# Patient Record
Sex: FEMALE | Race: WHITE | Employment: OTHER | URBAN - METROPOLITAN AREA
[De-identification: names, ages, dates, MRNs, and addresses within clinical notes are randomized per-mention and may not be internally consistent; named-entity substitution may affect disease eponyms.]

---

## 2017-02-22 RX ORDER — METOPROLOL TARTRATE 50 MG/1
TABLET ORAL
Qty: 180 TAB | Refills: 0 | Status: SHIPPED | OUTPATIENT
Start: 2017-02-22 | End: 2017-06-05 | Stop reason: SDUPTHER

## 2017-02-22 RX ORDER — CANDESARTAN 16 MG/1
TABLET ORAL
Qty: 90 TAB | Refills: 0 | Status: SHIPPED | OUTPATIENT
Start: 2017-02-22 | End: 2017-03-03

## 2017-02-22 RX ORDER — AMLODIPINE BESYLATE 10 MG/1
10 TABLET ORAL AS NEEDED
Qty: 90 TAB | Refills: 0 | Status: SHIPPED | OUTPATIENT
Start: 2017-02-22 | End: 2017-06-13 | Stop reason: SDUPTHER

## 2017-02-27 ENCOUNTER — TELEPHONE (OUTPATIENT)
Dept: CARDIOLOGY CLINIC | Age: 82
End: 2017-02-27

## 2017-02-27 NOTE — TELEPHONE ENCOUNTER
Patient called regarding rx for Atacand, she would like for someone to call her at [phone]. Thank you

## 2017-02-27 NOTE — TELEPHONE ENCOUNTER
Patient identified times 2. Judie is now 139.00 for a 90 day supply. She will look into seeing if she has a deductible that has to be met. She wants to know if there is another alternative that may be cheaper. permission given to leave a detailed voicemail if no answer.

## 2017-03-01 NOTE — TELEPHONE ENCOUNTER
Patient identified times 2. Spoke with patients  and relayed Dr. Duy Verma message as well as recommendations. They will notify the nurse once they come to a decision. Understanding verbalized.

## 2017-03-01 NOTE — TELEPHONE ENCOUNTER
Tell her to check cost of Diovan or Benicar  Last choice for me would be Losartan  She should call drug plan to get costs to her

## 2017-03-02 NOTE — TELEPHONE ENCOUNTER
Patient called and stated she checked on drug cost and losartan is the only tier 1 drug in her price range.  She can be reached at [phone]

## 2017-03-03 RX ORDER — LOSARTAN POTASSIUM 50 MG/1
50 TABLET ORAL DAILY
Qty: 90 TAB | Refills: 0 | Status: SHIPPED | OUTPATIENT
Start: 2017-03-03 | End: 2017-06-05 | Stop reason: SDUPTHER

## 2017-03-03 NOTE — TELEPHONE ENCOUNTER
Patient identified times 2. Informed of medication recommendations as listed and confirmed pharmacy. Also reminded her that her and her  still need follow ups with Dr. Gio Suarez. Understanding verbalized.   Verbal order per Dr. Gio Suarez

## 2017-04-20 RX ORDER — HYDROCHLOROTHIAZIDE 25 MG/1
25 TABLET ORAL DAILY
Qty: 90 TAB | Refills: 0 | Status: SHIPPED | OUTPATIENT
Start: 2017-04-20 | End: 2017-06-05 | Stop reason: SDUPTHER

## 2017-04-20 RX ORDER — ATORVASTATIN CALCIUM 20 MG/1
20 TABLET, FILM COATED ORAL
Qty: 90 TAB | Refills: 0 | Status: SHIPPED | OUTPATIENT
Start: 2017-04-20 | End: 2017-06-05 | Stop reason: SDUPTHER

## 2017-06-05 RX ORDER — AMLODIPINE BESYLATE 10 MG/1
10 TABLET ORAL AS NEEDED
Qty: 90 TAB | Refills: 0 | Status: SHIPPED | OUTPATIENT
Start: 2017-06-05 | End: 2017-06-13 | Stop reason: SDUPTHER

## 2017-06-05 RX ORDER — LOSARTAN POTASSIUM 50 MG/1
50 TABLET ORAL DAILY
Qty: 90 TAB | Refills: 0 | Status: SHIPPED | OUTPATIENT
Start: 2017-06-05 | End: 2017-06-13 | Stop reason: SDUPTHER

## 2017-06-05 RX ORDER — ATORVASTATIN CALCIUM 20 MG/1
20 TABLET, FILM COATED ORAL
Qty: 90 TAB | Refills: 0 | Status: SHIPPED | OUTPATIENT
Start: 2017-06-05 | End: 2017-06-13 | Stop reason: SDUPTHER

## 2017-06-05 RX ORDER — HYDROCHLOROTHIAZIDE 25 MG/1
25 TABLET ORAL DAILY
Qty: 90 TAB | Refills: 0 | Status: SHIPPED | OUTPATIENT
Start: 2017-06-05 | End: 2017-06-13 | Stop reason: SDUPTHER

## 2017-06-05 RX ORDER — METOPROLOL TARTRATE 50 MG/1
50 TABLET ORAL 2 TIMES DAILY
Qty: 180 TAB | Refills: 0 | Status: SHIPPED | OUTPATIENT
Start: 2017-06-05 | End: 2017-06-13 | Stop reason: SDUPTHER

## 2017-06-05 NOTE — TELEPHONE ENCOUNTER
Requested Prescriptions     Signed Prescriptions Disp Refills    amLODIPine (NORVASC) 10 mg tablet 90 Tab 0     Sig: Take 1 Tab by mouth as needed. PRN for SBP > 150. Keep appointment on 6-13-17 for further refills. Authorizing Provider: Lauryn Reyes     Ordering User: Lesvia Eden    atorvastatin (LIPITOR) 20 mg tablet 90 Tab 0     Sig: Take 1 Tab by mouth nightly. Keep appointment on 6-13-17 for further refills. Authorizing Provider: Lauryn Reyes     Ordering User: Lesvia Eden    metoprolol tartrate (LOPRESSOR) 50 mg tablet 180 Tab 0     Sig: Take 1 Tab by mouth two (2) times a day. Keep appointment on 6-13-17 for further refills. Authorizing Provider: Lauryn Hyatt User: Lesvia Eden    hydroCHLOROthiazide (HYDRODIURIL) 25 mg tablet 90 Tab 0     Sig: Take 1 Tab by mouth daily. Keep appointment on 6-13-17 for further refills. Authorizing Provider: Lauryn Hyatt User: Lesvia Eden    losartan (COZAAR) 50 mg tablet 90 Tab 0     Sig: Take 1 Tab by mouth daily. Keep appointment on 6-13-17 for further refills. Authorizing Provider: Lauryn Hyatt User: Lesvia Eden     Verbal order per Dr. Mary Tucker.    Follow up scheduled for 6-13-17 (overdue).

## 2017-06-13 ENCOUNTER — OFFICE VISIT (OUTPATIENT)
Dept: CARDIOLOGY CLINIC | Age: 82
End: 2017-06-13

## 2017-06-13 VITALS
DIASTOLIC BLOOD PRESSURE: 64 MMHG | HEIGHT: 64 IN | HEART RATE: 60 BPM | RESPIRATION RATE: 16 BRPM | OXYGEN SATURATION: 98 % | SYSTOLIC BLOOD PRESSURE: 120 MMHG | BODY MASS INDEX: 23.76 KG/M2 | WEIGHT: 139.2 LBS

## 2017-06-13 DIAGNOSIS — Z95.1 S/P CABG X 3: ICD-10-CM

## 2017-06-13 DIAGNOSIS — E78.00 PURE HYPERCHOLESTEROLEMIA: ICD-10-CM

## 2017-06-13 DIAGNOSIS — I25.10 ATHEROSCLEROSIS OF NATIVE CORONARY ARTERY OF NATIVE HEART WITHOUT ANGINA PECTORIS: Primary | ICD-10-CM

## 2017-06-13 DIAGNOSIS — I10 ESSENTIAL HYPERTENSION, BENIGN: ICD-10-CM

## 2017-06-13 DIAGNOSIS — R60.9 EDEMA, UNSPECIFIED TYPE: ICD-10-CM

## 2017-06-13 DIAGNOSIS — T46.4X5A COUGH DUE TO ACE INHIBITOR: ICD-10-CM

## 2017-06-13 DIAGNOSIS — R05.8 COUGH DUE TO ACE INHIBITOR: ICD-10-CM

## 2017-06-13 RX ORDER — LOSARTAN POTASSIUM 50 MG/1
50 TABLET ORAL DAILY
Qty: 90 TAB | Refills: 1 | Status: SHIPPED | OUTPATIENT
Start: 2017-06-13 | End: 2018-02-23 | Stop reason: SDUPTHER

## 2017-06-13 RX ORDER — ATORVASTATIN CALCIUM 20 MG/1
20 TABLET, FILM COATED ORAL
Qty: 90 TAB | Refills: 1 | Status: SHIPPED | OUTPATIENT
Start: 2017-06-13 | End: 2018-05-14 | Stop reason: SDUPTHER

## 2017-06-13 RX ORDER — AMLODIPINE BESYLATE 10 MG/1
10 TABLET ORAL AS NEEDED
Qty: 90 TAB | Refills: 1 | Status: SHIPPED | OUTPATIENT
Start: 2017-06-13 | End: 2018-02-23 | Stop reason: SDUPTHER

## 2017-06-13 RX ORDER — METOPROLOL TARTRATE 50 MG/1
50 TABLET ORAL 2 TIMES DAILY
Qty: 180 TAB | Refills: 1 | Status: SHIPPED | OUTPATIENT
Start: 2017-06-13 | End: 2018-02-10 | Stop reason: SDUPTHER

## 2017-06-13 RX ORDER — HYDROCHLOROTHIAZIDE 25 MG/1
25 TABLET ORAL DAILY
Qty: 90 TAB | Refills: 1 | Status: SHIPPED | OUTPATIENT
Start: 2017-06-13 | End: 2018-08-06 | Stop reason: SDUPTHER

## 2017-06-13 NOTE — MR AVS SNAPSHOT
Visit Information Date & Time Provider Department Dept. Phone Encounter #  
 6/13/2017  9:00 AM Irina Rowland MD [facility] [phone] [id number] Upcoming Health Maintenance Date Due DTaP/Tdap/Td series (1 - Tdap) 5/7/1952 ZOSTER VACCINE AGE 60> 5/7/1991 GLAUCOMA SCREENING Q2Y 5/7/1996 OSTEOPOROSIS SCREENING (DEXA) 5/7/1996 MEDICARE YEARLY EXAM 5/7/1996 Pneumococcal 65+ Low/Medium Risk (2 of 2 - PPSV23) 1/1/2012 INFLUENZA AGE 9 TO ADULT 8/1/2017 Allergies as of 6/13/2017  Review Complete On: 6/13/2017 By: Irina Rowland MD  
  
 Severity Noted Reaction Type Reactions Ciprofloxacin High 12/02/2010    Itching, Other (comments) Cipro, \"turn beet red like sunburn\" Codeine  12/02/2010    Itching Lisinopril  12/29/2010    Cough Lovastatin  12/29/2010    Diarrhea Current Immunizations  Reviewed on 6/18/2011 Name Date Influenza Vaccine Split 9/17/2010 Pneumococcal Vaccine (Unspecified Type) 1/1/2007 Not reviewed this visit Vitals BP Pulse Resp Height(growth percentile) Weight(growth percentile) SpO2  
 120/64 (BP 1 Location: Left arm, BP Patient Position: Sitting) 60 16 5' 4\" (1.626 m) 139 lb 3.2 oz (63.1 kg) 98% BMI Smoking Status 23.89 kg/m2 Never Smoker Vitals History BMI and BSA Data Body Mass Index Body Surface Area  
 23.89 kg/m 2 1.69 m 2 Preferred Pharmacy Pharmacy Name Phone [facility], [address] [phone] Your Updated Medication List  
  
   
This list is accurate as of: 6/13/17  9:43 AM.  Always use your most recent med list. amLODIPine 10 mg tablet Commonly known as:  Eloise Medici Take 1 Tab by mouth as needed. PRN for SBP > 150. aspirin 81 mg chewable tablet Take 81 mg by mouth daily. atorvastatin 20 mg tablet Commonly known as:  LIPITOR Take 1 Tab by mouth nightly. doxazosin 1 mg tablet Commonly known as:  CARDURA Take  by mouth daily. hydroCHLOROthiazide 25 mg tablet Commonly known as:  HYDRODIURIL Take 1 Tab by mouth daily. losartan 50 mg tablet Commonly known as:  COZAAR Take 1 Tab by mouth daily. metoprolol tartrate 50 mg tablet Commonly known as:  LOPRESSOR Take 1 Tab by mouth two (2) times a day. naproxen 250 mg tablet Commonly known as:  NAPROSYN Take  by mouth two (2) times daily (with meals). Prescriptions Sent to Pharmacy Refills  
 metoprolol tartrate (LOPRESSOR) 50 mg tablet 1 Sig: Take 1 Tab by mouth two (2) times a day. Class: Normal  
 Pharmacy: [place] [address] Ph #: [phone] Route: Oral  
 hydroCHLOROthiazide (HYDRODIURIL) 25 mg tablet 1 Sig: Take 1 Tab by mouth daily. Class: Normal  
 Pharmacy: [place] [address] Ph #: [phone] Route: Oral  
 amLODIPine (NORVASC) 10 mg tablet 1 Sig: Take 1 Tab by mouth as needed. PRN for SBP > 150. Class: Normal  
 Pharmacy: [place] [address] Ph #: [phone] Route: Oral  
 losartan (COZAAR) 50 mg tablet 1 Sig: Take 1 Tab by mouth daily. Class: Normal  
 Pharmacy: [place] [address] Ph #: [phone] Route: Oral  
 atorvastatin (LIPITOR) 20 mg tablet 1 Sig: Take 1 Tab by mouth nightly. Class: Normal  
 Pharmacy: [place] [address] Ph #: [phone] Route: Oral  
  
Patient Instructions You will need to follow up in clinic with Dr. Neil Joy in 1 year. Introducing \[facility] [place] [facility]\"" & [facility] SERVICES! Dear Jose A Mendes: Thank you for requesting a [employer] account. Our records indicate that you already have an active [employer] account. You can access your account anytime at [email or web address] Did you know that you can access your hospital and ER discharge instructions at any time in [employer]? You can also review all of your test results from your hospital stay or ER visit. Additional Information If you have questions, please visit the Frequently Asked Questions section of the [employer] website at [email or web address] Remember, [employer] is NOT to be used for urgent needs. For medical emergencies, dial 911. Now available from your iPhone and Android! Please provide this summary of care documentation to your next provider. Your primary care clinician is listed as Payton Shahid. If you have any questions after today's visit, please call [phone].

## 2017-06-13 NOTE — PROGRESS NOTES
Kiet James     5/7/1931       Jason Barrios MD, [facility]  Date of Visit-6/13/2017   PCP is Emeka Chacon MD   [facility] [place]  [facility]  HPI:  Kiet James is a 80 y.o. female   ONE YEAR FU   Hx of CAD, HTN, CABG, dyslipidemia   Unable to tolerate satin or ACE inhibitor   Subjective:  Denies any specific complaints of chest pain, chest pressure, PND, orthopnea. She's taking Amlodipine daily and likes it. She gets some mild edema at night. Her blood pressure is 120. She's tolerating multiple meds for blood pressure currently well and getting labs with primary care    AssessCdment/Plan:     Impression/Plan:  1. CAD, no angina. Continue beta blocker and aspirin. 2. Hypertension, significant improvement with changes in meds. 3. Statin intolerance, but she will tolerate some low dose of Lipitor. 4. Edema is controlled. Follow up in one year. Impression: The primary encounter diagnosis was Atherosclerosis of native coronary artery of native heart without angina pectoris. Diagnoses of Essential hypertension, benign, Pure hypercholesterolemia, Edema, unspecified type, S/P CABG x 3, and Cough due to ACE inhibitor were also pertinent to this visit. Cardiac History:   Cath 6-:-- Global left ventricular function was normal. EF calculated by contrast  ventriculography was 65 %. -- CORONARY CIRCULATION:  -- Proximal LAD: There was a 50 % stenosis. -- Mid LAD: There was a long diffusely diseased segment tapering down to  a 95 % stenosis. With a 70% stenosis more distally. -- Proximal  circumflex: There was a very proximal 30-35% followed by a 90 % stenosis. -- 1st obtuse marginal: There was a 90 % stenosis. -- RCA: The vessel was small sized. Non-dominant vessel. -- Ostial RCA: There was a 90 % stenosis. -- Proximal RCA: There was a 50- 70 % long stenosis.   -- Mid RCA: There was a 80 % stenosis in the proximal portion of this  Segment. CABG- CABG - OFF PUMP - CABGx 3, lima, eric, epi aortic ultrasound - off pump, rightt endoscopic vein harvest; [id number] [place] [address]  to Juan TORRES Ao to OM1 and OM2     ROS-except as noted above. . A complete cardiac and respiratory are reviewed and negative except as above ; Resp-denies wheezing  or productive cough,. Const- No unusual weight loss or fever; Neuro-no recent seizure or CVA ; GI- No BRBPR, abdom pain, bloating ; - no  hematuria   see supplement sheet, initialed and to be scanned by staff  Past Medical History:   Diagnosis Date    Carotid arterial disease ([facility] [address]     mild 0-49%    Coronary atherosclerosis of native coronary artery     Cath 2011 with multivessel cad    Cough due to ACE inhibitor     Diverticulitis     Dyslipidemia     Hypertension     Macular degeneration     S/P CABG (coronary artery bypass graft)     6-19-11 CABG - OFF PUMP - CABGx 3, lima, eric, epi aortic ultrasound - off pump, rightt endoscopic vein harvest; [id number] [place] [address] Juan Pena to OM1 and OM2      Social Hx= reports that she has never smoked. She has never used smokeless tobacco. She reports that she drinks about 0.6 oz of alcohol per week  She reports that she does not use illicit drugs. Exam and Labs:    Visit Vitals    /64 (BP 1 Location: Left arm, BP Patient Position: Sitting)    Pulse 60    Resp 16    Ht 5' 4\" (1.626 m)    Wt 139 lb 3.2 oz (63.1 kg)    SpO2 98%    BMI 23.89 kg/m2      Constitutional:  NAD, comfortable  Head: NC,AT. Eyes: No scleral icterus. Neck:  Neck supple. No JVD present. Throat: moist mucous membranes. Chest: Effort normal & normal respiratory excursion . Neurological: alert, conversant and oriented . Skin: Skin is not cold. No obvious systemic rash noted. Not diaphoretic. No erythema. Psychiatric:  Grossly normal mood and affect. Behavior appears normal. Extremities:  no clubbing or cyanosis.  Abdomen: non distended    Lungs:breath sounds normal. No stridor. distress, wheezes or  Rales. Heart:    normal rate, regular rhythm, normal S1, S2, no murmurs, rubs, clicks or gallops , PMI non displaced. Edema: Edema is none. Wt Readings from Last 3 Encounters:   06/13/17 139 lb 3.2 oz (63.1 kg)   07/20/16 144 lb (65.3 kg)   12/07/15 148 lb 6.4 oz (67.3 kg)      BP Readings from Last 3 Encounters:   06/13/17 120/64   07/20/16 150/60   12/07/15 130/50      Current Outpatient Prescriptions   Medication Sig    amLODIPine (NORVASC) 10 mg tablet Take 1 Tab by mouth as needed. PRN for SBP > 150. Keep appointment on 6-13-17 for further refills.  atorvastatin (LIPITOR) 20 mg tablet Take 1 Tab by mouth nightly. Keep appointment on 6-13-17 for further refills.  metoprolol tartrate (LOPRESSOR) 50 mg tablet Take 1 Tab by mouth two (2) times a day. Keep appointment on 6-13-17 for further refills.  hydroCHLOROthiazide (HYDRODIURIL) 25 mg tablet Take 1 Tab by mouth daily. Keep appointment on 6-13-17 for further refills.  losartan (COZAAR) 50 mg tablet Take 1 Tab by mouth daily. Keep appointment on 6-13-17 for further refills.  aspirin 81 mg chewable tablet Take 81 mg by mouth daily.  doxazosin (CARDURA) 1 mg tablet Take  by mouth daily.  naproxen (NAPROSYN) 250 mg tablet Take  by mouth two (2) times daily (with meals). No current facility-administered medications for this visit. Impression see above.

## 2018-01-15 ENCOUNTER — TELEPHONE (OUTPATIENT)
Dept: CARDIOLOGY CLINIC | Age: 83
End: 2018-01-15

## 2018-01-15 DIAGNOSIS — I10 ESSENTIAL HYPERTENSION, BENIGN: Primary | ICD-10-CM

## 2018-01-15 DIAGNOSIS — E78.00 PURE HYPERCHOLESTEROLEMIA: ICD-10-CM

## 2018-01-15 NOTE — TELEPHONE ENCOUNTER
Patient would like to know if she will need blood work done for appointment on 2/07/18. Patient can be reached at [phone]. Thanks !

## 2018-01-16 NOTE — TELEPHONE ENCOUNTER
Verified patient with two types of identifiers. Informed patient of need for labs for next appointment. Verbalizes understanding. And will call with any questions or concerns.   Patient requested to have CBC drawn to check hgb level  Will ask MD  Patient request labs orders to be mailed    CBC, BMP, LFTs and fasting Lipids entered   Verbal order per Dr. Adarsh Toussaint

## 2018-01-26 ENCOUNTER — HOSPITAL ENCOUNTER (OUTPATIENT)
Dept: LAB | Age: 83
Discharge: HOME | End: 2018-01-26
Payer: MEDICARE

## 2018-01-26 PROCEDURE — 80061 LIPID PANEL: CPT

## 2018-01-26 PROCEDURE — 80076 HEPATIC FUNCTION PANEL: CPT

## 2018-01-26 PROCEDURE — 85025 COMPLETE CBC W/AUTO DIFF WBC: CPT

## 2018-01-26 PROCEDURE — 80048 BASIC METABOLIC PNL TOTAL CA: CPT

## 2018-01-26 PROCEDURE — 36415 COLL VENOUS BLD VENIPUNCTURE: CPT

## 2018-01-27 LAB
ALBUMIN SERPL-MCNC: 4 G/DL (ref 3.5–4.7)
ALP SERPL-CCNC: 92 IU/L (ref 39–117)
ALT SERPL-CCNC: 20 IU/L (ref 0–32)
AST SERPL-CCNC: 23 IU/L (ref 0–40)
BASOPHILS # BLD AUTO: 0.1 X10E3/UL (ref 0–0.2)
BASOPHILS NFR BLD AUTO: 2 %
BILIRUB DIRECT SERPL-MCNC: 0.17 MG/DL (ref 0–0.4)
BILIRUB SERPL-MCNC: 0.7 MG/DL (ref 0–1.2)
BUN SERPL-MCNC: 23 MG/DL (ref 8–27)
BUN/CREAT SERPL: 27 (ref 12–28)
CALCIUM SERPL-MCNC: 9.2 MG/DL (ref 8.7–10.3)
CHLORIDE SERPL-SCNC: 98 MMOL/L (ref 96–106)
CHOLEST SERPL-MCNC: 177 MG/DL (ref 100–199)
CO2 SERPL-SCNC: 26 MMOL/L (ref 18–29)
CREAT SERPL-MCNC: 0.85 MG/DL (ref 0.57–1)
EOSINOPHIL # BLD AUTO: 0.5 X10E3/UL (ref 0–0.4)
EOSINOPHIL NFR BLD AUTO: 7 %
ERYTHROCYTE [DISTWIDTH] IN BLOOD BY AUTOMATED COUNT: 14.6 % (ref 12.3–15.4)
GFR SERPLBLD CREATININE-BSD FMLA CKD-EPI: 62 ML/MIN/1.73
GFR SERPLBLD CREATININE-BSD FMLA CKD-EPI: 72 ML/MIN/1.73
GLUCOSE SERPL-MCNC: 104 MG/DL (ref 65–99)
HCT VFR BLD AUTO: 35 % (ref 34–46.6)
HDLC SERPL-MCNC: 79 MG/DL
HGB BLD-MCNC: 11.5 G/DL (ref 11.1–15.9)
IMM GRANULOCYTES # BLD: 0 X10E3/UL (ref 0–0.1)
IMM GRANULOCYTES NFR BLD: 0 %
INTERPRETATION, 910389: NORMAL
LDLC SERPL CALC-MCNC: 82 MG/DL (ref 0–99)
LYMPHOCYTES # BLD AUTO: 2.5 X10E3/UL (ref 0.7–3.1)
LYMPHOCYTES NFR BLD AUTO: 37 %
MCH RBC QN AUTO: 29.6 PG (ref 26.6–33)
MCHC RBC AUTO-ENTMCNC: 32.9 G/DL (ref 31.5–35.7)
MCV RBC AUTO: 90 FL (ref 79–97)
MONOCYTES # BLD AUTO: 0.5 X10E3/UL (ref 0.1–0.9)
MONOCYTES NFR BLD AUTO: 7 %
NEUTROPHILS # BLD AUTO: 3.1 X10E3/UL (ref 1.4–7)
NEUTROPHILS NFR BLD AUTO: 47 %
PLATELET # BLD AUTO: 249 X10E3/UL (ref 150–379)
POTASSIUM SERPL-SCNC: 4.9 MMOL/L (ref 3.5–5.2)
PROT SERPL-MCNC: 6.9 G/DL (ref 6–8.5)
RBC # BLD AUTO: 3.89 X10E6/UL (ref 3.77–5.28)
SODIUM SERPL-SCNC: 137 MMOL/L (ref 134–144)
TRIGL SERPL-MCNC: 81 MG/DL (ref 0–149)
VLDLC SERPL CALC-MCNC: 16 MG/DL (ref 5–40)
WBC # BLD AUTO: 6.6 X10E3/UL (ref 3.4–10.8)

## 2018-01-29 ENCOUNTER — TELEPHONE (OUTPATIENT)
Dept: CARDIOLOGY CLINIC | Age: 83
End: 2018-01-29

## 2018-01-29 NOTE — TELEPHONE ENCOUNTER
Verified patient with two types of identifiers. Notified patient of results. Patient verbalizes understanding and has no questions at this time. Patient requested results to be sent to PCP.

## 2018-01-29 NOTE — TELEPHONE ENCOUNTER
----- Message from Jimena Almaraz MD sent at 1/28/2018  8:51 PM EST -----  Labs fine  Nurse to call pt for test result

## 2018-01-29 NOTE — TELEPHONE ENCOUNTER
Faxed Recent lab results to Dr. Christina Parra office at fax number [phone]. Fax confirmation received.

## 2018-02-07 ENCOUNTER — OFFICE VISIT (OUTPATIENT)
Dept: CARDIOLOGY CLINIC | Age: 83
End: 2018-02-07

## 2018-02-07 VITALS
SYSTOLIC BLOOD PRESSURE: 130 MMHG | BODY MASS INDEX: 24.01 KG/M2 | HEART RATE: 55 BPM | WEIGHT: 140.6 LBS | DIASTOLIC BLOOD PRESSURE: 56 MMHG | OXYGEN SATURATION: 98 % | RESPIRATION RATE: 16 BRPM | HEIGHT: 64 IN

## 2018-02-07 DIAGNOSIS — Z95.1 S/P CABG X 3: ICD-10-CM

## 2018-02-07 DIAGNOSIS — H35.30 MACULAR DEGENERATION: ICD-10-CM

## 2018-02-07 DIAGNOSIS — E78.00 PURE HYPERCHOLESTEROLEMIA: ICD-10-CM

## 2018-02-07 DIAGNOSIS — I10 ESSENTIAL HYPERTENSION, BENIGN: ICD-10-CM

## 2018-02-07 DIAGNOSIS — I25.10 ATHEROSCLEROSIS OF NATIVE CORONARY ARTERY OF NATIVE HEART WITHOUT ANGINA PECTORIS: Primary | ICD-10-CM

## 2018-02-07 NOTE — PROGRESS NOTES
Christiano Rice     5/7/1931       Jason Cox MD, [facility]  Date of Visit-2/7/2018   PCP is Jessika Martinez MD   [facility] [place]  [facility]  HPI:  Christiano Rice is a 80 y.o. female   Follow up of CAD, CABG, HTN and dyslipidemia. Unable to tolerate statin or ACE. Lab work stable   Lab Results   Component Value Date/Time    Cholesterol, total 177 01/26/2018 09:15 AM    HDL Cholesterol 79 01/26/2018 09:15 AM    LDL, calculated 82 01/26/2018 09:15 AM    VLDL, calculated 16 01/26/2018 09:15 AM    Triglyceride 81 01/26/2018 09:15 AM      Lab Results   Component Value Date/Time    Sodium 137 01/26/2018 09:15 AM    Potassium 4.9 01/26/2018 09:15 AM    Chloride 98 01/26/2018 09:15 AM    CO2 26 01/26/2018 09:15 AM    Anion gap 16 (H) 06/19/2011 10:05 AM    Glucose 104 (H) 01/26/2018 09:15 AM    BUN 23 01/26/2018 09:15 AM    Creatinine 0.85 01/26/2018 09:15 AM    BUN/Creatinine ratio 27 01/26/2018 09:15 AM    GFR est AA 72 01/26/2018 09:15 AM    GFR est non-AA 62 01/26/2018 09:15 AM    Calcium 9.2 01/26/2018 09:15 AM      The pt states that her macular degeneration is getting worse. Her  states that her blood pressure is not controlled. She states that she is currently taking amlodipine whenever her blood pressure is above 150 but she is unclear when she needs to take it. The pt states that her blood pressure is usually under 150 but can be as high as 180 and never below 130. Her average number is about 150. The pt states that she is fatigued. Denies chest pain, edema, syncope or shortness of breath at rest, has no tachycardia, palpitations or sense of arrhythmia. EKG: sinus bradycardia WNL at 55 normal axis and intervals     Assessment/Plan:     Future Appointments  Date Time Provider Dalia Khan   8/22/2018 11:40 AM Fito Davis MD [facility]      Patient Instructions   Increase you Losartan from 50mg to 100 mg for 2 weeks. Check your BP at home once daily at random times    Call us at [phone] IN 2 weeks to let us know how it going. Return for follow up in 6 months     Key CAD CHF Meds             metoprolol tartrate (LOPRESSOR) 50 mg tablet  (Taking) Take 1 Tab by mouth two (2) times a day. hydroCHLOROthiazide (HYDRODIURIL) 25 mg tablet  (Taking) Take 1 Tab by mouth daily. amLODIPine (NORVASC) 10 mg tablet  (Taking) Take 1 Tab by mouth as needed. PRN for SBP > 150. losartan (COZAAR) 50 mg tablet  (Taking) Take 1 Tab by mouth daily. aspirin 81 mg chewable tablet  (Taking) Take 81 mg by mouth daily. doxazosin (CARDURA) 1 mg tablet  (Taking) Take  by mouth daily. 1. CAD, prior CABG no angina, stable     2. HTN multiple meds, I would prefer to try to amplify the doses of the meds she is already on I will increase the losartan to 100 mg daily, our goal is a blood pressure mostly below 150 and ideally between 120-130, she will call me in 2 weeks with numbers and then will either change the ARB or add Norvasc 10 mg a day, likely both. 3. Dyslipidemia with statin intolerance LDL is at goal, doing well. 4. Fatigue, previous anemia but now HGB is normal, mainly limited by her macular degeneration. Impression:   1. Atherosclerosis of native coronary artery of native heart without angina pectoris    2. Essential hypertension, benign    3. Pure hypercholesterolemia    4. S/P CABG x 3    5. Macular degeneration       Cardiac History:   Cath 6-:-- Global left ventricular function was normal. EF calculated by contrast  ventriculography was 65 %. -- CORONARY CIRCULATION:  -- Proximal LAD: There was a 50 % stenosis. -- Mid LAD: There was a long diffusely diseased segment tapering down to  a 95 % stenosis. With a 70% stenosis more distally. -- Proximal  circumflex: There was a very proximal 30-35% followed by a 90 % stenosis. -- 1st obtuse marginal: There was a 90 % stenosis.   -- RCA: The vessel was small sized. Non-dominant vessel. -- Ostial RCA: There was a 90 % stenosis. -- Proximal RCA: There was a 50- 70 % long stenosis. -- Mid RCA: There was a 80 % stenosis in the proximal portion of this  Segment. CABG- CABG - OFF PUMP - CABGx 3, lima, eric, epi aortic ultrasound - off pump, rightt endoscopic vein harvest; [address] [place] [address]  to LAD, Svg Ao to OM1 and OM2     ROS-except as noted above. . A complete cardiac and respiratory are reviewed and negative except as above ; Resp-denies wheezing  or productive cough,. Const- No unusual weight loss or fever; Neuro-no recent seizure or CVA ; GI- No BRBPR, abdom pain, bloating ; - no  hematuria   see supplement sheet, initialed and to be scanned by staff  Past Medical History:   Diagnosis Date    Carotid arterial disease ([facility] [address]     mild 0-49%    Coronary atherosclerosis of native coronary artery     Cath 2011 with multivessel cad    Cough due to ACE inhibitor     Diverticulitis     Dyslipidemia     Hypertension     Macular degeneration     S/P CABG (coronary artery bypass graft)     6-19-11 CABG - OFF PUMP - CABGx 3, lima, eric, epi aortic ultrasound - off pump, rightt endoscopic vein harvest; [address] [place] [address]  to LAD, Svg Ao to OM1 and OM2      Social Hx= reports that she has never smoked. She has never used smokeless tobacco. She reports that she drinks about 0.6 oz of alcohol per week  She reports that she does not use illicit drugs. Exam and Labs:  /56 (BP 1 Location: Left arm, BP Patient Position: Sitting)  Pulse (!) 55  Resp 16  Ht 5' 4\" (1.626 m)  Wt 140 lb 9.6 oz (63.8 kg)  SpO2 98%  BMI 24.13 kg/j2Nejdxqzjohhczl:  NAD, comfortable  Head: NC,AT. Eyes: No scleral icterus. Neck:  Neck supple. No JVD present. Throat: moist mucous membranes. Chest: Effort normal & normal respiratory excursion . Neurological: alert, conversant and oriented . Skin: Skin is not cold. No obvious systemic rash noted.  Not diaphoretic. No erythema. Psychiatric:  Grossly normal mood and affect. Behavior appears normal. Extremities:  no clubbing or cyanosis. Abdomen: non distended    Lungs:breath sounds normal. No stridor. distress, wheezes or  Rales. Heart: normal rate, regular rhythm, normal S1, S2, no murmurs, rubs, clicks or gallops , PMI non displaced. Edema: Edema is none. Lab Results   Component Value Date/Time    Cholesterol, total 177 01/26/2018 09:15 AM    HDL Cholesterol 79 01/26/2018 09:15 AM    LDL, calculated 82 01/26/2018 09:15 AM    Triglyceride 81 01/26/2018 09:15 AM     Lab Results   Component Value Date/Time    Sodium 137 01/26/2018 09:15 AM    Potassium 4.9 01/26/2018 09:15 AM    Chloride 98 01/26/2018 09:15 AM    CO2 26 01/26/2018 09:15 AM    Anion gap 16 (H) 06/19/2011 10:05 AM    Glucose 104 (H) 01/26/2018 09:15 AM    BUN 23 01/26/2018 09:15 AM    Creatinine 0.85 01/26/2018 09:15 AM    BUN/Creatinine ratio 27 01/26/2018 09:15 AM    GFR est AA 72 01/26/2018 09:15 AM    GFR est non-AA 62 01/26/2018 09:15 AM    Calcium 9.2 01/26/2018 09:15 AM      Wt Readings from Last 3 Encounters:   02/07/18 140 lb 9.6 oz (63.8 kg)   06/13/17 139 lb 3.2 oz (63.1 kg)   07/20/16 144 lb (65.3 kg)      BP Readings from Last 3 Encounters:   02/07/18 130/56   06/13/17 120/64   07/20/16 150/60      Current Outpatient Prescriptions   Medication Sig    metoprolol tartrate (LOPRESSOR) 50 mg tablet Take 1 Tab by mouth two (2) times a day.  hydroCHLOROthiazide (HYDRODIURIL) 25 mg tablet Take 1 Tab by mouth daily.  amLODIPine (NORVASC) 10 mg tablet Take 1 Tab by mouth as needed. PRN for SBP > 150.  losartan (COZAAR) 50 mg tablet Take 1 Tab by mouth daily.  atorvastatin (LIPITOR) 20 mg tablet Take 1 Tab by mouth nightly.  aspirin 81 mg chewable tablet Take 81 mg by mouth daily.  doxazosin (CARDURA) 1 mg tablet Take  by mouth daily.  naproxen (NAPROSYN) 250 mg tablet Take  by mouth two (2) times daily (with meals). No current facility-administered medications for this visit. Impression see above.       Written by Lois Lund, as dictated by Elvin Stark MD.

## 2018-02-07 NOTE — PATIENT INSTRUCTIONS
Increase you Losartan from 50mg to 100 mg for 2 weeks. Check your BP at home once daily at random times    Call us at [phone] IN 2 weeks to let us know how it going.     Return for follow up in 6 months

## 2018-02-07 NOTE — MR AVS SNAPSHOT
[address] [facility] [address] [facility] 
[phone] Patient: Christophe Sierra MRN: [id number] CGQ:2/5[birth year]70 Visit Information Date & Time Provider Department Dept. Phone Encounter #  
 2/7/2018 11:00 AM Luther Georges MD [facility] [phone] [id number] Your Appointments 8/22/2018 11:40 AM  
ESTABLISHED PATIENT with Luther Georges MD  
[facility] (PIERRE SCHEDULING) Appt Note: 1 yr f/u; 6 mo fu  
 [address] [facility] [address] 1007 [facility]  
54 [facility] Upcoming Health Maintenance Date Due DTaP/Tdap/Td series (1 - Tdap) 5/7/1952 ZOSTER VACCINE AGE 60> 3/7/1991 GLAUCOMA SCREENING Q2Y 5/7/1996 OSTEOPOROSIS SCREENING (DEXA) 5/7/1996 MEDICARE YEARLY EXAM 5/7/1996 Pneumococcal 65+ Low/Medium Risk (2 of 2 - PPSV23) 1/1/2012 Influenza Age 5 to Adult 8/1/2017 Allergies as of 2/7/2018  Review Complete On: 2/7/2018 By: Luther Georges MD  
  
 Severity Noted Reaction Type Reactions Ciprofloxacin High 12/02/2010    Itching, Other (comments) Cipro, \"turn beet red like sunburn\" Codeine  12/02/2010    Itching Lisinopril  12/29/2010    Cough Lovastatin  12/29/2010    Diarrhea Current Immunizations  Reviewed on 6/18/2011 Name Date Influenza Vaccine Split 9/17/2010 ZZZ-RETIRED (DO NOT USE) Pneumococcal Vaccine (Unspecified Type) 1/1/2007 Not reviewed this visit You Were Diagnosed With   
  
 Codes Comments Atherosclerosis of native coronary artery of native heart without angina pectoris    -  Primary ICD-10-CM: I25.10 ICD-9-CM: 414.01 Essential hypertension, benign     ICD-10-CM: I10 
ICD-9-CM: 401.1 Pure hypercholesterolemia     ICD-10-CM: E78.00 ICD-9-CM: 272.0 S/P CABG x 3     ICD-10-CM: Z95.1 ICD-9-CM: V45.81 Macular degeneration     ICD-10-CM: H35.30 ICD-9-CM: 362.50 Vitals BP Pulse Resp Height(growth percentile) Weight(growth percentile) SpO2  
 130/56 (BP 1 Location: Left arm, BP Patient Position: Sitting) (!) 55 16 5' 4\" (1.626 m) 140 lb 9.6 oz (63.8 kg) 98% BMI Smoking Status 24.13 kg/m2 Never Smoker BMI and BSA Data Body Mass Index Body Surface Area  
 24.13 kg/m 2 1.7 m 2 Preferred Pharmacy Pharmacy Name Phone [facility], [address] [phone] Your Updated Medication List  
  
   
This list is accurate as of: 2/7/18 11:59 AM.  Always use your most recent med list. amLODIPine 10 mg tablet Commonly known as:  Rulon Elena Take 1 Tab by mouth as needed. PRN for SBP > 150. aspirin 81 mg chewable tablet Take 81 mg by mouth daily. atorvastatin 20 mg tablet Commonly known as:  LIPITOR Take 1 Tab by mouth nightly. doxazosin 1 mg tablet Commonly known as:  CARDURA Take  by mouth daily. hydroCHLOROthiazide 25 mg tablet Commonly known as:  HYDRODIURIL Take 1 Tab by mouth daily. losartan 50 mg tablet Commonly known as:  COZAAR Take 1 Tab by mouth daily. metoprolol tartrate 50 mg tablet Commonly known as:  LOPRESSOR Take 1 Tab by mouth two (2) times a day. naproxen 250 mg tablet Commonly known as:  NAPROSYN Take  by mouth two (2) times daily (with meals). We Performed the Following AMB POC EKG ROUTINE W/ 12 LEADS, INTER & REP [75714 CPT(R)] Patient Instructions Increase you Losartan from 50mg to 100 mg for 2 weeks. Check your BP at home once daily at random times Call us at [phone] IN 2 weeks to let us know how it going. Return for follow up in 6 months Introducing [facility]!    
 Dear Misty Plunkett: 
 Thank you for requesting a [employer] account. Our records indicate that you already have an active [employer] account. You can access your account anytime at [email or web address] Did you know that you can access your hospital and ER discharge instructions at any time in [employer]? You can also review all of your test results from your hospital stay or ER visit. Additional Information If you have questions, please visit the Frequently Asked Questions section of the [employer] website at [email or web address] Remember, [employer] is NOT to be used for urgent needs. For medical emergencies, dial 911. Now available from your iPhone and Android! Please provide this summary of care documentation to your next provider. Your primary care clinician is listed as Tonda Kussmaul. If you have any questions after today's visit, please call [phone].

## 2018-02-14 RX ORDER — METOPROLOL TARTRATE 50 MG/1
TABLET ORAL
Qty: 180 TAB | Refills: 1 | Status: SHIPPED | OUTPATIENT
Start: 2018-02-14 | End: 2018-07-25 | Stop reason: SDUPTHER

## 2018-02-14 NOTE — TELEPHONE ENCOUNTER
Request for metoprolol tartrate 50 mg tab, BID. Last office visit 2/7/18,   Next office visit 8/22/18.      Refills per verbal order from Dr. Marleni Chi.

## 2018-02-16 ENCOUNTER — TELEPHONE (OUTPATIENT)
Dept: CARDIOLOGY CLINIC | Age: 83
End: 2018-02-16

## 2018-02-16 NOTE — TELEPHONE ENCOUNTER
Verified patient with two types of identifiers. Spoke with patient's , Ev Chapman. Concerned for wife's BP  Has been recording BP since last office visit as directed at random times. Patient only recording SBP #, readings are as follows:  154, 186, 135, 124, 158, 184, 163 and today 183. Patient would like to like to do something before going into the weekend with these high BP. Informed them that Dr. Ori Gay was out of the office until Tuesday. Will discuss with partner MD and call patient back with recommendation.

## 2018-02-16 NOTE — TELEPHONE ENCOUNTER
Patients  called stating pts BP is still running high all the time and would like to discuss another medication. Please call him at [phone].  Thank you

## 2018-02-16 NOTE — TELEPHONE ENCOUNTER
Verified patient with two types of identifiers. Per Dayo Cruz NP pt to take amlodipine 10 mg, take 1/2 tab in the evenings. Continue to monitor BP and call office next week with BP update. Verbalizes understanding. And will call with any questions or concerns.

## 2018-02-23 ENCOUNTER — TELEPHONE (OUTPATIENT)
Dept: CARDIOLOGY CLINIC | Age: 83
End: 2018-02-23

## 2018-02-23 RX ORDER — LOSARTAN POTASSIUM 100 MG/1
100 TABLET ORAL DAILY
Qty: 90 TAB | Refills: 1 | Status: SHIPPED | OUTPATIENT
Start: 2018-02-23 | End: 2018-11-09 | Stop reason: SDUPTHER

## 2018-02-23 RX ORDER — AMLODIPINE BESYLATE 10 MG/1
10 TABLET ORAL DAILY
Qty: 90 TAB | Refills: 1 | Status: SHIPPED | OUTPATIENT
Start: 2018-02-23 | End: 2019-08-03

## 2018-02-23 NOTE — TELEPHONE ENCOUNTER
Attempted to reach patient. No Answer, unable to leave message because it kept ringing. Upon return call to inform patient to increase amlodipine to 10 mg daily. Will send in updated prescription to pharmacy.

## 2018-02-23 NOTE — TELEPHONE ENCOUNTER
Verified patient with two types of identifiers. Spoke with Mr. Shorty Pop, pt's . Still concerned with BP. Last week patient amlodipine 5 mg, at night was added to medications. Reports that pt BP are as follows:    2/17 am 145 pm 174  2/18 am 151 pm 151  2/19 am 153 pm 145  2/20 am 162 pm 163  2/21 am 142 pm 188  2/22 am 157 pm 178  2/23 am 139    Will notify MD and return call with recommendation.

## 2018-02-23 NOTE — TELEPHONE ENCOUNTER
Verified patient with two types of identifiers. Informed patient of orders below. Verbalizes understanding. And will call with any questions or concerns.     Patient will call next week with updates

## 2018-02-23 NOTE — TELEPHONE ENCOUNTER
Patients  Homero Sutherland called concerned about patients BP. Please call him at [phone].  Thank you

## 2018-03-05 ENCOUNTER — TELEPHONE (OUTPATIENT)
Dept: CARDIOLOGY CLINIC | Age: 83
End: 2018-03-05

## 2018-03-05 NOTE — TELEPHONE ENCOUNTER
Verified patient with two types of identifiers. Patient's  reported that BP was doing good over weekend, concerned because it was a little low this morning at 98/44. Stated that patient only took losartan 50 mg instead of 100 mg. Patient had some small episodes of dizziness early this morning but denies any current symptoms. Instructed for patient to changed positions slowly today to allow for body to adjust to changes in positions. Patient is now taking Losartan 100 mg, daily in am and amlodipine 10 mg, daily in pm. Pt will recheck BP tonight before BP medication and will call office if BP is still low or if pt has any episodes of dizziness.      BP reported:  2/27/18 /90  2/28/18 /56  3/1/18 /56  3/2/18 /56  3/3/18 /60  3/4/18 /44  3/5/18 BP 98/44

## 2018-03-05 NOTE — TELEPHONE ENCOUNTER
Patient's  called in regarding his wife's BP. Patient's  would like a a call back today. Thanks!   Phone [phone]

## 2018-03-09 NOTE — TELEPHONE ENCOUNTER
Deandra Rider seems like bp are at goal  If persistent low bp that are symptomatic let us know but so far sounds good

## 2018-03-19 ENCOUNTER — TELEPHONE (OUTPATIENT)
Dept: CARDIOLOGY CLINIC | Age: 83
End: 2018-03-19

## 2018-03-19 NOTE — TELEPHONE ENCOUNTER
Patients  called, he said they have been monitoring her BP for about a month now and would like to speak with you   Phone: [phone]

## 2018-03-19 NOTE — TELEPHONE ENCOUNTER
Verified patient with two types of identifiers. Update on BP readings    3/12 /55 /60  3/13 /61 /54  3/15 /55 /55  3/16 /55 /57  3/17 /56 /55  3/18 /63 /53    Informed patient that it seemed that BP was at goal. Would update MD with readings. Instructed pt to continue to monitor BP 2-3 times per week and keep same plan. Verbalizes understanding. And will call with any questions or concerns. Will notify MD and return call to patient if any recommendations are needed.

## 2018-03-30 ENCOUNTER — TELEPHONE (OUTPATIENT)
Dept: CARDIOLOGY CLINIC | Age: 83
End: 2018-03-30

## 2018-03-30 NOTE — TELEPHONE ENCOUNTER
Verified patient with two types of identifiers. Patient's BP are at goal. Patient will monitor BP 1-2 times per week and PRN. Continue current plan and keep f/u in August.  Verbalizes understanding. And will call with any questions or concerns.

## 2018-03-30 NOTE — TELEPHONE ENCOUNTER
Pt's  called to give you the bp readings for the pt. He can be reached @ [phone]. Thanks!   Terrie Martin

## 2018-05-15 RX ORDER — ATORVASTATIN CALCIUM 20 MG/1
TABLET, FILM COATED ORAL
Qty: 90 TAB | Refills: 1 | Status: SHIPPED | OUTPATIENT
Start: 2018-05-15 | End: 2018-11-27 | Stop reason: SDUPTHER

## 2018-05-15 NOTE — TELEPHONE ENCOUNTER
Request for atorvastatin 20 mg tab, nightly. Last office visit 2/7/18,   Next office visit 8/22/18.      Refills per verbal order from Dr. Ival Nissen.

## 2018-07-27 RX ORDER — METOPROLOL TARTRATE 50 MG/1
TABLET ORAL
Qty: 180 TAB | Refills: 0 | Status: SHIPPED | OUTPATIENT
Start: 2018-07-27 | End: 2018-11-09 | Stop reason: SDUPTHER

## 2018-07-27 NOTE — TELEPHONE ENCOUNTER
Requested Prescriptions     Signed Prescriptions Disp Refills    metoprolol tartrate (LOPRESSOR) 50 mg tablet 180 Tab 0     Sig: TAKE ONE TABLET BY MOUTH TWICE A DAY     Authorizing Provider: Jonn Bernheim     Ordering User: Virgilio Castro     Verbal order per Dr. Kyrie Del Rosario.  Follow up scheduled on 8-22-18.

## 2018-10-31 ENCOUNTER — OFFICE VISIT (OUTPATIENT)
Dept: CARDIOLOGY CLINIC | Age: 83
End: 2018-10-31

## 2018-10-31 VITALS
HEIGHT: 64 IN | DIASTOLIC BLOOD PRESSURE: 60 MMHG | OXYGEN SATURATION: 97 % | WEIGHT: 143 LBS | SYSTOLIC BLOOD PRESSURE: 110 MMHG | RESPIRATION RATE: 16 BRPM | BODY MASS INDEX: 24.41 KG/M2 | HEART RATE: 51 BPM

## 2018-10-31 DIAGNOSIS — R05.8 COUGH DUE TO ACE INHIBITOR: ICD-10-CM

## 2018-10-31 DIAGNOSIS — I25.10 ATHEROSCLEROSIS OF NATIVE CORONARY ARTERY OF NATIVE HEART WITHOUT ANGINA PECTORIS: Primary | ICD-10-CM

## 2018-10-31 DIAGNOSIS — I10 ESSENTIAL HYPERTENSION, BENIGN: ICD-10-CM

## 2018-10-31 DIAGNOSIS — Z95.1 S/P CABG X 3: ICD-10-CM

## 2018-10-31 DIAGNOSIS — T46.4X5A COUGH DUE TO ACE INHIBITOR: ICD-10-CM

## 2018-10-31 DIAGNOSIS — E78.00 PURE HYPERCHOLESTEROLEMIA: ICD-10-CM

## 2018-10-31 RX ORDER — CHLORTHALIDONE 25 MG/1
25 TABLET ORAL DAILY
COMMUNITY
Start: 2018-10-23 | End: 2019-10-29

## 2018-10-31 NOTE — PROGRESS NOTES
Visit Vitals  /60 (BP 1 Location: Left arm, BP Patient Position: Sitting)   Pulse (!) 51   Resp 16   Ht 5' 4\" (1.626 m)   Wt 143 lb (64.9 kg)   SpO2 97%   BMI 24.55 kg/m²

## 2018-10-31 NOTE — PROGRESS NOTES
Soila Kaplan     5/7/1931       Jason Elizondo MD, [facility]  Date of Visit-10/31/2018   PCP is Minda Damico MD   [address] [place] [address] [facility] [place]  [facility]  HPI:  Soila Kaplan is a 80 y.o. female   6 months visit follow up of CAD, CABG, HTN and dyslipidemia. Unable to tolerate statin or ACE. Last visit we increased losartan. We increased Norvasc and Cozaar in February. And by March BP seems to be at goal.       Denies chest pain, edema, syncope or shortness of breath at rest, has no tachycardia, palpitations or sense of arrhythmia. Pt has low BP at home occasionally, and she says that she can sense it on her arm/head but denies feeling dizzy or bothersome when this is happening, and she would sit down when sensing low BP. Pt's highest BP at home was 138, and typically would be around 125. Pt is on Norvasc 5 mg. Pt was changed from hydrochlorothiazide to chlorthalidone due to her low sodium/hyponitremia. Pt is unsure whether her sodium has improved since this change however. EKG: Sinus at 51 with first degree AV block. Assessment/Plan:     1. CAD prior CABG no angina. 2. HTN doses have been adjusted she seems to have good control now. The Norvasc is at 5 mg and she's on chlorthalidone. She's at goal.    3. Dyslipidemia. Statin intolerance LDL is at goal.     4. Fatigue diarrhea issues working out with PCP and GI    Follow up in one year     Impression:   1. Atherosclerosis of native coronary artery of native heart without angina pectoris    2. Essential hypertension, benign    3. Pure hypercholesterolemia    4. S/P CABG x 3    5. Cough due to ACE inhibitor       Cardiac History:   Cath 6-:-- Global left ventricular function was normal. EF calculated by contrast  ventriculography was 65 %. -- CORONARY CIRCULATION:  -- Proximal LAD: There was a 50 % stenosis.   -- Mid LAD: There was a long diffusely diseased segment tapering down to  a 95 % stenosis. With a 70% stenosis more distally. -- Proximal  circumflex: There was a very proximal 30-35% followed by a 90 % stenosis. -- 1st obtuse marginal: There was a 90 % stenosis. -- RCA: The vessel was small sized. Non-dominant vessel. -- Ostial RCA: There was a 90 % stenosis. -- Proximal RCA: There was a 50- 70 % long stenosis. -- Mid RCA: There was a 80 % stenosis in the proximal portion of this  Segment. CABG- CABG - OFF PUMP - CABGx 3, lima, eric, epi aortic ultrasound - off pump, rightt endoscopic vein harvest; [address] [place] [address]  to LAD, Svg Ao to OM1 and OM2     ROS-except as noted above. . A complete cardiac and respiratory are reviewed and negative except as above ; Resp-denies wheezing  or productive cough,. Const- No unusual weight loss or fever; Neuro-no recent seizure or CVA ; GI- No BRBPR, abdom pain, bloating ; - no  hematuria   see supplement sheet, initialed and to be scanned by staff  Past Medical History:   Diagnosis Date    Carotid arterial disease ([facility] [address]     mild 0-49%    Coronary atherosclerosis of native coronary artery     Cath 2011 with multivessel cad    Cough due to ACE inhibitor     Diverticulitis     Dyslipidemia     Hypertension     Macular degeneration     S/P CABG (coronary artery bypass graft)     6-19-11 CABG - OFF PUMP - CABGx 3, lima, eric, epi aortic ultrasound - off pump, rightt endoscopic vein harvest; [address] [place] [address]  to LAD, Svg Ao to OM1 and OM2      Social Hx= reports that  has never smoked. she has never used smokeless tobacco. She reports that she drinks about 0.6 oz of alcohol per week. She reports that she does not use drugs. Exam and Labs:  /60 (BP 1 Location: Left arm, BP Patient Position: Sitting)   Pulse (!) 51   Resp 16   Ht 5' 4\" (1.626 m)   Wt 143 lb (64.9 kg)   SpO2 97%   BMI 24.55 kg/m² Constitutional:  NAD, comfortable  Head: NC,AT. Eyes: No scleral icterus. Neck:  Neck supple.  No JVD present. Throat: moist mucous membranes. Chest: Effort normal & normal respiratory excursion . Neurological: alert, conversant and oriented . Skin: Skin is not cold. No obvious systemic rash noted. Not diaphoretic. No erythema. Psychiatric:  Grossly normal mood and affect. Behavior appears normal. Extremities:  no clubbing or cyanosis. Abdomen: non distended    Lungs:breath sounds normal. No stridor. distress, wheezes or  Rales. Heart: normal rate, regular rhythm, normal S1, S2, no murmurs, rubs, clicks or gallops , PMI non displaced. Edema: Edema is none. Lab Results   Component Value Date/Time    Cholesterol, total 177 01/26/2018 09:15 AM    HDL Cholesterol 79 01/26/2018 09:15 AM    LDL, calculated 82 01/26/2018 09:15 AM    Triglyceride 81 01/26/2018 09:15 AM     Lab Results   Component Value Date/Time    Sodium 137 01/26/2018 09:15 AM    Potassium 4.9 01/26/2018 09:15 AM    Chloride 98 01/26/2018 09:15 AM    CO2 26 01/26/2018 09:15 AM    Anion gap 16 (H) 06/19/2011 10:05 AM    Glucose 104 (H) 01/26/2018 09:15 AM    BUN 23 01/26/2018 09:15 AM    Creatinine 0.85 01/26/2018 09:15 AM    BUN/Creatinine ratio 27 01/26/2018 09:15 AM    GFR est AA 72 01/26/2018 09:15 AM    GFR est non-AA 62 01/26/2018 09:15 AM    Calcium 9.2 01/26/2018 09:15 AM      Wt Readings from Last 3 Encounters:   10/31/18 143 lb (64.9 kg)   02/07/18 140 lb 9.6 oz (63.8 kg)   06/13/17 139 lb 3.2 oz (63.1 kg)      BP Readings from Last 3 Encounters:   10/31/18 110/60   02/07/18 130/56   06/13/17 120/64      Current Outpatient Medications   Medication Sig    metoprolol tartrate (LOPRESSOR) 50 mg tablet TAKE ONE TABLET BY MOUTH TWICE A DAY    atorvastatin (LIPITOR) 20 mg tablet TAKE ONE TABLET BY MOUTH ONCE NIGHTLY    losartan (COZAAR) 100 mg tablet Take 1 Tab by mouth daily.  amLODIPine (NORVASC) 10 mg tablet Take 1 Tab by mouth daily.  (Patient taking differently: Take 5 mg by mouth daily.)    aspirin 81 mg chewable tablet Take 81 mg by mouth daily.  doxazosin (CARDURA) 1 mg tablet Take  by mouth daily.  naproxen (NAPROSYN) 250 mg tablet Take  by mouth two (2) times daily (with meals).  chlorthalidone (HYGROTEN) 25 mg tablet     hydroCHLOROthiazide (HYDRODIURIL) 25 mg tablet TAKE ONE TABLET BY MOUTH DAILY     No current facility-administered medications for this visit. Impression see above.       Written by Omid Jensen, as dictated by Sabina Marino MD.

## 2018-11-09 RX ORDER — METOPROLOL TARTRATE 50 MG/1
TABLET ORAL
Qty: 180 TAB | Refills: 3 | Status: SHIPPED | OUTPATIENT
Start: 2018-11-09 | End: 2019-12-06 | Stop reason: SDUPTHER

## 2018-11-09 RX ORDER — LOSARTAN POTASSIUM 100 MG/1
TABLET ORAL
Qty: 90 TAB | Refills: 3 | Status: SHIPPED | OUTPATIENT
Start: 2018-11-09 | End: 2019-08-03

## 2018-11-09 NOTE — TELEPHONE ENCOUNTER
Request for losartan 100mg daily and metoprolol 50mg BID. Last office visit 10-31-18, next office visit 11-20-19.  Refills per verbal order from Dr. Johnson Olivo.

## 2018-11-28 RX ORDER — ATORVASTATIN CALCIUM 20 MG/1
TABLET, FILM COATED ORAL
Qty: 90 TAB | Refills: 0 | Status: SHIPPED | OUTPATIENT
Start: 2018-11-28 | End: 2019-03-17 | Stop reason: SDUPTHER

## 2018-11-28 NOTE — TELEPHONE ENCOUNTER
Request for lipitor 20mg every day. Last office visit 10/31/18, next office visit 11/20/19. Refills per verbal order from Dr. Rosanne Molina.  Last lipid panel 1/2018.

## 2019-03-18 RX ORDER — ATORVASTATIN CALCIUM 20 MG/1
TABLET, FILM COATED ORAL
Qty: 90 TAB | Refills: 1 | Status: SHIPPED | OUTPATIENT
Start: 2019-03-18 | End: 2019-10-11 | Stop reason: SDUPTHER

## 2019-03-18 NOTE — TELEPHONE ENCOUNTER
Requested Prescriptions     Signed Prescriptions Disp Refills    atorvastatin (LIPITOR) 20 mg tablet 90 Tab 1     Sig: TAKE ONE TABLET BY MOUTH ONCE NIGHTLY     Authorizing Provider: Janeice Castleman     Ordering User: Joe Pfeiffer     Verbal order per Dr. Cl Hazel.  Yearly follow up scheduled on 11-20-19.

## 2019-08-03 ENCOUNTER — APPOINTMENT (OUTPATIENT)
Dept: GENERAL RADIOLOGY | Age: 84
DRG: 872 | End: 2019-08-03
Attending: EMERGENCY MEDICINE
Payer: MEDICARE

## 2019-08-03 ENCOUNTER — APPOINTMENT (OUTPATIENT)
Dept: NON INVASIVE DIAGNOSTICS | Age: 84
DRG: 872 | End: 2019-08-03
Attending: INTERNAL MEDICINE
Payer: MEDICARE

## 2019-08-03 ENCOUNTER — APPOINTMENT (OUTPATIENT)
Dept: CT IMAGING | Age: 84
DRG: 872 | End: 2019-08-03
Attending: EMERGENCY MEDICINE
Payer: MEDICARE

## 2019-08-03 ENCOUNTER — HOSPITAL ENCOUNTER (INPATIENT)
Age: 84
LOS: 8 days | Discharge: HOME | DRG: 872 | End: 2019-08-11
Attending: EMERGENCY MEDICINE | Admitting: INTERNAL MEDICINE
Payer: MEDICARE

## 2019-08-03 DIAGNOSIS — N39.0 URINARY TRACT INFECTION WITHOUT HEMATURIA, SITE UNSPECIFIED: ICD-10-CM

## 2019-08-03 DIAGNOSIS — R50.9 FEVER, UNSPECIFIED FEVER CAUSE: ICD-10-CM

## 2019-08-03 DIAGNOSIS — E87.6 HYPOKALEMIA: ICD-10-CM

## 2019-08-03 DIAGNOSIS — R55 SYNCOPE AND COLLAPSE: Primary | ICD-10-CM

## 2019-08-03 PROBLEM — D72.89 LEFT SHIFT: Status: ACTIVE | Noted: 2019-08-03

## 2019-08-03 PROBLEM — A41.9 SEPSIS (HCC): Status: ACTIVE | Noted: 2019-08-03

## 2019-08-03 PROBLEM — D64.9 ANEMIA: Status: ACTIVE | Noted: 2019-08-03

## 2019-08-03 PROBLEM — E86.0 DEHYDRATION: Status: ACTIVE | Noted: 2019-08-03

## 2019-08-03 PROBLEM — E87.1 HYPONATREMIA: Status: ACTIVE | Noted: 2019-08-03

## 2019-08-03 LAB
ALBUMIN SERPL-MCNC: 2.6 G/DL (ref 3.5–5)
ALBUMIN/GLOB SERPL: 0.7 {RATIO} (ref 1.1–2.2)
ALP SERPL-CCNC: 98 U/L (ref 45–117)
ALT SERPL-CCNC: 13 U/L (ref 12–78)
AMPHET UR QL SCN: NEGATIVE
ANION GAP SERPL CALC-SCNC: 11 MMOL/L (ref 5–15)
APPEARANCE UR: ABNORMAL
AST SERPL-CCNC: 10 U/L (ref 15–37)
BACTERIA URNS QL MICRO: ABNORMAL /HPF
BARBITURATES UR QL SCN: NEGATIVE
BASOPHILS # BLD: 0.1 K/UL (ref 0–0.1)
BASOPHILS NFR BLD: 1 % (ref 0–1)
BENZODIAZ UR QL: NEGATIVE
BILIRUB SERPL-MCNC: 0.6 MG/DL (ref 0.2–1)
BILIRUB UR QL: NEGATIVE
BNP SERPL-MCNC: 2471 PG/ML
BUN SERPL-MCNC: 21 MG/DL (ref 6–20)
BUN/CREAT SERPL: 18 (ref 12–20)
CALCIUM SERPL-MCNC: 8.1 MG/DL (ref 8.5–10.1)
CANNABINOIDS UR QL SCN: NEGATIVE
CHLORIDE SERPL-SCNC: 101 MMOL/L (ref 97–108)
CO2 SERPL-SCNC: 21 MMOL/L (ref 21–32)
COCAINE UR QL SCN: NEGATIVE
COLOR UR: ABNORMAL
COMMENT, HOLDF: NORMAL
CORTIS SERPL-MCNC: 44.8 UG/DL
CREAT SERPL-MCNC: 1.2 MG/DL (ref 0.55–1.02)
DIFFERENTIAL METHOD BLD: ABNORMAL
DRUG SCRN COMMENT,DRGCM: NORMAL
EOSINOPHIL # BLD: 0 K/UL (ref 0–0.4)
EOSINOPHIL NFR BLD: 0 % (ref 0–7)
EPITH CASTS URNS QL MICRO: ABNORMAL /LPF
ERYTHROCYTE [DISTWIDTH] IN BLOOD BY AUTOMATED COUNT: 12.6 % (ref 11.5–14.5)
FERRITIN SERPL-MCNC: 192 NG/ML (ref 8–252)
FOLATE SERPL-MCNC: 38.7 NG/ML (ref 5–21)
GLOBULIN SER CALC-MCNC: 3.9 G/DL (ref 2–4)
GLUCOSE SERPL-MCNC: 134 MG/DL (ref 65–100)
GLUCOSE UR STRIP.AUTO-MCNC: NEGATIVE MG/DL
HAPTOGLOB SERPL-MCNC: 328 MG/DL (ref 30–200)
HCT VFR BLD AUTO: 29.2 % (ref 35–47)
HEMOCCULT STL QL: POSITIVE
HGB BLD-MCNC: 9.9 G/DL (ref 11.5–16)
HGB UR QL STRIP: ABNORMAL
HYALINE CASTS URNS QL MICRO: ABNORMAL /LPF (ref 0–5)
IMM GRANULOCYTES # BLD AUTO: 0.1 K/UL (ref 0–0.04)
IMM GRANULOCYTES NFR BLD AUTO: 1 % (ref 0–0.5)
IRON SATN MFR SERPL: 7 % (ref 20–50)
IRON SERPL-MCNC: 13 UG/DL (ref 35–150)
KETONES UR QL STRIP.AUTO: NEGATIVE MG/DL
LACTATE BLD-SCNC: 1.53 MMOL/L (ref 0.4–2)
LDH SERPL L TO P-CCNC: 234 U/L (ref 81–246)
LEUKOCYTE ESTERASE UR QL STRIP.AUTO: ABNORMAL
LYMPHOCYTES # BLD: 0.7 K/UL (ref 0.8–3.5)
LYMPHOCYTES NFR BLD: 7 % (ref 12–49)
MCH RBC QN AUTO: 29.8 PG (ref 26–34)
MCHC RBC AUTO-ENTMCNC: 33.9 G/DL (ref 30–36.5)
MCV RBC AUTO: 88 FL (ref 80–99)
METHADONE UR QL: NEGATIVE
MONOCYTES # BLD: 0.1 K/UL (ref 0–1)
MONOCYTES NFR BLD: 1 % (ref 5–13)
NEUTS SEG # BLD: 8.7 K/UL (ref 1.8–8)
NEUTS SEG NFR BLD: 90 % (ref 32–75)
NITRITE UR QL STRIP.AUTO: POSITIVE
NRBC # BLD: 0 K/UL (ref 0–0.01)
NRBC BLD-RTO: 0 PER 100 WBC
OPIATES UR QL: NEGATIVE
PCP UR QL: NEGATIVE
PH UR STRIP: 5.5 [PH] (ref 5–8)
PLATELET # BLD AUTO: 285 K/UL (ref 150–400)
PMV BLD AUTO: 9.2 FL (ref 8.9–12.9)
POTASSIUM SERPL-SCNC: 3 MMOL/L (ref 3.5–5.1)
PROT SERPL-MCNC: 6.5 G/DL (ref 6.4–8.2)
PROT UR STRIP-MCNC: 30 MG/DL
RBC # BLD AUTO: 3.32 M/UL (ref 3.8–5.2)
RBC #/AREA URNS HPF: ABNORMAL /HPF (ref 0–5)
RBC MORPH BLD: ABNORMAL
RETICS # AUTO: 0.03 M/UL (ref 0.02–0.08)
RETICS/RBC NFR AUTO: 1.1 % (ref 0.7–2.1)
SAMPLES BEING HELD,HOLD: NORMAL
SODIUM SERPL-SCNC: 133 MMOL/L (ref 136–145)
SP GR UR REFRACTOMETRY: 1.01 (ref 1–1.03)
TIBC SERPL-MCNC: 186 UG/DL (ref 250–450)
TROPONIN I SERPL-MCNC: <0.05 NG/ML
TSH SERPL DL<=0.05 MIU/L-ACNC: 0.33 UIU/ML (ref 0.36–3.74)
UROBILINOGEN UR QL STRIP.AUTO: 0.2 EU/DL (ref 0.2–1)
VIT B12 SERPL-MCNC: 1796 PG/ML (ref 193–986)
WBC # BLD AUTO: 9.7 K/UL (ref 3.6–11)
WBC MORPH BLD: ABNORMAL
WBC URNS QL MICRO: ABNORMAL /HPF (ref 0–4)

## 2019-08-03 PROCEDURE — 96361 HYDRATE IV INFUSION ADD-ON: CPT

## 2019-08-03 PROCEDURE — 74011250637 HC RX REV CODE- 250/637: Performed by: INTERNAL MEDICINE

## 2019-08-03 PROCEDURE — 36415 COLL VENOUS BLD VENIPUNCTURE: CPT

## 2019-08-03 PROCEDURE — 74011250636 HC RX REV CODE- 250/636: Performed by: INTERNAL MEDICINE

## 2019-08-03 PROCEDURE — 80307 DRUG TEST PRSMV CHEM ANLYZR: CPT

## 2019-08-03 PROCEDURE — 82272 OCCULT BLD FECES 1-3 TESTS: CPT

## 2019-08-03 PROCEDURE — 74011000258 HC RX REV CODE- 258: Performed by: EMERGENCY MEDICINE

## 2019-08-03 PROCEDURE — 87077 CULTURE AEROBIC IDENTIFY: CPT

## 2019-08-03 PROCEDURE — 87086 URINE CULTURE/COLONY COUNT: CPT

## 2019-08-03 PROCEDURE — 85025 COMPLETE CBC W/AUTO DIFF WBC: CPT

## 2019-08-03 PROCEDURE — 74011636320 HC RX REV CODE- 636/320: Performed by: RADIOLOGY

## 2019-08-03 PROCEDURE — 82728 ASSAY OF FERRITIN: CPT

## 2019-08-03 PROCEDURE — 83615 LACTATE (LD) (LDH) ENZYME: CPT

## 2019-08-03 PROCEDURE — 84443 ASSAY THYROID STIM HORMONE: CPT

## 2019-08-03 PROCEDURE — 83540 ASSAY OF IRON: CPT

## 2019-08-03 PROCEDURE — 87040 BLOOD CULTURE FOR BACTERIA: CPT

## 2019-08-03 PROCEDURE — 74011000258 HC RX REV CODE- 258: Performed by: INTERNAL MEDICINE

## 2019-08-03 PROCEDURE — 82607 VITAMIN B-12: CPT

## 2019-08-03 PROCEDURE — 83880 ASSAY OF NATRIURETIC PEPTIDE: CPT

## 2019-08-03 PROCEDURE — 83605 ASSAY OF LACTIC ACID: CPT

## 2019-08-03 PROCEDURE — 74177 CT ABD & PELVIS W/CONTRAST: CPT

## 2019-08-03 PROCEDURE — 82533 TOTAL CORTISOL: CPT

## 2019-08-03 PROCEDURE — 82746 ASSAY OF FOLIC ACID SERUM: CPT

## 2019-08-03 PROCEDURE — 93005 ELECTROCARDIOGRAM TRACING: CPT

## 2019-08-03 PROCEDURE — 65270000029 HC RM PRIVATE

## 2019-08-03 PROCEDURE — 89055 LEUKOCYTE ASSESSMENT FECAL: CPT

## 2019-08-03 PROCEDURE — 85045 AUTOMATED RETICULOCYTE COUNT: CPT

## 2019-08-03 PROCEDURE — 70450 CT HEAD/BRAIN W/O DYE: CPT

## 2019-08-03 PROCEDURE — 71045 X-RAY EXAM CHEST 1 VIEW: CPT

## 2019-08-03 PROCEDURE — 99285 EMERGENCY DEPT VISIT HI MDM: CPT

## 2019-08-03 PROCEDURE — 83010 ASSAY OF HAPTOGLOBIN QUANT: CPT

## 2019-08-03 PROCEDURE — 74011250636 HC RX REV CODE- 250/636: Performed by: EMERGENCY MEDICINE

## 2019-08-03 PROCEDURE — 81001 URINALYSIS AUTO W/SCOPE: CPT

## 2019-08-03 PROCEDURE — 74011250637 HC RX REV CODE- 250/637: Performed by: EMERGENCY MEDICINE

## 2019-08-03 PROCEDURE — 84484 ASSAY OF TROPONIN QUANT: CPT

## 2019-08-03 PROCEDURE — 87506 IADNA-DNA/RNA PROBE TQ 6-11: CPT

## 2019-08-03 PROCEDURE — 96365 THER/PROPH/DIAG IV INF INIT: CPT

## 2019-08-03 PROCEDURE — 80053 COMPREHEN METABOLIC PANEL: CPT

## 2019-08-03 PROCEDURE — 87186 SC STD MICRODIL/AGAR DIL: CPT

## 2019-08-03 RX ORDER — DIPHENOXYLATE HYDROCHLORIDE AND ATROPINE SULFATE 2.5; .025 MG/1; MG/1
1 TABLET ORAL
COMMUNITY
End: 2019-08-11

## 2019-08-03 RX ORDER — AMLODIPINE BESYLATE 5 MG/1
5 TABLET ORAL DAILY
Status: DISCONTINUED | OUTPATIENT
Start: 2019-08-04 | End: 2019-08-10

## 2019-08-03 RX ORDER — SODIUM CHLORIDE 0.9 % (FLUSH) 0.9 %
5-10 SYRINGE (ML) INJECTION AS NEEDED
Status: DISCONTINUED | OUTPATIENT
Start: 2019-08-03 | End: 2019-08-11 | Stop reason: HOSPADM

## 2019-08-03 RX ORDER — METOPROLOL TARTRATE 50 MG/1
50 TABLET ORAL DAILY
Status: DISCONTINUED | OUTPATIENT
Start: 2019-08-04 | End: 2019-08-03

## 2019-08-03 RX ORDER — LOSARTAN POTASSIUM 50 MG/1
50 TABLET ORAL DAILY
Status: DISCONTINUED | OUTPATIENT
Start: 2019-08-04 | End: 2019-08-09

## 2019-08-03 RX ORDER — METOPROLOL TARTRATE 50 MG/1
50 TABLET ORAL 2 TIMES DAILY
Status: DISCONTINUED | OUTPATIENT
Start: 2019-08-03 | End: 2019-08-11 | Stop reason: HOSPADM

## 2019-08-03 RX ORDER — LOSARTAN POTASSIUM 50 MG/1
100 TABLET ORAL DAILY
Status: DISCONTINUED | OUTPATIENT
Start: 2019-08-04 | End: 2019-08-03

## 2019-08-03 RX ORDER — LOPERAMIDE HYDROCHLORIDE 2 MG/1
2 CAPSULE ORAL ONCE
Status: COMPLETED | OUTPATIENT
Start: 2019-08-03 | End: 2019-08-03

## 2019-08-03 RX ORDER — ENOXAPARIN SODIUM 100 MG/ML
30 INJECTION SUBCUTANEOUS EVERY 24 HOURS
Status: DISCONTINUED | OUTPATIENT
Start: 2019-08-03 | End: 2019-08-05

## 2019-08-03 RX ORDER — POTASSIUM CHLORIDE 750 MG/1
40 TABLET, FILM COATED, EXTENDED RELEASE ORAL
Status: DISCONTINUED | OUTPATIENT
Start: 2019-08-03 | End: 2019-08-03

## 2019-08-03 RX ORDER — DIPHENHYDRAMINE HCL 25 MG
25 CAPSULE ORAL
Status: DISCONTINUED | OUTPATIENT
Start: 2019-08-03 | End: 2019-08-11 | Stop reason: HOSPADM

## 2019-08-03 RX ORDER — ATORVASTATIN CALCIUM 20 MG/1
20 TABLET, FILM COATED ORAL
Status: DISCONTINUED | OUTPATIENT
Start: 2019-08-03 | End: 2019-08-11 | Stop reason: HOSPADM

## 2019-08-03 RX ORDER — ONDANSETRON 2 MG/ML
4 INJECTION INTRAMUSCULAR; INTRAVENOUS
Status: DISCONTINUED | OUTPATIENT
Start: 2019-08-03 | End: 2019-08-11 | Stop reason: HOSPADM

## 2019-08-03 RX ORDER — POTASSIUM CHLORIDE 1.5 G/1.77G
40 POWDER, FOR SOLUTION ORAL
Status: COMPLETED | OUTPATIENT
Start: 2019-08-03 | End: 2019-08-03

## 2019-08-03 RX ORDER — AMLODIPINE BESYLATE 5 MG/1
5 TABLET ORAL DAILY
Status: ON HOLD | COMMUNITY
End: 2019-08-11 | Stop reason: SDUPTHER

## 2019-08-03 RX ORDER — DOXAZOSIN 1 MG/1
1 TABLET ORAL DAILY
Status: DISCONTINUED | OUTPATIENT
Start: 2019-08-04 | End: 2019-08-11 | Stop reason: HOSPADM

## 2019-08-03 RX ORDER — ACETAMINOPHEN 325 MG/1
650 TABLET ORAL
Status: DISCONTINUED | OUTPATIENT
Start: 2019-08-03 | End: 2019-08-11 | Stop reason: HOSPADM

## 2019-08-03 RX ORDER — ENOXAPARIN SODIUM 100 MG/ML
40 INJECTION SUBCUTANEOUS EVERY 24 HOURS
Status: DISCONTINUED | OUTPATIENT
Start: 2019-08-03 | End: 2019-08-03 | Stop reason: DRUGHIGH

## 2019-08-03 RX ORDER — POTASSIUM CHLORIDE AND SODIUM CHLORIDE 900; 300 MG/100ML; MG/100ML
INJECTION, SOLUTION INTRAVENOUS CONTINUOUS
Status: DISCONTINUED | OUTPATIENT
Start: 2019-08-03 | End: 2019-08-10

## 2019-08-03 RX ORDER — GUAIFENESIN 100 MG/5ML
81 LIQUID (ML) ORAL DAILY
Status: DISCONTINUED | OUTPATIENT
Start: 2019-08-04 | End: 2019-08-11 | Stop reason: HOSPADM

## 2019-08-03 RX ORDER — ACETAMINOPHEN 325 MG/1
650 TABLET ORAL
Status: COMPLETED | OUTPATIENT
Start: 2019-08-03 | End: 2019-08-03

## 2019-08-03 RX ORDER — LOSARTAN POTASSIUM 50 MG/1
50 TABLET ORAL DAILY
COMMUNITY
End: 2019-08-11

## 2019-08-03 RX ADMIN — SODIUM CHLORIDE 1000 ML: 900 INJECTION, SOLUTION INTRAVENOUS at 07:10

## 2019-08-03 RX ADMIN — ACETAMINOPHEN 650 MG: 325 TABLET ORAL at 07:57

## 2019-08-03 RX ADMIN — LOPERAMIDE HYDROCHLORIDE 2 MG: 2 CAPSULE ORAL at 20:40

## 2019-08-03 RX ADMIN — POTASSIUM CHLORIDE 40 MEQ: 1.5 POWDER, FOR SOLUTION ORAL at 10:07

## 2019-08-03 RX ADMIN — SODIUM CHLORIDE AND POTASSIUM CHLORIDE: 9; 2.98 INJECTION, SOLUTION INTRAVENOUS at 15:49

## 2019-08-03 RX ADMIN — ENOXAPARIN SODIUM 30 MG: 30 INJECTION SUBCUTANEOUS at 15:49

## 2019-08-03 RX ADMIN — PIPERACILLIN SODIUM,TAZOBACTAM SODIUM 3.38 G: 3; .375 INJECTION, POWDER, FOR SOLUTION INTRAVENOUS at 08:41

## 2019-08-03 RX ADMIN — ATORVASTATIN CALCIUM 20 MG: 20 TABLET, FILM COATED ORAL at 22:07

## 2019-08-03 RX ADMIN — METOPROLOL TARTRATE 50 MG: 50 TABLET ORAL at 20:40

## 2019-08-03 RX ADMIN — PIPERACILLIN SODIUM,TAZOBACTAM SODIUM 3.38 G: 3; .375 INJECTION, POWDER, FOR SOLUTION INTRAVENOUS at 18:10

## 2019-08-03 RX ADMIN — IOPAMIDOL 100 ML: 755 INJECTION, SOLUTION INTRAVENOUS at 08:30

## 2019-08-03 NOTE — ROUTINE PROCESS
TRANSFER - OUT REPORT: 
 
Verbal report given to Nissa Jorgensen RN(name) on Jenni Jacome  being transferred to [facility](unit) for routine progression of care Report consisted of patients Situation, Background, Assessment and  
Recommendations(SBAR). Information from the following report(s) SBAR, ED Summary and MAR was reviewed with the receiving nurse. Lines:  
Peripheral IV 08/03/19 Left Wrist (Active) Site Assessment Clean, dry, & intact 8/3/2019  7:02 AM  
Phlebitis Assessment 0 8/3/2019  7:02 AM  
Infiltration Assessment 0 8/3/2019  7:02 AM  
Dressing Status Clean, dry, & intact 8/3/2019  7:02 AM  
Dressing Type Tape;Transparent 8/3/2019  7:02 AM  
Hub Color/Line Status Flushed;Patent 8/3/2019  7:02 AM  
   
Peripheral IV 08/03/19 Right Wrist (Active) Site Assessment Clean, dry, & intact 8/3/2019  7:09 AM  
Phlebitis Assessment 0 8/3/2019  7:09 AM  
Infiltration Assessment 0 8/3/2019  7:09 AM  
Dressing Status Clean, dry, & intact 8/3/2019  7:09 AM  
Dressing Type Transparent 8/3/2019  7:09 AM  
Hub Color/Line Status Pink;Flushed;Patent 8/3/2019  7:09 AM  
  
 
Opportunity for questions and clarification was provided. Patient transported with: 
 [facility]

## 2019-08-03 NOTE — ED TRIAGE NOTES
Pt arrives via EMS from home c/o diarrhea x 13 days and fever. C/o shivering. Found by  on toilet, GCS 12. LNW 2100 last night Tympanic temp 104.5 in route. 200NS bolus in route,  in route. Denies CP, SOB, states \"I feel wonderful right now. \"

## 2019-08-03 NOTE — PROGRESS NOTES
Pharmacy Dosing Services: 08/03/19  Lovenox dose change per renal P&T protocol  Physician: Dr Daryle Robinson  Enoxaparin Indication:  DVT prophylaxis  Previous Dose  Lovenox 40 mg SC daily   Serum Creatinine Lab Results   Component Value Date/Time    Creatinine 1.20 (H) 08/03/2019 07:10 AM        Creatinine Clearance Estimated Creatinine Clearance: 28 mL/min (A) (based on SCr of 1.2 mg/dL (H)).    Platelets Lab Results   Component Value Date/Time    PLATELET 804 85/76/6660 07:10 AM         H/H Lab Results   Component Value Date/Time    HGB 9.9 (L) 08/03/2019 07:10 AM          Adjustments:  Changed Lovenox to 30 mg SC daily    Continue to monitor  [facility]

## 2019-08-03 NOTE — PROGRESS NOTES
[facility]: MED RECONCILIATION    Comments/Recommendations:     Patient able to confirm name, , allergies and preferred pharmacy    Medications added:     lomotil    Medications removed: HCTZ    Medications adjusted:    Losartan dose adjusted to 50 mg per pt  Amlodipine adjusted to 5 mg per pt     Information obtained from: Family Member    Allergies: Ciprofloxacin; Codeine; Lisinopril; and Lovastatin    Prior to Admission Medications:     Prior to Admission Medications   Prescriptions Last Dose Informant Patient Reported? Taking? amLODIPine (NORVASC) 5 mg tablet 2019 Self Yes Yes   Sig: Take 5 mg by mouth daily. aspirin 81 mg chewable tablet 2019 Self Yes Yes   Sig: Take 81 mg by mouth daily. atorvastatin (LIPITOR) 20 mg tablet 2019 Self No Yes   Sig: TAKE ONE TABLET BY MOUTH ONCE NIGHTLY   chlorthalidone (HYGROTEN) 25 mg tablet 2019 Self Yes Yes   Sig: Take 25 mg by mouth daily. diphenoxylate-atropine (LOMOTIL) 2.5-0.025 mg per tablet 2019 Self Yes Yes   Sig: Take 1 Tab by mouth four (4) times daily as needed for Diarrhea. doxazosin (CARDURA) 1 mg tablet 2019 Self Yes Yes   Sig: Take  by mouth daily. losartan (COZAAR) 50 mg tablet 2019 at Unknown time Self Yes Yes   Sig: Take 50 mg by mouth daily. metoprolol tartrate (LOPRESSOR) 50 mg tablet 2019 Self No Yes   Sig: TAKE ONE TABLET BY MOUTH TWICE A DAY   naproxen (NAPROSYN) 250 mg tablet 2019 Self Yes Yes   Sig: Take  by mouth two (2) times daily as needed. Facility-Administered Medications: None           [employer] MARIEL.    Clinical Staff Pharmacist  [address] [place] [address]  [phone]

## 2019-08-03 NOTE — ED PROVIDER NOTES
Leilani Paniagua. Preston Duane is an 80 y.o. female who presents via EMS to the ED with a c/o diarrhea onset (13 days ago ) and fever today. Pt denies any pain at this time but reports that she has had 13 days of severe diarrhea/ EMS report that pt was found seated, slumped over, trembling and minimally responsive on the toilet this morning. Pt's  reports that pt was complaining of chills but reports that her body felt extremely hot all over. EMS report that they got a tympanic temperature of 104.5° and administered 200 cc's of IVF. Pt's daughter states that pt was started on Lomotil for her diarrhea, and pt went from having 5-6 episodes of diarrhea daily to 3-4 episodes. Pt's daughter also reports that she has had a decreased appetite. Pt specifically denies chest pain, shortness of breath, n/v,  numbness, tingling, abdominal pain, back pain, cough, leg swelling, dizziness or any other acute sx. Of note pt's daughter reports that pt tested negative for E. Coli recently. Pt denies any recent travel, known sick contacts, or recent illness. PCP: Katelynn Perkins MD  PMHx significant for: Coronary atherosclerosis of native coronary artery, HTN, Dyslipidemia, Diverticulitis, Macular degeneration, Carotid Arterial Disease  PSHx significant for: Cardiac Catheterization, CABG  Social Hx: Tobacco: none EtOH: occaisional Illicit drug use: none    There are no further complaints or symptoms at this time. Signed by: Annie Zuniga scribkimi for Dao Bedoya MD on August 3rd, 2019 at 7:01am.      The history is provided by the patient, the spouse, a relative, the EMS personnel and medical records. No  was used.         Past Medical History:   Diagnosis Date    Carotid arterial disease ([address]     mild 0-49%    Coronary atherosclerosis of native coronary artery     Cath 2011 with multivessel cad    Cough due to ACE inhibitor     Diverticulitis     Dyslipidemia     Hypertension     Macular degeneration     S/P CABG (coronary artery bypass graft)     6-19-11 CABG - OFF PUMP - CABGx 3, lima, eric, epi aortic ultrasound - off pump, rightt endoscopic vein harvest; [id number] [facility] [address] to LAD, [place] Ao to OM1 and OM2       No past surgical history on file. No family history on file. Social History     Socioeconomic History    Marital status:      Spouse name: Not on file    Number of children: Not on file    Years of education: Not on file    Highest education level: Not on file   Occupational History    Not on file   Social Needs    Financial resource strain: Not on file    Food insecurity:     Worry: Not on file     Inability: Not on file    Transportation needs:     Medical: Not on file     Non-medical: Not on file   Tobacco Use    Smoking status: Never Smoker    Smokeless tobacco: Never Used   Substance and Sexual Activity    Alcohol use: Yes     Alcohol/week: 1.0 standard drinks     Types: 1 Standard drinks or equivalent per week     Comment: Occasional    Drug use: No    Sexual activity: Not on file   Lifestyle    Physical activity:     Days per week: Not on file     Minutes per session: Not on file    Stress: Not on file   Relationships    Social connections:     Talks on phone: Not on file     Gets together: Not on file     Attends Hinduism service: Not on file     Active member of club or organization: Not on file     Attends meetings of clubs or organizations: Not on file     Relationship status: Not on file    Intimate partner violence:     Fear of current or ex partner: Not on file     Emotionally abused: Not on file     Physically abused: Not on file     Forced sexual activity: Not on file   Other Topics Concern    Not on file   Social History Narrative    Not on file         ALLERGIES: Ciprofloxacin; Codeine; Lisinopril; and Lovastatin    Review of Systems   Constitutional: Positive for appetite change, chills and fever.    Respiratory: Negative for cough and shortness of breath. Cardiovascular: Negative for chest pain and leg swelling. Gastrointestinal: Positive for diarrhea. Negative for abdominal pain, nausea and vomiting. Genitourinary: Negative for dysuria and hematuria. Musculoskeletal: Negative for back pain and neck pain. Neurological: Positive for tremors. All other systems reviewed and are negative. Vitals:    08/03/19 0659   Pulse: 87   Resp: 20   SpO2: 95%   Weight: 63.5 kg (140 lb)   Height: 5' 4\" (1.626 m)            Physical Exam   Constitutional: She is oriented to person, place, and time. She appears well-developed and well-nourished. NAD, AxOx3, speaking in complete sentences  gcs = 15     HENT:   Head: Normocephalic and atraumatic. Mouth/Throat: Oropharynx is clear and moist.   Eyes: Pupils are equal, round, and reactive to light. Conjunctivae and EOM are normal. Right eye exhibits no discharge. Left eye exhibits no discharge. No scleral icterus. Neck: Normal range of motion. Neck supple. No JVD present. No tracheal deviation present. Cardiovascular: Normal rate, regular rhythm, normal heart sounds and intact distal pulses. Exam reveals no gallop and no friction rub. No murmur heard. Pulmonary/Chest: Effort normal and breath sounds normal. No respiratory distress. She has no wheezes. She has no rales. She exhibits no tenderness. Abdominal: Soft. Bowel sounds are normal. There is no tenderness. There is no rebound and no guarding. Genitourinary: No vaginal discharge found. Genitourinary Comments: Pt denies urinary/ vaginal complaints   Musculoskeletal: Normal range of motion. She exhibits no edema, tenderness or deformity. Neurological: She is alert and oriented to person, place, and time. She displays normal reflexes. No cranial nerve deficit or sensory deficit. She exhibits normal muscle tone.  Coordination normal.   pt has motor/ CV/ Sensation grossly intact to all extremities, R = L in strength;   Skin: Skin is warm and dry. Capillary refill takes less than 2 seconds. No rash noted. No erythema. No pallor. Psychiatric: She has a normal mood and affect. Her behavior is normal. Thought content normal.   Nursing note and vitals reviewed. MDM       Procedures    Chief Complaint   Patient presents with    Fever       7:10 AM  The patients presenting problems have been discussed, and they are in agreement with the care plan formulated and outlined with them. I have encouraged them to ask questions as they arise throughout their visit. MEDICATIONS GIVEN:  Medications   sodium chloride (NS) flush 5-10 mL (has no administration in time range)   acetaminophen (TYLENOL) tablet 650 mg (has no administration in time range)   sodium chloride 0.9 % bolus infusion 1,000 mL (has no administration in time range)     Followed by   sodium chloride 0.9 % bolus infusion 905 mL (has no administration in time range)   sodium chloride 0.9 % bolus infusion 1,000 mL (has no administration in time range)       LABS REVIEWED:  Labs Reviewed   CULTURE, BLOOD   CULTURE, BLOOD   LACTIC ACID   URINALYSIS W/ RFLX MICROSCOPIC   METABOLIC PANEL, COMPREHENSIVE   CBC WITH AUTOMATED DIFF   TROPONIN I   NT-PRO BNP   SAMPLES BEING HELD       RADIOLOGY RESULTS:  The following have been ordered and reviewed:  _____________________________________________________________________  _____________________________________________________________________    EKG interpretation:   Rhythm: normal sinus rhythm; and regular . Rate (approx.): 86; Axis: normal; P wave: normal; QRS interval: normal ; ST/T wave: normal; Negative acute significant segmental elevations/ compared to study dated 10/31/2018 noted widening of QRS    PROCEDURES:        CONSULTATIONS:       PROGRESS NOTES:      DIAGNOSIS:    1. Syncope and collapse    2. Urinary tract infection without hematuria, site unspecified    3. Fever, unspecified fever cause    4.  Hypokalemia        PLAN:  1- syncope - trend enzymes/ monitor  2 Fever/ UTI / Diarrhea - abx/ cultures;   3 hypokalemia - repleated;       ED COURSE: The patients hospital course has been uncomplicated. 8:15 AM  Pt / family updated on current results/ plans and they agree;      8:32 AM  Called by CT tech/ 'already did the CT with contrast/ didn't know about the PO contrast'; Hospitalist Alcides for Admission  9:58 AM    ED Room Number: EP36/13  Patient Name and age:  Krystyna Garcia 80 y.o.  female  Working Diagnosis:   1. Syncope and collapse    2. Urinary tract infection without hematuria, site unspecified    3. Fever, unspecified fever cause    4.  Hypokalemia      Readmission: no  Isolation Requirements:  no  Recommended Level of Care:  telemetry  Code Status:  Full Code  Department:[facility] ED - [phone]  Other:  Syncopal episode/ UTI/ hypokalemia/ started zosyn (diarrhea x 2 weeks)

## 2019-08-03 NOTE — PROGRESS NOTES
Bedside shift change report given to Juan Goel (oncoming nurse) by Dayna Landry (offgoing nurse). Report included the following information SBAR, Kardex, ED Summary and Recent Results.

## 2019-08-03 NOTE — ED NOTES
Patient relays \" I have so much trouble swallowing pills, Can I get this some other way\". This relayed to Dr. Brad Patel at this time, he states will change potassium to liquid instead of pills.

## 2019-08-04 LAB
ANION GAP SERPL CALC-SCNC: 7 MMOL/L (ref 5–15)
ATRIAL RATE: 86 BPM
BUN SERPL-MCNC: 19 MG/DL (ref 6–20)
BUN/CREAT SERPL: 15 (ref 12–20)
C DIFF GDH STL QL: NEGATIVE
C DIFF TOX A+B STL QL IA: NEGATIVE
CALCIUM SERPL-MCNC: 7.8 MG/DL (ref 8.5–10.1)
CALCULATED P AXIS, ECG09: 73 DEGREES
CALCULATED R AXIS, ECG10: 95 DEGREES
CALCULATED T AXIS, ECG11: -12 DEGREES
CAMPYLOBACTER SPECIES, DNA: NEGATIVE
CHLORIDE SERPL-SCNC: 107 MMOL/L (ref 97–108)
CO2 SERPL-SCNC: 22 MMOL/L (ref 21–32)
COMMENT, HOLDF: NORMAL
CREAT SERPL-MCNC: 1.3 MG/DL (ref 0.55–1.02)
DIAGNOSIS, 93000: NORMAL
ENTEROTOXIGEN E COLI, DNA: NEGATIVE
ERYTHROCYTE [DISTWIDTH] IN BLOOD BY AUTOMATED COUNT: 12.9 % (ref 11.5–14.5)
GLUCOSE SERPL-MCNC: 124 MG/DL (ref 65–100)
HCT VFR BLD AUTO: 26.2 % (ref 35–47)
HGB BLD-MCNC: 8.9 G/DL (ref 11.5–16)
INTERPRETATION: NORMAL
MAGNESIUM SERPL-MCNC: 1.5 MG/DL (ref 1.6–2.4)
MCH RBC QN AUTO: 29.9 PG (ref 26–34)
MCHC RBC AUTO-ENTMCNC: 34 G/DL (ref 30–36.5)
MCV RBC AUTO: 87.9 FL (ref 80–99)
NRBC # BLD: 0 K/UL (ref 0–0.01)
NRBC BLD-RTO: 0 PER 100 WBC
P SHIGELLOIDES DNA STL QL NAA+PROBE: NEGATIVE
P-R INTERVAL, ECG05: 186 MS
PLATELET # BLD AUTO: 261 K/UL (ref 150–400)
PMV BLD AUTO: 9.1 FL (ref 8.9–12.9)
POTASSIUM SERPL-SCNC: 3.6 MMOL/L (ref 3.5–5.1)
Q-T INTERVAL, ECG07: 372 MS
QRS DURATION, ECG06: 130 MS
QTC CALCULATION (BEZET), ECG08: 445 MS
RBC # BLD AUTO: 2.98 M/UL (ref 3.8–5.2)
SALMONELLA SPECIES, DNA: NEGATIVE
SAMPLES BEING HELD,HOLD: NORMAL
SHIGA TOXIN PRODUCING, DNA: NEGATIVE
SHIGELLA SP+EIEC IPAH STL QL NAA+PROBE: NEGATIVE
SODIUM SERPL-SCNC: 136 MMOL/L (ref 136–145)
TROPONIN I SERPL-MCNC: 0.07 NG/ML
TROPONIN I SERPL-MCNC: 0.07 NG/ML
VENTRICULAR RATE, ECG03: 86 BPM
VIBRIO SPECIES, DNA: NEGATIVE
WBC # BLD AUTO: 11.6 K/UL (ref 3.6–11)
WBC #/AREA STL HPF: NORMAL /HPF (ref 0–4)
Y. ENTEROCOLITICA, DNA: NEGATIVE

## 2019-08-04 PROCEDURE — 74011250636 HC RX REV CODE- 250/636: Performed by: INTERNAL MEDICINE

## 2019-08-04 PROCEDURE — 74011250637 HC RX REV CODE- 250/637: Performed by: FAMILY MEDICINE

## 2019-08-04 PROCEDURE — 83735 ASSAY OF MAGNESIUM: CPT

## 2019-08-04 PROCEDURE — 87040 BLOOD CULTURE FOR BACTERIA: CPT

## 2019-08-04 PROCEDURE — 74011000258 HC RX REV CODE- 258: Performed by: INTERNAL MEDICINE

## 2019-08-04 PROCEDURE — 87449 NOS EACH ORGANISM AG IA: CPT

## 2019-08-04 PROCEDURE — 65270000029 HC RM PRIVATE

## 2019-08-04 PROCEDURE — 74011250637 HC RX REV CODE- 250/637: Performed by: INTERNAL MEDICINE

## 2019-08-04 PROCEDURE — 85027 COMPLETE CBC AUTOMATED: CPT

## 2019-08-04 PROCEDURE — 84484 ASSAY OF TROPONIN QUANT: CPT

## 2019-08-04 PROCEDURE — 74011250636 HC RX REV CODE- 250/636: Performed by: FAMILY MEDICINE

## 2019-08-04 PROCEDURE — 80048 BASIC METABOLIC PNL TOTAL CA: CPT

## 2019-08-04 PROCEDURE — 36415 COLL VENOUS BLD VENIPUNCTURE: CPT

## 2019-08-04 RX ORDER — DIPHENOXYLATE HYDROCHLORIDE AND ATROPINE SULFATE 2.5; .025 MG/1; MG/1
1 TABLET ORAL
Status: DISCONTINUED | OUTPATIENT
Start: 2019-08-04 | End: 2019-08-11 | Stop reason: HOSPADM

## 2019-08-04 RX ORDER — MAGNESIUM SULFATE 1 G/100ML
1 INJECTION INTRAVENOUS ONCE
Status: COMPLETED | OUTPATIENT
Start: 2019-08-04 | End: 2019-08-04

## 2019-08-04 RX ADMIN — PIPERACILLIN SODIUM,TAZOBACTAM SODIUM 3.38 G: 3; .375 INJECTION, POWDER, FOR SOLUTION INTRAVENOUS at 08:01

## 2019-08-04 RX ADMIN — ACETAMINOPHEN 650 MG: 325 TABLET ORAL at 06:52

## 2019-08-04 RX ADMIN — DOXAZOSIN 1 MG: 1 TABLET ORAL at 09:11

## 2019-08-04 RX ADMIN — ACETAMINOPHEN 650 MG: 325 TABLET ORAL at 14:02

## 2019-08-04 RX ADMIN — LOSARTAN POTASSIUM 50 MG: 50 TABLET, FILM COATED ORAL at 09:11

## 2019-08-04 RX ADMIN — PSYLLIUM HUSK 1 PACKET: 3.4 POWDER ORAL at 12:49

## 2019-08-04 RX ADMIN — Medication 1 CAPSULE: at 03:53

## 2019-08-04 RX ADMIN — PIPERACILLIN SODIUM,TAZOBACTAM SODIUM 3.38 G: 3; .375 INJECTION, POWDER, FOR SOLUTION INTRAVENOUS at 01:04

## 2019-08-04 RX ADMIN — ATORVASTATIN CALCIUM 20 MG: 20 TABLET, FILM COATED ORAL at 21:10

## 2019-08-04 RX ADMIN — METOPROLOL TARTRATE 50 MG: 50 TABLET ORAL at 17:03

## 2019-08-04 RX ADMIN — Medication 10 ML: at 13:31

## 2019-08-04 RX ADMIN — MAGNESIUM SULFATE HEPTAHYDRATE 1 G: 1 INJECTION, SOLUTION INTRAVENOUS at 09:14

## 2019-08-04 RX ADMIN — ENOXAPARIN SODIUM 30 MG: 30 INJECTION SUBCUTANEOUS at 13:31

## 2019-08-04 RX ADMIN — ASPIRIN 81 MG 81 MG: 81 TABLET ORAL at 09:11

## 2019-08-04 RX ADMIN — METOPROLOL TARTRATE 50 MG: 50 TABLET ORAL at 09:11

## 2019-08-04 RX ADMIN — AMLODIPINE BESYLATE 5 MG: 5 TABLET ORAL at 09:11

## 2019-08-04 RX ADMIN — SODIUM CHLORIDE AND POTASSIUM CHLORIDE: 9; 2.98 INJECTION, SOLUTION INTRAVENOUS at 18:14

## 2019-08-04 RX ADMIN — DIPHENOXYLATE HYDROCHLORIDE AND ATROPINE SULFATE 1 TABLET: 2.5; .025 TABLET ORAL at 13:53

## 2019-08-04 RX ADMIN — PSYLLIUM HUSK 1 PACKET: 3.4 POWDER ORAL at 18:38

## 2019-08-04 RX ADMIN — PIPERACILLIN SODIUM,TAZOBACTAM SODIUM 3.38 G: 3; .375 INJECTION, POWDER, FOR SOLUTION INTRAVENOUS at 17:03

## 2019-08-04 RX ADMIN — DIPHENOXYLATE HYDROCHLORIDE AND ATROPINE SULFATE 1 TABLET: 2.5; .025 TABLET ORAL at 18:43

## 2019-08-04 NOTE — CONSULTS
[address]  [address] [facility], [address] [facility]  [phone]          GI CONSULTATION NOTE      NAME:  Porsha Hardin   :   1931   MRN:   [id number]       Referring Physician: Dr Virginia Eric Date: 2019     Chief Complaint:  Diarrhea and anemia    History of Present Illness:  Patient is a 80 y.o. who is seen in consultation at the request of Dr.Mc Christian Virgen. Pt admitted overnight with dx of UTI, dehydration and chronic diarrhea. She was seen with complains of pre syncope on day priro to admission while having BM . She hx chronic diarrhea, for 3 weeks lately with some intermittent bleeding attributed to hemorrhoid per pt.   Failed improve on imodium and cholestyramine [facility]  [facility] she has a fever, SIRS and UA suggests UTI. She has been started on abx. THis am she feels liitle better but still poor appetite and persistent diarrhea. She carries a dx of lymphocytic colitis followed by Dr Acosta Main and Meir He in the office. She has been off medications for yrs for this condition. SHe also reports IBS stx. Pt also kisha any new medications use of NSAIDs or sick contacts. She has had chronic anemia . Her stool is + FOBT.          PMH:  Past Medical History:   Diagnosis Date    Carotid arterial disease (HCC)     mild 0-49%    Coronary atherosclerosis of native coronary artery     Cath  with multivessel cad    Cough due to ACE inhibitor     Diverticulitis     Dyslipidemia     Hypertension     Macular degeneration     S/P CABG (coronary artery bypass graft)     11 CABG - OFF PUMP - CABGx 3, lima, eric, epi aortic ultrasound - off pump, rightt endoscopic vein harvest; [id number] [facility] [address] to LAD, [place] Ao to OM1 and OM2       PSH:  No past surgical history on file. Allergies:   Allergies   Allergen Reactions    Ciprofloxacin Itching and Other (comments)     Cipro, \"turn beet red like sunburn\"    Codeine Itching    Lisinopril Cough    Lovastatin Diarrhea       Home Medications:  Prior to Admission Medications   Prescriptions Last Dose Informant Patient Reported? Taking? amLODIPine (NORVASC) 5 mg tablet 8/2/2019 Self Yes Yes   Sig: Take 5 mg by mouth daily. aspirin 81 mg chewable tablet 8/2/2019 Self Yes Yes   Sig: Take 81 mg by mouth daily. atorvastatin (LIPITOR) 20 mg tablet 8/2/2019 Self No Yes   Sig: TAKE ONE TABLET BY MOUTH ONCE NIGHTLY   chlorthalidone (HYGROTEN) 25 mg tablet 8/2/2019 Self Yes Yes   Sig: Take 25 mg by mouth daily. diphenoxylate-atropine (LOMOTIL) 2.5-0.025 mg per tablet 8/2/2019 Self Yes Yes   Sig: Take 1 Tab by mouth four (4) times daily as needed for Diarrhea. doxazosin (CARDURA) 1 mg tablet 8/2/2019 Self Yes Yes   Sig: Take  by mouth daily. losartan (COZAAR) 50 mg tablet 8/2/2019 at Unknown time Self Yes Yes   Sig: Take 50 mg by mouth daily. metoprolol tartrate (LOPRESSOR) 50 mg tablet 8/2/2019 Self No Yes   Sig: TAKE ONE TABLET BY MOUTH TWICE A DAY   naproxen (NAPROSYN) 250 mg tablet 8/2/2019 Self Yes Yes   Sig: Take  by mouth two (2) times daily as needed.       Facility-Administered Medications: None       Hospital Medications:  Current Facility-Administered Medications   Medication Dose Route Frequency    lactobac ac& pc-s.therm-b.anim (GERALDINE Q/RISAQUAD)  1 Cap Oral DAILY    psyllium husk-aspartame (METAMUCIL FIBER) packet 1 Packet  1 Packet Oral BID    sodium chloride (NS) flush 5-10 mL  5-10 mL IntraVENous PRN    0.9% sodium chloride with KCl 40 mEq/L infusion   IntraVENous CONTINUOUS    amLODIPine (NORVASC) tablet 5 mg  5 mg Oral DAILY    aspirin chewable tablet 81 mg  81 mg Oral DAILY    atorvastatin (LIPITOR) tablet 20 mg  20 mg Oral QHS    doxazosin (CARDURA) tablet 1 mg  1 mg Oral DAILY    acetaminophen (TYLENOL) tablet 650 mg  650 mg Oral Q4H PRN    diphenhydrAMINE (BENADRYL) capsule 25 mg  25 mg Oral Q4H PRN    ondansetron (ZOFRAN) injection 4 mg  4 mg IntraVENous Q4H PRN    enoxaparin (LOVENOX) injection 30 mg  30 mg SubCUTAneous Q24H    piperacillin-tazobactam (ZOSYN) 3.375 g in 0.9% sodium chloride (MBP/ADV) 100 mL  3.375 g IntraVENous Q8H    metoprolol tartrate (LOPRESSOR) tablet 50 mg  50 mg Oral BID    losartan (COZAAR) tablet 50 mg  50 mg Oral DAILY       Social History:  Social History     Tobacco Use    Smoking status: Never Smoker    Smokeless tobacco: Never Used   Substance Use Topics    Alcohol use: Yes     Alcohol/week: 1.0 standard drinks     Types: 1 Standard drinks or equivalent per week     Comment: Occasional       Family History:  No family history on file. Review of Systems:  A detailed 10 system ROS is obtained, with pertinent positives as listed in the HPI and PMH. All others are negative. Objective:     Patient Vitals for the past 8 hrs:   BP Temp Pulse Resp SpO2   08/04/19 1056 130/72 98 °F (36.7 °C) 61 17 94 %   08/04/19 0916  98.1 °F (36.7 °C)      08/04/19 0910 130/59 97.5 °F (36.4 °C) 61 18 95 %     08/04 0701 - 08/04 1900  In: 626.7 [I.V.:626.7]  Out: -   08/02 1901 - 08/04 0700  In: 2816.7 [I.V.:2816.7]  Out: 500 [Urine:500]        PHYSICAL EXAM:  General: WD, WN. Alert, cooperative, no acute distress    HEENT: NC, Atraumatic. PERRLA, EOMI. Anicteric sclerae. Lungs:  CTA Bilaterally. No Wheezing/Rhonchi/Rales. Heart:  Regular  rhythm,  No murmur (), No Rubs, No Gallops  Abdomen: Soft, Non distended, Non tender.  +Bowel sounds, no HSM  Extremities: No c/c/e  Neurologic:  CN 2-12 gi, Alert and oriented X 3. No acute neurological distress   Psych:   Good insight. Not anxious nor agitated.     Data Review     Recent Labs     08/04/19  0503 08/03/19  0710   WBC 11.6* 9.7   HGB 8.9* 9.9*   HCT 26.2* 29.2*    285     Recent Labs     08/04/19  0503 08/03/19  0710    133*   K 3.6 3.0*    101   CO2 22 21   BUN 19 21*   CREA 1.30* 1.20*   * 134*   CA 7.8* 8.1*     Recent Labs     08/03/19  0710   SGOT 10*   AP 98   TP 6. 5   ALB 2.6*   GLOB 3.9     No results for input(s): INR, PTP, APTT in the last 72 hours. No lab exists for component: INREXT     Imaging studies reviewed          Assessment:   Chronic diarrhea- worsened for the past 3 weeks. 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 Pt has intermittent bleeding and anemia but no abdominal pain. Ddx incluide infectious, inflammatory, malasorptive and less likely neoplastic. Agree with stool studies and if negative consider colonoscopy with bx for further evaluation. Anemia- chronic , normocytic and persistent. Better now that in the past with Hb 8.8 g/dl. + FOBT    ARF- continue aggressive IV hydration.      Plan:   Supportive tx  Stool studies  IV hydration and electrolyte replacement  Dr Isabel Garcia to see in am       Tao Whittaker MD

## 2019-08-04 NOTE — PROGRESS NOTES
Oncall Dr Court Barrientos notified of pt request for immodium r/t multiple liquid stools and that c-diff test could not be completed per protocol because the WBC was not high enough. On call notified of need to consult with infectious disease before being able to order c-diff testing, will pass it on to the attending.

## 2019-08-04 NOTE — PROGRESS NOTES
Physical Therapy:  Attempted to see patient. Patient currently resting, and declined and agreed to participate tomorrow. Patient has been up all night with bowel movements and this is her first time resting. We will re-attempt tomorrow. Thank you.   Miguel Nolasco PT,DPT,NCS

## 2019-08-04 NOTE — PROGRESS NOTES
1300: Per patient, Dr. Jarvis Ram will order something for diarrhea if cdiff negative. Patient Cdiff came back negative. Notified Dr. Trevor Velasquez. Orders to follow. Enteric contact discontinued. Bedside shift change report given to 1818 DAPHNE Cancino (oncoming nurse) by Rachelle Nugent RN (offgoing nurse). Report included the following information SBAR, Kardex, Intake/Output, MAR and Recent Results.

## 2019-08-04 NOTE — PROGRESS NOTES
Problem: Falls - Risk of  Goal: *Absence of Falls  Description  Document Kian Shi Fall Risk and appropriate interventions in the flowsheet. Outcome: Progressing Towards Goal  Note:   Fall Risk Interventions:  Mobility Interventions: Communicate number of staff needed for ambulation/transfer, OT consult for ADLs, Patient to call before getting OOB, PT Consult for mobility concerns, PT Consult for assist device competence, Utilize walker, cane, or other assistive device         Medication Interventions: Assess postural VS orthostatic hypotension, Patient to call before getting OOB, Teach patient to arise slowly, Utilize gait belt for transfers/ambulation    Elimination Interventions: Call light in reach, Patient to call for help with toileting needs, Stay With Me (per policy), Toileting schedule/hourly rounds    History of Falls Interventions: Investigate reason for fall, Utilize gait belt for transfer/ambulation, Vital signs minimum Q4HRs X 24 hrs (comment for end date)         Problem: Patient Education: Go to Patient Education Activity  Goal: Patient/Family Education  Outcome: Progressing Towards Goal     Problem: Risk for Spread of Infection  Goal: Prevent transmission of infectious organism to others  Description  Prevent the transmission of infectious organisms to other patients, staff members, and visitors.   Outcome: Progressing Towards Goal     Problem: Patient Education:  Go to Education Activity  Goal: Patient/Family Education  Outcome: Progressing Towards Goal

## 2019-08-04 NOTE — PROGRESS NOTES
Daily Progress Note: 8/4/2019  Roosevelt Thompson MD    Assessment/Plan:   UTI (urinary tract infection) - POA, based on UA. --bacteremia -- prelim blood cultures positive. Repeat blood cultures today  --urine culture pending  --continue broad spectrum IV abx     Sepsis / Fever / Left shift  - POA, presumed due to UTI. NOT severe. Lactate normal.    --Abx as above. Supportive care. Follow cx.     Syncope / Dehydration / Hyponatremia / Hypokalemia - POA due to GI loss, poor PO intake and UTI. --Hydrate and follow. Replete lytes.       Anemia - iron def, stool heme +, hx hemorrhoids  --with ongoing diarrhea will have GI see as well    Diarrhea -- stool studies pending including Cdiff  --If Cdiff neg will start anti-diarrheals     Coronary atherosclerosis of native coronary artery S/P CABG x 3 / Essential hypertension, benign - POA, likely stable. --Troponin minimal bump this AM, will trend  --check ECHO     Pure hypercholesterolemia - continue atorvastatin     Macular degeneration - Supportive care.     DVT proph - lovenox       Problem List:  Problem List as of 8/4/2019 Date Reviewed: 11/4/2018          Codes Class Noted - Resolved    UTI (urinary tract infection) ICD-10-CM: N39.0  ICD-9-CM: 599.0  8/3/2019 - Present        Sepsis ([address] ICD-10-CM: A41.9  ICD-9-CM: 038.9, 995.91  8/3/2019 - Present        Fever ICD-10-CM: R50.9  ICD-9-CM: 780.60  8/3/2019 - Present        Left shift ICD-10-CM: D72.89  ICD-9-CM: 288.9  8/3/2019 - Present        Syncope ICD-10-CM: R55  ICD-9-CM: 780.2  8/3/2019 - Present        Dehydration ICD-10-CM: E86.0  ICD-9-CM: 276.51  8/3/2019 - Present        Hyponatremia ICD-10-CM: E87.1  ICD-9-CM: 276.1  8/3/2019 - Present        Hypokalemia ICD-10-CM: E87.6  ICD-9-CM: 276.8  8/3/2019 - Present        Anemia ICD-10-CM: D64.9  ICD-9-CM: 285.9  8/3/2019 - Present        Macular degeneration ICD-10-CM: H35.30  ICD-9-CM: 362.50  10/30/2013 - Present Carotid arterial disease (HCC) ICD-10-CM: I77.9  ICD-9-CM: 447.9  Unknown - Present    Overview Signed 10/30/2013 10:11 AM by Samantha Ackerman MD     mild 0-49%             Coronary atherosclerosis of native coronary artery ICD-10-CM: I25.10  ICD-9-CM: 414.01  Unknown - Present    Overview Signed 2/22/2012 10:44 AM by Samantha Ackerman MD     Cath 2011 with multivessel cad             S/P CABG (coronary artery bypass graft) ICD-10-CM: Z95.1  ICD-9-CM: V45.81  Unknown - Present    Overview Signed 2/22/2012 10:44 AM by Samantha Ackerman MD     7-88-36 CABG - OFF PUMP - CABGx 3, lima, eric, epi aortic ultrasound - off pump, rightt endoscopic vein harvest; [id number] [place] [address] Dr to LAD, Svg Ao to OM1 and OM2             S/P CABG x 3 ICD-10-CM: Z95.1  ICD-9-CM: V45.81  6/17/2011 - Present    Overview Signed 7/26/2011 10:51 AM by Clint TEAGUE to LAD and SVGs to OM-1 and OM-2. RCA small non-dominant diffusely diseased vessel ungrafted. LV EF at cath was 65%. Essential hypertension, benign ICD-10-CM: I10  ICD-9-CM: 401.1  12/2/2010 - Present        Pure hypercholesterolemia ICD-10-CM: E78.00  ICD-9-CM: 272.0  12/2/2010 - Present        RESOLVED: Cough due to ACE inhibitor ICD-10-CM: R05, T46.4X5A  ICD-9-CM: 786.2, E942.6  Unknown - 8/3/2019        RESOLVED: Edema ICD-10-CM: R60.9  ICD-9-CM: 782.3  6/3/2012 - 8/3/2019        RESOLVED: Medication adverse effect ICD-10-CM: T50.905A  ICD-9-CM: E947.9  6/3/2012 - 8/3/2019        RESOLVED: Diverticulitis ICD-10-CM: N37.52  ICD-9-CM: 562.11  Unknown - 8/3/2019        RESOLVED: Pre-operative cardiovascular examination, unstable angina ([address] ICD-10-CM: Z01.810, I20.0  ICD-9-CM: 411.1, V72.81  6/19/2011 - 6/19/2011        RESOLVED: Diverticulosis of colon with hemorrhage ICD-10-CM: K57.31  ICD-9-CM: 562.12  12/2/2010 - 8/3/2019              Subjective:   H&P: Ms. Leslie Montemayor is a 80 y.o.    female who presented to the Emergency Department complaining of pre syncope. Occurred this AM. Occurred after BM. Associated with period of weakness, poor alertness and confusion. Patient with acute and hx chronic diarrhea, for 3 weeks lately. Failed improve on imodium and cholestyramine. In our ER she has a fever, meets sepsis criteria and UA suggests UTI. We will admit her for management. :  Lots of diarrhea overnight. She has no abd. Pain or CP/SOB/dizziness. She feels weak. Some vaginal irritation from diarrhea so some dysuria at times. Discussed with daughter and  at bedside. Review of Systems:   A comprehensive review of systems was negative except for that written in the HPI. Objective:   Physical Exam:     Visit Vitals  /53 (BP 1 Location: Left arm, BP Patient Position: At rest;Head of bed elevated (Comment degrees))   Pulse 63   Temp 97.8 °F (36.6 °C)   Resp 18   Ht 5' 4\" (1.626 m)   Wt 63.5 kg (140 lb)   SpO2 95%   BMI 24.03 kg/m²      O2 Device: Room air    Temp (24hrs), Av.6 °F (37 °C), Min:97.8 °F (36.6 °C), Max:99.3 °F (37.4 °C)    No intake/output data recorded.  1901 -  0700  In: 2816.7 [I.V.:2816.7]  Out: 500 [Urine:500]    General:  Alert, cooperative, no distress, appears stated age. Head:  Normocephalic, without obvious abnormality, atraumatic. Eyes:  Conjunctivae/corneas clear. PERRL, EOMs intact. Nose: Nares normal. Septum midline. Mucosa normal. No drainage or sinus tenderness. Throat: Lips, mucosa, and tongue moist..   Neck: Supple, symmetrical, trachea midline, no adenopathy   Lungs:   Clear to auscultation bilaterally. Heart:  Regular rate and rhythm, 2/6 systolic murmur   Abdomen:   Soft, non-tender. Bowel sounds normal. No masses,  No organomegaly. Extremities: Extremities normal, atraumatic, no cyanosis or edema. No calf tenderness or cords. Pulses: 2+ and symmetric all extremities.    Skin: Skin color, texture, turgor normal. No rashes or lesions   Neurologic: CNII-XII intact. Alert and oriented X 3. Data Review:       Recent Days:  Recent Labs     08/04/19  0503 08/03/19  0710   WBC 11.6* 9.7   HGB 8.9* 9.9*   HCT 26.2* 29.2*    285     Recent Labs     08/04/19  0503 08/03/19  0710    133*   K 3.6 3.0*    101   CO2 22 21   * 134*   BUN 19 21*   CREA 1.30* 1.20*   CA 7.8* 8.1*   MG 1.5*  --    ALB  --  2.6*   SGOT  --  10*   ALT  --  13     No results for input(s): PH, PCO2, PO2, HCO3, FIO2 in the last 72 hours.     24 Hour Results:  Recent Results (from the past 24 hour(s))   DRUG SCREEN, URINE    Collection Time: 08/03/19 10:35 AM   Result Value Ref Range    AMPHETAMINES NEGATIVE  NEG      BARBITURATES NEGATIVE  NEG      BENZODIAZEPINES NEGATIVE  NEG      COCAINE NEGATIVE  NEG      METHADONE NEGATIVE  NEG      OPIATES NEGATIVE  NEG      PCP(PHENCYCLIDINE) NEGATIVE  NEG      THC (TH-CANNABINOL) NEGATIVE  NEG      Drug screen comment (NOTE)    OCCULT BLOOD, STOOL    Collection Time: 08/03/19  8:08 PM   Result Value Ref Range    Occult blood, stool POSITIVE (A) NEG     CBC W/O DIFF    Collection Time: 08/04/19  5:03 AM   Result Value Ref Range    WBC 11.6 (H) 3.6 - 11.0 K/uL    RBC 2.98 (L) 3.80 - 5.20 M/uL    HGB 8.9 (L) 11.5 - 16.0 g/dL    HCT 26.2 (L) 35.0 - 47.0 %    MCV 87.9 80.0 - 99.0 FL    MCH 29.9 26.0 - 34.0 PG    MCHC 34.0 30.0 - 36.5 g/dL    RDW 12.9 11.5 - 14.5 %    PLATELET 144 692 - 544 K/uL    MPV 9.1 8.9 - 12.9 FL    NRBC 0.0 0  WBC    ABSOLUTE NRBC 0.00 0.00 - 0.01 K/uL   MAGNESIUM    Collection Time: 08/04/19  5:03 AM   Result Value Ref Range    Magnesium 1.5 (L) 1.6 - 2.4 mg/dL   METABOLIC PANEL, BASIC    Collection Time: 08/04/19  5:03 AM   Result Value Ref Range    Sodium 136 136 - 145 mmol/L    Potassium 3.6 3.5 - 5.1 mmol/L    Chloride 107 97 - 108 mmol/L    CO2 22 21 - 32 mmol/L    Anion gap 7 5 - 15 mmol/L    Glucose 124 (H) 65 - 100 mg/dL    BUN 19 6 - 20 MG/DL    Creatinine 1.30 (H) 0.55 - 1.02 MG/DL BUN/Creatinine ratio 15 12 - 20      GFR est AA 47 (L) >60 ml/min/1.73m2    GFR est non-AA 39 (L) >60 ml/min/1.73m2    Calcium 7.8 (L) 8.5 - 10.1 MG/DL   TROPONIN I    Collection Time: 08/04/19  5:03 AM   Result Value Ref Range    Troponin-I, Qt. 0.07 (H) <0.05 ng/mL       Medications reviewed  Current Facility-Administered Medications   Medication Dose Route Frequency    lactobac ac& pc-s.therm-b.anim (GERALDINE Q/RISAQUAD)  1 Cap Oral DAILY    psyllium husk-aspartame (METAMUCIL FIBER) packet 1 Packet  1 Packet Oral BID    sodium chloride (NS) flush 5-10 mL  5-10 mL IntraVENous PRN    0.9% sodium chloride with KCl 40 mEq/L infusion   IntraVENous CONTINUOUS    amLODIPine (NORVASC) tablet 5 mg  5 mg Oral DAILY    aspirin chewable tablet 81 mg  81 mg Oral DAILY    atorvastatin (LIPITOR) tablet 20 mg  20 mg Oral QHS    doxazosin (CARDURA) tablet 1 mg  1 mg Oral DAILY    acetaminophen (TYLENOL) tablet 650 mg  650 mg Oral Q4H PRN    diphenhydrAMINE (BENADRYL) capsule 25 mg  25 mg Oral Q4H PRN    ondansetron (ZOFRAN) injection 4 mg  4 mg IntraVENous Q4H PRN    enoxaparin (LOVENOX) injection 30 mg  30 mg SubCUTAneous Q24H    piperacillin-tazobactam (ZOSYN) 3.375 g in 0.9% sodium chloride (MBP/ADV) 100 mL  3.375 g IntraVENous Q8H    metoprolol tartrate (LOPRESSOR) tablet 50 mg  50 mg Oral BID    losartan (COZAAR) tablet 50 mg  50 mg Oral DAILY       Care Plan discussed with: Patient/Family    Total time spent with patient: 30 minutes.     Analilia Baeza MD

## 2019-08-04 NOTE — PROGRESS NOTES
On call provider Dr Teresa Nuñez notified of positive blood cultures and that pt was already on broad spectrum iv abt. Provider ordered more medications to help control diarrhea but no other changes at this time.

## 2019-08-04 NOTE — PROGRESS NOTES
Dr Sahara Francis on call notified per family members concern about pt's continuing diarrhea. Provider concerned about complications should antidiarrheals be given if has c diff since it has not been r/o. Reordered c-dif test will attempt to send once sample is obtained.

## 2019-08-05 LAB
ALBUMIN SERPL-MCNC: 2.3 G/DL (ref 3.5–5)
ALBUMIN/GLOB SERPL: 0.6 {RATIO} (ref 1.1–2.2)
ALP SERPL-CCNC: 129 U/L (ref 45–117)
ALT SERPL-CCNC: 22 U/L (ref 12–78)
ANION GAP SERPL CALC-SCNC: 4 MMOL/L (ref 5–15)
AST SERPL-CCNC: 30 U/L (ref 15–37)
BACTERIA SPEC CULT: ABNORMAL
BASOPHILS # BLD: 0.1 K/UL (ref 0–0.1)
BASOPHILS NFR BLD: 1 % (ref 0–1)
BILIRUB SERPL-MCNC: 0.7 MG/DL (ref 0.2–1)
BUN SERPL-MCNC: 11 MG/DL (ref 6–20)
BUN/CREAT SERPL: 11 (ref 12–20)
CALCIUM SERPL-MCNC: 7.9 MG/DL (ref 8.5–10.1)
CC UR VC: ABNORMAL
CHLORIDE SERPL-SCNC: 114 MMOL/L (ref 97–108)
CO2 SERPL-SCNC: 21 MMOL/L (ref 21–32)
CREAT SERPL-MCNC: 1.01 MG/DL (ref 0.55–1.02)
DIFFERENTIAL METHOD BLD: ABNORMAL
EOSINOPHIL # BLD: 0.5 K/UL (ref 0–0.4)
EOSINOPHIL NFR BLD: 4 % (ref 0–7)
ERYTHROCYTE [DISTWIDTH] IN BLOOD BY AUTOMATED COUNT: 13.4 % (ref 11.5–14.5)
GLOBULIN SER CALC-MCNC: 3.7 G/DL (ref 2–4)
GLUCOSE SERPL-MCNC: 88 MG/DL (ref 65–100)
HCT VFR BLD AUTO: 27.1 % (ref 35–47)
HGB BLD-MCNC: 9.1 G/DL (ref 11.5–16)
IMM GRANULOCYTES # BLD AUTO: 0.1 K/UL (ref 0–0.04)
IMM GRANULOCYTES NFR BLD AUTO: 1 % (ref 0–0.5)
LYMPHOCYTES # BLD: 1.7 K/UL (ref 0.8–3.5)
LYMPHOCYTES NFR BLD: 14 % (ref 12–49)
MAGNESIUM SERPL-MCNC: 1.5 MG/DL (ref 1.6–2.4)
MCH RBC QN AUTO: 29.6 PG (ref 26–34)
MCHC RBC AUTO-ENTMCNC: 33.6 G/DL (ref 30–36.5)
MCV RBC AUTO: 88.3 FL (ref 80–99)
MONOCYTES # BLD: 1 K/UL (ref 0–1)
MONOCYTES NFR BLD: 9 % (ref 5–13)
NEUTS SEG # BLD: 9 K/UL (ref 1.8–8)
NEUTS SEG NFR BLD: 71 % (ref 32–75)
NRBC # BLD: 0 K/UL (ref 0–0.01)
NRBC BLD-RTO: 0 PER 100 WBC
PLATELET # BLD AUTO: 275 K/UL (ref 150–400)
PMV BLD AUTO: 9.5 FL (ref 8.9–12.9)
POTASSIUM SERPL-SCNC: 4.1 MMOL/L (ref 3.5–5.1)
PROT SERPL-MCNC: 6 G/DL (ref 6.4–8.2)
RBC # BLD AUTO: 3.07 M/UL (ref 3.8–5.2)
SERVICE CMNT-IMP: ABNORMAL
SERVICE CMNT-IMP: ABNORMAL
SODIUM SERPL-SCNC: 139 MMOL/L (ref 136–145)
WBC # BLD AUTO: 12.3 K/UL (ref 3.6–11)

## 2019-08-05 PROCEDURE — 97116 GAIT TRAINING THERAPY: CPT

## 2019-08-05 PROCEDURE — 97162 PT EVAL MOD COMPLEX 30 MIN: CPT

## 2019-08-05 PROCEDURE — 74011250637 HC RX REV CODE- 250/637: Performed by: INTERNAL MEDICINE

## 2019-08-05 PROCEDURE — 80053 COMPREHEN METABOLIC PANEL: CPT

## 2019-08-05 PROCEDURE — 74011250637 HC RX REV CODE- 250/637: Performed by: FAMILY MEDICINE

## 2019-08-05 PROCEDURE — 85025 COMPLETE CBC W/AUTO DIFF WBC: CPT

## 2019-08-05 PROCEDURE — 74011000258 HC RX REV CODE- 258: Performed by: INTERNAL MEDICINE

## 2019-08-05 PROCEDURE — 74011250636 HC RX REV CODE- 250/636: Performed by: INTERNAL MEDICINE

## 2019-08-05 PROCEDURE — 36415 COLL VENOUS BLD VENIPUNCTURE: CPT

## 2019-08-05 PROCEDURE — 74011250637 HC RX REV CODE- 250/637: Performed by: NURSE PRACTITIONER

## 2019-08-05 PROCEDURE — 83735 ASSAY OF MAGNESIUM: CPT

## 2019-08-05 PROCEDURE — 65270000029 HC RM PRIVATE

## 2019-08-05 RX ORDER — CHOLESTYRAMINE 4 G/4.8G
4 POWDER, FOR SUSPENSION ORAL
Status: DISCONTINUED | OUTPATIENT
Start: 2019-08-05 | End: 2019-08-05

## 2019-08-05 RX ORDER — LANOLIN ALCOHOL/MO/W.PET/CERES
400 CREAM (GRAM) TOPICAL DAILY
Status: DISCONTINUED | OUTPATIENT
Start: 2019-08-05 | End: 2019-08-07

## 2019-08-05 RX ORDER — ENOXAPARIN SODIUM 100 MG/ML
40 INJECTION SUBCUTANEOUS EVERY 24 HOURS
Status: DISCONTINUED | OUTPATIENT
Start: 2019-08-06 | End: 2019-08-11 | Stop reason: HOSPADM

## 2019-08-05 RX ORDER — CHOLESTYRAMINE 4 G/4.8G
4 POWDER, FOR SUSPENSION ORAL 2 TIMES DAILY WITH MEALS
Status: DISCONTINUED | OUTPATIENT
Start: 2019-08-05 | End: 2019-08-06

## 2019-08-05 RX ADMIN — PIPERACILLIN SODIUM,TAZOBACTAM SODIUM 3.38 G: 3; .375 INJECTION, POWDER, FOR SOLUTION INTRAVENOUS at 17:04

## 2019-08-05 RX ADMIN — PIPERACILLIN SODIUM,TAZOBACTAM SODIUM 3.38 G: 3; .375 INJECTION, POWDER, FOR SOLUTION INTRAVENOUS at 08:36

## 2019-08-05 RX ADMIN — ASPIRIN 81 MG 81 MG: 81 TABLET ORAL at 08:35

## 2019-08-05 RX ADMIN — METOPROLOL TARTRATE 50 MG: 50 TABLET ORAL at 08:35

## 2019-08-05 RX ADMIN — PSYLLIUM HUSK 1 PACKET: 3.4 POWDER ORAL at 17:04

## 2019-08-05 RX ADMIN — DIPHENOXYLATE HYDROCHLORIDE AND ATROPINE SULFATE 1 TABLET: 2.5; .025 TABLET ORAL at 10:15

## 2019-08-05 RX ADMIN — Medication 1 CAPSULE: at 08:35

## 2019-08-05 RX ADMIN — CHOLESTYRAMINE 4 G: 4 POWDER, FOR SUSPENSION ORAL at 16:01

## 2019-08-05 RX ADMIN — SODIUM CHLORIDE AND POTASSIUM CHLORIDE: 9; 2.98 INJECTION, SOLUTION INTRAVENOUS at 03:29

## 2019-08-05 RX ADMIN — LOSARTAN POTASSIUM 50 MG: 50 TABLET, FILM COATED ORAL at 08:34

## 2019-08-05 RX ADMIN — ATORVASTATIN CALCIUM 20 MG: 20 TABLET, FILM COATED ORAL at 22:22

## 2019-08-05 RX ADMIN — PIPERACILLIN SODIUM,TAZOBACTAM SODIUM 3.38 G: 3; .375 INJECTION, POWDER, FOR SOLUTION INTRAVENOUS at 00:53

## 2019-08-05 RX ADMIN — DIPHENOXYLATE HYDROCHLORIDE AND ATROPINE SULFATE 1 TABLET: 2.5; .025 TABLET ORAL at 20:25

## 2019-08-05 RX ADMIN — DIPHENOXYLATE HYDROCHLORIDE AND ATROPINE SULFATE 1 TABLET: 2.5; .025 TABLET ORAL at 04:35

## 2019-08-05 RX ADMIN — DOXAZOSIN 1 MG: 1 TABLET ORAL at 08:35

## 2019-08-05 RX ADMIN — METOPROLOL TARTRATE 50 MG: 50 TABLET ORAL at 17:04

## 2019-08-05 RX ADMIN — AMLODIPINE BESYLATE 5 MG: 5 TABLET ORAL at 08:35

## 2019-08-05 RX ADMIN — Medication 400 MG: at 08:36

## 2019-08-05 NOTE — PROGRESS NOTES
Problem: Falls - Risk of  Goal: *Absence of Falls  Description  Document Page Payment Fall Risk and appropriate interventions in the flowsheet. Outcome: Progressing Towards Goal  Note:   Fall Risk Interventions:  Mobility Interventions: Communicate number of staff needed for ambulation/transfer         Medication Interventions: Assess postural VS orthostatic hypotension    Elimination Interventions: Call light in reach    History of Falls Interventions: Door open when patient unattended         Problem: Patient Education: Go to Patient Education Activity  Goal: Patient/Family Education  Outcome: Progressing Towards Goal     Problem: Risk for Spread of Infection  Goal: Prevent transmission of infectious organism to others  Description  Prevent the transmission of infectious organisms to other patients, staff members, and visitors.   Outcome: Progressing Towards Goal     Problem: Patient Education:  Go to Education Activity  Goal: Patient/Family Education  Outcome: Progressing Towards Goal

## 2019-08-05 NOTE — PROGRESS NOTES
Physical Therapy Note:    Orders acknowledged, chart reviewed, discussed with RN. PT evaluation attempted and deferred. Pt declining participation, stating she recently returned to bed after ambulating to/from restroom and is very fatigued. Encouraged pt to mobilize as much as possible including sitting in chair and discussed role of PT. She agrees to follow-up at another time. Will continue to follow and proceed with PT evaluation when appropriate.     Sheeba Finch, PT, DPT, Desiree Contreras

## 2019-08-05 NOTE — PROGRESS NOTES
Bedside shift change report given to Frandy Monsalve, [facility] nurse) by MARY CARMEN Dean (offgoing nurse).  Report included the following information SBAR, Kardex, Intake/Output, MAR and Recent Results.

## 2019-08-05 NOTE — PROGRESS NOTES
[address] NP  [phone]           GI PROGRESS NOTE        NAME: Smita Garcia   :  1931   MRN:  [id number]       Subjective:   Has had 4 loose stools, not black or bloody. Denies abdominal pain      Objective:   NAD      VITALS:   Last 24hrs VS reviewed since prior progress note. Most recent are:  Visit Vitals  /63   Pulse 60   Temp 97.9 °F (36.6 °C)   Resp 14   Ht 5' 4\" (1.626 m)   Wt 63.5 kg (140 lb)   SpO2 94%   BMI 24.03 kg/m²       Intake/Output Summary (Last 24 hours) at 2019 1432  Last data filed at 2019 0844  Gross per 24 hour   Intake 406.67 ml   Output 250 ml   Net 156.67 ml       PHYSICAL EXAM:  General: Alert, in no acute distress    HEENT: Anicteric sclerae. Lungs:            CTA Bilaterally. Heart:  Regular  rhythm,    Abdomen: Soft, Non distended, Non tender.  (+)Bowel sounds, no HSM  Extremities: No c/c/e  Neurologic:  CN 2-12 gi, Alert and oriented X 3. No acute neurological distress   Psych:   Good insight. Not anxious nor agitated.     Lab Data Reviewed:   Recent Labs     19  0512 19  0503   WBC 12.3* 11.6*   HGB 9.1* 8.9*   HCT 27.1* 26.2*    261     Recent Labs     19  0512 19  0503    136   K 4.1 3.6   * 107   CO2 21 22   BUN 11 19   CREA 1.01 1.30*   GLU 88 124*   CA 7.9* 7.8*     Recent Labs     19  0512 19  0710   SGOT 30 10*   * 98   TP 6.0* 6.5   ALB 2.3* 2.6*   GLOB 3.7 3.9       ________________________________________________________________________  Patient Active Problem List   Diagnosis Code    Essential hypertension, benign I10    Pure hypercholesterolemia E78.00    S/P CABG x 3 Z95.1    Coronary atherosclerosis of native coronary artery I25.10    S/P CABG (coronary artery bypass graft) Z95.1    Macular degeneration H35.30    Carotid arterial disease (HCC) I77.9    UTI (urinary tract infection) N39.0    Sepsis (HCC) A41.9    Fever R50.9    Left shift D72.89    Syncope R55    Dehydration E86.0    Hyponatremia E87.1    Hypokalemia E87.6    Anemia D64.9         Assessment and Plan:  Diarrhea:  Stool tests are negative for infectious process. Pt has known history of microscopic colitis. - Diet as tolerated  - Monitor Labs  - Continue probiotic  - Continue Fiber supplement  - Started Questran BID - consider increasing based on pts response    Following.        Signed By: Ishan Heck NP     8/5/2019  2:32 PM

## 2019-08-05 NOTE — PROGRESS NOTES
Spiritual Care Partner Volunteer visited patient on the [facility] floor on 8/5/19   Documented by:  Mariely Mcclellan. Jeff Owens.      Paging Service: [phone])

## 2019-08-05 NOTE — PROGRESS NOTES
8/5/2019 11:23 AM Met with pt, pt's  and pt's daughter. Pt was resting during assessment, pt's family provided all information. Charted address and phone numbers confirmed. Reason for Admission:  UTI                  RRAT Score: 13                 Do you (patient/family) have any concerns for transition/discharge? No concerns noted from pt's  nor her daughter. Pt's daughter reported their main concern was getting pt's bowel movements under control. Pt's daughter reported pt is \"spunky\" and had no physical limitations prior to admission. Plan for utilizing home health: Pt has no history of home health. Pt's  has had home health in the past but was unable to recall the name of the agency. Current Advanced Directive/Advance Care Plan:    Gertrudis Tobin   [phone]             Transition of Care Plan: home with family    Pt lives with her  in a 1 story home in [place], there are no steps to enter. Pt was independent with adls and walking with out the use of an assistive device prior to admission. Pt's  and daughter transport pt to [facility] and will transport pt home. Pt has rx coverage and fills her scripts at the [facility]. No discharge needs identified at this time. CM will follow.  ROGELIO Gonzalez  Care Management Interventions  PCP Verified by CM: Yes(Garrison Babara Collet, no nurse navigator )  Xinhart Signup: No  Discharge Durable Medical Equipment: No  Physical Therapy Consult: Yes  Occupational Therapy Consult: No  Speech Therapy Consult: No  Current Support Network: Lives with Spouse

## 2019-08-05 NOTE — PROGRESS NOTES
Problem: Mobility Impaired (Adult and Pediatric)  Goal: *Acute Goals and Plan of Care (Insert Text)  Description  FUNCTIONAL STATUS PRIOR TO ADMISSION: Patient was independent and active without use of DME, provides care for spouse. HOME SUPPORT PRIOR TO ADMISSION: The patient lived alone with family to provide assistance. Physical Therapy Goals  Initiated 8/5/2019  1. Patient will move from supine to sit and sit to supine  in bed with independence within 7 day(s). 2.  Patient will transfer from bed to chair and chair to bed with independence using the least restrictive device within 7 day(s). 3.  Patient will perform sit to stand with independence within 7 day(s). 4.  Patient will ambulate with independence for 450 feet with the least restrictive device within 7 day(s). 5.  Patient will ascend/descend 1 stairs without handrail(s) with modified independence within 7 day(s). Outcome: Progressing Towards Goal   PHYSICAL THERAPY EVALUATION  Patient: Bhakti Lange (89 y.o. female)  Date: 8/5/2019  Primary Diagnosis: UTI (urinary tract infection) [N39.0]        Precautions: Universal         ASSESSMENT  Based on the objective data described below, the patient presents with decreased endurance and strength limiting functional independence on day 2 of admission s/p pre-syncope with work-up revealing UTI sepsis. She offers good participation with PT and appears eager to maintain independence. She mobilizes at overall SBA/supervision level and tolerates activity without complaints. Current Level of Function Impacting Discharge (mobility/balance): mild balance impairment    Functional Outcome Measure: The patient scored 25/28 on the Tinetti Gait and Balance Assment outcome measure which is indicative of low fall risk but positive balance impairments. Other factors to consider for discharge: provides care to spouse at baseline; may need additional family assistance for spouse.      Patient will benefit from skilled therapy intervention to address the above noted impairments. PLAN :  Recommendations and Planned Interventions: bed mobility training, transfer training, gait training, therapeutic exercises, patient and family training/education and therapeutic activities      Frequency/Duration: Patient will be followed by physical therapy:  5 times a week to address goals. Recommendation for discharge: (in order for the patient to meet his/her long term goals)  Outpatient physical therapy follow up recommended for strength, balance, and endurance training  vs. None pending progress. This discharge recommendation:  A follow-up discussion with the attending provider and/or case management is planned    Equipment recommendations for successful discharge (if) home: none          SUBJECTIVE:   Patient stated I'm glad we walked.     Pt received supine, agreeable to PT and cleared by RN. OBJECTIVE DATA SUMMARY:   HISTORY:    Past Medical History:   Diagnosis Date    Carotid arterial disease ([facility] [address]     mild 0-49%    Coronary atherosclerosis of native coronary artery     Cath 2011 with multivessel cad    Cough due to ACE inhibitor     Diverticulitis     Dyslipidemia     Hypertension     Macular degeneration     S/P CABG (coronary artery bypass graft)     6-19-11 CABG - OFF PUMP - CABGx 3, lima, eric, epi aortic ultrasound - off pump, rightt endoscopic vein harvest; [id number] [place] [address] Dr to LAD, Svg Ao to OM1 and OM2   No past surgical history on file.     Personal factors and/or comorbidities impacting plan of care: as above    Home Situation  Home Environment: Private residence  # Steps to Enter: 1  Wheelchair Ramp: Yes  One/Two Story Residence: One story  Living Alone: No  Support Systems: Family member(s)  Patient Expects to be Discharged to[de-identified] Private residence  Current DME Used/Available at Home: brijesh Verde, [address] [place], straight(does not use)    EXAMINATION/PRESENTATION/DECISION MAKING: Critical Behavior:  Neurologic State: Alert  Orientation Level: Oriented X4  Cognition: Follows commands     Hearing: Auditory  Auditory Impairment: None  Skin:  LE exposed skin intact  Edema: none noted LEs. Range Of Motion:  AROM: Within functional limits           PROM: Within functional limits           Strength:    Strength: Generally decreased, functional                    Tone & Sensation:   Tone: Normal                              Coordination:  Coordination: Within functional limits  Vision:      Functional Mobility:  Bed Mobility:     Supine to Sit: Modified independent  Sit to Supine: Modified independent  Scooting: Modified independent  Transfers:  Sit to Stand: Supervision  Stand to Sit: Supervision                       Balance:   Sitting: Intact  Standing: Without support  Standing - Static: Good  Standing - Dynamic : Good  Ambulation/Gait Training:  Distance (ft): 125 Feet (ft)  Assistive Device: Gait belt(intermittent hand-rail)  Ambulation - Level of Assistance: Stand-by assistance        Gait Abnormalities: Decreased step clearance                                    No loss of balance, appropriate obstacle negotiation; able to change gait speeds and directions without loss of balance. Functional Measure:  Tinetti test:    Sitting Balance: 1  Arises: 1  Attempts to Rise: 2  Immediate Standing Balance: 2  Standing Balance: 2  Nudged: 2  Eyes Closed: 1  Turn 360 Degrees - Continuous/Discontinuous: 1  Turn 360 Degrees - Steady/Unsteady: 1  Sitting Down: 1  Balance Score: 14  Indication of Gait: 1  R Step Length/Height: 1  L Step Length/Height: 1  R Foot Clearance: 1  L Foot Clearance: 1  Step Symmetry: 1  Step Continuity: 1  Path: 2  Trunk: 1  Walking Time: 1  Gait Score: 11  Total Score: 25         Tinetti Tool Score Risk of Falls  <19 = High Fall Risk  19-24 = Moderate Fall Risk  25-28 = Low Fall Risk  Tinetti ME.  Performance-Oriented Assessment of Mobility Problems in Elderly Patients. [facility] 66; [id number]. (Scoring Description: PT Bulletin Feb. 10, 1993)    Older adults: Shelly Crenshaw et al, 2009; n = 1000 [facility] elderly evaluated with ABC, BANDAR, ADL, and IADL)  · Mean BANDAR score for males aged 69-68 years = 26.21(3.40)  · Mean BANDAR score for females age 69-68 years = 25.16(4.30)  · Mean BANDAR score for males over 80 years = 23.29(6.02)  · Mean BANDAR score for females over 80 years = 17.20(8.32)            Physical Therapy Evaluation Charge Determination   History Examination Presentation Decision-Making   HIGH Complexity :3+ comorbidities / personal factors will impact the outcome/ POC  HIGH Complexity : 4+ Standardized tests and measures addressing body structure, function, activity limitation and / or participation in recreation  LOW Complexity : Stable, uncomplicated  Other outcome measures Tinetti  LOW       Based on the above components, the patient evaluation is determined to be of the following complexity level: LOW     Pain Rating:  Denies pain    Activity Tolerance:   Good  Please refer to the flowsheet for vital signs taken during this treatment. After treatment patient left in no apparent distress:   Supine in bed, Call bell within reach, Caregiver / family present and Side rails x 3    COMMUNICATION/EDUCATION:   The patients plan of care was discussed with: Registered Nurse. Fall prevention education was provided and the patient/caregiver indicated understanding., Patient/family have participated as able in goal setting and plan of care. and Patient/family agree to work toward stated goals and plan of care.     Thank you for this referral.     Wally Sanchez, PT, DPT   Time Calculation: 21 mins

## 2019-08-05 NOTE — PROGRESS NOTES
Daily Progress Note: 8/5/2019  Katie Newell MD    Assessment/Plan:   UTI (urinary tract infection) - POA, based on UA. --bacteremia -- prelim blood cultures positive. Repeat blood cultures neg so far. --urine culture with Gram neg rods  --continue Zosyn     Sepsis / Fever / Left shift  - POA, presumed due to UTI. NOT severe. Lactate normal.    --Abx as above. Supportive care. Follow cx.     Syncope / Dehydration / Hyponatremia / Hypokalemia - POA due to GI loss, poor PO intake and UTI. --Hydrate and follow. Replete lytes.       Anemia - iron def, stool heme +, hx hemorrhoids  --with ongoing diarrhea will have GI see as well    Diarrhea   --Cdiff neg   -- anti-diarrheals have helped  -- stool studies negative for infect organisms so far     Coronary atherosclerosis of native coronary artery S/P CABG x 3 / Essential hypertension, benign - POA, likely stable. --Troponin minimal bump  --check ECHO    Hypomagnesemia  --replace as needed     Pure hypercholesterolemia - continue atorvastatin     Macular degeneration - Supportive care.     DVT proph - lovenox       Problem List:  Problem List as of 8/5/2019 Date Reviewed: 11/4/2018          Codes Class Noted - Resolved    UTI (urinary tract infection) ICD-10-CM: N39.0  ICD-9-CM: 599.0  8/3/2019 - Present        Sepsis ([address] ICD-10-CM: A41.9  ICD-9-CM: 038.9, 995.91  8/3/2019 - Present        Fever ICD-10-CM: R50.9  ICD-9-CM: 780.60  8/3/2019 - Present        Left shift ICD-10-CM: D72.89  ICD-9-CM: 288.9  8/3/2019 - Present        Syncope ICD-10-CM: R55  ICD-9-CM: 780.2  8/3/2019 - Present        Dehydration ICD-10-CM: E86.0  ICD-9-CM: 276.51  8/3/2019 - Present        Hyponatremia ICD-10-CM: E87.1  ICD-9-CM: 276.1  8/3/2019 - Present        Hypokalemia ICD-10-CM: E87.6  ICD-9-CM: 276.8  8/3/2019 - Present        Anemia ICD-10-CM: D64.9  ICD-9-CM: 285.9  8/3/2019 - Present        Macular degeneration ICD-10-CM: H35.30  ICD-9-CM: 362.50  10/30/2013 - Present        Carotid arterial disease ([facility] [address] ICD-10-CM: I77.9  ICD-9-CM: 447.9  Unknown - Present    Overview Signed 10/30/2013 10:11 AM by Roxana Kelley MD     mild 0-49%             Coronary atherosclerosis of native coronary artery ICD-10-CM: I25.10  ICD-9-CM: 414.01  Unknown - Present    Overview Signed 2/22/2012 10:44 AM by Roxana Kelley MD     Cath 2011 with multivessel cad             S/P CABG (coronary artery bypass graft) ICD-10-CM: Z95.1  ICD-9-CM: V45.81  Unknown - Present    Overview Signed 2/22/2012 10:44 AM by Roxana Kelley MD     8-63-31 CABG - OFF PUMP - CABGx 3, lima, eric, epi aortic ultrasound - off pump, rightt endoscopic vein harvest; [address] [place] [address] [address] to LAD, Svg Ao to OM1 and OM2             S/P CABG x 3 ICD-10-CM: Z95.1  ICD-9-CM: V45.81  6/17/2011 - Present    Overview Signed 7/26/2011 10:51 AM by Benny TEAGUE to LAD and SVGs to OM-1 and OM-2. RCA small non-dominant diffusely diseased vessel ungrafted. LV EF at cath was 65%. Essential hypertension, benign ICD-10-CM: I10  ICD-9-CM: 401.1  12/2/2010 - Present        Pure hypercholesterolemia ICD-10-CM: E78.00  ICD-9-CM: 272.0  12/2/2010 - Present        RESOLVED: Cough due to ACE inhibitor ICD-10-CM: R05, T46.4X5A  ICD-9-CM: 786.2, E942.6  Unknown - 8/3/2019        RESOLVED: Edema ICD-10-CM: R60.9  ICD-9-CM: 782.3  6/3/2012 - 8/3/2019        RESOLVED: Medication adverse effect ICD-10-CM: T50.905A  ICD-9-CM: E947.9  6/3/2012 - 8/3/2019        RESOLVED: Diverticulitis ICD-10-CM: S19.60  ICD-9-CM: 562.11  Unknown - 8/3/2019        RESOLVED: Pre-operative cardiovascular examination, unstable angina ([facility] [address] ICD-10-CM: Z01.810, I20.0  ICD-9-CM: 411.1, V72.81  6/19/2011 - 6/19/2011        RESOLVED: Diverticulosis of colon with hemorrhage ICD-10-CM: K57.31  ICD-9-CM: 562.12  12/2/2010 - 8/3/2019              Subjective:   H&P: Ms. Дмитрий Clancy is a 80 y.o.    female who presented to the Emergency Department complaining of pre syncope. Occurred this AM. Occurred after BM. Associated with period of weakness, poor alertness and confusion. Patient with acute and hx chronic diarrhea, for 3 weeks lately. Failed improve on imodium and cholestyramine. In our ER she has a fever, meets sepsis criteria and UA suggests UTI. We will admit her for management. :  Lots of diarrhea overnight. She has no abd. Pain or CP/SOB/dizziness. She feels weak. Some vaginal irritation from diarrhea so some dysuria at times. Discussed with daughter and  at bedside. :  Pt has no complaints. Less diarrhea with the imodium - 3 loose stools since yesterday. GI considering colonoscopy. Review of Systems:   A comprehensive review of systems was negative except for that written in the HPI. Objective:   Physical Exam:     Visit Vitals  /72   Pulse 61   Temp 98.4 °F (36.9 °C)   Resp 13   Ht 5' 4\" (1.626 m)   Wt 63.5 kg (140 lb)   SpO2 92%   BMI 24.03 kg/m²      O2 Device: Room air    Temp (24hrs), Av.9 °F (36.6 °C), Min:97.4 °F (36.3 °C), Max:98.4 °F (36.9 °C)    No intake/output data recorded.  1901 -  0700  In: 2750 [I.V.:2750]  Out: 300 [Urine:300]    General:  Alert, cooperative, no distress, appears stated age. Head:  Normocephalic, without obvious abnormality, atraumatic. Eyes:  Conjunctivae/corneas clear. PERRL, EOMs intact. Nose: Nares normal. Septum midline. Mucosa normal. No drainage or sinus tenderness. Throat: Lips, mucosa, and tongue moist..   Neck: Supple, symmetrical, trachea midline, no adenopathy   Lungs:   Clear to auscultation bilaterally. Heart:  Regular rate and rhythm, 2/6 systolic murmur   Abdomen:   Soft, non-tender. Bowel sounds normal. No masses,  No organomegaly. Extremities: Extremities normal, atraumatic, no cyanosis or edema. No calf tenderness or cords. Pulses: 2+ and symmetric all extremities.    Skin: Skin color, texture, turgor normal. No rashes or lesions   Neurologic:   Alert and oriented X 3. Moves all extrem to command. Data Review:       Recent Days:  Recent Labs     08/05/19  0512 08/04/19  0503 08/03/19  0710   WBC 12.3* 11.6* 9.7   HGB 9.1* 8.9* 9.9*   HCT 27.1* 26.2* 29.2*    261 285     Recent Labs     08/05/19  0512 08/04/19  0503 08/03/19  0710    136 133*   K 4.1 3.6 3.0*   * 107 101   CO2 21 22 21   GLU 88 124* 134*   BUN 11 19 21*   CREA 1.01 1.30* 1.20*   CA 7.9* 7.8* 8.1*   MG 1.5* 1.5*  --    ALB 2.3*  --  2.6*   SGOT 30  --  10*   ALT 22  --  13     No results for input(s): PH, PCO2, PO2, HCO3, FIO2 in the last 72 hours. 24 Hour Results:  Recent Results (from the past 24 hour(s))   CULTURE, BLOOD, PAIRED    Collection Time: 08/04/19  8:19 AM   Result Value Ref Range    Special Requests: NO SPECIAL REQUESTS      Culture result: NO GROWTH AFTER 21 HOURS     TROPONIN I    Collection Time: 08/04/19  2:33 PM   Result Value Ref Range    Troponin-I, Qt. 0.07 (H) <0.05 ng/mL   SAMPLES BEING HELD    Collection Time: 08/04/19  2:33 PM   Result Value Ref Range    SAMPLES BEING HELD 1sst     COMMENT        Add-on orders for these samples will be processed based on acceptable specimen integrity and analyte stability, which may vary by analyte. CBC WITH AUTOMATED DIFF    Collection Time: 08/05/19  5:12 AM   Result Value Ref Range    WBC 12.3 (H) 3.6 - 11.0 K/uL    RBC 3.07 (L) 3.80 - 5.20 M/uL    HGB 9.1 (L) 11.5 - 16.0 g/dL    HCT 27.1 (L) 35.0 - 47.0 %    MCV 88.3 80.0 - 99.0 FL    MCH 29.6 26.0 - 34.0 PG    MCHC 33.6 30.0 - 36.5 g/dL    RDW 13.4 11.5 - 14.5 %    PLATELET 658 213 - 227 K/uL    MPV 9.5 8.9 - 12.9 FL    NRBC 0.0 0  WBC    ABSOLUTE NRBC 0.00 0.00 - 0.01 K/uL    NEUTROPHILS 71 32 - 75 %    LYMPHOCYTES 14 12 - 49 %    MONOCYTES 9 5 - 13 %    EOSINOPHILS 4 0 - 7 %    BASOPHILS 1 0 - 1 %    IMMATURE GRANULOCYTES 1 (H) 0.0 - 0.5 %    ABS.  NEUTROPHILS 9.0 (H) 1.8 - 8.0 K/UL    ABS. LYMPHOCYTES 1.7 0.8 - 3.5 K/UL    ABS. MONOCYTES 1.0 0.0 - 1.0 K/UL    ABS. EOSINOPHILS 0.5 (H) 0.0 - 0.4 K/UL    ABS. BASOPHILS 0.1 0.0 - 0.1 K/UL    ABS. IMM. GRANS. 0.1 (H) 0.00 - 0.04 K/UL    DF AUTOMATED     METABOLIC PANEL, COMPREHENSIVE    Collection Time: 08/05/19  5:12 AM   Result Value Ref Range    Sodium 139 136 - 145 mmol/L    Potassium 4.1 3.5 - 5.1 mmol/L    Chloride 114 (H) 97 - 108 mmol/L    CO2 21 21 - 32 mmol/L    Anion gap 4 (L) 5 - 15 mmol/L    Glucose 88 65 - 100 mg/dL    BUN 11 6 - 20 MG/DL    Creatinine 1.01 0.55 - 1.02 MG/DL    BUN/Creatinine ratio 11 (L) 12 - 20      GFR est AA >60 >60 ml/min/1.73m2    GFR est non-AA 52 (L) >60 ml/min/1.73m2    Calcium 7.9 (L) 8.5 - 10.1 MG/DL    Bilirubin, total 0.7 0.2 - 1.0 MG/DL    ALT (SGPT) 22 12 - 78 U/L    AST (SGOT) 30 15 - 37 U/L    Alk.  phosphatase 129 (H) 45 - 117 U/L    Protein, total 6.0 (L) 6.4 - 8.2 g/dL    Albumin 2.3 (L) 3.5 - 5.0 g/dL    Globulin 3.7 2.0 - 4.0 g/dL    A-G Ratio 0.6 (L) 1.1 - 2.2     MAGNESIUM    Collection Time: 08/05/19  5:12 AM   Result Value Ref Range    Magnesium 1.5 (L) 1.6 - 2.4 mg/dL       Medications reviewed  Current Facility-Administered Medications   Medication Dose Route Frequency    lactobac ac& pc-s.therm-b.anim (GERALDINE Q/RISAQUAD)  1 Cap Oral DAILY    psyllium husk-aspartame (METAMUCIL FIBER) packet 1 Packet  1 Packet Oral BID    diphenoxylate-atropine (LOMOTIL) tablet 1 Tab  1 Tab Oral QID PRN    sodium chloride (NS) flush 5-10 mL  5-10 mL IntraVENous PRN    0.9% sodium chloride with KCl 40 mEq/L infusion   IntraVENous CONTINUOUS    amLODIPine (NORVASC) tablet 5 mg  5 mg Oral DAILY    aspirin chewable tablet 81 mg  81 mg Oral DAILY    atorvastatin (LIPITOR) tablet 20 mg  20 mg Oral QHS    doxazosin (CARDURA) tablet 1 mg  1 mg Oral DAILY    acetaminophen (TYLENOL) tablet 650 mg  650 mg Oral Q4H PRN    diphenhydrAMINE (BENADRYL) capsule 25 mg  25 mg Oral Q4H PRN    ondansetron (ZOFRAN) injection 4 mg  4 mg IntraVENous Q4H PRN    enoxaparin (LOVENOX) injection 30 mg  30 mg SubCUTAneous Q24H    piperacillin-tazobactam (ZOSYN) 3.375 g in 0.9% sodium chloride (MBP/ADV) 100 mL  3.375 g IntraVENous Q8H    metoprolol tartrate (LOPRESSOR) tablet 50 mg  50 mg Oral BID    losartan (COZAAR) tablet 50 mg  50 mg Oral DAILY       Care Plan discussed with: Patient/Family    Total time spent with patient: 30 minutes.     Cl España MD

## 2019-08-05 NOTE — PROGRESS NOTES
Pharmacy Dosing Services: 08/05/19  Lovenox dose change per renal P&T protocol  Physician: Dr. Allan Myers  Enoxaparin Indication:  DVT prophylaxis    Previous Dose Lovenox 30 mg SC daily   Serum Creatinine Lab Results   Component Value Date/Time    Creatinine 1.01 08/05/2019 05:12 AM        Creatinine Clearance Estimated Creatinine Clearance: 33.2 mL/min (based on SCr of 1.01 mg/dL).    Platelets Lab Results   Component Value Date/Time    PLATELET 710 46/82/5582 05:12 AM         H/H Lab Results   Component Value Date/Time    HGB 9.1 (L) 08/05/2019 05:12 AM          Adjustments:  Changed Lovenox back to original dose of 40 mg SC daily    Continue to monitor  [address] [facility]

## 2019-08-06 LAB
ALBUMIN SERPL-MCNC: 2.4 G/DL (ref 3.5–5)
ALBUMIN/GLOB SERPL: 0.7 {RATIO} (ref 1.1–2.2)
ALP SERPL-CCNC: 172 U/L (ref 45–117)
ALT SERPL-CCNC: 22 U/L (ref 12–78)
ANION GAP SERPL CALC-SCNC: 5 MMOL/L (ref 5–15)
AST SERPL-CCNC: 24 U/L (ref 15–37)
BASOPHILS # BLD: 0.1 K/UL (ref 0–0.1)
BASOPHILS NFR BLD: 0 % (ref 0–1)
BILIRUB SERPL-MCNC: 0.6 MG/DL (ref 0.2–1)
BUN SERPL-MCNC: 6 MG/DL (ref 6–20)
BUN/CREAT SERPL: 7 (ref 12–20)
CALCIUM SERPL-MCNC: 7.8 MG/DL (ref 8.5–10.1)
CHLORIDE SERPL-SCNC: 112 MMOL/L (ref 97–108)
CO2 SERPL-SCNC: 21 MMOL/L (ref 21–32)
CREAT SERPL-MCNC: 0.86 MG/DL (ref 0.55–1.02)
DIFFERENTIAL METHOD BLD: ABNORMAL
EOSINOPHIL # BLD: 0.2 K/UL (ref 0–0.4)
EOSINOPHIL NFR BLD: 2 % (ref 0–7)
ERYTHROCYTE [DISTWIDTH] IN BLOOD BY AUTOMATED COUNT: 13.6 % (ref 11.5–14.5)
GLOBULIN SER CALC-MCNC: 3.4 G/DL (ref 2–4)
GLUCOSE SERPL-MCNC: 104 MG/DL (ref 65–100)
HCT VFR BLD AUTO: 27 % (ref 35–47)
HGB BLD-MCNC: 9.1 G/DL (ref 11.5–16)
IMM GRANULOCYTES # BLD AUTO: 0.1 K/UL (ref 0–0.04)
IMM GRANULOCYTES NFR BLD AUTO: 1 % (ref 0–0.5)
LYMPHOCYTES # BLD: 2.2 K/UL (ref 0.8–3.5)
LYMPHOCYTES NFR BLD: 19 % (ref 12–49)
MAGNESIUM SERPL-MCNC: 1.2 MG/DL (ref 1.6–2.4)
MCH RBC QN AUTO: 29.5 PG (ref 26–34)
MCHC RBC AUTO-ENTMCNC: 33.7 G/DL (ref 30–36.5)
MCV RBC AUTO: 87.7 FL (ref 80–99)
MONOCYTES # BLD: 1.2 K/UL (ref 0–1)
MONOCYTES NFR BLD: 11 % (ref 5–13)
NEUTS SEG # BLD: 7.8 K/UL (ref 1.8–8)
NEUTS SEG NFR BLD: 67 % (ref 32–75)
NRBC # BLD: 0 K/UL (ref 0–0.01)
NRBC BLD-RTO: 0 PER 100 WBC
PLATELET # BLD AUTO: 279 K/UL (ref 150–400)
PMV BLD AUTO: 9.2 FL (ref 8.9–12.9)
POTASSIUM SERPL-SCNC: 4.4 MMOL/L (ref 3.5–5.1)
PROT SERPL-MCNC: 5.8 G/DL (ref 6.4–8.2)
RBC # BLD AUTO: 3.08 M/UL (ref 3.8–5.2)
SODIUM SERPL-SCNC: 138 MMOL/L (ref 136–145)
WBC # BLD AUTO: 11.6 K/UL (ref 3.6–11)

## 2019-08-06 PROCEDURE — 74011250637 HC RX REV CODE- 250/637: Performed by: INTERNAL MEDICINE

## 2019-08-06 PROCEDURE — 36415 COLL VENOUS BLD VENIPUNCTURE: CPT

## 2019-08-06 PROCEDURE — 74011250636 HC RX REV CODE- 250/636: Performed by: FAMILY MEDICINE

## 2019-08-06 PROCEDURE — 74011250637 HC RX REV CODE- 250/637: Performed by: FAMILY MEDICINE

## 2019-08-06 PROCEDURE — 74011000258 HC RX REV CODE- 258: Performed by: INTERNAL MEDICINE

## 2019-08-06 PROCEDURE — 83735 ASSAY OF MAGNESIUM: CPT

## 2019-08-06 PROCEDURE — 74011250636 HC RX REV CODE- 250/636: Performed by: INTERNAL MEDICINE

## 2019-08-06 PROCEDURE — 74011250637 HC RX REV CODE- 250/637: Performed by: NURSE PRACTITIONER

## 2019-08-06 PROCEDURE — 65270000029 HC RM PRIVATE

## 2019-08-06 PROCEDURE — 85025 COMPLETE CBC W/AUTO DIFF WBC: CPT

## 2019-08-06 PROCEDURE — 74011000258 HC RX REV CODE- 258: Performed by: FAMILY MEDICINE

## 2019-08-06 PROCEDURE — 80053 COMPREHEN METABOLIC PANEL: CPT

## 2019-08-06 RX ORDER — CHOLESTYRAMINE 4 G/4.8G
4 POWDER, FOR SUSPENSION ORAL 4 TIMES DAILY
Status: DISCONTINUED | OUTPATIENT
Start: 2019-08-06 | End: 2019-08-10

## 2019-08-06 RX ADMIN — Medication 10 ML: at 21:52

## 2019-08-06 RX ADMIN — CHOLESTYRAMINE 4 G: 4 POWDER, FOR SUSPENSION ORAL at 22:03

## 2019-08-06 RX ADMIN — CHOLESTYRAMINE 4 G: 4 POWDER, FOR SUSPENSION ORAL at 18:18

## 2019-08-06 RX ADMIN — Medication 1 CAPSULE: at 08:01

## 2019-08-06 RX ADMIN — CHOLESTYRAMINE 4 G: 4 POWDER, FOR SUSPENSION ORAL at 07:59

## 2019-08-06 RX ADMIN — CHOLESTYRAMINE 4 G: 4 POWDER, FOR SUSPENSION ORAL at 13:55

## 2019-08-06 RX ADMIN — ATORVASTATIN CALCIUM 20 MG: 20 TABLET, FILM COATED ORAL at 21:52

## 2019-08-06 RX ADMIN — CEFTRIAXONE 2 G: 2 INJECTION, POWDER, FOR SOLUTION INTRAMUSCULAR; INTRAVENOUS at 08:51

## 2019-08-06 RX ADMIN — METOPROLOL TARTRATE 50 MG: 50 TABLET ORAL at 18:18

## 2019-08-06 RX ADMIN — DIPHENOXYLATE HYDROCHLORIDE AND ATROPINE SULFATE 1 TABLET: 2.5; .025 TABLET ORAL at 10:22

## 2019-08-06 RX ADMIN — PSYLLIUM HUSK 1 PACKET: 3.4 POWDER ORAL at 18:18

## 2019-08-06 RX ADMIN — SODIUM CHLORIDE AND POTASSIUM CHLORIDE: 9; 2.98 INJECTION, SOLUTION INTRAVENOUS at 11:51

## 2019-08-06 RX ADMIN — DIPHENOXYLATE HYDROCHLORIDE AND ATROPINE SULFATE 1 TABLET: 2.5; .025 TABLET ORAL at 18:18

## 2019-08-06 RX ADMIN — ASPIRIN 81 MG 81 MG: 81 TABLET ORAL at 08:00

## 2019-08-06 RX ADMIN — SODIUM CHLORIDE AND POTASSIUM CHLORIDE: 9; 2.98 INJECTION, SOLUTION INTRAVENOUS at 00:42

## 2019-08-06 RX ADMIN — PSYLLIUM HUSK 1 PACKET: 3.4 POWDER ORAL at 08:01

## 2019-08-06 RX ADMIN — DIPHENOXYLATE HYDROCHLORIDE AND ATROPINE SULFATE 1 TABLET: 2.5; .025 TABLET ORAL at 14:11

## 2019-08-06 RX ADMIN — AMLODIPINE BESYLATE 5 MG: 5 TABLET ORAL at 08:00

## 2019-08-06 RX ADMIN — PIPERACILLIN SODIUM,TAZOBACTAM SODIUM 3.38 G: 3; .375 INJECTION, POWDER, FOR SOLUTION INTRAVENOUS at 00:42

## 2019-08-06 RX ADMIN — METOPROLOL TARTRATE 50 MG: 50 TABLET ORAL at 08:00

## 2019-08-06 RX ADMIN — DIPHENOXYLATE HYDROCHLORIDE AND ATROPINE SULFATE 1 TABLET: 2.5; .025 TABLET ORAL at 22:01

## 2019-08-06 RX ADMIN — Medication 400 MG: at 08:00

## 2019-08-06 RX ADMIN — LOSARTAN POTASSIUM 50 MG: 50 TABLET, FILM COATED ORAL at 08:00

## 2019-08-06 RX ADMIN — ENOXAPARIN SODIUM 40 MG: 40 INJECTION SUBCUTANEOUS at 13:55

## 2019-08-06 RX ADMIN — SODIUM CHLORIDE AND POTASSIUM CHLORIDE: 9; 2.98 INJECTION, SOLUTION INTRAVENOUS at 21:53

## 2019-08-06 RX ADMIN — DIPHENOXYLATE HYDROCHLORIDE AND ATROPINE SULFATE 1 TABLET: 2.5; .025 TABLET ORAL at 22:03

## 2019-08-06 RX ADMIN — DOXAZOSIN 1 MG: 1 TABLET ORAL at 08:50

## 2019-08-06 NOTE — PROGRESS NOTES
Daily Progress Note: 8/6/2019  Joel Shahid MD    Assessment/Plan:   UTI (urinary tract infection) - POA, based on UA. --bacteremia -- prelim blood cultures positive. Repeat blood cultures neg so far. --urine culture with E Coli  --Zosyn initially, switched to Rocephin 8/6     Sepsis / Fever / Left shift  - POA, presumed due to UTI. NOT severe. Lactate normal.    --Abx as above. Supportive care. Follow cx.     Syncope / Dehydration / Hyponatremia / Hypokalemia - POA due to GI loss, poor PO intake and UTI. --Hydrate and follow. Replete lytes.       Anemia - iron def, stool heme +, hx hemorrhoids  --with ongoing diarrhea will have GI see as well    Diarrhea   --Cdiff neg   -- anti-diarrheals have helped  -- stool studies negative for infect organisms so far     Coronary atherosclerosis of native coronary artery S/P CABG x 3 / Essential hypertension, benign - POA, likely stable. --Troponin minimal bump  --check ECHO    Hypomagnesemia  --replace as needed     Pure hypercholesterolemia - continue atorvastatin     Macular degeneration - Supportive care.     DVT proph - lovenox       Problem List:  Problem List as of 8/6/2019 Date Reviewed: 11/4/2018          Codes Class Noted - Resolved    UTI (urinary tract infection) ICD-10-CM: N39.0  ICD-9-CM: 599.0  8/3/2019 - Present        Sepsis ([address] ICD-10-CM: A41.9  ICD-9-CM: 038.9, 995.91  8/3/2019 - Present        Fever ICD-10-CM: R50.9  ICD-9-CM: 780.60  8/3/2019 - Present        Left shift ICD-10-CM: D72.89  ICD-9-CM: 288.9  8/3/2019 - Present        Syncope ICD-10-CM: R55  ICD-9-CM: 780.2  8/3/2019 - Present        Dehydration ICD-10-CM: E86.0  ICD-9-CM: 276.51  8/3/2019 - Present        Hyponatremia ICD-10-CM: E87.1  ICD-9-CM: 276.1  8/3/2019 - Present        Hypokalemia ICD-10-CM: E87.6  ICD-9-CM: 276.8  8/3/2019 - Present        Anemia ICD-10-CM: D64.9  ICD-9-CM: 285.9  8/3/2019 - Present        Macular degeneration ICD-10-CM: H35.30  ICD-9-CM: 362.50  10/30/2013 - Present        Carotid arterial disease ([facility] [address] ICD-10-CM: I77.9  ICD-9-CM: 447.9  Unknown - Present    Overview Signed 10/30/2013 10:11 AM by Jey Butler MD     mild 0-49%             Coronary atherosclerosis of native coronary artery ICD-10-CM: I25.10  ICD-9-CM: 414.01  Unknown - Present    Overview Signed 2/22/2012 10:44 AM by Jey Butler MD     Cath 2011 with multivessel cad             S/P CABG (coronary artery bypass graft) ICD-10-CM: Z95.1  ICD-9-CM: V45.81  Unknown - Present    Overview Signed 2/22/2012 10:44 AM by Jey Butler MD     8-69-49 CABG - OFF PUMP - CABGx 3, lima, eric, epi aortic ultrasound - off pump, rightt endoscopic vein harvest; [address] [place] [address] Dr to LAD, Svg Ao to OM1 and OM2             S/P CABG x 3 ICD-10-CM: Z95.1  ICD-9-CM: V45.81  6/17/2011 - Present    Overview Signed 7/26/2011 10:51 AM by Trudi TEAGUE to LAD and SVGs to OM-1 and OM-2. RCA small non-dominant diffusely diseased vessel ungrafted. LV EF at cath was 65%. Essential hypertension, benign ICD-10-CM: I10  ICD-9-CM: 401.1  12/2/2010 - Present        Pure hypercholesterolemia ICD-10-CM: E78.00  ICD-9-CM: 272.0  12/2/2010 - Present        RESOLVED: Cough due to ACE inhibitor ICD-10-CM: R05, T46.4X5A  ICD-9-CM: 786.2, E942.6  Unknown - 8/3/2019        RESOLVED: Edema ICD-10-CM: R60.9  ICD-9-CM: 782.3  6/3/2012 - 8/3/2019        RESOLVED: Medication adverse effect ICD-10-CM: T50.905A  ICD-9-CM: E947.9  6/3/2012 - 8/3/2019        RESOLVED: Diverticulitis ICD-10-CM: E66.41  ICD-9-CM: 562.11  Unknown - 8/3/2019        RESOLVED: Pre-operative cardiovascular examination, unstable angina ([facility] [address] ICD-10-CM: Z01.810, I20.0  ICD-9-CM: 411.1, V72.81  6/19/2011 - 6/19/2011        RESOLVED: Diverticulosis of colon with hemorrhage ICD-10-CM: K57.31  ICD-9-CM: 562.12  12/2/2010 - 8/3/2019              Subjective:   H&P: Ms. Shauna Corral is a 80 y.o.  female who presented to the Emergency Department complaining of pre syncope. Occurred this AM. Occurred after BM. Associated with period of weakness, poor alertness and confusion. Patient with acute and hx chronic diarrhea, for 3 weeks lately. Failed improve on imodium and cholestyramine. In our ER she has a fever, meets sepsis criteria and UA suggests UTI. We will admit her for management. :  Lots of diarrhea overnight. She has no abd. Pain or CP/SOB/dizziness. She feels weak. Some vaginal irritation from diarrhea so some dysuria at times. Discussed with daughter and  at bedside. :  Pt has no complaints. Less diarrhea with the imodium - 3 loose stools since yesterday. GI considering colonoscopy. :  No complaints. Starting to have fewer loose stools on Questran and Imodium. Currently hold on colonoscopy since fewer stools. AM labs pending. E Coli on urine culture - switching to Rocephin. Review of Systems:   A comprehensive review of systems was negative except for that written in the HPI. Objective:   Physical Exam:     Visit Vitals  /71 (BP 1 Location: Left arm, BP Patient Position: Lying left side)   Pulse 70   Temp 97.9 °F (36.6 °C)   Resp 18   Ht 5' 4\" (1.626 m)   Wt 63.5 kg (140 lb)   SpO2 95%   BMI 24.03 kg/m²      O2 Device: Room air    Temp (24hrs), Av.8 °F (36.6 °C), Min:97.4 °F (36.3 °C), Max:98.1 °F (36.7 °C)    No intake/output data recorded.  1901 -  0700  In: 1678.3 [I.V.:1678.3]  Out: 250 [Urine:250]    General:  Alert, cooperative, no distress, appears stated age. Head:  Normocephalic, without obvious abnormality, atraumatic. Eyes:  Conjunctivae/corneas clear. PERRL, EOMs intact. Nose: Nares normal. Septum midline. Mucosa normal. No drainage or sinus tenderness. Throat: Lips, mucosa, and tongue moist..   Neck: Supple, symmetrical, trachea midline, no adenopathy   Lungs:   Clear to auscultation bilaterally. Heart:  Regular rate and rhythm, 2/6 systolic murmur   Abdomen:   Soft, non-tender. Bowel sounds normal. No masses,  No organomegaly. Extremities: Extremities normal, atraumatic, no cyanosis or edema. No calf tenderness or cords. Pulses: 2+ and symmetric all extremities. Skin: Skin color, texture, turgor normal. No rashes or lesions   Neurologic:   Alert and oriented X 3. Moves all extrem to command. Data Review:       Recent Days:  Recent Labs     08/05/19  0512 08/04/19  0503   WBC 12.3* 11.6*   HGB 9.1* 8.9*   HCT 27.1* 26.2*    261     Recent Labs     08/05/19  0512 08/04/19  0503    136   K 4.1 3.6   * 107   CO2 21 22   GLU 88 124*   BUN 11 19   CREA 1.01 1.30*   CA 7.9* 7.8*   MG 1.5* 1.5*   ALB 2.3*  --    SGOT 30  --    ALT 22  --      No results for input(s): PH, PCO2, PO2, HCO3, FIO2 in the last 72 hours. 24 Hour Results:  No results found for this or any previous visit (from the past 24 hour(s)).     Medications reviewed  Current Facility-Administered Medications   Medication Dose Route Frequency    magnesium oxide (MAG-OX) tablet 400 mg  400 mg Oral DAILY    enoxaparin (LOVENOX) injection 40 mg  40 mg SubCUTAneous Q24H    cholestyramine-aspartame (QUESTRAN LIGHT) packet 4 g  4 g Oral BID WITH MEALS    phenyleph-min oil-petrolatum (PREPARATION H) 0.25-14-74.9 % ointment   PeriANAL QID PRN    lactobac ac& pc-s.therm-b.anim (GERALDINE Q/RISAQUAD)  1 Cap Oral DAILY    psyllium husk-aspartame (METAMUCIL FIBER) packet 1 Packet  1 Packet Oral BID    diphenoxylate-atropine (LOMOTIL) tablet 1 Tab  1 Tab Oral QID PRN    sodium chloride (NS) flush 5-10 mL  5-10 mL IntraVENous PRN    0.9% sodium chloride with KCl 40 mEq/L infusion   IntraVENous CONTINUOUS    amLODIPine (NORVASC) tablet 5 mg  5 mg Oral DAILY    aspirin chewable tablet 81 mg  81 mg Oral DAILY    atorvastatin (LIPITOR) tablet 20 mg  20 mg Oral QHS    doxazosin (CARDURA) tablet 1 mg  1 mg Oral DAILY    acetaminophen (TYLENOL) tablet 650 mg  650 mg Oral Q4H PRN    diphenhydrAMINE (BENADRYL) capsule 25 mg  25 mg Oral Q4H PRN    ondansetron (ZOFRAN) injection 4 mg  4 mg IntraVENous Q4H PRN    piperacillin-tazobactam (ZOSYN) 3.375 g in 0.9% sodium chloride (MBP/ADV) 100 mL  3.375 g IntraVENous Q8H    metoprolol tartrate (LOPRESSOR) tablet 50 mg  50 mg Oral BID    losartan (COZAAR) tablet 50 mg  50 mg Oral DAILY       Care Plan discussed with: Patient/Family    Total time spent with patient: 30 minutes.     Aman Herrera MD

## 2019-08-06 NOTE — PROGRESS NOTES
[address] NP  [phone]           GI PROGRESS NOTE        NAME: Salud Villarreal   :  1931   MRN:  [id number]       Subjective:   Frequency of stools improving      Objective: In NAD      VITALS:   Last 24hrs VS reviewed since prior progress note. Most recent are:  Visit Vitals  /77 (BP 1 Location: Left arm, BP Patient Position: At rest)   Pulse 67   Temp 97.5 °F (36.4 °C)   Resp 15   Ht 5' 4\" (1.626 m)   Wt 63.5 kg (140 lb)   SpO2 95%   BMI 24.03 kg/m²       Intake/Output Summary (Last 24 hours) at 2019 1516  Last data filed at 2019 0647  Gross per 24 hour   Intake 1678.33 ml   Output    Net 1678.33 ml       PHYSICAL EXAM:  General: Alert, in no acute distress    HEENT: Anicteric sclerae. Lungs:            CTA Bilaterally. Heart:  Regular  rhythm,    Abdomen: Soft, Non distended, Non tender.  (+)Bowel sounds, no HSM  Extremities: No c/c/e  Neurologic:  CN 2-12 gi, Alert and oriented X 3. No acute neurological distress   Psych:   Good insight. Not anxious nor agitated.     Lab Data Reviewed:   Recent Labs     19  0912 19   WBC 11.6* 12.3*   HGB 9.1* 9.1*   HCT 27.0* 27.1*    275     Recent Labs     19  0912 19    139   K 4.4 4.1   * 114*   CO2 21 21   BUN 6 11   CREA 0.86 1.01   * 88   CA 7.8* 7.9*     Recent Labs     19  0912 19   SGOT 24 30   * 129*   TP 5.8* 6.0*   ALB 2.4* 2.3*   GLOB 3.4 3.7       ________________________________________________________________________  Patient Active Problem List   Diagnosis Code    Essential hypertension, benign I10    Pure hypercholesterolemia E78.00    S/P CABG x 3 Z95.1    Coronary atherosclerosis of native coronary artery I25.10    S/P CABG (coronary artery bypass graft) Z95.1    Macular degeneration H35.30    Carotid arterial disease ([facility]) I77.9    UTI (urinary tract infection) N39.0    Sepsis ([facility]) A41.9  Fever R50.9    Left shift D72.89    Syncope R55    Dehydration E86.0    Hyponatremia E87.1    Hypokalemia E87.6    Anemia D64.9         Assessment and Plan:  Diarrhea:  Stool tests are negative for infectious process. Pt has known history of microscopic colitis.   - Diet as tolerated  - Monitor Labs  - Continue probiotic  - Continue Fiber supplement  - Continue Questran QID  - Continue Lomotil    Following       Signed By: Attila Husain NP     8/6/2019  3:16 PM

## 2019-08-06 NOTE — PROGRESS NOTES
Bedside and Verbal shift change report given to Will Savage (oncoming nurse) by Yara Wang (offgoing nurse). Report included the following information SBAR, Kardex, Intake/Output, MAR and Recent Results.

## 2019-08-06 NOTE — PROGRESS NOTES
Problem: Falls - Risk of  Goal: *Absence of Falls  Description  Document Marc Patel Fall Risk and appropriate interventions in the flowsheet. Outcome: Progressing Towards Goal  Note:   Fall Risk Interventions:  Mobility Interventions: Patient to call before getting OOB         Medication Interventions: Patient to call before getting OOB    Elimination Interventions: Call light in reach    History of Falls Interventions: Door open when patient unattended         Problem: Patient Education: Go to Patient Education Activity  Goal: Patient/Family Education  Outcome: Progressing Towards Goal     Problem: Risk for Spread of Infection  Goal: Prevent transmission of infectious organism to others  Description  Prevent the transmission of infectious organisms to other patients, staff members, and visitors.   Outcome: Progressing Towards Goal     Problem: Patient Education:  Go to Education Activity  Goal: Patient/Family Education  Outcome: Progressing Towards Goal     Problem: Patient Education: Go to Patient Education Activity  Goal: Patient/Family Education  Outcome: Progressing Towards Goal     Problem: Diarrhea (Adult and Pediatrics)  Goal: *Absence of diarrhea  Outcome: Progressing Towards Goal     Problem: Patient Education: Go to Patient Education Activity  Goal: Patient/Family Education  Outcome: Progressing Towards Goal

## 2019-08-06 NOTE — PROGRESS NOTES
Problem: Falls - Risk of  Goal: *Absence of Falls  Description  Document Candida Lightning Fall Risk and appropriate interventions in the flowsheet.   Outcome: Progressing Towards Goal  Note:   Fall Risk Interventions:  Mobility Interventions: Patient to call before getting OOB, Utilize walker, cane, or other assistive device         Medication Interventions: Patient to call before getting OOB, Teach patient to arise slowly    Elimination Interventions: Call light in reach, Patient to call for help with toileting needs    History of Falls Interventions: Door open when patient unattended, Investigate reason for fall, Utilize gait belt for transfer/ambulation         Problem: Patient Education: Go to Patient Education Activity  Goal: Patient/Family Education  Outcome: Progressing Towards Goal     Problem: Patient Education:  Go to Education Activity  Goal: Patient/Family Education  Outcome: Progressing Towards Goal     Problem: Patient Education: Go to Patient Education Activity  Goal: Patient/Family Education  Outcome: Progressing Towards Goal     Problem: Diarrhea (Adult and Pediatrics)  Goal: *Absence of diarrhea  Outcome: Progressing Towards Goal     Problem: Patient Education: Go to Patient Education Activity  Goal: Patient/Family Education  Outcome: Progressing Towards Goal

## 2019-08-06 NOTE — PROGRESS NOTES
Physical therapy    Two attempts made to work with patient. Pt declining secondary to diarrhea. Family at bedside PT will continue to follow    Severa Smolder.  Boom Wade

## 2019-08-06 NOTE — PROGRESS NOTES
Bedside shift change report given to Char No, [facility] nurse) by Juan C Murillo nurse).  Report included the following information SBAR, Kardex, Intake/Output, MAR and Recent Results.

## 2019-08-06 NOTE — PROGRESS NOTES
8/6/2019 2:19 PM EMR reviewed, no discharge needs identified at this time. CM will continue to follow. ROGELIO Olivier    Discharge plan:  Home with family assistance and outpatient follow up when medically stable.

## 2019-08-07 LAB
ALBUMIN SERPL-MCNC: 2 G/DL (ref 3.5–5)
ALBUMIN/GLOB SERPL: 0.5 {RATIO} (ref 1.1–2.2)
ALP SERPL-CCNC: 135 U/L (ref 45–117)
ALT SERPL-CCNC: 18 U/L (ref 12–78)
ANION GAP SERPL CALC-SCNC: 8 MMOL/L (ref 5–15)
AST SERPL-CCNC: 22 U/L (ref 15–37)
BASOPHILS # BLD: 0.1 K/UL (ref 0–0.1)
BASOPHILS NFR BLD: 1 % (ref 0–1)
BILIRUB SERPL-MCNC: 0.3 MG/DL (ref 0.2–1)
BUN SERPL-MCNC: 4 MG/DL (ref 6–20)
BUN/CREAT SERPL: 5 (ref 12–20)
CALCIUM SERPL-MCNC: 8.1 MG/DL (ref 8.5–10.1)
CHLORIDE SERPL-SCNC: 112 MMOL/L (ref 97–108)
CO2 SERPL-SCNC: 21 MMOL/L (ref 21–32)
CREAT SERPL-MCNC: 0.74 MG/DL (ref 0.55–1.02)
DIFFERENTIAL METHOD BLD: ABNORMAL
EOSINOPHIL # BLD: 0.3 K/UL (ref 0–0.4)
EOSINOPHIL NFR BLD: 3 % (ref 0–7)
ERYTHROCYTE [DISTWIDTH] IN BLOOD BY AUTOMATED COUNT: 13.6 % (ref 11.5–14.5)
GLOBULIN SER CALC-MCNC: 3.7 G/DL (ref 2–4)
GLUCOSE SERPL-MCNC: 89 MG/DL (ref 65–100)
HCT VFR BLD AUTO: 25.1 % (ref 35–47)
HGB BLD-MCNC: 8.3 G/DL (ref 11.5–16)
IMM GRANULOCYTES # BLD AUTO: 0.1 K/UL (ref 0–0.04)
IMM GRANULOCYTES NFR BLD AUTO: 1 % (ref 0–0.5)
LYMPHOCYTES # BLD: 2.5 K/UL (ref 0.8–3.5)
LYMPHOCYTES NFR BLD: 25 % (ref 12–49)
MAGNESIUM SERPL-MCNC: 1.4 MG/DL (ref 1.6–2.4)
MCH RBC QN AUTO: 29.5 PG (ref 26–34)
MCHC RBC AUTO-ENTMCNC: 33.1 G/DL (ref 30–36.5)
MCV RBC AUTO: 89.3 FL (ref 80–99)
MONOCYTES # BLD: 1 K/UL (ref 0–1)
MONOCYTES NFR BLD: 10 % (ref 5–13)
NEUTS SEG # BLD: 5.8 K/UL (ref 1.8–8)
NEUTS SEG NFR BLD: 60 % (ref 32–75)
NRBC # BLD: 0 K/UL (ref 0–0.01)
NRBC BLD-RTO: 0 PER 100 WBC
PLATELET # BLD AUTO: 280 K/UL (ref 150–400)
PMV BLD AUTO: 9.3 FL (ref 8.9–12.9)
POTASSIUM SERPL-SCNC: 4.6 MMOL/L (ref 3.5–5.1)
PROT SERPL-MCNC: 5.7 G/DL (ref 6.4–8.2)
RBC # BLD AUTO: 2.81 M/UL (ref 3.8–5.2)
SODIUM SERPL-SCNC: 141 MMOL/L (ref 136–145)
WBC # BLD AUTO: 9.8 K/UL (ref 3.6–11)

## 2019-08-07 PROCEDURE — 74011250637 HC RX REV CODE- 250/637: Performed by: INTERNAL MEDICINE

## 2019-08-07 PROCEDURE — 85025 COMPLETE CBC W/AUTO DIFF WBC: CPT

## 2019-08-07 PROCEDURE — 74011250636 HC RX REV CODE- 250/636: Performed by: FAMILY MEDICINE

## 2019-08-07 PROCEDURE — 74011250637 HC RX REV CODE- 250/637: Performed by: NURSE PRACTITIONER

## 2019-08-07 PROCEDURE — 97116 GAIT TRAINING THERAPY: CPT

## 2019-08-07 PROCEDURE — 83735 ASSAY OF MAGNESIUM: CPT

## 2019-08-07 PROCEDURE — 36415 COLL VENOUS BLD VENIPUNCTURE: CPT

## 2019-08-07 PROCEDURE — 80053 COMPREHEN METABOLIC PANEL: CPT

## 2019-08-07 PROCEDURE — 65270000029 HC RM PRIVATE

## 2019-08-07 PROCEDURE — 74011000258 HC RX REV CODE- 258: Performed by: FAMILY MEDICINE

## 2019-08-07 PROCEDURE — 74011250637 HC RX REV CODE- 250/637: Performed by: FAMILY MEDICINE

## 2019-08-07 RX ORDER — MAGNESIUM SULFATE HEPTAHYDRATE 40 MG/ML
2 INJECTION, SOLUTION INTRAVENOUS ONCE
Status: COMPLETED | OUTPATIENT
Start: 2019-08-07 | End: 2019-08-07

## 2019-08-07 RX ORDER — HYDRALAZINE HYDROCHLORIDE 20 MG/ML
10 INJECTION INTRAMUSCULAR; INTRAVENOUS
Status: DISCONTINUED | OUTPATIENT
Start: 2019-08-07 | End: 2019-08-11 | Stop reason: HOSPADM

## 2019-08-07 RX ORDER — LOPERAMIDE HYDROCHLORIDE 2 MG/1
2 CAPSULE ORAL EVERY 4 HOURS
Status: DISCONTINUED | OUTPATIENT
Start: 2019-08-07 | End: 2019-08-08

## 2019-08-07 RX ADMIN — LOSARTAN POTASSIUM 50 MG: 50 TABLET, FILM COATED ORAL at 08:10

## 2019-08-07 RX ADMIN — METOPROLOL TARTRATE 50 MG: 50 TABLET ORAL at 08:10

## 2019-08-07 RX ADMIN — PSYLLIUM HUSK 1 PACKET: 3.4 POWDER ORAL at 08:10

## 2019-08-07 RX ADMIN — DIPHENOXYLATE HYDROCHLORIDE AND ATROPINE SULFATE 1 TABLET: 2.5; .025 TABLET ORAL at 13:40

## 2019-08-07 RX ADMIN — Medication 1 CAPSULE: at 08:10

## 2019-08-07 RX ADMIN — CHOLESTYRAMINE 4 G: 4 POWDER, FOR SUSPENSION ORAL at 08:10

## 2019-08-07 RX ADMIN — ENOXAPARIN SODIUM 40 MG: 40 INJECTION SUBCUTANEOUS at 13:40

## 2019-08-07 RX ADMIN — DOXAZOSIN 1 MG: 1 TABLET ORAL at 08:32

## 2019-08-07 RX ADMIN — CEFTRIAXONE 2 G: 2 INJECTION, POWDER, FOR SOLUTION INTRAMUSCULAR; INTRAVENOUS at 09:57

## 2019-08-07 RX ADMIN — LOPERAMIDE HYDROCHLORIDE 2 MG: 2 CAPSULE ORAL at 20:14

## 2019-08-07 RX ADMIN — CHOLESTYRAMINE 4 G: 4 POWDER, FOR SUSPENSION ORAL at 22:38

## 2019-08-07 RX ADMIN — METOPROLOL TARTRATE 50 MG: 50 TABLET ORAL at 17:57

## 2019-08-07 RX ADMIN — PSYLLIUM HUSK 1 PACKET: 3.4 POWDER ORAL at 17:57

## 2019-08-07 RX ADMIN — CHOLESTYRAMINE 4 G: 4 POWDER, FOR SUSPENSION ORAL at 17:57

## 2019-08-07 RX ADMIN — SODIUM CHLORIDE AND POTASSIUM CHLORIDE: 9; 2.98 INJECTION, SOLUTION INTRAVENOUS at 22:35

## 2019-08-07 RX ADMIN — DIPHENOXYLATE HYDROCHLORIDE AND ATROPINE SULFATE 1 TABLET: 2.5; .025 TABLET ORAL at 20:14

## 2019-08-07 RX ADMIN — MAGNESIUM SULFATE HEPTAHYDRATE 2 G: 40 INJECTION, SOLUTION INTRAVENOUS at 07:51

## 2019-08-07 RX ADMIN — DIPHENOXYLATE HYDROCHLORIDE AND ATROPINE SULFATE 1 TABLET: 2.5; .025 TABLET ORAL at 07:54

## 2019-08-07 RX ADMIN — AMLODIPINE BESYLATE 5 MG: 5 TABLET ORAL at 08:10

## 2019-08-07 RX ADMIN — ATORVASTATIN CALCIUM 20 MG: 20 TABLET, FILM COATED ORAL at 20:16

## 2019-08-07 RX ADMIN — ASPIRIN 81 MG 81 MG: 81 TABLET ORAL at 08:10

## 2019-08-07 RX ADMIN — CHOLESTYRAMINE 4 G: 4 POWDER, FOR SUSPENSION ORAL at 13:40

## 2019-08-07 RX ADMIN — DIPHENOXYLATE HYDROCHLORIDE AND ATROPINE SULFATE 1 TABLET: 2.5; .025 TABLET ORAL at 17:57

## 2019-08-07 RX ADMIN — LOPERAMIDE HYDROCHLORIDE 2 MG: 2 CAPSULE ORAL at 15:58

## 2019-08-07 NOTE — PROGRESS NOTES
[address] NP  [phone]           GI PROGRESS NOTE        NAME: Brigette Westfall   :  1931   MRN:  [id number]       Subjective: Had 5 episodes of diarrhea since 0000 last night. Objective:   NAD      VITALS:   Last 24hrs VS reviewed since prior progress note. Most recent are:  Visit Vitals  /59   Pulse 60   Temp 97.9 °F (36.6 °C)   Resp 15   Ht 5' 4\" (1.626 m)   Wt 63.5 kg (140 lb)   SpO2 96%   BMI 24.03 kg/m²     No intake or output data in the 24 hours ending 19 1446    PHYSICAL EXAM:  General: Alert, in no acute distress    HEENT: Anicteric sclerae. Lungs:            CTA Bilaterally. Heart:  Regular  rhythm,    Abdomen: Soft, Non distended, Non tender.  (+)Bowel sounds, no HSM  Extremities: No c/c/e  Neurologic:  CN 2-12 gi, Alert and oriented X 3. No acute neurological distress   Psych:   Good insight. Not anxious nor agitated.     Lab Data Reviewed:   Recent Labs     19  0530 19  09   WBC 9.8 11.6*   HGB 8.3* 9.1*   HCT 25.1* 27.0*    279     Recent Labs     19  0530 19  0912    138   K 4.6 4.4   * 112*   CO2 21 21   BUN 4* 6   CREA 0.74 0.86   GLU 89 104*   CA 8.1* 7.8*     Recent Labs     19  0530 19  0912   SGOT 22 24   * 172*   TP 5.7* 5.8*   ALB 2.0* 2.4*   GLOB 3.7 3.4       ________________________________________________________________________  Patient Active Problem List   Diagnosis Code    Essential hypertension, benign I10    Pure hypercholesterolemia E78.00    S/P CABG x 3 Z95.1    Coronary atherosclerosis of native coronary artery I25.10    S/P CABG (coronary artery bypass graft) Z95.1    Macular degeneration H35.30    Carotid arterial disease (HCC) I77.9    UTI (urinary tract infection) N39.0    Sepsis (HCC) A41.9    Fever R50.9    Left shift D72.89    Syncope R55    Dehydration E86.0    Hyponatremia E87.1    Hypokalemia E87.6    Anemia D64.9 Assessment and Plan:  Diarrhea:  Stool tests are negative for infectious process.  Pt has known history of microscopic colitis - hesitant to start her on budesonide in setting of UTI.    - Diet as tolerated  - Monitor Labs  - Continue probiotic  - Continue Fiber supplement  - Continue Questran QID  - Continue Lomotil  - Started Imodium every 4 hours scheduled.     Following       Signed By: Maxx Ferris NP     8/7/2019  2:46 PM

## 2019-08-07 NOTE — PROGRESS NOTES
8/7/2019 4:50 PM   Discharge plan:  Home with family assistance and outpatient follow up when medically stable.

## 2019-08-07 NOTE — PROGRESS NOTES
Problem: Falls - Risk of  Goal: *Absence of Falls  Description  Document Usha Gagandeep Fall Risk and appropriate interventions in the flowsheet. Outcome: Progressing Towards Goal  Note:   Fall Risk Interventions:  Mobility Interventions: Patient to call before getting OOB         Medication Interventions: Patient to call before getting OOB    Elimination Interventions: Call light in reach    History of Falls Interventions: Door open when patient unattended         Problem: Patient Education: Go to Patient Education Activity  Goal: Patient/Family Education  Outcome: Progressing Towards Goal     Problem: Risk for Spread of Infection  Goal: Prevent transmission of infectious organism to others  Description  Prevent the transmission of infectious organisms to other patients, staff members, and visitors.   Outcome: Progressing Towards Goal     Problem: Patient Education:  Go to Education Activity  Goal: Patient/Family Education  Outcome: Progressing Towards Goal     Problem: Patient Education: Go to Patient Education Activity  Goal: Patient/Family Education  Outcome: Progressing Towards Goal     Problem: Diarrhea (Adult and Pediatrics)  Goal: *Absence of diarrhea  Outcome: Progressing Towards Goal     Problem: Patient Education: Go to Patient Education Activity  Goal: Patient/Family Education  Outcome: Progressing Towards Goal

## 2019-08-07 NOTE — PROGRESS NOTES
Daily Progress Note: 8/7/2019  Nadia Jon MD    Assessment/Plan:   UTI (urinary tract infection) - POA, based on UA. --bacteremia -- prelim blood cultures positive. Repeat blood cultures neg so far. --urine culture with E Coli- sens to Rocephin  --Zosyn initially, switched to Rocephin 8/6     Sepsis / Fever / Left shift  - POA,  due to UTI. NOT severe. Lactate normal.    --Abx as above. Supportive care. Follow cx.     Syncope / Dehydration / Hyponatremia / Hypokalemia - POA due to GI loss, poor PO intake and UTI. --Hydrate and follow. Replete lytes.       Anemia - iron def, stool heme +, hx hemorrhoids  --with ongoing diarrhea will have GI see as well    Diarrhea   --Cdiff neg   -- anti-diarrheals have helped  -- stool studies negative for infect organisms so far     Coronary atherosclerosis of native coronary artery S/P CABG x 3 / Essential hypertension, benign - POA, likely stable. --Troponin minimal bump  --check ECHO    Hypomagnesemia  --replace as needed     Pure hypercholesterolemia - continue atorvastatin     Macular degeneration - Supportive care.     DVT proph - lovenox       Problem List:  Problem List as of 8/7/2019 Date Reviewed: 11/4/2018          Codes Class Noted - Resolved    UTI (urinary tract infection) ICD-10-CM: N39.0  ICD-9-CM: 599.0  8/3/2019 - Present        Sepsis ([facility] [address] ICD-10-CM: A41.9  ICD-9-CM: 038.9, 995.91  8/3/2019 - Present        Fever ICD-10-CM: R50.9  ICD-9-CM: 780.60  8/3/2019 - Present        Left shift ICD-10-CM: D72.89  ICD-9-CM: 288.9  8/3/2019 - Present        Syncope ICD-10-CM: R55  ICD-9-CM: 780.2  8/3/2019 - Present        Dehydration ICD-10-CM: E86.0  ICD-9-CM: 276.51  8/3/2019 - Present        Hyponatremia ICD-10-CM: E87.1  ICD-9-CM: 276.1  8/3/2019 - Present        Hypokalemia ICD-10-CM: E87.6  ICD-9-CM: 276.8  8/3/2019 - Present        Anemia ICD-10-CM: D64.9  ICD-9-CM: 285.9  8/3/2019 - Present        Macular degeneration ICD-10-CM: H35.30  ICD-9-CM: 362.50  10/30/2013 - Present        Carotid arterial disease ([address] ICD-10-CM: I77.9  ICD-9-CM: 447.9  Unknown - Present    Overview Signed 10/30/2013 10:11 AM by Osorio Limon MD     mild 0-49%             Coronary atherosclerosis of native coronary artery ICD-10-CM: I25.10  ICD-9-CM: 414.01  Unknown - Present    Overview Signed 2/22/2012 10:44 AM by Osorio Limon MD     Cath 2011 with multivessel cad             S/P CABG (coronary artery bypass graft) ICD-10-CM: Z95.1  ICD-9-CM: V45.81  Unknown - Present    Overview Signed 2/22/2012 10:44 AM by Osorio Limon MD     3-36-71 CABG - OFF PUMP - CABGx 3, lima, eric, epi aortic ultrasound - off pump, rightt endoscopic vein harvest; [id number] [place] [address] Dr to LAD, Svg Ao to OM1 and OM2             S/P CABG x 3 ICD-10-CM: Z95.1  ICD-9-CM: V45.81  6/17/2011 - Present    Overview Signed 7/26/2011 10:51 AM by Kadi TEAGUE to LAD and SVGs to OM-1 and OM-2. RCA small non-dominant diffusely diseased vessel ungrafted. LV EF at cath was 65%.              Essential hypertension, benign ICD-10-CM: I10  ICD-9-CM: 401.1  12/2/2010 - Present        Pure hypercholesterolemia ICD-10-CM: E78.00  ICD-9-CM: 272.0  12/2/2010 - Present        RESOLVED: Cough due to ACE inhibitor ICD-10-CM: R05, T46.4X5A  ICD-9-CM: 786.2, E942.6  Unknown - 8/3/2019        RESOLVED: Edema ICD-10-CM: R60.9  ICD-9-CM: 782.3  6/3/2012 - 8/3/2019        RESOLVED: Medication adverse effect ICD-10-CM: T50.905A  ICD-9-CM: E947.9  6/3/2012 - 8/3/2019        RESOLVED: Diverticulitis ICD-10-CM: V95.57  ICD-9-CM: 562.11  Unknown - 8/3/2019        RESOLVED: Pre-operative cardiovascular examination, unstable angina ([facility] [address] ICD-10-CM: Z01.810, I20.0  ICD-9-CM: 411.1, V72.81  6/19/2011 - 6/19/2011        RESOLVED: Diverticulosis of colon with hemorrhage ICD-10-CM: K57.31  ICD-9-CM: 562.12  12/2/2010 - 8/3/2019              Subjective:   H&P: Ms. Virgil Mauricio is a 80 y.o.  female who presented to the Emergency Department complaining of pre syncope. Occurred this AM. Occurred after BM. Associated with period of weakness, poor alertness and confusion. Patient with acute and hx chronic diarrhea, for 3 weeks lately. Failed improve on imodium and cholestyramine. In our ER she has a fever, meets sepsis criteria and UA suggests UTI. We will admit her for management. :  Lots of diarrhea overnight. She has no abd. Pain or CP/SOB/dizziness. She feels weak. Some vaginal irritation from diarrhea so some dysuria at times. Discussed with daughter and  at bedside. :  Pt has no complaints. Less diarrhea with the imodium - 3 loose stools since yesterday. GI considering colonoscopy. :  No complaints. Starting to have fewer loose stools on Questran and Imodium. Currently hold on colonoscopy since fewer stools. AM labs pending. E Coli on urine culture - switching to Rocephin. : Less diarrhea- 3 stools during the night. Feeling a little better. Will change po Mag to IV in case this is worsening diarrhea. BC neg at 3 days. UC E-coli sensitive to Rocephin- this will be day 5 of antibiotic. Decrease IVF. Review of Systems:   A comprehensive review of systems was negative except for that written in the HPI. Objective:   Physical Exam:     Visit Vitals  /68 (BP 1 Location: Left arm, BP Patient Position: At rest)   Pulse 78   Temp 99 °F (37.2 °C)   Resp 16   Ht 5' 4\" (1.626 m)   Wt 140 lb (63.5 kg)   SpO2 92%   BMI 24.03 kg/m²      O2 Device: Room air    Temp (24hrs), Av °F (36.7 °C), Min:97.5 °F (36.4 °C), Max:99 °F (37.2 °C)    No intake/output data recorded.  1901 -  0700  In: 1678.3 [I.V.:1678.3]  Out: -     General:  Alert, cooperative, no distress, appears stated age. Head:  Normocephalic, without obvious abnormality, atraumatic. Eyes:  Conjunctivae/corneas clear. PERRL, EOMs intact.    Nose: Nares normal. Septum midline. Mucosa normal. No drainage or sinus tenderness. Throat: Lips, mucosa, and tongue moist..   Neck: Supple, symmetrical, trachea midline, no adenopathy   Lungs:   Clear to auscultation bilaterally. Heart:  Regular rate and rhythm, 2/6 systolic murmur   Abdomen:   Soft, non-tender. Bowel sounds normal. No masses,  No organomegaly. Extremities: Extremities normal, atraumatic, no cyanosis or edema. No calf tenderness or cords. Pulses: 2+ and symmetric all extremities. Skin: Skin color, texture, turgor normal. No rashes or lesions   Neurologic:   Alert and oriented X 3. Moves all extrem to command. Data Review:       Recent Days:  Recent Labs     08/07/19 0530 08/06/19 0912 08/05/19 0512   WBC 9.8 11.6* 12.3*   HGB 8.3* 9.1* 9.1*   HCT 25.1* 27.0* 27.1*    279 275     Recent Labs     08/07/19 0530 08/06/19 0912 08/05/19 0512    138 139   K 4.6 4.4 4.1   * 112* 114*   CO2 21 21 21   GLU 89 104* 88   BUN 4* 6 11   CREA 0.74 0.86 1.01   CA 8.1* 7.8* 7.9*   MG 1.4* 1.2* 1.5*   ALB 2.0* 2.4* 2.3*   SGOT 22 24 30   ALT 18 22 22     No results for input(s): PH, PCO2, PO2, HCO3, FIO2 in the last 72 hours.     24 Hour Results:  Recent Results (from the past 24 hour(s))   MAGNESIUM    Collection Time: 08/06/19  9:12 AM   Result Value Ref Range    Magnesium 1.2 (L) 1.6 - 2.4 mg/dL   METABOLIC PANEL, COMPREHENSIVE    Collection Time: 08/06/19  9:12 AM   Result Value Ref Range    Sodium 138 136 - 145 mmol/L    Potassium 4.4 3.5 - 5.1 mmol/L    Chloride 112 (H) 97 - 108 mmol/L    CO2 21 21 - 32 mmol/L    Anion gap 5 5 - 15 mmol/L    Glucose 104 (H) 65 - 100 mg/dL    BUN 6 6 - 20 MG/DL    Creatinine 0.86 0.55 - 1.02 MG/DL    BUN/Creatinine ratio 7 (L) 12 - 20      GFR est AA >60 >60 ml/min/1.73m2    GFR est non-AA >60 >60 ml/min/1.73m2    Calcium 7.8 (L) 8.5 - 10.1 MG/DL    Bilirubin, total 0.6 0.2 - 1.0 MG/DL    ALT (SGPT) 22 12 - 78 U/L    AST (SGOT) 24 15 - 37 U/L Alk. phosphatase 172 (H) 45 - 117 U/L    Protein, total 5.8 (L) 6.4 - 8.2 g/dL    Albumin 2.4 (L) 3.5 - 5.0 g/dL    Globulin 3.4 2.0 - 4.0 g/dL    A-G Ratio 0.7 (L) 1.1 - 2.2     CBC WITH AUTOMATED DIFF    Collection Time: 08/06/19  9:12 AM   Result Value Ref Range    WBC 11.6 (H) 3.6 - 11.0 K/uL    RBC 3.08 (L) 3.80 - 5.20 M/uL    HGB 9.1 (L) 11.5 - 16.0 g/dL    HCT 27.0 (L) 35.0 - 47.0 %    MCV 87.7 80.0 - 99.0 FL    MCH 29.5 26.0 - 34.0 PG    MCHC 33.7 30.0 - 36.5 g/dL    RDW 13.6 11.5 - 14.5 %    PLATELET 829 286 - 485 K/uL    MPV 9.2 8.9 - 12.9 FL    NRBC 0.0 0  WBC    ABSOLUTE NRBC 0.00 0.00 - 0.01 K/uL    NEUTROPHILS 67 32 - 75 %    LYMPHOCYTES 19 12 - 49 %    MONOCYTES 11 5 - 13 %    EOSINOPHILS 2 0 - 7 %    BASOPHILS 0 0 - 1 %    IMMATURE GRANULOCYTES 1 (H) 0.0 - 0.5 %    ABS. NEUTROPHILS 7.8 1.8 - 8.0 K/UL    ABS. LYMPHOCYTES 2.2 0.8 - 3.5 K/UL    ABS. MONOCYTES 1.2 (H) 0.0 - 1.0 K/UL    ABS. EOSINOPHILS 0.2 0.0 - 0.4 K/UL    ABS. BASOPHILS 0.1 0.0 - 0.1 K/UL    ABS. IMM. GRANS. 0.1 (H) 0.00 - 0.04 K/UL    DF AUTOMATED     MAGNESIUM    Collection Time: 08/07/19  5:30 AM   Result Value Ref Range    Magnesium 1.4 (L) 1.6 - 2.4 mg/dL   METABOLIC PANEL, COMPREHENSIVE    Collection Time: 08/07/19  5:30 AM   Result Value Ref Range    Sodium 141 136 - 145 mmol/L    Potassium 4.6 3.5 - 5.1 mmol/L    Chloride 112 (H) 97 - 108 mmol/L    CO2 21 21 - 32 mmol/L    Anion gap 8 5 - 15 mmol/L    Glucose 89 65 - 100 mg/dL    BUN 4 (L) 6 - 20 MG/DL    Creatinine 0.74 0.55 - 1.02 MG/DL    BUN/Creatinine ratio 5 (L) 12 - 20      GFR est AA >60 >60 ml/min/1.73m2    GFR est non-AA >60 >60 ml/min/1.73m2    Calcium 8.1 (L) 8.5 - 10.1 MG/DL    Bilirubin, total 0.3 0.2 - 1.0 MG/DL    ALT (SGPT) 18 12 - 78 U/L    AST (SGOT) 22 15 - 37 U/L    Alk.  phosphatase 135 (H) 45 - 117 U/L    Protein, total 5.7 (L) 6.4 - 8.2 g/dL    Albumin 2.0 (L) 3.5 - 5.0 g/dL    Globulin 3.7 2.0 - 4.0 g/dL    A-G Ratio 0.5 (L) 1.1 - 2.2     CBC WITH AUTOMATED DIFF    Collection Time: 08/07/19  5:30 AM   Result Value Ref Range    WBC 9.8 3.6 - 11.0 K/uL    RBC 2.81 (L) 3.80 - 5.20 M/uL    HGB 8.3 (L) 11.5 - 16.0 g/dL    HCT 25.1 (L) 35.0 - 47.0 %    MCV 89.3 80.0 - 99.0 FL    MCH 29.5 26.0 - 34.0 PG    MCHC 33.1 30.0 - 36.5 g/dL    RDW 13.6 11.5 - 14.5 %    PLATELET 104 825 - 684 K/uL    MPV 9.3 8.9 - 12.9 FL    NRBC 0.0 0  WBC    ABSOLUTE NRBC 0.00 0.00 - 0.01 K/uL    NEUTROPHILS 60 32 - 75 %    LYMPHOCYTES 25 12 - 49 %    MONOCYTES 10 5 - 13 %    EOSINOPHILS 3 0 - 7 %    BASOPHILS 1 0 - 1 %    IMMATURE GRANULOCYTES 1 (H) 0.0 - 0.5 %    ABS. NEUTROPHILS 5.8 1.8 - 8.0 K/UL    ABS. LYMPHOCYTES 2.5 0.8 - 3.5 K/UL    ABS. MONOCYTES 1.0 0.0 - 1.0 K/UL    ABS. EOSINOPHILS 0.3 0.0 - 0.4 K/UL    ABS. BASOPHILS 0.1 0.0 - 0.1 K/UL    ABS. IMM.  GRANS. 0.1 (H) 0.00 - 0.04 K/UL    DF AUTOMATED         Medications reviewed  Current Facility-Administered Medications   Medication Dose Route Frequency    magnesium sulfate 2 g/50 ml IVPB (premix or compounded)  2 g IntraVENous ONCE    hydrALAZINE (APRESOLINE) 20 mg/mL injection 10 mg  10 mg IntraVENous Q6H PRN    cefTRIAXone (ROCEPHIN) 2 g in 0.9% sodium chloride (MBP/ADV) 50 mL  2 g IntraVENous Q24H    cholestyramine-aspartame (QUESTRAN LIGHT) packet 4 g  4 g Oral QID    enoxaparin (LOVENOX) injection 40 mg  40 mg SubCUTAneous Q24H    phenyleph-min oil-petrolatum (PREPARATION H) 0.25-14-74.9 % ointment   PeriANAL QID PRN    lactobac ac& pc-s.therm-b.anim (GERALDINE Q/RISAQUAD)  1 Cap Oral DAILY    psyllium husk-aspartame (METAMUCIL FIBER) packet 1 Packet  1 Packet Oral BID    diphenoxylate-atropine (LOMOTIL) tablet 1 Tab  1 Tab Oral QID PRN    sodium chloride (NS) flush 5-10 mL  5-10 mL IntraVENous PRN    0.9% sodium chloride with KCl 40 mEq/L infusion   IntraVENous CONTINUOUS    amLODIPine (NORVASC) tablet 5 mg  5 mg Oral DAILY    aspirin chewable tablet 81 mg  81 mg Oral DAILY    atorvastatin (LIPITOR) tablet 20 mg  20 mg Oral QHS    doxazosin (CARDURA) tablet 1 mg  1 mg Oral DAILY    acetaminophen (TYLENOL) tablet 650 mg  650 mg Oral Q4H PRN    diphenhydrAMINE (BENADRYL) capsule 25 mg  25 mg Oral Q4H PRN    ondansetron (ZOFRAN) injection 4 mg  4 mg IntraVENous Q4H PRN    metoprolol tartrate (LOPRESSOR) tablet 50 mg  50 mg Oral BID    losartan (COZAAR) tablet 50 mg  50 mg Oral DAILY       Care Plan discussed with: Patient/Family    Total time spent with patient: 30 minutes.     Anita Donnelly MD

## 2019-08-07 NOTE — PROGRESS NOTES
Problem: Mobility Impaired (Adult and Pediatric)  Goal: *Acute Goals and Plan of Care (Insert Text)  Description  FUNCTIONAL STATUS PRIOR TO ADMISSION: Patient was independent and active without use of DME, provides care for spouse. HOME SUPPORT PRIOR TO ADMISSION: The patient lived alone with family to provide assistance. Physical Therapy Goals  Initiated 8/5/2019  1. Patient will move from supine to sit and sit to supine  in bed with independence within 7 day(s). 2.  Patient will transfer from bed to chair and chair to bed with independence using the least restrictive device within 7 day(s). 3.  Patient will perform sit to stand with independence within 7 day(s). 4.  Patient will ambulate with independence for 450 feet with the least restrictive device within 7 day(s). 5.  Patient will ascend/descend 1 stairs without handrail(s) with modified independence within 7 day(s). Outcome: Progressing Towards Goal  Note:   PHYSICAL THERAPY TREATMENT  Patient: Anshu Berry (13 y.o. female)  Date: 8/7/2019  Diagnosis: UTI (urinary tract infection) [N39.0] <principal problem not specified>       Precautions:    Chart, physical therapy assessment, plan of care and goals were reviewed. ASSESSMENT      Current Level of Function Impacting Discharge (mobility/balance): SBA/ supervision for functional transfers and gait training, slight increased trunk sway noted, no overt LOB. Other factors to consider for discharge: none         PLAN :  Patient continues to benefit from skilled intervention to address the above impairments. Continue treatment per established plan of care. to address goals.     Recommendation for discharge: (in order for the patient to meet his/her long term goals)  Outpatient physical therapy follow up recommended for balance training and increased activity tolerance     This discharge recommendation:  Has been made in collaboration with the attending provider and/or case management    Equipment recommendations for successful discharge (if) home: none        SUBJECTIVE:   Patient stated i feeling great.     OBJECTIVE DATA SUMMARY:   Critical Behavior:  Neurologic State: Alert  Orientation Level: Oriented X4  Cognition: Follows commands     Functional Mobility Training:  Bed Mobility:     Supine to Sit: Modified independent  Sit to Supine: Modified independent           Transfers:  Sit to Stand: Supervision  Stand to Sit: Supervision                             Balance:  Sitting: Intact  Standing: Without support  Standing - Static: Good  Standing - Dynamic : Good  Ambulation/Gait Training:  Distance (ft): 200 Feet (ft)  Assistive Device: Gait belt  Ambulation - Level of Assistance: Stand-by assistance        Gait Abnormalities: Decreased step clearance                                      Stairs: Therapeutic Exercises:     Pain Rating:  Denies pain    Activity Tolerance:   Good  Please refer to the flowsheet for vital signs taken during this treatment.     After treatment patient left in no apparent distress:   Sitting in chair, Call bell within reach and Caregiver / family present    COMMUNICATION/COLLABORATION:   The patients plan of care was discussed with: Registered Nurse    Parrish Montes   Time Calculation: 20 mins

## 2019-08-07 NOTE — PROGRESS NOTES
Bedside shift change report given to MARY CARMEN Ramsey (oncoming nurse) by MARY CARMEN Dean  (offgoing nurse).  Report included the following information SBAR, Kardex, Intake/Output, MAR and Recent Results.

## 2019-08-07 NOTE — PROGRESS NOTES
Nutrition Assessment:    RECOMMENDATIONS/INTERVENTION(S):   1. Continue with Regular diet order to promote PO intakes. 2. Will order [id number] supplement BID for additional 40gm protein/day. 3. Monitor Po intakes of meals/ONS, GI for improvement in diarrhea, labs, weight. ASSESSMENT:   8/7: 79 yo female admitted for UTI. RD assessment for LOS. PMhx: diverticulitis, dislipidemia, HTN, CAD, CABG. Weight WNL per BMI per age. Per wt hx in EMR, pt's weight has been stable PTA. Regular diet ordered. Pt states she has been eating about 50% of her meals which is about her normal amount/meal at home. She usually eats 2 meals + 1 snack/day at home. Denies difficulty chewing/swallowing. Interested in trying [place] for additional kcals/protein. C/o diarrhea for last month, not really improving as she has already had 5 episodes today at time of visit. Discussed pt's need for increased PO intake while having diarrhea. Labs reviewed. Meds: statin, Questran, probiotic, MgSu, Metamucil. NaCl with 40mEq Kcl ordered at 75ml/hr. Diet Order: Regular  % Eaten:  No data found. Pertinent Medications: [x] Reviewed    Labs: [x] Reviewed    Anthropometrics: Height: 5' 4\" (162.6 cm) Weight: 63.5 kg (140 lb)    IBW (%IBW):   ( ) UBW (%UBW):   (  %)      BMI: Body mass index is 24.03 kg/m². This BMI is indicative of:   [] Underweight    [x] Normal    [] Overweight    []  Obesity    []  Extreme Obesity (BMI>40)  Estimated Nutrition Needs (Based on): 4849 Kcals/day(REE 1050 x AF 1.3) , 64 g(64-76gm (1-1.2gm/kg/d)) Protein  Carbohydrate: At Least 130 g/day  Fluids: 1365 mL/day (1 ml/kcal)    Last BM: 8/7 - diarrhea  []Active     [x]Hyperactive  []Hypoactive       [] Absent   BS  Skin:    [x] Intact   [] Incision  [] Breakdown   [] DTI   [] Tears/Excoriation/Abrasion  []Edema [] Other:    Wt Readings from Last 30 Encounters:   08/03/19 63.5 kg (140 lb)   10/31/18 64.9 kg (143 lb)   02/07/18 63.8 kg (140 lb 9.6 oz)   06/13/17 63.1 kg (139 lb 3.2 oz)   07/20/16 65.3 kg (144 lb)   12/07/15 67.3 kg (148 lb 6.4 oz)   02/02/15 67.6 kg (149 lb)   10/30/13 65.3 kg (144 lb)   07/30/13 66 kg (145 lb 9.6 oz)   12/19/12 66.2 kg (146 lb)   09/26/12 65.3 kg (144 lb)   03/21/12 63 kg (139 lb)   02/22/12 67.1 kg (148 lb)   10/28/11 62.1 kg (137 lb)   07/26/11 63 kg (139 lb)   06/20/11 69.3 kg (152 lb 12.8 oz)   06/16/11 65.3 kg (144 lb)   06/13/11 66.3 kg (146 lb 3.2 oz)   12/29/10 67.4 kg (148 lb 9.6 oz)      NUTRITION DIAGNOSES:   Problem:  Altered GI function     Etiology: related to altered GI function     Signs/Symptoms: as evidenced by diarrhea x 4 weeks      NUTRITION INTERVENTIONS:  Meals/Snacks: General/healthful diet   Supplements: Commercial supplement              GOAL:   Consume > 50% all meals + ONS with decreased number of BM's/day within next 4-6 days    Cultural, Amish, or Ethnic Dietary Needs: None     EDUCATION & DISCHARGE NEEDS:    [x] None Identified   [] Identified and Education Provided/Documented   [] Identified and Pt declined/was not appropriate      [x] Interdisciplinary Care Plan Reviewed/Documented    [x] Discharge Needs:    Follow general healthy diet at home    [] No Nutrition Related Discharge Needs    NUTRITION RISK:   Pt Is At Nutrition Risk  [x]     No Nutrition Risk Identified  []       PT SEEN FOR:    []  MD Consult: []Calorie Count      []Diabetic Diet Education        []Diet Education     []Electrolyte Management     []General Nutrition Management and Supplements     []Management of Tube Feeding     []TPN Recommendations    []  RN Referral:  []MST score >=2     []Enteral/Parenteral Nutrition PTA     []Pregnant: Gestational DM or Multigestation                 [] Pressure Ulcer    []  Low BMI      [x]  Length of Stay       [] Dysphagia Diet         [] Ventilator  []  Follow-up     Previous Recommendations:   [] Implemented          [] Not Implemented          [x] Not Applicable    Previous Goal:   [] Met              [] Progressing Towards Goal              [] Not Progressing Towards Goal   [x] Not Applicable            Srinivasa Santoyo, [address] [place] [address]  Pager [phone]  Phone [phone]

## 2019-08-08 PROCEDURE — 74011250637 HC RX REV CODE- 250/637: Performed by: INTERNAL MEDICINE

## 2019-08-08 PROCEDURE — 74011250637 HC RX REV CODE- 250/637: Performed by: FAMILY MEDICINE

## 2019-08-08 PROCEDURE — 74011250636 HC RX REV CODE- 250/636: Performed by: FAMILY MEDICINE

## 2019-08-08 PROCEDURE — 74011250637 HC RX REV CODE- 250/637: Performed by: NURSE PRACTITIONER

## 2019-08-08 PROCEDURE — 65270000029 HC RM PRIVATE

## 2019-08-08 RX ORDER — MAGNESIUM SULFATE HEPTAHYDRATE 40 MG/ML
2 INJECTION, SOLUTION INTRAVENOUS ONCE
Status: COMPLETED | OUTPATIENT
Start: 2019-08-08 | End: 2019-08-08

## 2019-08-08 RX ORDER — BUDESONIDE 3 MG/1
9 CAPSULE, COATED PELLETS ORAL DAILY
Status: DISCONTINUED | OUTPATIENT
Start: 2019-08-09 | End: 2019-08-11 | Stop reason: HOSPADM

## 2019-08-08 RX ORDER — BUDESONIDE 3 MG/1
6 CAPSULE, COATED PELLETS ORAL DAILY
Status: DISCONTINUED | OUTPATIENT
Start: 2019-08-08 | End: 2019-08-08

## 2019-08-08 RX ADMIN — CHOLESTYRAMINE 4 G: 4 POWDER, FOR SUSPENSION ORAL at 12:29

## 2019-08-08 RX ADMIN — LOPERAMIDE HYDROCHLORIDE 2 MG: 2 CAPSULE ORAL at 08:35

## 2019-08-08 RX ADMIN — SODIUM CHLORIDE AND POTASSIUM CHLORIDE: 9; 2.98 INJECTION, SOLUTION INTRAVENOUS at 14:42

## 2019-08-08 RX ADMIN — CHOLESTYRAMINE 4 G: 4 POWDER, FOR SUSPENSION ORAL at 07:47

## 2019-08-08 RX ADMIN — DIPHENOXYLATE HYDROCHLORIDE AND ATROPINE SULFATE 1 TABLET: 2.5; .025 TABLET ORAL at 08:41

## 2019-08-08 RX ADMIN — DIPHENOXYLATE HYDROCHLORIDE AND ATROPINE SULFATE 1 TABLET: 2.5; .025 TABLET ORAL at 05:14

## 2019-08-08 RX ADMIN — DIPHENOXYLATE HYDROCHLORIDE AND ATROPINE SULFATE 1 TABLET: 2.5; .025 TABLET ORAL at 21:04

## 2019-08-08 RX ADMIN — ASPIRIN 81 MG 81 MG: 81 TABLET ORAL at 08:35

## 2019-08-08 RX ADMIN — LOPERAMIDE HYDROCHLORIDE 2 MG: 2 CAPSULE ORAL at 13:08

## 2019-08-08 RX ADMIN — PSYLLIUM HUSK 1 PACKET: 3.4 POWDER ORAL at 08:35

## 2019-08-08 RX ADMIN — CHOLESTYRAMINE 4 G: 4 POWDER, FOR SUSPENSION ORAL at 17:18

## 2019-08-08 RX ADMIN — LOSARTAN POTASSIUM 50 MG: 50 TABLET, FILM COATED ORAL at 08:36

## 2019-08-08 RX ADMIN — AMLODIPINE BESYLATE 5 MG: 5 TABLET ORAL at 08:35

## 2019-08-08 RX ADMIN — PSYLLIUM HUSK 1 PACKET: 3.4 POWDER ORAL at 17:19

## 2019-08-08 RX ADMIN — METOPROLOL TARTRATE 50 MG: 50 TABLET ORAL at 08:36

## 2019-08-08 RX ADMIN — Medication 1 CAPSULE: at 08:35

## 2019-08-08 RX ADMIN — DIPHENOXYLATE HYDROCHLORIDE AND ATROPINE SULFATE 1 TABLET: 2.5; .025 TABLET ORAL at 13:08

## 2019-08-08 RX ADMIN — DOXAZOSIN 1 MG: 1 TABLET ORAL at 08:36

## 2019-08-08 RX ADMIN — ENOXAPARIN SODIUM 40 MG: 40 INJECTION SUBCUTANEOUS at 12:29

## 2019-08-08 RX ADMIN — METOPROLOL TARTRATE 50 MG: 50 TABLET ORAL at 17:19

## 2019-08-08 RX ADMIN — LOPERAMIDE HYDROCHLORIDE 2 MG: 2 CAPSULE ORAL at 05:14

## 2019-08-08 RX ADMIN — HYDRALAZINE HYDROCHLORIDE 10 MG: 20 INJECTION INTRAMUSCULAR; INTRAVENOUS at 21:57

## 2019-08-08 RX ADMIN — CHOLESTYRAMINE 4 G: 4 POWDER, FOR SUSPENSION ORAL at 21:04

## 2019-08-08 RX ADMIN — LOPERAMIDE HYDROCHLORIDE 2 MG: 2 CAPSULE ORAL at 00:18

## 2019-08-08 RX ADMIN — MAGNESIUM SULFATE HEPTAHYDRATE 2 G: 40 INJECTION, SOLUTION INTRAVENOUS at 08:35

## 2019-08-08 RX ADMIN — BUDESONIDE 6 MG: 3 CAPSULE, GELATIN COATED ORAL at 13:08

## 2019-08-08 RX ADMIN — ATORVASTATIN CALCIUM 20 MG: 20 TABLET, FILM COATED ORAL at 21:04

## 2019-08-08 NOTE — PROGRESS NOTES
Daily Progress Note: 8/8/2019  Johnathan Quintero MD    Assessment/Plan:   UTI (urinary tract infection) - POA, based on UA. --bacteremia -- prelim blood cultures positive. Repeat blood cultures neg so far. --urine culture with E Coli- sens to Rocephin  --Zosyn initially, switched to Rocephin 8/6- will stop today as she has had 5 days of IV antibiotics     Sepsis / Fever / Left shift  - POA,  due to UTI. NOT severe. Lactate normal.    --Abx as above. Supportive care. Follow cx.     Syncope / Dehydration / Hyponatremia / Hypokalemia - POA due to GI loss, poor PO intake and UTI. --Hydrate and follow. Replete lytes.       Anemia - iron def, stool heme +, hx hemorrhoids  --with ongoing diarrhea will have GI see as well    Diarrhea   -- Cdiff neg   -- anti-diarrheals   -- stool studies negative for infect organisms so far     Coronary atherosclerosis of native coronary artery S/P CABG x 3 / Essential hypertension, benign - POA, likely stable. --Troponin minimal bump  --check ECHO    Hypomagnesemia  --replace as needed     Pure hypercholesterolemia - continue atorvastatin     Macular degeneration - Supportive care.     DVT proph - lovenox       Problem List:  Problem List as of 8/8/2019 Date Reviewed: 11/4/2018          Codes Class Noted - Resolved    UTI (urinary tract infection) ICD-10-CM: N39.0  ICD-9-CM: 599.0  8/3/2019 - Present        Sepsis ([address] ICD-10-CM: A41.9  ICD-9-CM: 038.9, 995.91  8/3/2019 - Present        Fever ICD-10-CM: R50.9  ICD-9-CM: 780.60  8/3/2019 - Present        Left shift ICD-10-CM: D72.89  ICD-9-CM: 288.9  8/3/2019 - Present        Syncope ICD-10-CM: R55  ICD-9-CM: 780.2  8/3/2019 - Present        Dehydration ICD-10-CM: E86.0  ICD-9-CM: 276.51  8/3/2019 - Present        Hyponatremia ICD-10-CM: E87.1  ICD-9-CM: 276.1  8/3/2019 - Present        Hypokalemia ICD-10-CM: E87.6  ICD-9-CM: 276.8  8/3/2019 - Present        Anemia ICD-10-CM: D64.9  ICD-9-CM: 285.9  8/3/2019 - Present        Macular degeneration ICD-10-CM: H35.30  ICD-9-CM: 362.50  10/30/2013 - Present        Carotid arterial disease (HCC) ICD-10-CM: I77.9  ICD-9-CM: 447.9  Unknown - Present    Overview Signed 10/30/2013 10:11 AM by Osorio Limon MD     mild 0-49%             Coronary atherosclerosis of native coronary artery ICD-10-CM: I25.10  ICD-9-CM: 414.01  Unknown - Present    Overview Signed 2/22/2012 10:44 AM by Osorio Limon MD     Cath 2011 with multivessel cad             S/P CABG (coronary artery bypass graft) ICD-10-CM: Z95.1  ICD-9-CM: V45.81  Unknown - Present    Overview Signed 2/22/2012 10:44 AM by Osorio Limon MD     7-16-94 CABG - OFF PUMP - CABGx 3, lima, eric, epi aortic ultrasound - off pump, rightt endoscopic vein harvest; [address] [place] [address] Dr to LAD, Svg Ao to OM1 and OM2             S/P CABG x 3 ICD-10-CM: Z95.1  ICD-9-CM: V45.81  6/17/2011 - Present    Overview Signed 7/26/2011 10:51 AM by Kadi TEAGUE to LAD and SVGs to OM-1 and OM-2. RCA small non-dominant diffusely diseased vessel ungrafted. LV EF at cath was 65%.              Essential hypertension, benign ICD-10-CM: I10  ICD-9-CM: 401.1  12/2/2010 - Present        Pure hypercholesterolemia ICD-10-CM: E78.00  ICD-9-CM: 272.0  12/2/2010 - Present        RESOLVED: Cough due to ACE inhibitor ICD-10-CM: R05, T46.4X5A  ICD-9-CM: 786.2, E942.6  Unknown - 8/3/2019        RESOLVED: Edema ICD-10-CM: R60.9  ICD-9-CM: 782.3  6/3/2012 - 8/3/2019        RESOLVED: Medication adverse effect ICD-10-CM: T50.905A  ICD-9-CM: E947.9  6/3/2012 - 8/3/2019        RESOLVED: Diverticulitis ICD-10-CM: E59.74  ICD-9-CM: 562.11  Unknown - 8/3/2019        RESOLVED: Pre-operative cardiovascular examination, unstable angina ([facility] ICD-10-CM: Z01.810, I20.0  ICD-9-CM: 411.1, V72.81  6/19/2011 - 6/19/2011        RESOLVED: Diverticulosis of colon with hemorrhage ICD-10-CM: K57.31  ICD-9-CM: 562.12  12/2/2010 - 8/3/2019 Subjective:   H&P: Ms. Mary Anna is a 80 y.o.  female who presented to the Emergency Department complaining of pre syncope. Occurred this AM. Occurred after BM. Associated with period of weakness, poor alertness and confusion. Patient with acute and hx chronic diarrhea, for 3 weeks lately. Failed improve on imodium and cholestyramine. In our ER she has a fever, meets sepsis criteria and UA suggests UTI. We will admit her for management. :  Lots of diarrhea overnight. She has no abd. Pain or CP/SOB/dizziness. She feels weak. Some vaginal irritation from diarrhea so some dysuria at times. Discussed with daughter and  at bedside. :  Pt has no complaints. Less diarrhea with the imodium - 3 loose stools since yesterday. GI considering colonoscopy. :  No complaints. Starting to have fewer loose stools on Questran and Imodium. Currently hold on colonoscopy since fewer stools. AM labs pending. E Coli on urine culture - switching to Rocephin. : Less diarrhea- 3 stools during the night. Feeling a little better. Will change po Mag to IV in case this is worsening diarrhea. BC neg at 3 days. UC E-coli sensitive to Rocephin- this will be day 5 of antibiotic. Decrease IVF.     : Still having continuous stools. 10 in the last day. Will stop IV antibiotics as she has had for 5 days now. Replete mag IV. Afebrile. Review of Systems:   A comprehensive review of systems was negative except for that written in the HPI. Objective:   Physical Exam:     Visit Vitals  /65 (BP 1 Location: Right arm, BP Patient Position: Lying left side)   Pulse 72   Temp 98.6 °F (37 °C)   Resp 16   Ht 5' 4\" (1.626 m)   Wt 63.5 kg (140 lb)   SpO2 94%   BMI 24.03 kg/m²      O2 Device: Room air    Temp (24hrs), Av.8 °F (36.6 °C), Min:97.4 °F (36.3 °C), Max:98.6 °F (37 °C)    No intake/output data recorded. No intake/output data recorded.     General:  Alert, cooperative, no distress, appears stated age. Head:  Normocephalic, without obvious abnormality, atraumatic. Eyes:  Conjunctivae/corneas clear. PERRL, EOMs intact. Nose: Nares normal. Septum midline. Mucosa normal. No drainage or sinus tenderness. Throat: Lips, mucosa, and tongue moist..   Neck: Supple, symmetrical, trachea midline, no adenopathy   Lungs:   Clear to auscultation bilaterally. Heart:  Regular rate and rhythm, 2/6 systolic murmur   Abdomen:   Soft, non-tender. Bowel sounds normal. No masses,  No organomegaly. Extremities: Extremities normal, atraumatic, no cyanosis or edema. No calf tenderness or cords. Pulses: 2+ and symmetric all extremities. Skin: Skin color, texture, turgor normal. No rashes or lesions   Neurologic:   Alert and oriented X 3. Moves all extrem to command. Data Review:       Recent Days:  Recent Labs     08/07/19  0530 08/06/19  0912   WBC 9.8 11.6*   HGB 8.3* 9.1*   HCT 25.1* 27.0*    279     Recent Labs     08/07/19  0530 08/06/19  0912    138   K 4.6 4.4   * 112*   CO2 21 21   GLU 89 104*   BUN 4* 6   CREA 0.74 0.86   CA 8.1* 7.8*   MG 1.4* 1.2*   ALB 2.0* 2.4*   SGOT 22 24   ALT 18 22     No results for input(s): PH, PCO2, PO2, HCO3, FIO2 in the last 72 hours. 24 Hour Results:  No results found for this or any previous visit (from the past 24 hour(s)).     Medications reviewed  Current Facility-Administered Medications   Medication Dose Route Frequency    hydrALAZINE (APRESOLINE) 20 mg/mL injection 10 mg  10 mg IntraVENous Q6H PRN    loperamide (IMODIUM) capsule 2 mg  2 mg Oral Q4H    cefTRIAXone (ROCEPHIN) 2 g in 0.9% sodium chloride (MBP/ADV) 50 mL  2 g IntraVENous Q24H    cholestyramine-aspartame (QUESTRAN LIGHT) packet 4 g  4 g Oral QID    enoxaparin (LOVENOX) injection 40 mg  40 mg SubCUTAneous Q24H    phenyleph-min oil-petrolatum (PREPARATION H) 0.25-14-74.9 % ointment   PeriANAL QID PRN    lactobac ac& pc-s.therm-b.anim (GERALDINE Q/RISAQUAD) 1 Cap Oral DAILY    psyllium husk-aspartame (METAMUCIL FIBER) packet 1 Packet  1 Packet Oral BID    diphenoxylate-atropine (LOMOTIL) tablet 1 Tab  1 Tab Oral QID PRN    sodium chloride (NS) flush 5-10 mL  5-10 mL IntraVENous PRN    0.9% sodium chloride with KCl 40 mEq/L infusion   IntraVENous CONTINUOUS    amLODIPine (NORVASC) tablet 5 mg  5 mg Oral DAILY    aspirin chewable tablet 81 mg  81 mg Oral DAILY    atorvastatin (LIPITOR) tablet 20 mg  20 mg Oral QHS    doxazosin (CARDURA) tablet 1 mg  1 mg Oral DAILY    acetaminophen (TYLENOL) tablet 650 mg  650 mg Oral Q4H PRN    diphenhydrAMINE (BENADRYL) capsule 25 mg  25 mg Oral Q4H PRN    ondansetron (ZOFRAN) injection 4 mg  4 mg IntraVENous Q4H PRN    metoprolol tartrate (LOPRESSOR) tablet 50 mg  50 mg Oral BID    losartan (COZAAR) tablet 50 mg  50 mg Oral DAILY       Care Plan discussed with: Patient/Family    Total time spent with patient: 30 minutes.     David Salinas MD

## 2019-08-08 NOTE — PROGRESS NOTES
[address] NP  [phone]           GI PROGRESS NOTE        NAME: Bandar Davidson   :  1931   MRN:  [id number]       Subjective:   Reports 11 bowel movements over night. Denies abdominal pain. Objective: In NAD      VITALS:   Last 24hrs VS reviewed since prior progress note. Most recent are:  Visit Vitals  /65 (BP 1 Location: Right arm, BP Patient Position: Lying left side)   Pulse 72   Temp 98.6 °F (37 °C)   Resp 16   Ht 5' 4\" (1.626 m)   Wt 63.5 kg (140 lb)   SpO2 94%   BMI 24.03 kg/m²     No intake or output data in the 24 hours ending 19 1202    PHYSICAL EXAM:  General: Alert, in no acute distress    HEENT: Anicteric sclerae. Lungs:            CTA Bilaterally. Heart:  Regular  rhythm,    Abdomen: Soft, Non distended, Non tender.  (+)Bowel sounds, no HSM  Extremities: No c/c/e  Neurologic:  CN 2-12 gi, Alert and oriented X 3. No acute neurological distress   Psych:   Good insight. Not anxious nor agitated.     Lab Data Reviewed:   Recent Labs     19  0530 19  0912   WBC 9.8 11.6*   HGB 8.3* 9.1*   HCT 25.1* 27.0*    279     Recent Labs     19  0530 19  0912    138   K 4.6 4.4   * 112*   CO2 21 21   BUN 4* 6   CREA 0.74 0.86   GLU 89 104*   CA 8.1* 7.8*     Recent Labs     19  0530 19  0912   SGOT 22 24   * 172*   TP 5.7* 5.8*   ALB 2.0* 2.4*   GLOB 3.7 3.4       ________________________________________________________________________  Patient Active Problem List   Diagnosis Code    Essential hypertension, benign I10    Pure hypercholesterolemia E78.00    S/P CABG x 3 Z95.1    Coronary atherosclerosis of native coronary artery I25.10    S/P CABG (coronary artery bypass graft) Z95.1    Macular degeneration H35.30    Carotid arterial disease ([facility]) I77.9    UTI (urinary tract infection) N39.0    Sepsis ([facility]) A41.9    Fever R50.9    Left shift D72.89    Syncope R55    Dehydration E86.0    Hyponatremia E87.1    Hypokalemia E87.6    Anemia D64.9         Assessment and Plan:  Diarrhea:  Persisting with out improvement. Stool tests are negative for infectious process.      - Started Budesonide  - Diet as tolerated  - Monitor Labs  - Continue probiotic  - Continue Fiber supplement  - Continue Questran QID  - Continue Lomotil  - Continue Imodium every 4 hours scheduled.        Signed By: Karen Germain NP     8/8/2019  12:02 PM

## 2019-08-08 NOTE — PROGRESS NOTES
Physical Therapy    1425 Two attempts to work with patient. 1000 pt requesting shower and \"get cleaned up. 46 Pt sleeping and family requesting to allow her to rest. PT will continue to follow    Joel Alex.  Choco Tim

## 2019-08-08 NOTE — PROGRESS NOTES
Shift Summary    Bedside and Verbal shift change report given to Carina Blake RN (oncoming nurse) by Yoseph Pappas RN (offgoing nurse). Report included the following information SBAR, Kardex, MAR and Recent Results. Spoke with Pravin Aguero. NP regarding medications for loose stools. Patient has not had a bowel movement all day. Plan to stop imodium and only take lomotil as needed for loose stools. Oncoming nurse notified. Bedside and Verbal shift change report given to Harris Domingo RN (oncoming nurse) by Dorothy Hsu. Desiree Carvajal RN (offgoing nurse). Report included the following information SBAR, Kardex, MAR and Recent Results.

## 2019-08-09 LAB
BACTERIA SPEC CULT: ABNORMAL
BACTERIA SPEC CULT: NORMAL
SERVICE CMNT-IMP: ABNORMAL
SERVICE CMNT-IMP: NORMAL

## 2019-08-09 PROCEDURE — 74011250636 HC RX REV CODE- 250/636: Performed by: FAMILY MEDICINE

## 2019-08-09 PROCEDURE — 74011250637 HC RX REV CODE- 250/637: Performed by: INTERNAL MEDICINE

## 2019-08-09 PROCEDURE — 74011250637 HC RX REV CODE- 250/637: Performed by: FAMILY MEDICINE

## 2019-08-09 PROCEDURE — 74011000258 HC RX REV CODE- 258: Performed by: FAMILY MEDICINE

## 2019-08-09 PROCEDURE — 65270000029 HC RM PRIVATE

## 2019-08-09 RX ORDER — LOSARTAN POTASSIUM 50 MG/1
100 TABLET ORAL DAILY
Status: DISCONTINUED | OUTPATIENT
Start: 2019-08-09 | End: 2019-08-11 | Stop reason: HOSPADM

## 2019-08-09 RX ADMIN — METOPROLOL TARTRATE 50 MG: 50 TABLET ORAL at 08:08

## 2019-08-09 RX ADMIN — ASPIRIN 81 MG 81 MG: 81 TABLET ORAL at 08:08

## 2019-08-09 RX ADMIN — CHOLESTYRAMINE 4 G: 4 POWDER, FOR SUSPENSION ORAL at 08:13

## 2019-08-09 RX ADMIN — HYDRALAZINE HYDROCHLORIDE 10 MG: 20 INJECTION INTRAMUSCULAR; INTRAVENOUS at 21:20

## 2019-08-09 RX ADMIN — SODIUM CHLORIDE AND POTASSIUM CHLORIDE: 9; 2.98 INJECTION, SOLUTION INTRAVENOUS at 05:36

## 2019-08-09 RX ADMIN — METOPROLOL TARTRATE 50 MG: 50 TABLET ORAL at 17:28

## 2019-08-09 RX ADMIN — AMLODIPINE BESYLATE 5 MG: 5 TABLET ORAL at 08:08

## 2019-08-09 RX ADMIN — DOXAZOSIN 1 MG: 1 TABLET ORAL at 08:08

## 2019-08-09 RX ADMIN — PSYLLIUM HUSK 1 PACKET: 3.4 POWDER ORAL at 17:28

## 2019-08-09 RX ADMIN — Medication 1 CAPSULE: at 08:07

## 2019-08-09 RX ADMIN — SODIUM CHLORIDE AND POTASSIUM CHLORIDE: 9; 2.98 INJECTION, SOLUTION INTRAVENOUS at 14:03

## 2019-08-09 RX ADMIN — PSYLLIUM HUSK 1 PACKET: 3.4 POWDER ORAL at 08:13

## 2019-08-09 RX ADMIN — CEFTRIAXONE 2 G: 2 INJECTION, POWDER, FOR SOLUTION INTRAMUSCULAR; INTRAVENOUS at 10:31

## 2019-08-09 RX ADMIN — CHOLESTYRAMINE 4 G: 4 POWDER, FOR SUSPENSION ORAL at 17:28

## 2019-08-09 RX ADMIN — CHOLESTYRAMINE 4 G: 4 POWDER, FOR SUSPENSION ORAL at 21:02

## 2019-08-09 RX ADMIN — DIPHENOXYLATE HYDROCHLORIDE AND ATROPINE SULFATE 1 TABLET: 2.5; .025 TABLET ORAL at 14:02

## 2019-08-09 RX ADMIN — DIPHENHYDRAMINE HYDROCHLORIDE 25 MG: 25 CAPSULE ORAL at 23:57

## 2019-08-09 RX ADMIN — BUDESONIDE 9 MG: 3 CAPSULE, GELATIN COATED ORAL at 08:58

## 2019-08-09 RX ADMIN — ATORVASTATIN CALCIUM 20 MG: 20 TABLET, FILM COATED ORAL at 21:02

## 2019-08-09 RX ADMIN — DIPHENOXYLATE HYDROCHLORIDE AND ATROPINE SULFATE 1 TABLET: 2.5; .025 TABLET ORAL at 05:35

## 2019-08-09 RX ADMIN — ENOXAPARIN SODIUM 40 MG: 40 INJECTION SUBCUTANEOUS at 13:03

## 2019-08-09 RX ADMIN — LOSARTAN POTASSIUM 100 MG: 50 TABLET, FILM COATED ORAL at 08:07

## 2019-08-09 RX ADMIN — CHOLESTYRAMINE 4 G: 4 POWDER, FOR SUSPENSION ORAL at 13:04

## 2019-08-09 NOTE — PROGRESS NOTES
8/9/2019 5:55 PM 2nd IMM letter signed by pt.     8/9/2019  3:20 PM   Discharge plan:  Home with family assistance and outpatient follow up when medically stable.

## 2019-08-09 NOTE — PROGRESS NOTES
Physical Therapy - Patient's tx completed ~12 noon and OT called to follow up as patient agreeable and sitting up in chair. Patient completed gait and stairs and is cleared for discharge to home after first PT session. MARY CARMEN Machado and patient made aware patient has been cleared. Full PT note to follow.

## 2019-08-09 NOTE — PROGRESS NOTES
Problem: Diarrhea (Adult and Pediatrics)  Goal: *Absence of diarrhea  Outcome: Progressing Towards Goal   Patient encourage to eat nutritious food containing fiber and increase PO fluid intake.

## 2019-08-09 NOTE — PROGRESS NOTES
Spiritual Care Assessment/Progress Note  [address]      NAME: Karri Cummings      MRN: [id number]  AGE: 80 y.o. SEX: female  Shinto Affiliation: Presbyterian   Language: English     8/9/2019     Total Time (in minutes): 10     Spiritual Assessment begun in [facility] ORT 2 through conversation with:         [x]Patient        [] Family    [] Friend(s)        Reason for Consult: Initial/Spiritual assessment, patient floor     Spiritual beliefs: (Please include comment if needed)     [] Identifies with a nisreen tradition:         [] Supported by a nisreen community:            [] Claims no spiritual orientation:           [] Seeking spiritual identity:                [] Adheres to an individual form of spirituality:           [x] Not able to assess:                           Identified resources for coping:      [] Prayer                               [] Music                  [] Guided Imagery     [] Family/friends                 [] Pet visits     [] Devotional reading                         [x] Unknown     [] Other:                                              Interventions offered during this visit: (See comments for more details)    Patient Interventions:  Other (comment)(Unable to assess)           Plan of Care:     [] Support spiritual and/or cultural needs    [] Support AMD and/or advance care planning process      [] Support grieving process   [] Coordinate Rites and/or Rituals    [] Coordination with community clergy   [] No spiritual needs identified at this time   [] Detailed Plan of Care below (See Comments)  [] Make referral to Music Therapy  [] Make referral to Pet Therapy     [] Make referral to Addiction services  [] Make referral to [facility]  [] Make referral to Spiritual Care Partner  [] No future visits requested        [x] Follow up visits as needed     Comments:      attempted an initial spiritual assessment for patient, Micha Nation" on the Post. Surgical Ortho floor. Sridevi was with other providers when the  came to visit. Chaplains will follow up as able and/or needed  [address]. Niko Jared.      Paging Service: [phone])

## 2019-08-09 NOTE — PROGRESS NOTES
[address] NP  [phone]           GI PROGRESS NOTE        NAME: Bandar Davidson   :  1931   MRN:  [id number]       Subjective:   Frequency of bowel movements is improving,  Stools becoming more formed. Objective:   Nad      VITALS:   Last 24hrs VS reviewed since prior progress note. Most recent are:  Visit Vitals  /85 (BP 1 Location: Left arm, BP Patient Position: At rest)   Pulse 65   Temp 97.8 °F (36.6 °C)   Resp 16   Ht 5' 4\" (1.626 m)   Wt 63.5 kg (140 lb)   SpO2 97%   BMI 24.03 kg/m²       Intake/Output Summary (Last 24 hours) at 2019 1621  Last data filed at 2019 0745  Gross per 24 hour   Intake 200 ml   Output    Net 200 ml       PHYSICAL EXAM:  General: Alert, in no acute distress    HEENT: Anicteric sclerae. Lungs:            CTA Bilaterally. Heart:  Regular  rhythm,    Abdomen: Soft, Non distended, Non tender.  (+)Bowel sounds, no HSM  Extremities: No c/c/e  Neurologic:  CN 2-12 gi, Alert and oriented X 3. No acute neurological distress   Psych:   Good insight. Not anxious nor agitated.     Lab Data Reviewed:   Recent Labs     19  0530   WBC 9.8   HGB 8.3*   HCT 25.1*        Recent Labs     19  0530      K 4.6   *   CO2 21   BUN 4*   CREA 0.74   GLU 89   CA 8.1*     Recent Labs     19  0530   SGOT 22   *   TP 5.7*   ALB 2.0*   GLOB 3.7       ________________________________________________________________________  Patient Active Problem List   Diagnosis Code    Essential hypertension, benign I10    Pure hypercholesterolemia E78.00    S/P CABG x 3 Z95.1    Coronary atherosclerosis of native coronary artery I25.10    S/P CABG (coronary artery bypass graft) Z95.1    Macular degeneration H35.30    Carotid arterial disease (HCC) I77.9    UTI (urinary tract infection) N39.0    Sepsis (HCC) A41.9    Fever R50.9    Left shift D72.89    Syncope R55    Dehydration E86.0    Hyponatremia E87.1    Hypokalemia E87.6    Anemia D64.9         Assessment and Plan:  Diarrhea:  Improving with Budesonide  - Continue Budesonide  - Diet as tolerated  - Monitor Labs  - Continue probiotic  - Continue Fiber supplement  - Continue Questran QID  - Continue Lomotil  - Discontinued imodium    Will have on call provider see her as needed over the weekend. Please call with any questions or concerns.     Following       Signed By: Sandie Lopez NP     8/9/2019  4:21 PM

## 2019-08-09 NOTE — PROGRESS NOTES
Daily Progress Note: 8/9/2019  Luba Gilmore MD    Assessment/Plan:   UTI (urinary tract infection) - POA, based on UA. --bacteremia -- prelim blood cultures positive. Repeat blood cultures neg so far. --urine culture with E Coli- sens to Rocephin  --Zosyn initially, switched to Rocephin 8/6     Sepsis / Fever / Left shift  - POA,  due to UTI. NOT severe. Lactate normal.    --Abx as above. Supportive care. Follow cx.     Syncope / Dehydration / Hyponatremia / Hypokalemia - POA due to GI loss, poor PO intake and UTI. --Hydrate and follow. Replete lytes. HTN  -- Home meds  -- Increased Losartan 8/9    Anemia - iron def, stool heme +, hx hemorrhoids  --with ongoing diarrhea will have GI see as well    Diarrhea   -- Cdiff neg   -- anti-diarrheals   -- stool studies negative for infect organisms so far  -- Budesonide added 8/8     Coronary atherosclerosis of native coronary artery S/P CABG x 3 / Essential hypertension, benign - POA, likely stable. --Troponin minimal bump  --check ECHO    Hypomagnesemia  --replace as needed     Pure hypercholesterolemia - continue atorvastatin     Macular degeneration - Supportive care.     DVT proph - lovenox       Problem List:  Problem List as of 8/9/2019 Date Reviewed: 11/4/2018          Codes Class Noted - Resolved    UTI (urinary tract infection) ICD-10-CM: N39.0  ICD-9-CM: 599.0  8/3/2019 - Present        Sepsis ([facility] [address] ICD-10-CM: A41.9  ICD-9-CM: 038.9, 995.91  8/3/2019 - Present        Fever ICD-10-CM: R50.9  ICD-9-CM: 780.60  8/3/2019 - Present        Left shift ICD-10-CM: D72.89  ICD-9-CM: 288.9  8/3/2019 - Present        Syncope ICD-10-CM: R55  ICD-9-CM: 780.2  8/3/2019 - Present        Dehydration ICD-10-CM: E86.0  ICD-9-CM: 276.51  8/3/2019 - Present        Hyponatremia ICD-10-CM: E87.1  ICD-9-CM: 276.1  8/3/2019 - Present        Hypokalemia ICD-10-CM: E87.6  ICD-9-CM: 276.8  8/3/2019 - Present        Anemia ICD-10-CM: D64.9  ICD-9-CM: 285.9  8/3/2019 - Present        Macular degeneration ICD-10-CM: H35.30  ICD-9-CM: 362.50  10/30/2013 - Present        Carotid arterial disease (HCC) ICD-10-CM: I77.9  ICD-9-CM: 447.9  Unknown - Present    Overview Signed 10/30/2013 10:11 AM by Georges Torre MD     mild 0-49%             Coronary atherosclerosis of native coronary artery ICD-10-CM: I25.10  ICD-9-CM: 414.01  Unknown - Present    Overview Signed 2/22/2012 10:44 AM by Georges Torre MD     Cath 2011 with multivessel cad             S/P CABG (coronary artery bypass graft) ICD-10-CM: Z95.1  ICD-9-CM: V45.81  Unknown - Present    Overview Signed 2/22/2012 10:44 AM by Georges Torre MD     8-19-71 CABG - OFF PUMP - CABGx 3, lima, eric, epi aortic ultrasound - off pump, rightt endoscopic vein harvest; [address] [place] [address] Dr to LAD, Svg Ao to OM1 and OM2             S/P CABG x 3 ICD-10-CM: Z95.1  ICD-9-CM: V45.81  6/17/2011 - Present    Overview Signed 7/26/2011 10:51 AM by Jace TEAGUE to LAD and SVGs to OM-1 and OM-2. RCA small non-dominant diffusely diseased vessel ungrafted. LV EF at cath was 65%.              Essential hypertension, benign ICD-10-CM: I10  ICD-9-CM: 401.1  12/2/2010 - Present        Pure hypercholesterolemia ICD-10-CM: E78.00  ICD-9-CM: 272.0  12/2/2010 - Present        RESOLVED: Cough due to ACE inhibitor ICD-10-CM: R05, T46.4X5A  ICD-9-CM: 786.2, E942.6  Unknown - 8/3/2019        RESOLVED: Edema ICD-10-CM: R60.9  ICD-9-CM: 782.3  6/3/2012 - 8/3/2019        RESOLVED: Medication adverse effect ICD-10-CM: T50.905A  ICD-9-CM: E947.9  6/3/2012 - 8/3/2019        RESOLVED: Diverticulitis ICD-10-CM: B59.86  ICD-9-CM: 562.11  Unknown - 8/3/2019        RESOLVED: Pre-operative cardiovascular examination, unstable angina ([facility] ICD-10-CM: Z01.810, I20.0  ICD-9-CM: 411.1, V72.81  6/19/2011 - 6/19/2011        RESOLVED: Diverticulosis of colon with hemorrhage ICD-10-CM: K57.31  ICD-9-CM: 562.12 2010 - 8/3/2019              Subjective:   H&P: Ms. Lauro snyder is a 80 y.o.  female who presented to the Emergency Department complaining of pre syncope. Occurred this AM. Occurred after BM. Associated with period of weakness, poor alertness and confusion. Patient with acute and hx chronic diarrhea, for 3 weeks lately. Failed improve on imodium and cholestyramine. In our ER she has a fever, meets sepsis criteria and UA suggests UTI. We will admit her for management. :  Lots of diarrhea overnight. She has no abd. Pain or CP/SOB/dizziness. She feels weak. Some vaginal irritation from diarrhea so some dysuria at times. Discussed with daughter and  at bedside. :  Pt has no complaints. Less diarrhea with the imodium - 3 loose stools since yesterday. GI considering colonoscopy. :  No complaints. Starting to have fewer loose stools on Questran and Imodium. Currently hold on colonoscopy since fewer stools. AM labs pending. E Coli on urine culture - switching to Rocephin. : Less diarrhea- 3 stools during the night. Feeling a little better. Will change po Mag to IV in case this is worsening diarrhea. BC neg at 3 days. UC E-coli sensitive to Rocephin- this will be day 5 of antibiotic. Decrease IVF.     : Still having continuous stools. 10 in the last day. Replete mag IV. Afebrile. : AM labs pending. Had a better night. Last stool at 12:30am. BP up so will increase Losartan this am.  Added Budesonide yesterday. Will continue Rocephin for a few more days as BC +. Review of Systems:   A comprehensive review of systems was negative except for that written in the HPI.     Objective:   Physical Exam:     Visit Vitals  /65 (BP 1 Location: Right arm, BP Patient Position: At rest)   Pulse 68   Temp 97.7 °F (36.5 °C)   Resp 16   Ht 5' 4\" (1.626 m)   Wt 140 lb (63.5 kg)   SpO2 96%   BMI 24.03 kg/m²      O2 Device: Room air    Temp (24hrs), Av.6 °F (36.4 °C), Min:97.2 °F (36.2 °C), Max:97.9 °F (36.6 °C)    No intake/output data recorded. No intake/output data recorded. General:  Alert, cooperative, no distress, appears stated age. Head:  Normocephalic, without obvious abnormality, atraumatic. Eyes:  Conjunctivae/corneas clear. PERRL, EOMs intact. Nose: Nares normal. Septum midline. Mucosa normal. No drainage or sinus tenderness. Throat: Lips, mucosa, and tongue moist..   Neck: Supple, symmetrical, trachea midline, no adenopathy   Lungs:   Clear to auscultation bilaterally. Heart:  Regular rate and rhythm, 2/6 systolic murmur   Abdomen:   Soft, non-tender. Bowel sounds normal. No masses,  No organomegaly. Extremities: Extremities normal, atraumatic, no cyanosis or edema. No calf tenderness or cords. Pulses: 2+ and symmetric all extremities. Skin: Skin color, texture, turgor normal. No rashes or lesions   Neurologic:   Alert and oriented X 3. Moves all extrem to command. Data Review:       Recent Days:  Recent Labs     08/07/19  0530 08/06/19  0912   WBC 9.8 11.6*   HGB 8.3* 9.1*   HCT 25.1* 27.0*    279     Recent Labs     08/07/19 0530 08/06/19  0912    138   K 4.6 4.4   * 112*   CO2 21 21   GLU 89 104*   BUN 4* 6   CREA 0.74 0.86   CA 8.1* 7.8*   MG 1.4* 1.2*   ALB 2.0* 2.4*   SGOT 22 24   ALT 18 22     No results for input(s): PH, PCO2, PO2, HCO3, FIO2 in the last 72 hours. 24 Hour Results:  No results found for this or any previous visit (from the past 24 hour(s)).     Medications reviewed  Current Facility-Administered Medications   Medication Dose Route Frequency    losartan (COZAAR) tablet 100 mg  100 mg Oral DAILY    budesonide (ENTOCORT EC) capsule 9 mg  9 mg Oral DAILY    hydrALAZINE (APRESOLINE) 20 mg/mL injection 10 mg  10 mg IntraVENous Q6H PRN    cholestyramine-aspartame (QUESTRAN LIGHT) packet 4 g  4 g Oral QID    enoxaparin (LOVENOX) injection 40 mg  40 mg SubCUTAneous Q24H    phenyleph-min oil-petrolatum (PREPARATION H) 0.25-14-74.9 % ointment   PeriANAL QID PRN    lactobac ac& pc-s.therm-b.anim (GERALDINE Q/RISAQUAD)  1 Cap Oral DAILY    psyllium husk-aspartame (METAMUCIL FIBER) packet 1 Packet  1 Packet Oral BID    diphenoxylate-atropine (LOMOTIL) tablet 1 Tab  1 Tab Oral QID PRN    sodium chloride (NS) flush 5-10 mL  5-10 mL IntraVENous PRN    0.9% sodium chloride with KCl 40 mEq/L infusion   IntraVENous CONTINUOUS    amLODIPine (NORVASC) tablet 5 mg  5 mg Oral DAILY    aspirin chewable tablet 81 mg  81 mg Oral DAILY    atorvastatin (LIPITOR) tablet 20 mg  20 mg Oral QHS    doxazosin (CARDURA) tablet 1 mg  1 mg Oral DAILY    acetaminophen (TYLENOL) tablet 650 mg  650 mg Oral Q4H PRN    diphenhydrAMINE (BENADRYL) capsule 25 mg  25 mg Oral Q4H PRN    ondansetron (ZOFRAN) injection 4 mg  4 mg IntraVENous Q4H PRN    metoprolol tartrate (LOPRESSOR) tablet 50 mg  50 mg Oral BID       Care Plan discussed with: Patient/Family    Total time spent with patient: 30 minutes.     Karla Richter MD

## 2019-08-10 LAB
ALBUMIN SERPL-MCNC: 2.6 G/DL (ref 3.5–5)
ALBUMIN/GLOB SERPL: 0.7 {RATIO} (ref 1.1–2.2)
ALP SERPL-CCNC: 129 U/L (ref 45–117)
ALT SERPL-CCNC: 18 U/L (ref 12–78)
ANION GAP SERPL CALC-SCNC: 7 MMOL/L (ref 5–15)
AST SERPL-CCNC: 18 U/L (ref 15–37)
BASOPHILS # BLD: 0.1 K/UL (ref 0–0.1)
BASOPHILS NFR BLD: 1 % (ref 0–1)
BILIRUB SERPL-MCNC: 0.4 MG/DL (ref 0.2–1)
BUN SERPL-MCNC: 4 MG/DL (ref 6–20)
BUN/CREAT SERPL: 6 (ref 12–20)
CALCIUM SERPL-MCNC: 8.3 MG/DL (ref 8.5–10.1)
CHLORIDE SERPL-SCNC: 113 MMOL/L (ref 97–108)
CO2 SERPL-SCNC: 20 MMOL/L (ref 21–32)
CREAT SERPL-MCNC: 0.67 MG/DL (ref 0.55–1.02)
DIFFERENTIAL METHOD BLD: ABNORMAL
EOSINOPHIL # BLD: 0.3 K/UL (ref 0–0.4)
EOSINOPHIL NFR BLD: 3 % (ref 0–7)
ERYTHROCYTE [DISTWIDTH] IN BLOOD BY AUTOMATED COUNT: 13.7 % (ref 11.5–14.5)
GLOBULIN SER CALC-MCNC: 3.9 G/DL (ref 2–4)
GLUCOSE SERPL-MCNC: 99 MG/DL (ref 65–100)
HCT VFR BLD AUTO: 25.6 % (ref 35–47)
HGB BLD-MCNC: 8.4 G/DL (ref 11.5–16)
IMM GRANULOCYTES # BLD AUTO: 0.1 K/UL (ref 0–0.04)
IMM GRANULOCYTES NFR BLD AUTO: 1 % (ref 0–0.5)
LYMPHOCYTES # BLD: 2.2 K/UL (ref 0.8–3.5)
LYMPHOCYTES NFR BLD: 21 % (ref 12–49)
MAGNESIUM SERPL-MCNC: 1.8 MG/DL (ref 1.6–2.4)
MCH RBC QN AUTO: 29.6 PG (ref 26–34)
MCHC RBC AUTO-ENTMCNC: 32.8 G/DL (ref 30–36.5)
MCV RBC AUTO: 90.1 FL (ref 80–99)
MONOCYTES # BLD: 0.6 K/UL (ref 0–1)
MONOCYTES NFR BLD: 6 % (ref 5–13)
NEUTS SEG # BLD: 6.9 K/UL (ref 1.8–8)
NEUTS SEG NFR BLD: 68 % (ref 32–75)
NRBC # BLD: 0 K/UL (ref 0–0.01)
NRBC BLD-RTO: 0 PER 100 WBC
PLATELET # BLD AUTO: 333 K/UL (ref 150–400)
PMV BLD AUTO: 9.3 FL (ref 8.9–12.9)
POTASSIUM SERPL-SCNC: 4.6 MMOL/L (ref 3.5–5.1)
PROT SERPL-MCNC: 6.5 G/DL (ref 6.4–8.2)
RBC # BLD AUTO: 2.84 M/UL (ref 3.8–5.2)
SODIUM SERPL-SCNC: 140 MMOL/L (ref 136–145)
WBC # BLD AUTO: 10.2 K/UL (ref 3.6–11)

## 2019-08-10 PROCEDURE — 83735 ASSAY OF MAGNESIUM: CPT

## 2019-08-10 PROCEDURE — 74011250636 HC RX REV CODE- 250/636: Performed by: FAMILY MEDICINE

## 2019-08-10 PROCEDURE — 74011250637 HC RX REV CODE- 250/637: Performed by: FAMILY MEDICINE

## 2019-08-10 PROCEDURE — 74011000258 HC RX REV CODE- 258: Performed by: FAMILY MEDICINE

## 2019-08-10 PROCEDURE — 36415 COLL VENOUS BLD VENIPUNCTURE: CPT

## 2019-08-10 PROCEDURE — 74011250637 HC RX REV CODE- 250/637: Performed by: INTERNAL MEDICINE

## 2019-08-10 PROCEDURE — 85025 COMPLETE CBC W/AUTO DIFF WBC: CPT

## 2019-08-10 PROCEDURE — 80053 COMPREHEN METABOLIC PANEL: CPT

## 2019-08-10 PROCEDURE — 65270000029 HC RM PRIVATE

## 2019-08-10 RX ORDER — CHOLESTYRAMINE 4 G/4.8G
4 POWDER, FOR SUSPENSION ORAL
Status: DISCONTINUED | OUTPATIENT
Start: 2019-08-10 | End: 2019-08-11 | Stop reason: HOSPADM

## 2019-08-10 RX ORDER — AMLODIPINE BESYLATE 5 MG/1
5 TABLET ORAL 2 TIMES DAILY
Status: DISCONTINUED | OUTPATIENT
Start: 2019-08-10 | End: 2019-08-11 | Stop reason: HOSPADM

## 2019-08-10 RX ORDER — CHOLESTYRAMINE 4 G/4.8G
4 POWDER, FOR SUSPENSION ORAL
Status: DISCONTINUED | OUTPATIENT
Start: 2019-08-10 | End: 2019-08-10

## 2019-08-10 RX ADMIN — METOPROLOL TARTRATE 50 MG: 50 TABLET ORAL at 10:30

## 2019-08-10 RX ADMIN — HYDRALAZINE HYDROCHLORIDE 10 MG: 20 INJECTION INTRAMUSCULAR; INTRAVENOUS at 18:33

## 2019-08-10 RX ADMIN — LOSARTAN POTASSIUM 100 MG: 50 TABLET, FILM COATED ORAL at 10:30

## 2019-08-10 RX ADMIN — METOPROLOL TARTRATE 50 MG: 50 TABLET ORAL at 17:25

## 2019-08-10 RX ADMIN — PSYLLIUM HUSK 1 PACKET: 3.4 POWDER ORAL at 17:25

## 2019-08-10 RX ADMIN — ENOXAPARIN SODIUM 40 MG: 40 INJECTION SUBCUTANEOUS at 12:02

## 2019-08-10 RX ADMIN — Medication 1 CAPSULE: at 09:38

## 2019-08-10 RX ADMIN — DOXAZOSIN 1 MG: 1 TABLET ORAL at 10:30

## 2019-08-10 RX ADMIN — PSYLLIUM HUSK 1 PACKET: 3.4 POWDER ORAL at 09:37

## 2019-08-10 RX ADMIN — MENTHOL, CAMPHOR: 1.11; 1.11 LOTION TOPICAL at 12:02

## 2019-08-10 RX ADMIN — CEFTRIAXONE 2 G: 2 INJECTION, POWDER, FOR SOLUTION INTRAMUSCULAR; INTRAVENOUS at 09:41

## 2019-08-10 RX ADMIN — ATORVASTATIN CALCIUM 20 MG: 20 TABLET, FILM COATED ORAL at 21:40

## 2019-08-10 RX ADMIN — AMLODIPINE BESYLATE 5 MG: 5 TABLET ORAL at 10:30

## 2019-08-10 RX ADMIN — HYDRALAZINE HYDROCHLORIDE 10 MG: 20 INJECTION INTRAMUSCULAR; INTRAVENOUS at 08:34

## 2019-08-10 RX ADMIN — AMLODIPINE BESYLATE 5 MG: 5 TABLET ORAL at 17:25

## 2019-08-10 RX ADMIN — ASPIRIN 81 MG 81 MG: 81 TABLET ORAL at 09:39

## 2019-08-10 RX ADMIN — SODIUM CHLORIDE AND POTASSIUM CHLORIDE: 9; 2.98 INJECTION, SOLUTION INTRAVENOUS at 05:49

## 2019-08-10 RX ADMIN — MENTHOL, CAMPHOR: 1.11; 1.11 LOTION TOPICAL at 17:26

## 2019-08-10 RX ADMIN — CHOLESTYRAMINE 4 G: 4 POWDER, FOR SUSPENSION ORAL at 12:02

## 2019-08-10 RX ADMIN — BUDESONIDE 9 MG: 3 CAPSULE, GELATIN COATED ORAL at 09:38

## 2019-08-10 NOTE — PROGRESS NOTES
Daily Progress Note: 8/10/2019  Leydi To MD    Addendum: after rounding, I spoke with Dr Mick Garcia, he rec tapering Donnel Braver to daily at this point. Order placed. Assessment/Plan:   UTI (urinary tract infection) - POA, based on UA. --bacteremia -- prelim blood cultures positive. Repeat blood cultures neg so far. --urine culture with E Coli- sens to Rocephin  --Zosyn initially, switched to Rocephin 8/6     Sepsis / Fever / Left shift  - POA,  due to UTI. NOT severe. Lactate normal.  WBC stable. --Abx as above.       Syncope / Dehydration / Hyponatremia / Hypokalemia - POA due to GI loss, poor PO intake and UTI. --resolved  --stop IVF    HTN: remains elevated, will stop IVF  -- Home meds  -- Increased Losartan 8/9  -- monitor closely    Anemia - iron def, stool heme +, hx hemorrhoids, Hgb stable  - watch    Diarrhea   -- Cdiff neg   -- anti-diarrheals and Questran- wean to TID  -- stool studies negative for infect organisms so far  -- Budesonide added 8/8     Coronary atherosclerosis of native coronary artery S/P CABG x 3 / Essential hypertension, benign - POA, likely stable. Echo stable  --Troponin minimal bump    Hypomagnesemia: resolved  -- monitor and replace as needed     Pure hypercholesterolemia - continue atorvastatin     Macular degeneration - Supportive care.     Heat rash on back  --sarna lotion    DVT proph - lovenox       Problem List:  Problem List as of 8/10/2019 Date Reviewed: 11/4/2018          Codes Class Noted - Resolved    UTI (urinary tract infection) ICD-10-CM: N39.0  ICD-9-CM: 599.0  8/3/2019 - Present        Sepsis ([address] ICD-10-CM: A41.9  ICD-9-CM: 038.9, 995.91  8/3/2019 - Present        Fever ICD-10-CM: R50.9  ICD-9-CM: 780.60  8/3/2019 - Present        Left shift ICD-10-CM: D72.89  ICD-9-CM: 288.9  8/3/2019 - Present        Syncope ICD-10-CM: R55  ICD-9-CM: 780.2  8/3/2019 - Present        Dehydration ICD-10-CM: E86.0  ICD-9-CM: 276.51 8/3/2019 - Present        Hyponatremia ICD-10-CM: E87.1  ICD-9-CM: 276.1  8/3/2019 - Present        Hypokalemia ICD-10-CM: E87.6  ICD-9-CM: 276.8  8/3/2019 - Present        Anemia ICD-10-CM: D64.9  ICD-9-CM: 285.9  8/3/2019 - Present        Macular degeneration ICD-10-CM: H35.30  ICD-9-CM: 362.50  10/30/2013 - Present        Carotid arterial disease (HCC) ICD-10-CM: I77.9  ICD-9-CM: 447.9  Unknown - Present    Overview Signed 10/30/2013 10:11 AM by Be Langford MD     mild 0-49%             Coronary atherosclerosis of native coronary artery ICD-10-CM: I25.10  ICD-9-CM: 414.01  Unknown - Present    Overview Signed 2/22/2012 10:44 AM by Be Langford MD     Cath 2011 with multivessel cad             S/P CABG (coronary artery bypass graft) ICD-10-CM: Z95.1  ICD-9-CM: V45.81  Unknown - Present    Overview Signed 2/22/2012 10:44 AM by Be Langford MD     6-01-94 CABG - OFF PUMP - CABGx 3, lima, eric, epi aortic ultrasound - off pump, rightt endoscopic vein harvest; [id number] [place] [address] Dr to LAD, Svg Ao to OM1 and OM2             S/P CABG x 3 ICD-10-CM: Z95.1  ICD-9-CM: V45.81  6/17/2011 - Present    Overview Signed 7/26/2011 10:51 AM by Leo TEAGUE to LAD and SVGs to OM-1 and OM-2. RCA small non-dominant diffusely diseased vessel ungrafted. LV EF at cath was 65%.              Essential hypertension, benign ICD-10-CM: I10  ICD-9-CM: 401.1  12/2/2010 - Present        Pure hypercholesterolemia ICD-10-CM: E78.00  ICD-9-CM: 272.0  12/2/2010 - Present        RESOLVED: Cough due to ACE inhibitor ICD-10-CM: R05, T46.4X5A  ICD-9-CM: 786.2, E942.6  Unknown - 8/3/2019        RESOLVED: Edema ICD-10-CM: R60.9  ICD-9-CM: 782.3  6/3/2012 - 8/3/2019        RESOLVED: Medication adverse effect ICD-10-CM: T50.905A  ICD-9-CM: E947.9  6/3/2012 - 8/3/2019        RESOLVED: Diverticulitis ICD-10-CM: O13.28  ICD-9-CM: 562.11  Unknown - 8/3/2019        RESOLVED: Pre-operative cardiovascular examination, unstable angina ([facility] [address] ICD-10-CM: Z01.810, I20.0  ICD-9-CM: 411.1, V72.81  6/19/2011 - 6/19/2011        RESOLVED: Diverticulosis of colon with hemorrhage ICD-10-CM: K57.31  ICD-9-CM: 562.12  12/2/2010 - 8/3/2019              Subjective:   H&P: Ms. Jorge Isabel is a 80 y.o.  female who presented to the Emergency Department complaining of pre syncope. Occurred this AM. Occurred after BM. Associated with period of weakness, poor alertness and confusion. Patient with acute and hx chronic diarrhea, for 3 weeks lately. Failed improve on imodium and cholestyramine. In our ER she has a fever, meets sepsis criteria and UA suggests UTI. We will admit her for management. 8/4:  Lots of diarrhea overnight. She has no abd. Pain or CP/SOB/dizziness. She feels weak. Some vaginal irritation from diarrhea so some dysuria at times. Discussed with daughter and  at bedside. 8/5:  Pt has no complaints. Less diarrhea with the imodium - 3 loose stools since yesterday. GI considering colonoscopy. 8/6:  No complaints. Starting to have fewer loose stools on Questran and Imodium. Currently hold on colonoscopy since fewer stools. AM labs pending. E Coli on urine culture - switching to Rocephin. 8/7: Less diarrhea- 3 stools during the night. Feeling a little better. Will change po Mag to IV in case this is worsening diarrhea. BC neg at 3 days. UC E-coli sensitive to Rocephin- this will be day 5 of antibiotic. Decrease IVF.     8/8: Still having continuous stools. 10 in the last day. Replete mag IV. Afebrile. 8/9: AM labs pending. Had a better night. Last stool at 12:30am. BP up so will increase Losartan this am.  Added Budesonide yesterday. Will continue Rocephin for a few more days as BC +.     8/10: Labs stable, stooling has improved. Da and pt note that the Lorenda Bodo is very chalky and difficult to take. Want to wean it.  Also very insistent that the rocephin be completed today as it is day #7 and likely making her bowels worse. Pt notes itchy rash on upper back. Wants to walk more in the halls. Also notes dry nasal mucosa and whistle-like breathing. BPs have been elevated, losartan increased yesterday. Responded to IV hydralazine this AM, hasn't had her PO meds yet today. Review of Systems:   A comprehensive review of systems was negative except for that written in the HPI. Objective:   Physical Exam:     Visit Vitals  BP (P) 174/67 (BP 1 Location: Right arm, BP Patient Position: At rest)   Pulse (P) 80   Temp (P) 98.1 °F (36.7 °C)   Resp (P) 16   Ht 5' 4\" (1.626 m)   Wt 63.5 kg (140 lb)   SpO2 (P) 95%   BMI 24.03 kg/m²      O2 Device: Room air    Temp (24hrs), Av.2 °F (36.8 °C), Min:97.8 °F (36.6 °C), Max:98.4 °F (36.9 °C)    No intake/output data recorded.  1901 - 08/10 0700  In: 200 [P.O.:200]  Out: -     General:  Alert, cooperative, no distress, appears stated age. Head:  Normocephalic, without obvious abnormality, atraumatic. Eyes:  Conjunctivae/corneas clear. PERRL, EOMs intact. Nose: Nares normal. Septum midline. Throat: Lips, mucosa, and tongue moist.   Neck: Supple, symmetrical, trachea midline, no adenopathy   Lungs:   Crackles at bases, symmetric, nonlabored   Heart:  Regular rate and rhythm, 2/6 systolic murmur   Abdomen:   Soft, non-tender. Bowel sounds normal. No masses,  No organomegaly. Extremities: Extremities normal, atraumatic, no cyanosis or edema. No calf tenderness or cords. Pulses: 2+ and symmetric all extremities. Skin: Skin color, texture, turgor normal. Erythematous papules on upper back     Neurologic:   Alert and oriented X 3. Moves all extrem to command.      Data Review:       Recent Days:  Recent Labs     08/10/19  0530   WBC 10.2   HGB 8.4*   HCT 25.6*        Recent Labs     08/10/19  0459      K 4.6   *   CO2 20*   GLU 99   BUN 4*   CREA 0.67   CA 8.3*   MG 1.8   ALB 2.6*   SGOT 18   ALT 18     No results for input(s): PH, PCO2, PO2, HCO3, FIO2 in the last 72 hours. 24 Hour Results:  Recent Results (from the past 24 hour(s))   METABOLIC PANEL, COMPREHENSIVE    Collection Time: 08/10/19  4:59 AM   Result Value Ref Range    Sodium 140 136 - 145 mmol/L    Potassium 4.6 3.5 - 5.1 mmol/L    Chloride 113 (H) 97 - 108 mmol/L    CO2 20 (L) 21 - 32 mmol/L    Anion gap 7 5 - 15 mmol/L    Glucose 99 65 - 100 mg/dL    BUN 4 (L) 6 - 20 MG/DL    Creatinine 0.67 0.55 - 1.02 MG/DL    BUN/Creatinine ratio 6 (L) 12 - 20      GFR est AA >60 >60 ml/min/1.73m2    GFR est non-AA >60 >60 ml/min/1.73m2    Calcium 8.3 (L) 8.5 - 10.1 MG/DL    Bilirubin, total 0.4 0.2 - 1.0 MG/DL    ALT (SGPT) 18 12 - 78 U/L    AST (SGOT) 18 15 - 37 U/L    Alk. phosphatase 129 (H) 45 - 117 U/L    Protein, total 6.5 6.4 - 8.2 g/dL    Albumin 2.6 (L) 3.5 - 5.0 g/dL    Globulin 3.9 2.0 - 4.0 g/dL    A-G Ratio 0.7 (L) 1.1 - 2.2     MAGNESIUM    Collection Time: 08/10/19  4:59 AM   Result Value Ref Range    Magnesium 1.8 1.6 - 2.4 mg/dL   CBC WITH AUTOMATED DIFF    Collection Time: 08/10/19  5:30 AM   Result Value Ref Range    WBC 10.2 3.6 - 11.0 K/uL    RBC 2.84 (L) 3.80 - 5.20 M/uL    HGB 8.4 (L) 11.5 - 16.0 g/dL    HCT 25.6 (L) 35.0 - 47.0 %    MCV 90.1 80.0 - 99.0 FL    MCH 29.6 26.0 - 34.0 PG    MCHC 32.8 30.0 - 36.5 g/dL    RDW 13.7 11.5 - 14.5 %    PLATELET 082 569 - 945 K/uL    MPV 9.3 8.9 - 12.9 FL    NRBC 0.0 0  WBC    ABSOLUTE NRBC 0.00 0.00 - 0.01 K/uL    NEUTROPHILS 68 32 - 75 %    LYMPHOCYTES 21 12 - 49 %    MONOCYTES 6 5 - 13 %    EOSINOPHILS 3 0 - 7 %    BASOPHILS 1 0 - 1 %    IMMATURE GRANULOCYTES 1 (H) 0.0 - 0.5 %    ABS. NEUTROPHILS 6.9 1.8 - 8.0 K/UL    ABS. LYMPHOCYTES 2.2 0.8 - 3.5 K/UL    ABS. MONOCYTES 0.6 0.0 - 1.0 K/UL    ABS. EOSINOPHILS 0.3 0.0 - 0.4 K/UL    ABS. BASOPHILS 0.1 0.0 - 0.1 K/UL    ABS. IMM.  GRANS. 0.1 (H) 0.00 - 0.04 K/UL    DF AUTOMATED         Medications reviewed  Current Facility-Administered Medications   Medication Dose Route Frequency    losartan (COZAAR) tablet 100 mg  100 mg Oral DAILY    cefTRIAXone (ROCEPHIN) 2 g in 0.9% sodium chloride (MBP/ADV) 50 mL  2 g IntraVENous Q24H    budesonide (ENTOCORT EC) capsule 9 mg  9 mg Oral DAILY    hydrALAZINE (APRESOLINE) 20 mg/mL injection 10 mg  10 mg IntraVENous Q6H PRN    cholestyramine-aspartame (QUESTRAN LIGHT) packet 4 g  4 g Oral QID    enoxaparin (LOVENOX) injection 40 mg  40 mg SubCUTAneous Q24H    phenyleph-min oil-petrolatum (PREPARATION H) 0.25-14-74.9 % ointment   PeriANAL QID PRN    lactobac ac& pc-s.therm-b.anim (GERALDINE Q/RISAQUAD)  1 Cap Oral DAILY    psyllium husk-aspartame (METAMUCIL FIBER) packet 1 Packet  1 Packet Oral BID    diphenoxylate-atropine (LOMOTIL) tablet 1 Tab  1 Tab Oral QID PRN    sodium chloride (NS) flush 5-10 mL  5-10 mL IntraVENous PRN    0.9% sodium chloride with KCl 40 mEq/L infusion   IntraVENous CONTINUOUS    amLODIPine (NORVASC) tablet 5 mg  5 mg Oral DAILY    aspirin chewable tablet 81 mg  81 mg Oral DAILY    atorvastatin (LIPITOR) tablet 20 mg  20 mg Oral QHS    doxazosin (CARDURA) tablet 1 mg  1 mg Oral DAILY    acetaminophen (TYLENOL) tablet 650 mg  650 mg Oral Q4H PRN    diphenhydrAMINE (BENADRYL) capsule 25 mg  25 mg Oral Q4H PRN    ondansetron (ZOFRAN) injection 4 mg  4 mg IntraVENous Q4H PRN    metoprolol tartrate (LOPRESSOR) tablet 50 mg  50 mg Oral BID       Care Plan discussed with: Patient/Family/[place]/ Dr Ashok Mares    Total time spent with patient: 30 minutes.     Sadie Larkin MD

## 2019-08-10 NOTE — PROGRESS NOTES
Problem: Falls - Risk of  Goal: *Absence of Falls  Description  Document Margaret Salazar Fall Risk and appropriate interventions in the flowsheet. Outcome: Progressing Towards Goal  Note:   Fall Risk Interventions:  Mobility Interventions: Communicate number of staff needed for ambulation/transfer, Patient to call before getting OOB         Medication Interventions: Evaluate medications/consider consulting pharmacy, Patient to call before getting OOB, Teach patient to arise slowly    Elimination Interventions: Call light in reach, Elevated toilet seat, Patient to call for help with toileting needs, Stay With Me (per policy), Toilet paper/wipes in reach, Bed/chair exit alarm    History of Falls Interventions: Bed/chair exit alarm, Consult care management for discharge planning, Investigate reason for fall, Room close to nurse's station, Utilize gait belt for transfer/ambulation         Problem: Patient Education: Go to Patient Education Activity  Goal: Patient/Family Education  Outcome: Progressing Towards Goal     Problem: Risk for Spread of Infection  Goal: Prevent transmission of infectious organism to others  Description  Prevent the transmission of infectious organisms to other patients, staff members, and visitors.   Outcome: Progressing Towards Goal     Problem: Patient Education: Go to Patient Education Activity  Goal: Patient/Family Education  Outcome: Progressing Towards Goal  Note:   Patient encouraged to sit in chair for meals and ambulate today     Problem: Patient Education: Go to Patient Education Activity  Goal: Patient/Family Education  Outcome: Progressing Towards Goal  Note:   Patient encouraged to sit in chair for meals and ambulate today

## 2019-08-11 VITALS
DIASTOLIC BLOOD PRESSURE: 64 MMHG | RESPIRATION RATE: 16 BRPM | HEIGHT: 64 IN | BODY MASS INDEX: 23.9 KG/M2 | OXYGEN SATURATION: 97 % | HEART RATE: 65 BPM | SYSTOLIC BLOOD PRESSURE: 159 MMHG | WEIGHT: 140 LBS | TEMPERATURE: 98.4 F

## 2019-08-11 LAB
ALBUMIN SERPL-MCNC: 2.4 G/DL (ref 3.5–5)
ALBUMIN/GLOB SERPL: 0.7 {RATIO} (ref 1.1–2.2)
ALP SERPL-CCNC: 114 U/L (ref 45–117)
ALT SERPL-CCNC: 15 U/L (ref 12–78)
ANION GAP SERPL CALC-SCNC: 5 MMOL/L (ref 5–15)
AST SERPL-CCNC: 14 U/L (ref 15–37)
BASOPHILS # BLD: 0.1 K/UL (ref 0–0.1)
BASOPHILS NFR BLD: 1 % (ref 0–1)
BILIRUB SERPL-MCNC: 0.4 MG/DL (ref 0.2–1)
BUN SERPL-MCNC: 6 MG/DL (ref 6–20)
BUN/CREAT SERPL: 8 (ref 12–20)
CALCIUM SERPL-MCNC: 8.2 MG/DL (ref 8.5–10.1)
CHLORIDE SERPL-SCNC: 112 MMOL/L (ref 97–108)
CO2 SERPL-SCNC: 22 MMOL/L (ref 21–32)
CREAT SERPL-MCNC: 0.76 MG/DL (ref 0.55–1.02)
DIFFERENTIAL METHOD BLD: ABNORMAL
EOSINOPHIL # BLD: 0.2 K/UL (ref 0–0.4)
EOSINOPHIL NFR BLD: 2 % (ref 0–7)
ERYTHROCYTE [DISTWIDTH] IN BLOOD BY AUTOMATED COUNT: 13.8 % (ref 11.5–14.5)
GLOBULIN SER CALC-MCNC: 3.6 G/DL (ref 2–4)
GLUCOSE SERPL-MCNC: 104 MG/DL (ref 65–100)
HCT VFR BLD AUTO: 25 % (ref 35–47)
HGB BLD-MCNC: 8.2 G/DL (ref 11.5–16)
IMM GRANULOCYTES # BLD AUTO: 0.2 K/UL (ref 0–0.04)
IMM GRANULOCYTES NFR BLD AUTO: 2 % (ref 0–0.5)
LYMPHOCYTES # BLD: 2.5 K/UL (ref 0.8–3.5)
LYMPHOCYTES NFR BLD: 21 % (ref 12–49)
MAGNESIUM SERPL-MCNC: 1.7 MG/DL (ref 1.6–2.4)
MCH RBC QN AUTO: 29.4 PG (ref 26–34)
MCHC RBC AUTO-ENTMCNC: 32.8 G/DL (ref 30–36.5)
MCV RBC AUTO: 89.6 FL (ref 80–99)
MONOCYTES # BLD: 0.8 K/UL (ref 0–1)
MONOCYTES NFR BLD: 7 % (ref 5–13)
NEUTS SEG # BLD: 7.8 K/UL (ref 1.8–8)
NEUTS SEG NFR BLD: 67 % (ref 32–75)
NRBC # BLD: 0 K/UL (ref 0–0.01)
NRBC BLD-RTO: 0 PER 100 WBC
PLATELET # BLD AUTO: 345 K/UL (ref 150–400)
PMV BLD AUTO: 9.3 FL (ref 8.9–12.9)
POTASSIUM SERPL-SCNC: 4.2 MMOL/L (ref 3.5–5.1)
PROT SERPL-MCNC: 6 G/DL (ref 6.4–8.2)
RBC # BLD AUTO: 2.79 M/UL (ref 3.8–5.2)
SODIUM SERPL-SCNC: 139 MMOL/L (ref 136–145)
WBC # BLD AUTO: 11.6 K/UL (ref 3.6–11)

## 2019-08-11 PROCEDURE — 83735 ASSAY OF MAGNESIUM: CPT

## 2019-08-11 PROCEDURE — 85025 COMPLETE CBC W/AUTO DIFF WBC: CPT

## 2019-08-11 PROCEDURE — 36415 COLL VENOUS BLD VENIPUNCTURE: CPT

## 2019-08-11 PROCEDURE — 74011250637 HC RX REV CODE- 250/637: Performed by: INTERNAL MEDICINE

## 2019-08-11 PROCEDURE — 74011250637 HC RX REV CODE- 250/637: Performed by: FAMILY MEDICINE

## 2019-08-11 PROCEDURE — 80053 COMPREHEN METABOLIC PANEL: CPT

## 2019-08-11 PROCEDURE — 74011250636 HC RX REV CODE- 250/636: Performed by: FAMILY MEDICINE

## 2019-08-11 RX ORDER — CHLORTHALIDONE 25 MG/1
25 TABLET ORAL DAILY
Status: DISCONTINUED | OUTPATIENT
Start: 2019-08-11 | End: 2019-08-11 | Stop reason: HOSPADM

## 2019-08-11 RX ORDER — AMLODIPINE BESYLATE 5 MG/1
5 TABLET ORAL 2 TIMES DAILY
Qty: 60 TAB | Refills: 0 | Status: SHIPPED
Start: 2019-08-11 | End: 2019-10-19

## 2019-08-11 RX ORDER — CHOLESTYRAMINE 4 G/4.8G
4 POWDER, FOR SUSPENSION ORAL
Qty: 1 EACH | Refills: 0 | Status: SHIPPED
Start: 2019-08-11 | End: 2019-10-19

## 2019-08-11 RX ORDER — BUDESONIDE 3 MG/1
9 CAPSULE, COATED PELLETS ORAL DAILY
Qty: 30 CAP | Refills: 0 | Status: SHIPPED | OUTPATIENT
Start: 2019-08-11 | End: 2021-01-20

## 2019-08-11 RX ORDER — LOSARTAN POTASSIUM 100 MG/1
100 TABLET ORAL DAILY
Qty: 30 TAB | Refills: 0 | Status: SHIPPED
Start: 2019-08-11 | End: 2019-12-31

## 2019-08-11 RX ORDER — HYDRALAZINE HYDROCHLORIDE 10 MG/1
10 TABLET, FILM COATED ORAL
Qty: 12 TAB | Refills: 0 | Status: SHIPPED | OUTPATIENT
Start: 2019-08-11 | End: 2021-01-20

## 2019-08-11 RX ADMIN — METOPROLOL TARTRATE 50 MG: 50 TABLET ORAL at 09:00

## 2019-08-11 RX ADMIN — Medication 1 CAPSULE: at 09:00

## 2019-08-11 RX ADMIN — MENTHOL, CAMPHOR: 1.11; 1.11 LOTION TOPICAL at 09:02

## 2019-08-11 RX ADMIN — CHLORTHALIDONE 25 MG: 25 TABLET ORAL at 09:00

## 2019-08-11 RX ADMIN — DOXAZOSIN 1 MG: 1 TABLET ORAL at 09:00

## 2019-08-11 RX ADMIN — BUDESONIDE 9 MG: 3 CAPSULE, GELATIN COATED ORAL at 09:00

## 2019-08-11 RX ADMIN — ENOXAPARIN SODIUM 40 MG: 40 INJECTION SUBCUTANEOUS at 14:13

## 2019-08-11 RX ADMIN — AMLODIPINE BESYLATE 5 MG: 5 TABLET ORAL at 09:00

## 2019-08-11 RX ADMIN — LOSARTAN POTASSIUM 100 MG: 50 TABLET, FILM COATED ORAL at 09:00

## 2019-08-11 RX ADMIN — ASPIRIN 81 MG 81 MG: 81 TABLET ORAL at 09:00

## 2019-08-11 RX ADMIN — CHOLESTYRAMINE 4 G: 4 POWDER, FOR SUSPENSION ORAL at 14:13

## 2019-08-11 RX ADMIN — PSYLLIUM HUSK 1 PACKET: 3.4 POWDER ORAL at 08:59

## 2019-08-11 NOTE — PROGRESS NOTES
I have reviewed discharge instructions with the patient, spouse and caregiver. The patient, spouse and caregiver verbalized understanding. Prescriptions given to patient. Opportunities for questions/clarification provided.

## 2019-08-11 NOTE — DISCHARGE SUMMARY
Physician Discharge Summary     Patient ID:    Nate James  [id number]  80 y.o.  5/7/1931  Arley Barrett MD    Admit date: 8/3/2019    Discharge date and time: 8/11/2019    Admission Diagnoses: UTI (urinary tract infection) [N39.0]    Chronic Diagnoses:    Problem List as of 8/11/2019 Date Reviewed: 11/4/2018          Codes Class Noted - Resolved    UTI (urinary tract infection) ICD-10-CM: N39.0  ICD-9-CM: 599.0  8/3/2019 - Present        Sepsis ([facility] ICD-10-CM: A41.9  ICD-9-CM: 038.9, 995.91  8/3/2019 - Present        Fever ICD-10-CM: R50.9  ICD-9-CM: 780.60  8/3/2019 - Present        Left shift ICD-10-CM: D72.89  ICD-9-CM: 288.9  8/3/2019 - Present        Syncope ICD-10-CM: R55  ICD-9-CM: 780.2  8/3/2019 - Present        Dehydration ICD-10-CM: E86.0  ICD-9-CM: 276.51  8/3/2019 - Present        Hyponatremia ICD-10-CM: E87.1  ICD-9-CM: 276.1  8/3/2019 - Present        Hypokalemia ICD-10-CM: E87.6  ICD-9-CM: 276.8  8/3/2019 - Present        Anemia ICD-10-CM: D64.9  ICD-9-CM: 285.9  8/3/2019 - Present        Macular degeneration ICD-10-CM: H35.30  ICD-9-CM: 362.50  10/30/2013 - Present        Carotid arterial disease ([facility] ICD-10-CM: I77.9  ICD-9-CM: 447.9  Unknown - Present    Overview Signed 10/30/2013 10:11 AM by Roxana Kelley MD     mild 0-49%             Coronary atherosclerosis of native coronary artery ICD-10-CM: I25.10  ICD-9-CM: 414.01  Unknown - Present    Overview Signed 2/22/2012 10:44 AM by Roxana Kelley MD     Cath 2011 with multivessel cad             S/P CABG (coronary artery bypass graft) ICD-10-CM: Z95.1  ICD-9-CM: V45.81  Unknown - Present    Overview Signed 2/22/2012 10:44 AM by Roxana Kelley MD     3-86-68 CABG - OFF PUMP - CABGx 3, lima, eric, epi aortic ultrasound - off pump, rightt endoscopic vein harvest; [id number] [facility] [address] to LAD, Svg Ao to OM1 and OM2             S/P CABG x 3 ICD-10-CM: Z95.1  ICD-9-CM: V45.81  6/17/2011 - Present    Overview Signed 7/26/2011 10:51 AM by Rizwana TEAGUE to LAD and SVGs to OM-1 and OM-2. RCA small non-dominant diffusely diseased vessel ungrafted. LV EF at cath was 65%. Essential hypertension, benign ICD-10-CM: I10  ICD-9-CM: 401.1  12/2/2010 - Present        Pure hypercholesterolemia ICD-10-CM: E78.00  ICD-9-CM: 272.0  12/2/2010 - Present        RESOLVED: Cough due to ACE inhibitor ICD-10-CM: R05, T46.4X5A  ICD-9-CM: 786.2, E942.6  Unknown - 8/3/2019        RESOLVED: Edema ICD-10-CM: R60.9  ICD-9-CM: 782.3  6/3/2012 - 8/3/2019        RESOLVED: Medication adverse effect ICD-10-CM: T50.905A  ICD-9-CM: E947.9  6/3/2012 - 8/3/2019        RESOLVED: Diverticulitis ICD-10-CM: P88.56  ICD-9-CM: 562.11  Unknown - 8/3/2019        RESOLVED: Pre-operative cardiovascular examination, unstable angina ([facility] ICD-10-CM: Z01.810, I20.0  ICD-9-CM: 411.1, V72.81  6/19/2011 - 6/19/2011        RESOLVED: Diverticulosis of colon with hemorrhage ICD-10-CM: K57.31  ICD-9-CM: 562.12  12/2/2010 - 8/3/2019              Discharge Medications:   Current Discharge Medication List      START taking these medications    Details   budesonide (ENTOCORT EC) 3 mg capsule Take 3 Caps by mouth daily. Qty: 30 Cap, Refills: 0      camphor-menthol (SARNA) 0.5-0.5 % lotion Apply  to affected area two (2) times a day. Qty: 222 mL, Refills: 0      cholestyramine-aspartame (QUESTRAN LIGHT) 4 gram packet Take 1 Packet by mouth daily (with lunch). Qty: 1 Each, Refills: 0      psyllium husk-aspartame (METAMUCIL FIBER) 3.4 gram pwpk packet Take 1 Packet by mouth two (2) times a day. Qty: 1 Packet, Refills: 0      L. acidoph & paracasei- S therm- Bifido (GERALDINE-Q/RISAQUAD) 8 billion cell cap cap Take 1 Cap by mouth daily. Qty: 30 Cap, Refills: 0      hydrALAZINE (APRESOLINE) 10 mg tablet Take 1 Tab by mouth three (3) times daily as needed for Other (Systolic BP >187).   Qty: 12 Tab, Refills: 0         CONTINUE these medications which have CHANGED Details   amLODIPine (NORVASC) 5 mg tablet Take 1 Tab by mouth two (2) times a day. Qty: 60 Tab, Refills: 0      losartan (COZAAR) 100 mg tablet Take 1 Tab by mouth daily. Qty: 30 Tab, Refills: 0         CONTINUE these medications which have NOT CHANGED    Details   atorvastatin (LIPITOR) 20 mg tablet TAKE ONE TABLET BY MOUTH ONCE NIGHTLY  Qty: 90 Tab, Refills: 1      metoprolol tartrate (LOPRESSOR) 50 mg tablet TAKE ONE TABLET BY MOUTH TWICE A DAY  Qty: 180 Tab, Refills: 3      chlorthalidone (HYGROTEN) 25 mg tablet Take 25 mg by mouth daily. aspirin 81 mg chewable tablet Take 81 mg by mouth daily. Associated Diagnoses: Atherosclerosis of native coronary artery without angina pectoris; S/P CABG x 3      doxazosin (CARDURA) 1 mg tablet Take  by mouth daily. naproxen (NAPROSYN) 250 mg tablet Take  by mouth two (2) times daily as needed. STOP taking these medications       diphenoxylate-atropine (LOMOTIL) 2.5-0.025 mg per tablet Comments:   Reason for Stopping: Follow up Care:    1. Crista Verma MD in 3-5 days    Diet:  Low residue, low salt, limit caffeine    Disposition:  Home. Advanced Directive:    Discharge Exam:  [See today's progress note.]    CONSULTATIONS: GI    Significant Diagnostic Studies:   Recent Labs     08/11/19  0534 08/10/19  0530   WBC 11.6* 10.2   HGB 8.2* 8.4*   HCT 25.0* 25.6*    333     Recent Labs     08/11/19  0534 08/10/19  0459    140   K 4.2 4.6   * 113*   CO2 22 20*   BUN 6 4*   CREA 0.76 0.67   * 99   CA 8.2* 8.3*   MG 1.7 1.8     Recent Labs     08/11/19  0534 08/10/19  0459   SGOT 14* 18    129*   TP 6.0* 6.5   ALB 2.4* 2.6*   GLOB 3.6 3.9     No results for input(s): INR, PTP, APTT in the last 72 hours. No lab exists for component: INREXT   No results for input(s): FE, TIBC, PSAT, FERR in the last 72 hours. No results for input(s): PH, PCO2, PO2 in the last 72 hours.   No results for input(s): CPK, CKMB in the last 72 hours. No lab exists for component: TROPONINI  No components found for: [place]  Lab Results   Component Value Date/Time    TSH 0.33 (L) 08/03/2019 07:10 AM       HOSPITAL COURSE & TREATMENT RENDERED:   1. Pt was admitted with presyncope after extended period of chronic diarrhea that was unable to be managed outpt. Stool studies were neg for infectious process, but she was dx and treated for UTI with one positive blood cx. She completed rocephin course. GI saw pt, increased questran, started budesonide, probiotics, scheduled lomotil and metamucil. Eventually, pts stools firmed up and the Donnel Braver was weaned back to her home once daily regimen. She was able to limit the lomotil to PRN. Due to infection and diarrhea, her diuretics were held while on IVF. BPs were quite elevated during admission, and her home losartan and norvasc were doubled. When possible, her chlorthalidone was restarted and her BPs came down. Pt and her family were concerned about the possibility of continued spikes in BP at home, and felt more comfortable having some PRN hydralazine on hand to use. Pt was stable at the time of discharge. Please see today's progress note for further details.       Signed:  Sky Cobian MD  8/11/2019  8:46 AM

## 2019-08-11 NOTE — PROGRESS NOTES
Daily Progress and Discharge Note: 8/11/2019  Radha Manning MD      Assessment/Plan:   UTI (urinary tract infection) - POA, based on UA. --bacteremia -- prelim blood cultures positive. Repeat blood cultures neg so far. --urine culture with E Coli- sens to Rocephin  --Zosyn initially, switched to Rocephin 8/6- completed.     Sepsis / Fever / Left shift  - POA,  due to UTI. NOT severe. Lactate normal.  WBC stable. --Abx as above.       Syncope / Dehydration / Hyponatremia / Hypokalemia - POA due to GI loss, poor PO intake and UTI. --resolved    HTN: remains elevated  -- Increased Losartan 8/9  --increased norvasc to BID 8/10  --restart chlorthalidone  --da insistent that we send her home with some PRN hydralazine just in case    Anemia - iron def, stool heme +, hx hemorrhoids, Hgb stable  - watch    Diarrhea   -- Cdiff neg   -- anti-diarrheals and Questran weaned to daily  -- stool studies negative for infect organisms so far  -- Budesonide added 8/8     Coronary atherosclerosis of native coronary artery S/P CABG x 3 / Essential hypertension, benign - POA, likely stable. Echo stable  --Troponin minimal bump    Hypomagnesemia: resolved  -- monitor and replace as needed     Pure hypercholesterolemia - continue atorvastatin     Macular degeneration - Supportive care.     Heat rash on back  --sarna lotion    DVT proph - lovenox    --home today with family       Problem List:  Problem List as of 8/11/2019 Date Reviewed: 11/4/2018          Codes Class Noted - Resolved    UTI (urinary tract infection) ICD-10-CM: N39.0  ICD-9-CM: 599.0  8/3/2019 - Present        Sepsis ([address] ICD-10-CM: A41.9  ICD-9-CM: 038.9, 995.91  8/3/2019 - Present        Fever ICD-10-CM: R50.9  ICD-9-CM: 780.60  8/3/2019 - Present        Left shift ICD-10-CM: D72.89  ICD-9-CM: 288.9  8/3/2019 - Present        Syncope ICD-10-CM: R55  ICD-9-CM: 780.2  8/3/2019 - Present        Dehydration ICD-10-CM: E86.0  ICD-9-CM: 276.51  8/3/2019 - Present        Hyponatremia ICD-10-CM: E87.1  ICD-9-CM: 276.1  8/3/2019 - Present        Hypokalemia ICD-10-CM: E87.6  ICD-9-CM: 276.8  8/3/2019 - Present        Anemia ICD-10-CM: D64.9  ICD-9-CM: 285.9  8/3/2019 - Present        Macular degeneration ICD-10-CM: H35.30  ICD-9-CM: 362.50  10/30/2013 - Present        Carotid arterial disease ([facility]) ICD-10-CM: I77.9  ICD-9-CM: 447.9  Unknown - Present    Overview Signed 10/30/2013 10:11 AM by Linda Wooten MD     mild 0-49%             Coronary atherosclerosis of native coronary artery ICD-10-CM: I25.10  ICD-9-CM: 414.01  Unknown - Present    Overview Signed 2/22/2012 10:44 AM by Linda Wooten MD     Cath 2011 with multivessel cad             S/P CABG (coronary artery bypass graft) ICD-10-CM: Z95.1  ICD-9-CM: V45.81  Unknown - Present    Overview Signed 2/22/2012 10:44 AM by Linda Wooten MD     8-66-82 CABG - OFF PUMP - CABGx 3, lima, eric, epi aortic ultrasound - off pump, rightt endoscopic vein harvest; [id number] [place] [address] Dr to LAD, Svg Ao to OM1 and OM2             S/P CABG x 3 ICD-10-CM: Z95.1  ICD-9-CM: V45.81  6/17/2011 - Present    Overview Signed 7/26/2011 10:51 AM by Victoria TEAGUE to LAD and SVGs to OM-1 and OM-2. RCA small non-dominant diffusely diseased vessel ungrafted. LV EF at cath was 65%.              Essential hypertension, benign ICD-10-CM: I10  ICD-9-CM: 401.1  12/2/2010 - Present        Pure hypercholesterolemia ICD-10-CM: E78.00  ICD-9-CM: 272.0  12/2/2010 - Present        RESOLVED: Cough due to ACE inhibitor ICD-10-CM: R05, T46.4X5A  ICD-9-CM: 786.2, E942.6  Unknown - 8/3/2019        RESOLVED: Edema ICD-10-CM: R60.9  ICD-9-CM: 782.3  6/3/2012 - 8/3/2019        RESOLVED: Medication adverse effect ICD-10-CM: T50.905A  ICD-9-CM: E947.9  6/3/2012 - 8/3/2019        RESOLVED: Diverticulitis ICD-10-CM: I78.48  ICD-9-CM: 562.11  Unknown - 8/3/2019        RESOLVED: Pre-operative cardiovascular examination, unstable angina ([facility] ICD-10-CM: Z01.810, I20.0  ICD-9-CM: 411.1, V72.81  6/19/2011 - 6/19/2011        RESOLVED: Diverticulosis of colon with hemorrhage ICD-10-CM: K57.31  ICD-9-CM: 562.12  12/2/2010 - 8/3/2019              Subjective:   H&P: Ms. Ghazala Jones is a 80 y.o.  female who presented to the Emergency Department complaining of pre syncope. Occurred this AM. Occurred after BM. Associated with period of weakness, poor alertness and confusion. Patient with acute and hx chronic diarrhea, for 3 weeks lately. Failed improve on imodium and cholestyramine. In our ER she has a fever, meets sepsis criteria and UA suggests UTI. We will admit her for management. 8/4:  Lots of diarrhea overnight. She has no abd. Pain or CP/SOB/dizziness. She feels weak. Some vaginal irritation from diarrhea so some dysuria at times. Discussed with daughter and  at bedside. 8/5:  Pt has no complaints. Less diarrhea with the imodium - 3 loose stools since yesterday. GI considering colonoscopy. 8/6:  No complaints. Starting to have fewer loose stools on Questran and Imodium. Currently hold on colonoscopy since fewer stools. AM labs pending. E Coli on urine culture - switching to Rocephin. 8/7: Less diarrhea- 3 stools during the night. Feeling a little better. Will change po Mag to IV in case this is worsening diarrhea. BC neg at 3 days. UC E-coli sensitive to Rocephin- this will be day 5 of antibiotic. Decrease IVF.     8/8: Still having continuous stools. 10 in the last day. Replete mag IV. Afebrile. 8/9: AM labs pending. Had a better night. Last stool at 12:30am. BP up so will increase Losartan this am.  Added Budesonide yesterday. Will continue Rocephin for a few more days as BC +.     8/10: Labs stable, stooling has improved. Da and pt note that the Georgeanne Millers is very chalky and difficult to take. Want to wean it.  Also very insistent that the rocephin be completed today as it is day #7 and likely making her bowels worse. Pt notes itchy rash on upper back. Wants to walk more in the halls. Also notes dry nasal mucosa and whistle-like breathing. BPs have been elevated, losartan increased yesterday. Responded to IV hydralazine this AM, hasn't had her PO meds yet today. : BPs remain on the high end despite adjustments in home regimen. Kept up with PO intake of fluids yesterday despite IVF off. Da and pt are still quite concerned about pt's elevated BPs. We have been holding her chlorthalidone- and are ready to restart this. Tolerating PO.  3 soft BMs yesterday, but nothing since 3PM last night. Da is now concerned and wondering what to do if she gets constipated. Wondering what of the current bowel regimen meds she should keep. Also very vigilant about what to do with her BP readings and meds at home, but they are very eager to go home today. Itching is better with sarna lotion. Review of Systems:   A comprehensive review of systems was negative except for that written in the HPI. Objective:   Physical Exam:     Visit Vitals  /59 (BP 1 Location: Left arm, BP Patient Position: Sitting)   Pulse 76   Temp 98.3 °F (36.8 °C)   Resp 15   Ht 5' 4\" (1.626 m)   Wt 63.5 kg (140 lb)   SpO2 96%   BMI 24.03 kg/m²      O2 Device: Room air    Temp (24hrs), Av.2 °F (36.8 °C), Min:97.9 °F (36.6 °C), Max:98.7 °F (37.1 °C)    No intake/output data recorded. No intake/output data recorded. General:  Alert, cooperative, no distress, appears stated age. Sitting up on edge of bed in home night gown. Head:  Normocephalic, without obvious abnormality, atraumatic. Eyes:  Conjunctivae/corneas clear. PERRL, EOMs intact. Nose: Nares normal. Septum midline.    Throat: Lips, mucosa, and tongue moist.   Neck: Supple, symmetrical, trachea midline, no adenopathy   Lungs:   Clear, symmetric, nonlabored   Heart:  Regular rate and rhythm, 2/6 systolic murmur Abdomen:   Soft, non-tender. Bowel sounds normal. No masses,  No organomegaly. Extremities: Extremities normal, atraumatic, no cyanosis or edema. No calf tenderness or cords. Pulses: 2+ and symmetric all extremities. Skin: Skin color, texture, turgor normal. Erythematous papules on upper back     Neurologic:   Alert and oriented X 3. Moves all extrem to command. Data Review:       Recent Days:  Recent Labs     08/11/19  0534 08/10/19  0530   WBC 11.6* 10.2   HGB 8.2* 8.4*   HCT 25.0* 25.6*    333     Recent Labs     08/11/19  0534 08/10/19  0459    140   K 4.2 4.6   * 113*   CO2 22 20*   * 99   BUN 6 4*   CREA 0.76 0.67   CA 8.2* 8.3*   MG 1.7 1.8   ALB 2.4* 2.6*   SGOT 14* 18   ALT 15 18     No results for input(s): PH, PCO2, PO2, HCO3, FIO2 in the last 72 hours. 24 Hour Results:  Recent Results (from the past 24 hour(s))   CBC WITH AUTOMATED DIFF    Collection Time: 08/11/19  5:34 AM   Result Value Ref Range    WBC 11.6 (H) 3.6 - 11.0 K/uL    RBC 2.79 (L) 3.80 - 5.20 M/uL    HGB 8.2 (L) 11.5 - 16.0 g/dL    HCT 25.0 (L) 35.0 - 47.0 %    MCV 89.6 80.0 - 99.0 FL    MCH 29.4 26.0 - 34.0 PG    MCHC 32.8 30.0 - 36.5 g/dL    RDW 13.8 11.5 - 14.5 %    PLATELET 514 594 - 199 K/uL    MPV 9.3 8.9 - 12.9 FL    NRBC 0.0 0  WBC    ABSOLUTE NRBC 0.00 0.00 - 0.01 K/uL    NEUTROPHILS 67 32 - 75 %    LYMPHOCYTES 21 12 - 49 %    MONOCYTES 7 5 - 13 %    EOSINOPHILS 2 0 - 7 %    BASOPHILS 1 0 - 1 %    IMMATURE GRANULOCYTES 2 (H) 0.0 - 0.5 %    ABS. NEUTROPHILS 7.8 1.8 - 8.0 K/UL    ABS. LYMPHOCYTES 2.5 0.8 - 3.5 K/UL    ABS. MONOCYTES 0.8 0.0 - 1.0 K/UL    ABS. EOSINOPHILS 0.2 0.0 - 0.4 K/UL    ABS. BASOPHILS 0.1 0.0 - 0.1 K/UL    ABS. IMM.  GRANS. 0.2 (H) 0.00 - 0.04 K/UL    DF AUTOMATED     METABOLIC PANEL, COMPREHENSIVE    Collection Time: 08/11/19  5:34 AM   Result Value Ref Range    Sodium 139 136 - 145 mmol/L    Potassium 4.2 3.5 - 5.1 mmol/L    Chloride 112 (H) 97 - 108 mmol/L CO2 22 21 - 32 mmol/L    Anion gap 5 5 - 15 mmol/L    Glucose 104 (H) 65 - 100 mg/dL    BUN 6 6 - 20 MG/DL    Creatinine 0.76 0.55 - 1.02 MG/DL    BUN/Creatinine ratio 8 (L) 12 - 20      GFR est AA >60 >60 ml/min/1.73m2    GFR est non-AA >60 >60 ml/min/1.73m2    Calcium 8.2 (L) 8.5 - 10.1 MG/DL    Bilirubin, total 0.4 0.2 - 1.0 MG/DL    ALT (SGPT) 15 12 - 78 U/L    AST (SGOT) 14 (L) 15 - 37 U/L    Alk.  phosphatase 114 45 - 117 U/L    Protein, total 6.0 (L) 6.4 - 8.2 g/dL    Albumin 2.4 (L) 3.5 - 5.0 g/dL    Globulin 3.6 2.0 - 4.0 g/dL    A-G Ratio 0.7 (L) 1.1 - 2.2     MAGNESIUM    Collection Time: 08/11/19  5:34 AM   Result Value Ref Range    Magnesium 1.7 1.6 - 2.4 mg/dL       Medications reviewed  Current Facility-Administered Medications   Medication Dose Route Frequency    camphor-menthol (SARNA) 0.5-0.5 % lotion   Topical BID    cholestyramine-aspartame (QUESTRAN LIGHT) packet 4 g  4 g Oral DAILY WITH LUNCH    amLODIPine (NORVASC) tablet 5 mg  5 mg Oral BID    losartan (COZAAR) tablet 100 mg  100 mg Oral DAILY    budesonide (ENTOCORT EC) capsule 9 mg  9 mg Oral DAILY    hydrALAZINE (APRESOLINE) 20 mg/mL injection 10 mg  10 mg IntraVENous Q6H PRN    enoxaparin (LOVENOX) injection 40 mg  40 mg SubCUTAneous Q24H    phenyleph-min oil-petrolatum (PREPARATION H) 0.25-14-74.9 % ointment   PeriANAL QID PRN    lactobac ac& pc-s.therm-b.anim (GERALDINE Q/RISAQUAD)  1 Cap Oral DAILY    psyllium husk-aspartame (METAMUCIL FIBER) packet 1 Packet  1 Packet Oral BID    diphenoxylate-atropine (LOMOTIL) tablet 1 Tab  1 Tab Oral QID PRN    sodium chloride (NS) flush 5-10 mL  5-10 mL IntraVENous PRN    aspirin chewable tablet 81 mg  81 mg Oral DAILY    atorvastatin (LIPITOR) tablet 20 mg  20 mg Oral QHS    doxazosin (CARDURA) tablet 1 mg  1 mg Oral DAILY    acetaminophen (TYLENOL) tablet 650 mg  650 mg Oral Q4H PRN    diphenhydrAMINE (BENADRYL) capsule 25 mg  25 mg Oral Q4H PRN    ondansetron (ZOFRAN) injection 4 mg  4 mg IntraVENous Q4H PRN    metoprolol tartrate (LOPRESSOR) tablet 50 mg  50 mg Oral BID       Care Plan discussed with: Patient/Family/Nuremanuel/ Dr Mic Fall    Total time spent with patient: 30 minutes.     Latisha Mi MD

## 2019-08-11 NOTE — PROGRESS NOTES
Problem: Falls - Risk of  Goal: *Absence of Falls  Description  Document Yary Miller Fall Risk and appropriate interventions in the flowsheet.   Outcome: Progressing Towards Goal  Note:   Fall Risk Interventions:  Mobility Interventions: Patient to call before getting OOB, Bed/chair exit alarm, Communicate number of staff needed for ambulation/transfer         Medication Interventions: Bed/chair exit alarm, Evaluate medications/consider consulting pharmacy, Patient to call before getting OOB, Teach patient to arise slowly    Elimination Interventions: Call light in reach, Elevated toilet seat, Patient to call for help with toileting needs, Stay With Me (per policy), Bed/chair exit alarm    History of Falls Interventions: Bed/chair exit alarm, Consult care management for discharge planning         Problem: Patient Education: Go to Patient Education Activity  Goal: Patient/Family Education  Outcome: Progressing Towards Goal

## 2019-08-11 NOTE — DISCHARGE INSTRUCTIONS
Patient Discharge Instructions    Anshu Berry / [id number] : 1931    Admitted 8/3/2019 Discharged: 2019 8:45 AM     ACUTE DIAGNOSES:  UTI (urinary tract infection) [N39.0]    CHRONIC MEDICAL DIAGNOSES:  Problem List as of 2019 Date Reviewed: 2018          Codes Class Noted - Resolved    UTI (urinary tract infection) ICD-10-CM: N39.0  ICD-9-CM: 599.0  8/3/2019 - Present        Sepsis ([facility] ICD-10-CM: A41.9  ICD-9-CM: 038.9, 995.91  8/3/2019 - Present        Fever ICD-10-CM: R50.9  ICD-9-CM: 780.60  8/3/2019 - Present        Left shift ICD-10-CM: D72.89  ICD-9-CM: 288.9  8/3/2019 - Present        Syncope ICD-10-CM: R55  ICD-9-CM: 780.2  8/3/2019 - Present        Dehydration ICD-10-CM: E86.0  ICD-9-CM: 276.51  8/3/2019 - Present        Hyponatremia ICD-10-CM: E87.1  ICD-9-CM: 276.1  8/3/2019 - Present        Hypokalemia ICD-10-CM: E87.6  ICD-9-CM: 276.8  8/3/2019 - Present        Anemia ICD-10-CM: D64.9  ICD-9-CM: 285.9  8/3/2019 - Present        Macular degeneration ICD-10-CM: H35.30  ICD-9-CM: 362.50  10/30/2013 - Present        Carotid arterial disease ([facility] ICD-10-CM: I77.9  ICD-9-CM: 447.9  Unknown - Present    Overview Signed 10/30/2013 10:11 AM by Rosetta Diego MD     mild 0-49%             Coronary atherosclerosis of native coronary artery ICD-10-CM: I25.10  ICD-9-CM: 414.01  Unknown - Present    Overview Signed 2012 10:44 AM by Rosetta Diego MD     Cath  with multivessel cad             S/P CABG (coronary artery bypass graft) ICD-10-CM: Z95.1  ICD-9-CM: V45.81  Unknown - Present    Overview Signed 2012 10:44 AM by Rosetta Diego MD     1-96-87 CABG - OFF PUMP - CABGx 3, lima, eric, epi aortic ultrasound - off pump, rightt endoscopic vein harvest; [id number] [facility] [address] to LAD, Svg Ao to OM1 and OM2             S/P CABG x 3 ICD-10-CM: Z95.1  ICD-9-CM: V45.81  2011 - Present    Overview Signed 2011 10:51 AM by Michael TEAGUE to LAD and SVGs to OM-1 and OM-2. RCA small non-dominant diffusely diseased vessel ungrafted. LV EF at cath was 65%. Essential hypertension, benign ICD-10-CM: I10  ICD-9-CM: 401.1  12/2/2010 - Present        Pure hypercholesterolemia ICD-10-CM: E78.00  ICD-9-CM: 272.0  12/2/2010 - Present        RESOLVED: Cough due to ACE inhibitor ICD-10-CM: R05, T46.4X5A  ICD-9-CM: 786.2, E942.6  Unknown - 8/3/2019        RESOLVED: Edema ICD-10-CM: R60.9  ICD-9-CM: 782.3  6/3/2012 - 8/3/2019        RESOLVED: Medication adverse effect ICD-10-CM: T50.905A  ICD-9-CM: E947.9  6/3/2012 - 8/3/2019        RESOLVED: Diverticulitis ICD-10-CM: C41.34  ICD-9-CM: 562.11  Unknown - 8/3/2019        RESOLVED: Pre-operative cardiovascular examination, unstable angina ([facility] ICD-10-CM: Z01.810, I20.0  ICD-9-CM: 411.1, V72.81  6/19/2011 - 6/19/2011        RESOLVED: Diverticulosis of colon with hemorrhage ICD-10-CM: K57.31  ICD-9-CM: 562.12  12/2/2010 - 8/3/2019              DISCHARGE MEDICATIONS:   · It is important that you take the medication exactly as they are prescribed. · Keep your medication in the bottles provided by the pharmacist and keep a list of the medication names, dosages, and times to be taken in your wallet. · Do not take other medications without consulting your doctor. DIET:  Low fiber, low salt, limit caffeine    ACTIVITY: Activity as tolerated    ADDITIONAL INFORMATION: If you experience any of the following symptoms then please call your primary care physician or return to the emergency room if you cannot get hold of your doctor: Fever, chills, nausea, vomiting, diarrhea, change in mentation, falling, bleeding, shortness of breath. FOLLOW UP CARE:  Dr. Crista Verma MD  you are to call and set up an appointment to see them in 3-5 days    Information obtained by :  I understand that if any problems occur once I am at home I am to contact my physician.     I understand and acknowledge receipt of the instructions indicated above.                                                                                                                                            Physician's or R.N.'s Signature                                                                  Date/Time                                                                                                                                              Patient or Representative Signature                                                          Date/Time

## 2019-10-11 DIAGNOSIS — E78.00 PURE HYPERCHOLESTEROLEMIA: Primary | ICD-10-CM

## 2019-10-14 RX ORDER — ATORVASTATIN CALCIUM 20 MG/1
20 TABLET, FILM COATED ORAL
Qty: 90 TAB | Refills: 3 | Status: SHIPPED | OUTPATIENT
Start: 2019-10-14 | End: 2020-10-21

## 2019-10-14 NOTE — TELEPHONE ENCOUNTER
Request for atorvastatin 20mg QHS. Last office visit 10-31-18, next office visit 11-20-19.  Refills per verbal order from Dr. Martell Buerger.

## 2019-10-17 ENCOUNTER — APPOINTMENT (OUTPATIENT)
Dept: CT IMAGING | Age: 84
DRG: 640 | End: 2019-10-17
Attending: EMERGENCY MEDICINE
Payer: MEDICARE

## 2019-10-17 ENCOUNTER — HOSPITAL ENCOUNTER (EMERGENCY)
Age: 84
Discharge: HOME | DRG: 640 | End: 2019-10-17
Attending: EMERGENCY MEDICINE
Payer: MEDICARE

## 2019-10-17 VITALS
DIASTOLIC BLOOD PRESSURE: 64 MMHG | BODY MASS INDEX: 22.16 KG/M2 | OXYGEN SATURATION: 98 % | SYSTOLIC BLOOD PRESSURE: 148 MMHG | RESPIRATION RATE: 13 BRPM | HEART RATE: 70 BPM | TEMPERATURE: 97.7 F | WEIGHT: 133 LBS | HEIGHT: 65 IN

## 2019-10-17 DIAGNOSIS — K57.90 DIVERTICULOSIS: ICD-10-CM

## 2019-10-17 DIAGNOSIS — E86.0 DEHYDRATION: ICD-10-CM

## 2019-10-17 DIAGNOSIS — K80.20 GALLSTONES: ICD-10-CM

## 2019-10-17 DIAGNOSIS — R19.7 DIARRHEA, UNSPECIFIED TYPE: ICD-10-CM

## 2019-10-17 DIAGNOSIS — N39.0 URINARY TRACT INFECTION WITHOUT HEMATURIA, SITE UNSPECIFIED: Primary | ICD-10-CM

## 2019-10-17 LAB
ALBUMIN SERPL-MCNC: 3.1 G/DL (ref 3.5–5)
ALBUMIN/GLOB SERPL: 0.8 {RATIO} (ref 1.1–2.2)
ALP SERPL-CCNC: 104 U/L (ref 45–117)
ALT SERPL-CCNC: 21 U/L (ref 12–78)
AMYLASE SERPL-CCNC: 41 U/L (ref 25–115)
ANION GAP SERPL CALC-SCNC: 8 MMOL/L (ref 5–15)
APPEARANCE UR: CLEAR
AST SERPL-CCNC: 18 U/L (ref 15–37)
BACTERIA URNS QL MICRO: ABNORMAL /HPF
BASOPHILS # BLD: 0 K/UL (ref 0–0.1)
BASOPHILS NFR BLD: 0 % (ref 0–1)
BILIRUB SERPL-MCNC: 0.4 MG/DL (ref 0.2–1)
BILIRUB UR QL: NEGATIVE
BUN SERPL-MCNC: 9 MG/DL (ref 6–20)
BUN/CREAT SERPL: 13 (ref 12–20)
CALCIUM SERPL-MCNC: 9.1 MG/DL (ref 8.5–10.1)
CHLORIDE SERPL-SCNC: 93 MMOL/L (ref 97–108)
CO2 SERPL-SCNC: 23 MMOL/L (ref 21–32)
COLOR UR: ABNORMAL
COMMENT, HOLDF: NORMAL
CREAT SERPL-MCNC: 0.72 MG/DL (ref 0.55–1.02)
CRP SERPL-MCNC: 3.04 MG/DL (ref 0–0.6)
DIFFERENTIAL METHOD BLD: ABNORMAL
EOSINOPHIL # BLD: 0.1 K/UL (ref 0–0.4)
EOSINOPHIL NFR BLD: 1 % (ref 0–7)
EPITH CASTS URNS QL MICRO: ABNORMAL /LPF
ERYTHROCYTE [DISTWIDTH] IN BLOOD BY AUTOMATED COUNT: 13.6 % (ref 11.5–14.5)
GLOBULIN SER CALC-MCNC: 4.1 G/DL (ref 2–4)
GLUCOSE SERPL-MCNC: 130 MG/DL (ref 65–100)
GLUCOSE UR STRIP.AUTO-MCNC: NEGATIVE MG/DL
HCT VFR BLD AUTO: 29.5 % (ref 35–47)
HGB BLD-MCNC: 10.3 G/DL (ref 11.5–16)
HGB UR QL STRIP: ABNORMAL
IMM GRANULOCYTES # BLD AUTO: 0.1 K/UL (ref 0–0.04)
IMM GRANULOCYTES NFR BLD AUTO: 1 % (ref 0–0.5)
KETONES UR QL STRIP.AUTO: NEGATIVE MG/DL
LEUKOCYTE ESTERASE UR QL STRIP.AUTO: ABNORMAL
LIPASE SERPL-CCNC: 47 U/L (ref 73–393)
LYMPHOCYTES # BLD: 1.6 K/UL (ref 0.8–3.5)
LYMPHOCYTES NFR BLD: 21 % (ref 12–49)
MAGNESIUM SERPL-MCNC: 1.2 MG/DL (ref 1.6–2.4)
MCH RBC QN AUTO: 30.4 PG (ref 26–34)
MCHC RBC AUTO-ENTMCNC: 34.9 G/DL (ref 30–36.5)
MCV RBC AUTO: 87 FL (ref 80–99)
MONOCYTES # BLD: 0.3 K/UL (ref 0–1)
MONOCYTES NFR BLD: 4 % (ref 5–13)
NEUTS SEG # BLD: 5.5 K/UL (ref 1.8–8)
NEUTS SEG NFR BLD: 73 % (ref 32–75)
NITRITE UR QL STRIP.AUTO: NEGATIVE
NRBC # BLD: 0 K/UL (ref 0–0.01)
NRBC BLD-RTO: 0 PER 100 WBC
PH UR STRIP: 5.5 [PH] (ref 5–8)
PHOSPHATE SERPL-MCNC: 2.3 MG/DL (ref 2.6–4.7)
PLATELET # BLD AUTO: 358 K/UL (ref 150–400)
PMV BLD AUTO: 9.3 FL (ref 8.9–12.9)
POTASSIUM SERPL-SCNC: 3.5 MMOL/L (ref 3.5–5.1)
PROT SERPL-MCNC: 7.2 G/DL (ref 6.4–8.2)
PROT UR STRIP-MCNC: 30 MG/DL
RBC # BLD AUTO: 3.39 M/UL (ref 3.8–5.2)
RBC #/AREA URNS HPF: ABNORMAL /HPF (ref 0–5)
SAMPLES BEING HELD,HOLD: NORMAL
SODIUM SERPL-SCNC: 124 MMOL/L (ref 136–145)
SP GR UR REFRACTOMETRY: 1.01 (ref 1–1.03)
UA: UC IF INDICATED,UAUC: ABNORMAL
UROBILINOGEN UR QL STRIP.AUTO: 0.2 EU/DL (ref 0.2–1)
WBC # BLD AUTO: 7.7 K/UL (ref 3.6–11)
WBC URNS QL MICRO: ABNORMAL /HPF (ref 0–4)

## 2019-10-17 PROCEDURE — 74176 CT ABD & PELVIS W/O CONTRAST: CPT

## 2019-10-17 PROCEDURE — 86140 C-REACTIVE PROTEIN: CPT

## 2019-10-17 PROCEDURE — 87077 CULTURE AEROBIC IDENTIFY: CPT

## 2019-10-17 PROCEDURE — 99285 EMERGENCY DEPT VISIT HI MDM: CPT

## 2019-10-17 PROCEDURE — 83690 ASSAY OF LIPASE: CPT

## 2019-10-17 PROCEDURE — 85025 COMPLETE CBC W/AUTO DIFF WBC: CPT

## 2019-10-17 PROCEDURE — 74011250636 HC RX REV CODE- 250/636: Performed by: EMERGENCY MEDICINE

## 2019-10-17 PROCEDURE — 96360 HYDRATION IV INFUSION INIT: CPT

## 2019-10-17 PROCEDURE — 81001 URINALYSIS AUTO W/SCOPE: CPT

## 2019-10-17 PROCEDURE — 84100 ASSAY OF PHOSPHORUS: CPT

## 2019-10-17 PROCEDURE — 83735 ASSAY OF MAGNESIUM: CPT

## 2019-10-17 PROCEDURE — 87086 URINE CULTURE/COLONY COUNT: CPT

## 2019-10-17 PROCEDURE — 82150 ASSAY OF AMYLASE: CPT

## 2019-10-17 PROCEDURE — 80053 COMPREHEN METABOLIC PANEL: CPT

## 2019-10-17 PROCEDURE — 87186 SC STD MICRODIL/AGAR DIL: CPT

## 2019-10-17 RX ORDER — SULFAMETHOXAZOLE AND TRIMETHOPRIM 800; 160 MG/1; MG/1
1 TABLET ORAL 2 TIMES DAILY
Qty: 20 TAB | Refills: 0 | Status: SHIPPED | OUTPATIENT
Start: 2019-10-17 | End: 2019-10-19

## 2019-10-17 RX ADMIN — SODIUM CHLORIDE 1000 ML: 900 INJECTION, SOLUTION INTRAVENOUS at 06:34

## 2019-10-17 NOTE — ED PROVIDER NOTES
The patient is an 55-year-old female who presents to the ED by EMS for evaluation for generalized weakness, dizziness and lightheadedness, chronic watery nonbloody diarrhea for approximately 2 weeks, intermittent episode of blood that has been attributed to presents hemorrhoid. The patient admits to decreased appetite and activity. She denies any headache, chest pain, sore throat, cough, congestion, nausea, vomiting, abdominal pain, dysuria, sick contacts at home, unusual food sources, recent travel, prior history of the same. Past Medical History:   Diagnosis Date    Carotid arterial disease ([facility]     mild 0-49%    Coronary atherosclerosis of native coronary artery     Cath 2011 with multivessel cad    Cough due to ACE inhibitor     Diverticulitis     Dyslipidemia     Hypertension     Macular degeneration     S/P CABG (coronary artery bypass graft)     6-19-11 CABG - OFF PUMP - CABGx 3, lima, eric, epi aortic ultrasound - off pump, rightt endoscopic vein harvest; [id number] [place] [address] Dr to LAD, [place] Ao to OM1 and OM2       History reviewed. No pertinent surgical history. History reviewed. No pertinent family history. Social History     Socioeconomic History    Marital status:      Spouse name: Not on file    Number of children: Not on file    Years of education: Not on file    Highest education level: Not on file   Occupational History    Not on file   Social Needs    Financial resource strain: Not on file    Food insecurity:     Worry: Not on file     Inability: Not on file    Transportation needs:     Medical: Not on file     Non-medical: Not on file   Tobacco Use    Smoking status: Never Smoker    Smokeless tobacco: Never Used   Substance and Sexual Activity    Alcohol use:  Yes     Alcohol/week: 1.0 standard drinks     Types: 1 Standard drinks or equivalent per week     Comment: Occasional    Drug use: No    Sexual activity: Not Currently   Lifestyle    Physical activity:     Days per week: Not on file     Minutes per session: Not on file    Stress: Not on file   Relationships    Social connections:     Talks on phone: Not on file     Gets together: Not on file     Attends Christianity service: Not on file     Active member of club or organization: Not on file     Attends meetings of clubs or organizations: Not on file     Relationship status: Not on file    Intimate partner violence:     Fear of current or ex partner: Not on file     Emotionally abused: Not on file     Physically abused: Not on file     Forced sexual activity: Not on file   Other Topics Concern    Not on file   Social History Narrative    Not on file         ALLERGIES: Ciprofloxacin; Codeine; Lisinopril; and Lovastatin    Review of Systems   All other systems reviewed and are negative. Vitals:    10/17/19 0552 10/17/19 0553 10/17/19 0557 10/17/19 0600   BP: (!) 185/150 (!) 185/150 185/52 154/57   Pulse: 66      Resp: 17      Temp: 97.7 °F (36.5 °C)      SpO2: 97%  97% 98%   Weight: 60.3 kg (133 lb)      Height: 5' 5\" (1.651 m)               Physical Exam   Nursing note and vitals reviewed. CONSTITUTIONAL: Frail elderly female; in no apparent distress  HEAD: Normocephalic; atraumatic  EYES: PERRL; EOM intact; conjunctiva and sclera are clear bilaterally. ENT: No rhinorrhea; normal pharynx with no tonsillar hypertrophy; mucous membranes dry/moist, no erythema, no exudate. NECK: Supple; non-tender; no cervical lymphadenopathy  CARD: Normal S1, S2; no murmurs, rubs, or gallops. Regular rate and rhythm. RESP: Normal respiratory effort; breath sounds clear and equal bilaterally; no wheezes, rhonchi, or rales. ABD: Normal bowel sounds; non-distended; non-tender; no palpable organomegaly, no masses, no bruits. Back Exam: Normal inspection; no vertebral point tenderness, no CVA tenderness. Normal range of motion.   EXT: Normal ROM in all four extremities; non-tender to palpation; no swelling or deformity; distal pulses are normal, no edema. SKIN: Warm; dry; no rash; decreased skin turgor and pale  NEURO:Alert and oriented x 3, coherent, ROMMEL-XII grossly intact, sensory and motor are non-focal.    Better    MDM  Number of Diagnoses or Management Options  Diagnosis management comments: Assessment: Gastroenteritis/colitis presumed infectious rule out inflammatory bowel disease/dehydration/electrolyte abnormality    Plan: Lab/IV fluid/EKG/CT scan of the abdomen and pelvis/p.o. challenge and serial exam/ Monitor and Reevaluate. Amount and/or Complexity of Data Reviewed  Clinical lab tests: ordered and reviewed  Tests in the radiology section of CPT®: ordered and reviewed  Tests in the medicine section of CPT®: reviewed and ordered  Discussion of test results with the performing providers: yes  Decide to obtain previous medical records or to obtain history from someone other than the patient: yes  Obtain history from someone other than the patient: yes  Review and summarize past medical records: yes  Discuss the patient with other providers: yes  Independent visualization of images, tracings, or specimens: yes    Risk of Complications, Morbidity, and/or Mortality  Presenting problems: moderate  Diagnostic procedures: moderate  Management options: moderate           Procedures     CONSULT NOTE:  Emiliano Moon MD spoke with Dr. Ashley Nog, PCP> Discussed patient's presentation, history, physical assessment, and available diagnostic results. Will discontinue Keflex and initiate Bactrim> wants patient discharged home and will follow up in the office for outpatient reevaluation and work-up as deemed appropriate. Given    Progress Note:   Pt has been reexamined by Carlos Kinney MD. Pt is feeling much better. Symptoms have improved. All available results have been reviewed with pt and any available family. Pt understands sx, dx, and tx in ED. Care plan has been outlined and questions have been answered.  Pt is ready to go home. Will send home on dehydration/UTI/gastroenteritis instruction. Prescription of Bactrim. Oral rehydration instruction. Outpatient referral with PCP as needed. Written by David Hinton MD,8:17 AM    .   .

## 2019-10-17 NOTE — DISCHARGE INSTRUCTIONS
Patient Education        Diarrhea: Care Instructions  Your Care Instructions    Diarrhea is loose, watery stools (bowel movements). The exact cause is often hard to find. Sometimes diarrhea is your body's way of getting rid of what caused an upset stomach. Viruses, food poisoning, and many medicines can cause diarrhea. Some people get diarrhea in response to emotional stress, anxiety, or certain foods. Almost everyone has diarrhea now and then. It usually isn't serious, and your stools will return to normal soon. The important thing to do is replace the fluids you have lost, so you can prevent dehydration. The doctor has checked you carefully, but problems can develop later. If you notice any problems or new symptoms, get medical treatment right away. Follow-up care is a key part of your treatment and safety. Be sure to make and go to all appointments, and call your doctor if you are having problems. It's also a good idea to know your test results and keep a list of the medicines you take. How can you care for yourself at home? · Watch for signs of dehydration, which means your body has lost too much water. Dehydration is a serious condition and should be treated right away. Signs of dehydration are:  ? Increasing thirst and dry eyes and mouth. ? Feeling faint or lightheaded. ? A smaller amount of urine than normal.  · To prevent dehydration, drink plenty of fluids. Choose water and other caffeine-free clear liquids until you feel better. If you have kidney, heart, or liver disease and have to limit fluids, talk with your doctor before you increase the amount of fluids you drink. · Begin eating small amounts of mild foods the next day, if you feel like it. ? Try yogurt that has live cultures of Lactobacillus. (Check the label.)  ? Avoid spicy foods, fruits, alcohol, and caffeine until 48 hours after all symptoms are gone. ? Avoid chewing gum that contains sorbitol. ?  Avoid dairy products (except for yogurt with Lactobacillus) while you have diarrhea and for 3 days after symptoms are gone. · The doctor may recommend that you take over-the-counter medicine, such as loperamide (Imodium), if you still have diarrhea after 6 hours. Read and follow all instructions on the label. Do not use this medicine if you have bloody diarrhea, a high fever, or other signs of serious illness. Call your doctor if you think you are having a problem with your medicine. When should you call for help? Call 911 anytime you think you may need emergency care. For example, call if:    · You passed out (lost consciousness).     · Your stools are maroon or very bloody.    Call your doctor now or seek immediate medical care if:    · You are dizzy or lightheaded, or you feel like you may faint.     · Your stools are black and look like tar, or they have streaks of blood.     · You have new or worse belly pain.     · You have symptoms of dehydration, such as:  ? Dry eyes and a dry mouth. ? Passing only a little dark urine. ? Feeling thirstier than usual.     · You have a new or higher fever.    Watch closely for changes in your health, and be sure to contact your doctor if:    · Your diarrhea is getting worse.     · You see pus in the diarrhea.     · You are not getting better after 2 days (48 hours). Where can you learn more? Go to http://zainab-omid.info/. Enter D286 in the search box to learn more about \"Diarrhea: Care Instructions. \"  Current as of: June 26, 2019  Content Version: 12.2  © 9533-7637 [employer], Incorporated. Care instructions adapted under license by [employer] (which disclaims liability or warranty for this information). If you have questions about a medical condition or this instruction, always ask your healthcare professional. [place] [address] any warranty or liability for your use of this information.          Patient Education        Learning About Gallstones  What are gallstones? Gallstones are stones that form in the gallbladder. The gallbladder is a small sac located just under the liver. It stores bile released by the liver. Bile helps you digest fats. Gallstones can be smaller than a grain of sand or as large as a golf ball. Gallstones form when cholesterol and other things found in bile make stones. They can also form if the gallbladder doesn't empty as it should. Gallstones can also form in the common bile duct or cystic duct. These tubes carry bile from the gallbladder and the liver to the small intestine. What happens when you have gallstones? Gallstones can cause many different problems, such as:  · A blockage in the common bile duct. · Inflammation or infection of the gallbladder (acute cholecystitis). This can happen when a gallstone blocks the cystic duct. · Inflammation or infection of the common bile duct (cholangitis). This can happen when gallstones get stuck in the common bile duct. In rare cases, this can damage the liver or spread infection. · Pancreatitis, an inflammation of the pancreas. What are the symptoms? · Most people who have gallstones don't have symptoms. · When symptoms occur, they can include:  ? Pain in the pit of your stomach or in the upper right part of your belly. It may spread to your right upper back or shoulder blade area. ? Pain that may come and go or be steady. It may get worse when you eat. ? Fever and chills, if a gallstone is blocking a bile duct and causing an infection. ? Yellowing of your skin and the whites of your eyes. How can you prevent gallstones? There is no sure way to prevent gallstones. But you can reduce your risk of forming gallstones that can cause symptoms. · Stay at a healthy weight. If you need to lose weight, do so slowly and sensibly. · Eat regular, balanced meals. · Be active, and exercise regularly. How are gallstones treated?   · If you don't have symptoms, you probably don't need treatment. · For mild symptoms, your doctor may have you take pain medicine and wait to see if the pain goes away. · For severe pain or infection, or if you have more than one gallstone attack, your doctor may suggest surgery to have your gallbladder removed. The body works fine without a gallbladder. Follow-up care is a key part of your treatment and safety. Be sure to make and go to all appointments, and call your doctor if you are having problems. It's also a good idea to know your test results and keep a list of the medicines you take. Where can you learn more? Go to http://zainabFieldooomid.info/. Enter  in the search box to learn more about \"Learning About Gallstones. \"  Current as of: November 7, 2018  Content Version: 12.2  © 2236-6555 [employer]. Care instructions adapted under license by [employer] (which disclaims liability or warranty for this information). If you have questions about a medical condition or this instruction, always ask your healthcare professional. [place] [address] any warranty or liability for your use of this information. Patient Education        Oral Rehydration: Care Instructions  Your Care Instructions    Dehydration occurs when your body loses too much water. This can happen if you do not drink enough fluids or lose a lot of fluid due to diarrhea, vomiting, or sweating. Being dehydrated can cause health problems and can even be life-threatening. To replace lost fluids, you need to drink liquid that contains special chemicals called electrolytes. Electrolytes keep your body working well. Plain water does not have electrolytes. You also need to rest to prevent more fluid loss. Replacing water and electrolytes (oral rehydration) completely takes about 36 hours. But you should feel better within a few hours. Follow-up care is a key part of your treatment and safety.  Be sure to make and go to all appointments, and call your doctor if you are having problems. It's also a good idea to know your test results and keep a list of the medicines you take. How can you care for yourself at home? · Take frequent sips of a drink such as Gatorade, Powerade, or other rehydration drinks that your doctor suggests. These replace both fluid and important chemicals (electrolytes) you need for balance in your blood. · Drink 2 quarts of cool liquid over 2 to 4 hours. You should have at least 10 glasses of liquid a day to replace lost fluid. If you have kidney, heart, or liver disease and have to limit fluids, talk with your doctor before you increase the amount of fluids you drink. · Make your own drink. Measure everything carefully. The drink may not work well or may even be harmful if the amounts are off. ? 1 quart water  ? ½ teaspoon salt  ? 6 teaspoons sugar  · Do not drink liquid with caffeine, such as coffee and johnnie. · Do not drink any alcohol. It can make you dehydrated. · Drink plenty of fluids, enough so that your urine is light yellow or clear like water. If you have kidney, heart, or liver disease and have to limit fluids, talk with your doctor before you increase the amount of fluids you drink. When should you call for help? Call 911 anytime you think you may need emergency care. For example, call if:    · You have signs of severe dehydration, such as:  ? You are confused or unable to stay awake.  ? You passed out (lost consciousness).    Call your doctor now or seek immediate medical care if:    · You still have signs of dehydration. You have sunken eyes and a dry mouth, and you pass only a little dark urine.     · You are dizzy or lightheaded, or you feel like you may faint.     · You are not able to keep down fluids.    Watch closely for changes in your health, and be sure to contact your doctor if:    · You do not get better as expected. Where can you learn more?   Go to http://zainab-omid.info/. Enter I040 in the search box to learn more about \"Oral Rehydration: Care Instructions. \"  Current as of: June 26, 2019  Content Version: 12.2  © 0261-2689 "[employer] Incorporated. Care instructions adapted under license by [employer] (which disclaims liability or warranty for this information). If you have questions about a medical condition or this instruction, always ask your healthcare professional. [address] any warranty or liability for your use of this information.

## 2019-10-17 NOTE — ED NOTES
Bedside and Verbal shift change report given to Kenyetta Sanchez RN (oncoming nurse) by Woody Keyes RN (offgoing nurse). Report included the following information SBAR, Kardex, ED Summary, MAR, Accordion and Recent Results.

## 2019-10-17 NOTE — ED NOTES
Assumed care of patient. Introduced self as primary nurse using [address] [place] [address] Nw. Stretcher in low locked position with call bell within reach. Pt updated on plan of care and wait times. Pt instructed to use call bell if assistance is needed.

## 2019-10-17 NOTE — ED TRIAGE NOTES
Triage: Pt has had n/d for two weeks. Pt was seen by PCP earlier in the week. Pt was seen by Gi previous day and has suspected colitis.

## 2019-10-19 ENCOUNTER — HOSPITAL ENCOUNTER (INPATIENT)
Age: 84
LOS: 10 days | Discharge: HOME HEALTH | DRG: 640 | End: 2019-10-29
Attending: EMERGENCY MEDICINE | Admitting: INTERNAL MEDICINE
Payer: MEDICARE

## 2019-10-19 ENCOUNTER — APPOINTMENT (OUTPATIENT)
Dept: GENERAL RADIOLOGY | Age: 84
DRG: 640 | End: 2019-10-19
Attending: EMERGENCY MEDICINE
Payer: MEDICARE

## 2019-10-19 DIAGNOSIS — E87.1 HYPONATREMIA: ICD-10-CM

## 2019-10-19 DIAGNOSIS — A41.9 SEPSIS, DUE TO UNSPECIFIED ORGANISM, UNSPECIFIED WHETHER ACUTE ORGAN DYSFUNCTION PRESENT (HCC): Primary | ICD-10-CM

## 2019-10-19 DIAGNOSIS — K52.9 CHRONIC DIARRHEA: ICD-10-CM

## 2019-10-19 DIAGNOSIS — E87.6 HYPOKALEMIA: ICD-10-CM

## 2019-10-19 DIAGNOSIS — N30.01 ACUTE CYSTITIS WITH HEMATURIA: ICD-10-CM

## 2019-10-19 PROBLEM — R55 SYNCOPE: Status: RESOLVED | Noted: 2019-08-03 | Resolved: 2019-10-19

## 2019-10-19 PROBLEM — N39.0 UTI (URINARY TRACT INFECTION): Status: RESOLVED | Noted: 2019-08-03 | Resolved: 2019-10-19

## 2019-10-19 PROBLEM — R50.9 FEVER: Status: RESOLVED | Noted: 2019-08-03 | Resolved: 2019-10-19

## 2019-10-19 PROBLEM — D72.89 LEFT SHIFT: Status: RESOLVED | Noted: 2019-08-03 | Resolved: 2019-10-19

## 2019-10-19 PROBLEM — R19.7 NAUSEA VOMITING AND DIARRHEA: Status: ACTIVE | Noted: 2019-10-19

## 2019-10-19 PROBLEM — G93.41 ACUTE METABOLIC ENCEPHALOPATHY: Status: ACTIVE | Noted: 2019-10-19

## 2019-10-19 PROBLEM — R11.2 NAUSEA VOMITING AND DIARRHEA: Status: ACTIVE | Noted: 2019-10-19

## 2019-10-19 PROBLEM — D64.9 ANEMIA: Status: RESOLVED | Noted: 2019-08-03 | Resolved: 2019-10-19

## 2019-10-19 PROBLEM — D72.829 LEUKOCYTOSIS: Status: ACTIVE | Noted: 2019-10-19

## 2019-10-19 LAB
ALBUMIN SERPL-MCNC: 3.5 G/DL (ref 3.5–5)
ALBUMIN/GLOB SERPL: 0.8 {RATIO} (ref 1.1–2.2)
ALP SERPL-CCNC: 102 U/L (ref 45–117)
ALT SERPL-CCNC: 21 U/L (ref 12–78)
ANION GAP SERPL CALC-SCNC: 13 MMOL/L (ref 5–15)
ANION GAP SERPL CALC-SCNC: 8 MMOL/L (ref 5–15)
ANION GAP SERPL CALC-SCNC: 8 MMOL/L (ref 5–15)
ANION GAP SERPL CALC-SCNC: 9 MMOL/L (ref 5–15)
APPEARANCE UR: CLEAR
AST SERPL-CCNC: 19 U/L (ref 15–37)
BACTERIA SPEC CULT: ABNORMAL
BACTERIA URNS QL MICRO: NEGATIVE /HPF
BASOPHILS # BLD: 0.1 K/UL (ref 0–0.1)
BASOPHILS NFR BLD: 0 % (ref 0–1)
BILIRUB SERPL-MCNC: 0.6 MG/DL (ref 0.2–1)
BILIRUB UR QL: NEGATIVE
BUN SERPL-MCNC: 5 MG/DL (ref 6–20)
BUN SERPL-MCNC: 6 MG/DL (ref 6–20)
BUN/CREAT SERPL: 7 (ref 12–20)
BUN/CREAT SERPL: 8 (ref 12–20)
BUN/CREAT SERPL: 9 (ref 12–20)
BUN/CREAT SERPL: 9 (ref 12–20)
CALCIUM SERPL-MCNC: 6.9 MG/DL (ref 8.5–10.1)
CALCIUM SERPL-MCNC: 7.5 MG/DL (ref 8.5–10.1)
CALCIUM SERPL-MCNC: 7.6 MG/DL (ref 8.5–10.1)
CALCIUM SERPL-MCNC: 8.7 MG/DL (ref 8.5–10.1)
CC UR VC: ABNORMAL
CHLORIDE SERPL-SCNC: 73 MMOL/L (ref 97–108)
CHLORIDE SERPL-SCNC: 82 MMOL/L (ref 97–108)
CHLORIDE SERPL-SCNC: 82 MMOL/L (ref 97–108)
CHLORIDE SERPL-SCNC: 84 MMOL/L (ref 97–108)
CO2 SERPL-SCNC: 24 MMOL/L (ref 21–32)
CO2 SERPL-SCNC: 24 MMOL/L (ref 21–32)
CO2 SERPL-SCNC: 26 MMOL/L (ref 21–32)
CO2 SERPL-SCNC: 27 MMOL/L (ref 21–32)
COLOR UR: ABNORMAL
COMMENT, HOLDF: NORMAL
COMMENT, HOLDF: NORMAL
CREAT SERPL-MCNC: 0.63 MG/DL (ref 0.55–1.02)
CREAT SERPL-MCNC: 0.65 MG/DL (ref 0.55–1.02)
CREAT SERPL-MCNC: 0.67 MG/DL (ref 0.55–1.02)
CREAT SERPL-MCNC: 0.88 MG/DL (ref 0.55–1.02)
DIFFERENTIAL METHOD BLD: ABNORMAL
EOSINOPHIL # BLD: 0.1 K/UL (ref 0–0.4)
EOSINOPHIL NFR BLD: 0 % (ref 0–7)
EPITH CASTS URNS QL MICRO: ABNORMAL /LPF
ERYTHROCYTE [DISTWIDTH] IN BLOOD BY AUTOMATED COUNT: 13.1 % (ref 11.5–14.5)
GLOBULIN SER CALC-MCNC: 4.3 G/DL (ref 2–4)
GLUCOSE SERPL-MCNC: 129 MG/DL (ref 65–100)
GLUCOSE SERPL-MCNC: 134 MG/DL (ref 65–100)
GLUCOSE SERPL-MCNC: 172 MG/DL (ref 65–100)
GLUCOSE SERPL-MCNC: 184 MG/DL (ref 65–100)
GLUCOSE UR STRIP.AUTO-MCNC: 100 MG/DL
HCT VFR BLD AUTO: 31.5 % (ref 35–47)
HEMOCCULT STL QL: NEGATIVE
HGB BLD-MCNC: 11.7 G/DL (ref 11.5–16)
HGB UR QL STRIP: ABNORMAL
HYALINE CASTS URNS QL MICRO: ABNORMAL /LPF (ref 0–5)
IMM GRANULOCYTES # BLD AUTO: 0.1 K/UL (ref 0–0.04)
IMM GRANULOCYTES NFR BLD AUTO: 1 % (ref 0–0.5)
KETONES UR QL STRIP.AUTO: ABNORMAL MG/DL
LACTATE BLD-SCNC: 1.68 MMOL/L (ref 0.4–2)
LACTATE BLD-SCNC: 2.42 MMOL/L (ref 0.4–2)
LEUKOCYTE ESTERASE UR QL STRIP.AUTO: NEGATIVE
LYMPHOCYTES # BLD: 3.3 K/UL (ref 0.8–3.5)
LYMPHOCYTES NFR BLD: 20 % (ref 12–49)
MAGNESIUM SERPL-MCNC: 0.8 MG/DL (ref 1.6–2.4)
MAGNESIUM SERPL-MCNC: 0.9 MG/DL (ref 1.6–2.4)
MAGNESIUM SERPL-MCNC: NORMAL MG/DL (ref 1.6–2.4)
MCH RBC QN AUTO: 30.8 PG (ref 26–34)
MCHC RBC AUTO-ENTMCNC: 37.1 G/DL (ref 30–36.5)
MCV RBC AUTO: 82.9 FL (ref 80–99)
MONOCYTES # BLD: 1 K/UL (ref 0–1)
MONOCYTES NFR BLD: 6 % (ref 5–13)
NEUTS SEG # BLD: 11.6 K/UL (ref 1.8–8)
NEUTS SEG NFR BLD: 72 % (ref 32–75)
NITRITE UR QL STRIP.AUTO: NEGATIVE
NRBC # BLD: 0 K/UL (ref 0–0.01)
NRBC BLD-RTO: 0 PER 100 WBC
PH UR STRIP: 6.5 [PH] (ref 5–8)
PLATELET # BLD AUTO: 496 K/UL (ref 150–400)
PMV BLD AUTO: 8.8 FL (ref 8.9–12.9)
POTASSIUM SERPL-SCNC: 2.4 MMOL/L (ref 3.5–5.1)
POTASSIUM SERPL-SCNC: 2.8 MMOL/L (ref 3.5–5.1)
POTASSIUM SERPL-SCNC: 5.5 MMOL/L (ref 3.5–5.1)
POTASSIUM SERPL-SCNC: ABNORMAL MMOL/L (ref 3.5–5.1)
PROT SERPL-MCNC: 7.8 G/DL (ref 6.4–8.2)
PROT UR STRIP-MCNC: 100 MG/DL
RBC # BLD AUTO: 3.8 M/UL (ref 3.8–5.2)
RBC #/AREA URNS HPF: ABNORMAL /HPF (ref 0–5)
SAMPLES BEING HELD,HOLD: NORMAL
SAMPLES BEING HELD,HOLD: NORMAL
SERVICE CMNT-IMP: ABNORMAL
SODIUM SERPL-SCNC: 112 MMOL/L (ref 136–145)
SODIUM SERPL-SCNC: 115 MMOL/L (ref 136–145)
SODIUM SERPL-SCNC: 116 MMOL/L (ref 136–145)
SODIUM SERPL-SCNC: 117 MMOL/L (ref 136–145)
SP GR UR REFRACTOMETRY: 1.01 (ref 1–1.03)
TROPONIN I SERPL-MCNC: <0.05 NG/ML
UR CULT HOLD, URHOLD: NORMAL
UROBILINOGEN UR QL STRIP.AUTO: 0.2 EU/DL (ref 0.2–1)
WBC # BLD AUTO: 16.1 K/UL (ref 3.6–11)
WBC URNS QL MICRO: ABNORMAL /HPF (ref 0–4)

## 2019-10-19 PROCEDURE — 74011250637 HC RX REV CODE- 250/637: Performed by: INTERNAL MEDICINE

## 2019-10-19 PROCEDURE — 74011000258 HC RX REV CODE- 258: Performed by: EMERGENCY MEDICINE

## 2019-10-19 PROCEDURE — 87086 URINE CULTURE/COLONY COUNT: CPT

## 2019-10-19 PROCEDURE — 82272 OCCULT BLD FECES 1-3 TESTS: CPT

## 2019-10-19 PROCEDURE — 96368 THER/DIAG CONCURRENT INF: CPT

## 2019-10-19 PROCEDURE — 87177 OVA AND PARASITES SMEARS: CPT

## 2019-10-19 PROCEDURE — 71045 X-RAY EXAM CHEST 1 VIEW: CPT

## 2019-10-19 PROCEDURE — 85025 COMPLETE CBC W/AUTO DIFF WBC: CPT

## 2019-10-19 PROCEDURE — 84484 ASSAY OF TROPONIN QUANT: CPT

## 2019-10-19 PROCEDURE — 83735 ASSAY OF MAGNESIUM: CPT

## 2019-10-19 PROCEDURE — 0107U C DIFF TOX AG DETCJ IA STOOL: CPT

## 2019-10-19 PROCEDURE — 65610000006 HC RM INTENSIVE CARE

## 2019-10-19 PROCEDURE — 80053 COMPREHEN METABOLIC PANEL: CPT

## 2019-10-19 PROCEDURE — 74011250636 HC RX REV CODE- 250/636: Performed by: EMERGENCY MEDICINE

## 2019-10-19 PROCEDURE — 87506 IADNA-DNA/RNA PROBE TQ 6-11: CPT

## 2019-10-19 PROCEDURE — 93005 ELECTROCARDIOGRAM TRACING: CPT

## 2019-10-19 PROCEDURE — 74011000250 HC RX REV CODE- 250: Performed by: INTERNAL MEDICINE

## 2019-10-19 PROCEDURE — 80048 BASIC METABOLIC PNL TOTAL CA: CPT

## 2019-10-19 PROCEDURE — 96375 TX/PRO/DX INJ NEW DRUG ADDON: CPT

## 2019-10-19 PROCEDURE — 89055 LEUKOCYTE ASSESSMENT FECAL: CPT

## 2019-10-19 PROCEDURE — 99285 EMERGENCY DEPT VISIT HI MDM: CPT

## 2019-10-19 PROCEDURE — 36415 COLL VENOUS BLD VENIPUNCTURE: CPT

## 2019-10-19 PROCEDURE — 96365 THER/PROPH/DIAG IV INF INIT: CPT

## 2019-10-19 PROCEDURE — 81001 URINALYSIS AUTO W/SCOPE: CPT

## 2019-10-19 PROCEDURE — 83605 ASSAY OF LACTIC ACID: CPT

## 2019-10-19 PROCEDURE — 87040 BLOOD CULTURE FOR BACTERIA: CPT

## 2019-10-19 PROCEDURE — 74011250636 HC RX REV CODE- 250/636: Performed by: INTERNAL MEDICINE

## 2019-10-19 PROCEDURE — 96366 THER/PROPH/DIAG IV INF ADDON: CPT

## 2019-10-19 PROCEDURE — 74011000258 HC RX REV CODE- 258: Performed by: INTERNAL MEDICINE

## 2019-10-19 RX ORDER — METOPROLOL TARTRATE 50 MG/1
50 TABLET ORAL 2 TIMES DAILY
Status: DISCONTINUED | OUTPATIENT
Start: 2019-10-19 | End: 2019-10-29 | Stop reason: HOSPADM

## 2019-10-19 RX ORDER — ONDANSETRON 2 MG/ML
4 INJECTION INTRAMUSCULAR; INTRAVENOUS
Status: COMPLETED | OUTPATIENT
Start: 2019-10-19 | End: 2019-10-19

## 2019-10-19 RX ORDER — MAGNESIUM SULFATE 1 G/100ML
1 INJECTION INTRAVENOUS
Status: COMPLETED | OUTPATIENT
Start: 2019-10-19 | End: 2019-10-20

## 2019-10-19 RX ORDER — LOSARTAN POTASSIUM 50 MG/1
100 TABLET ORAL DAILY
Status: DISCONTINUED | OUTPATIENT
Start: 2019-10-19 | End: 2019-10-29 | Stop reason: HOSPADM

## 2019-10-19 RX ORDER — NALOXONE HYDROCHLORIDE 0.4 MG/ML
0.4 INJECTION, SOLUTION INTRAMUSCULAR; INTRAVENOUS; SUBCUTANEOUS AS NEEDED
Status: DISCONTINUED | OUTPATIENT
Start: 2019-10-19 | End: 2019-10-29 | Stop reason: HOSPADM

## 2019-10-19 RX ORDER — SODIUM CHLORIDE 0.9 % (FLUSH) 0.9 %
5-10 SYRINGE (ML) INJECTION AS NEEDED
Status: DISCONTINUED | OUTPATIENT
Start: 2019-10-19 | End: 2019-10-29 | Stop reason: HOSPADM

## 2019-10-19 RX ORDER — MONTELUKAST SODIUM 4 MG/1
3 TABLET, CHEWABLE ORAL
COMMUNITY
End: 2021-01-20

## 2019-10-19 RX ORDER — DIPHENHYDRAMINE HCL 25 MG
25 CAPSULE ORAL
Status: DISCONTINUED | OUTPATIENT
Start: 2019-10-19 | End: 2019-10-29 | Stop reason: HOSPADM

## 2019-10-19 RX ORDER — PROCHLORPERAZINE EDISYLATE 5 MG/ML
10 INJECTION INTRAMUSCULAR; INTRAVENOUS
Status: DISCONTINUED | OUTPATIENT
Start: 2019-10-19 | End: 2019-10-19 | Stop reason: CLARIF

## 2019-10-19 RX ORDER — MAGNESIUM SULFATE HEPTAHYDRATE 40 MG/ML
2 INJECTION, SOLUTION INTRAVENOUS ONCE
Status: COMPLETED | OUTPATIENT
Start: 2019-10-19 | End: 2019-10-19

## 2019-10-19 RX ORDER — ACETAMINOPHEN 325 MG/1
650 TABLET ORAL
Status: DISCONTINUED | OUTPATIENT
Start: 2019-10-19 | End: 2019-10-29 | Stop reason: HOSPADM

## 2019-10-19 RX ORDER — RANITIDINE 150 MG/1
150 TABLET, FILM COATED ORAL
COMMUNITY
End: 2021-01-20

## 2019-10-19 RX ORDER — POTASSIUM CHLORIDE 7.45 MG/ML
10 INJECTION INTRAVENOUS
Status: DISPENSED | OUTPATIENT
Start: 2019-10-19 | End: 2019-10-19

## 2019-10-19 RX ORDER — AMLODIPINE BESYLATE 5 MG/1
5 TABLET ORAL
COMMUNITY
End: 2019-10-29

## 2019-10-19 RX ORDER — ENOXAPARIN SODIUM 100 MG/ML
40 INJECTION SUBCUTANEOUS EVERY 24 HOURS
Status: DISCONTINUED | OUTPATIENT
Start: 2019-10-19 | End: 2019-10-29 | Stop reason: HOSPADM

## 2019-10-19 RX ORDER — MAGNESIUM SULFATE 1 G/100ML
1 INJECTION INTRAVENOUS
Status: DISPENSED | OUTPATIENT
Start: 2019-10-19 | End: 2019-10-19

## 2019-10-19 RX ORDER — NITROFURANTOIN 25; 75 MG/1; MG/1
100 CAPSULE ORAL 2 TIMES DAILY
COMMUNITY
Start: 2019-10-18 | End: 2019-10-29

## 2019-10-19 RX ORDER — DEXTROSE, SODIUM CHLORIDE, AND POTASSIUM CHLORIDE 5; .45; .3 G/100ML; G/100ML; G/100ML
INJECTION INTRAVENOUS CONTINUOUS
Status: DISCONTINUED | OUTPATIENT
Start: 2019-10-19 | End: 2019-10-20

## 2019-10-19 RX ORDER — ONDANSETRON 2 MG/ML
4 INJECTION INTRAMUSCULAR; INTRAVENOUS
Status: DISCONTINUED | OUTPATIENT
Start: 2019-10-19 | End: 2019-10-29 | Stop reason: HOSPADM

## 2019-10-19 RX ORDER — POTASSIUM CHLORIDE 7.45 MG/ML
10 INJECTION INTRAVENOUS
Status: COMPLETED | OUTPATIENT
Start: 2019-10-19 | End: 2019-10-20

## 2019-10-19 RX ORDER — GUAIFENESIN 100 MG/5ML
81 LIQUID (ML) ORAL
Status: DISCONTINUED | OUTPATIENT
Start: 2019-10-19 | End: 2019-10-29 | Stop reason: HOSPADM

## 2019-10-19 RX ORDER — ONDANSETRON 4 MG/1
4 TABLET, ORALLY DISINTEGRATING ORAL
COMMUNITY
End: 2021-01-20

## 2019-10-19 RX ORDER — METOPROLOL TARTRATE 50 MG/1
50 TABLET ORAL 2 TIMES DAILY
Status: DISCONTINUED | OUTPATIENT
Start: 2019-10-19 | End: 2019-10-19

## 2019-10-19 RX ORDER — MONTELUKAST SODIUM 4 MG/1
2 TABLET, CHEWABLE ORAL DAILY
COMMUNITY
End: 2021-01-20

## 2019-10-19 RX ADMIN — POTASSIUM CHLORIDE 10 MEQ: 7.46 INJECTION, SOLUTION INTRAVENOUS at 13:46

## 2019-10-19 RX ADMIN — POTASSIUM CHLORIDE 10 MEQ: 7.46 INJECTION, SOLUTION INTRAVENOUS at 15:01

## 2019-10-19 RX ADMIN — MAGNESIUM SULFATE HEPTAHYDRATE 1 G: 1 INJECTION, SOLUTION INTRAVENOUS at 19:45

## 2019-10-19 RX ADMIN — ONDANSETRON 4 MG: 2 INJECTION INTRAMUSCULAR; INTRAVENOUS at 10:36

## 2019-10-19 RX ADMIN — LOSARTAN POTASSIUM 100 MG: 50 TABLET, FILM COATED ORAL at 16:37

## 2019-10-19 RX ADMIN — POTASSIUM CHLORIDE 10 MEQ: 10 INJECTION, SOLUTION INTRAVENOUS at 23:54

## 2019-10-19 RX ADMIN — SODIUM CHLORIDE 809 ML: 900 INJECTION, SOLUTION INTRAVENOUS at 11:46

## 2019-10-19 RX ADMIN — MAGNESIUM SULFATE HEPTAHYDRATE 2 G: 40 INJECTION, SOLUTION INTRAVENOUS at 16:38

## 2019-10-19 RX ADMIN — PIPERACILLIN AND TAZOBACTAM 3.38 G: 3; .375 INJECTION, POWDER, LYOPHILIZED, FOR SOLUTION INTRAVENOUS at 11:23

## 2019-10-19 RX ADMIN — FOLIC ACID: 5 INJECTION, SOLUTION INTRAMUSCULAR; INTRAVENOUS; SUBCUTANEOUS at 18:05

## 2019-10-19 RX ADMIN — PROCHLORPERAZINE EDISYLATE 10 MG: 5 INJECTION INTRAMUSCULAR; INTRAVENOUS at 12:52

## 2019-10-19 RX ADMIN — POTASSIUM CHLORIDE 10 MEQ: 10 INJECTION, SOLUTION INTRAVENOUS at 21:45

## 2019-10-19 RX ADMIN — MAGNESIUM SULFATE HEPTAHYDRATE 1 G: 1 INJECTION, SOLUTION INTRAVENOUS at 21:45

## 2019-10-19 RX ADMIN — POTASSIUM CHLORIDE 10 MEQ: 7.46 INJECTION, SOLUTION INTRAVENOUS at 11:37

## 2019-10-19 RX ADMIN — ENOXAPARIN SODIUM 40 MG: 40 INJECTION SUBCUTANEOUS at 15:06

## 2019-10-19 RX ADMIN — POTASSIUM CHLORIDE 10 MEQ: 10 INJECTION, SOLUTION INTRAVENOUS at 20:47

## 2019-10-19 RX ADMIN — METOPROLOL TARTRATE 50 MG: 50 TABLET, FILM COATED ORAL at 16:38

## 2019-10-19 RX ADMIN — POTASSIUM CHLORIDE, DEXTROSE MONOHYDRATE AND SODIUM CHLORIDE: 300; 5; 450 INJECTION, SOLUTION INTRAVENOUS at 15:02

## 2019-10-19 RX ADMIN — SODIUM CHLORIDE 1000 ML: 900 INJECTION, SOLUTION INTRAVENOUS at 11:21

## 2019-10-19 NOTE — PROGRESS NOTES
[facility]: MED RECONCILIATION    Comments/Recommendations:   Interview with the patient's daughter at the bedside. Verifies allergies and preferred pharmacy listed. Pharmacist reviewed prescription refill history with Rx Query. For her current UTI the patient took three days of cephalexin and then was changed to Bactrim DS which she took one dose of and then her antibiotic was changed to nitrofurantoin. The patient took one dose of nitrofurantoin prior to admission. Medications added:     Colestipol  Nitrofurantoin  Ondansetron  Ranitidine    Medications removed:    Cholestyramine  Naproxen  Bactrim    Medications adjusted:    Amlodipine  Sarna    Allergies: Ciprofloxacin; Codeine; Lisinopril; and Lovastatin    Prior to Admission Medications:     Medication Documentation Review Audit       Reviewed by Nila Werner PHARMD (Pharmacist) on 10/19/19 at 1609      Medication Sig Documenting Provider Last Dose Status Taking? amLODIPine (NORVASC) 5 mg tablet Take 5 mg by mouth nightly. Provider, Historical 10/18/2019 Unknown time Active Yes   aspirin 81 mg chewable tablet Take 81 mg by mouth nightly. Provider, Historical 10/18/2019 Unknown time Active Yes   atorvastatin (LIPITOR) 20 mg tablet Take 1 Tab by mouth nightly. Tanvir Contreras MD 10/18/2019 Unknown time Active Yes   budesonide (ENTOCORT EC) 3 mg capsule Take 3 Caps by mouth daily. Danyell Street MD 10/18/2019 Unknown time Active Yes   camphor-menthol (SARNA ORIGINAL) 0.5-0.5 % lotion Apply  to affected area two (2) times daily as needed for Itching. Provider, Historical  Active Yes   chlorthalidone (HYGROTEN) 25 mg tablet Take 25 mg by mouth daily. Provider, Historical 10/18/2019 Unknown time Active Yes   colestipol (COLESTID) 1 gram tablet Take 2 g by mouth daily. Provider, Historical 10/18/2019 Unknown time Active Yes   colestipol (COLESTID) 1 gram tablet Take 3 g by mouth nightly.  Provider, Historical 10/18/2019 Unknown time Active Yes   doxazosin (CARDURA) 1 mg tablet Take 1 mg by mouth daily. Provider, Historical 10/18/2019 Unknown time Active Yes   hydrALAZINE (APRESOLINE) 10 mg tablet Take 1 Tab by mouth three (3) times daily as needed for Other (Systolic BP >860). Padilla Jacobo MD  Active Yes   L. acidoph & paracasei- S therm- Bifido (GERALDINE-Q/RISAQUAD) 8 billion cell cap cap Take 1 Cap by mouth daily. Padilla Jacobo MD 10/18/2019 Unknown time Active Yes   losartan (COZAAR) 100 mg tablet Take 1 Tab by mouth daily. Padilla Jacobo MD 10/18/2019 Unknown time Active Yes   metoprolol tartrate (LOPRESSOR) 50 mg tablet TAKE ONE TABLET BY MOUTH TWICE A DAY Jamarcus Lion MD 10/18/2019 Unknown time Active Yes   nitrofurantoin, macrocrystal-monohydrate, (MACROBID) 100 mg capsule Take 100 mg by mouth two (2) times a day. Indications: uti Provider, Historical 10/18/2019 Unknown time Active Yes   ondansetron (ZOFRAN ODT) 4 mg disintegrating tablet Take 4 mg by mouth every eight (8) hours as needed for Nausea. Provider, Historical  Active Yes   psyllium husk-aspartame (METAMUCIL FIBER) 3.4 gram pwpk packet Take 1 Packet by mouth two (2) times a day. Padilla Jacobo MD 10/18/2019 Unknown time Active Yes   raNITIdine (ZANTAC) 150 mg tablet Take 150 mg by mouth nightly.  Provider, Historical  Active Yes                  Thank you,      Josh Stone, PharmD, BCPS

## 2019-10-19 NOTE — PROGRESS NOTES
@[id number] TRANSFER - IN REPORT:    Verbal report received from  Noemi Villalba RN(name) on Georges Gambino  being received from ED(unit) for routine progression of care      Report consisted of patients Situation, Background, Assessment and   Recommendations(SBAR). Information from the following report(s) SBAR, Kardex, ED Summary, Intake/Output, MAR, Accordion, Recent Results, Med Rec Status, Cardiac Rhythm Sinus and Alarm Parameters  was reviewed with the receiving nurse. Opportunity for questions and clarification was provided. Assessment completed upon patients arrival to unit and care assumed.

## 2019-10-19 NOTE — CONSULTS
[facility]  [address] [facility], [facility] [address]  [id number] Renal Consult Note      NAME:  Nadja Cool   :   1931   MRN:  [id number]     Requesting Physician Jaylen Woodard MD   Reason for Consult:  hyponatremia     PCP:  Dacia Villegas MD     Date/Time:  10/19/2019 7:39 PM          Subjective:     CHIEF COMPLAINT: weakness, confusion and lethargy     HISTORY OF PRESENT ILLNESS:     Ms. Teresa Perdomo is a 80 y.o.  female who is admitted to the medical  Service with hyponatremia. We are asked to evaluate for hyponatremia. Ms. Teresa Perdomo is complaining of lethargy, weakness and disordered sensorium    Patinet notes poor appetite for several weeks, can't remember when she last ate a good meal ( concurs). She has had some nausea with vomiting but her primary GI issues are difficulty in swallowing and chronic loose stools. There is an unquantified weight loss. Presented to ER 2 days ago and treated with Bactrim (?presumed UTI). Serum Na then was 124. Returned today and Na down to 112 associated wit marked hypokalemia, hypochloremia and hypomagnesemia (c/w nutritional def). No seizures or tics. She has received IV Mag/ K and 0.45NS with some correction of the aforementioned defects. We are asked to assist in the treatment of these electrolyte anomalies. Past Medical History:   Diagnosis Date    Carotid arterial disease ([address]     mild 0-49%    Coronary atherosclerosis of native coronary artery     Cath  with multivessel cad    Cough due to ACE inhibitor     Diverticulitis     Dyslipidemia     Hypertension     Macular degeneration     S/P CABG (coronary artery bypass graft)     11 CABG - OFF PUMP - CABGx 3, lima, eric, epi aortic ultrasound - off pump, rightt endoscopic vein harvest; [id number] [facility] [address] to LAD, Svg Ao to OM1 and OM2        No past surgical history on file.     Social History Tobacco Use    Smoking status: Never Smoker    Smokeless tobacco: Never Used   Substance Use Topics    Alcohol use: Yes     Alcohol/week: 1.0 standard drinks     Types: 1 Standard drinks or equivalent per week     Comment: Occasional        No family history on file. Allergies   Allergen Reactions    Ciprofloxacin Itching and Other (comments)     Cipro, \"turn beet red like sunburn\"    Codeine Itching    Lisinopril Cough    Lovastatin Diarrhea        Prior to Admission medications    Medication Sig Start Date End Date Taking? Authorizing Provider   amLODIPine (NORVASC) 5 mg tablet Take 5 mg by mouth nightly. Yes Provider, Historical   camphor-menthol (SARNA ORIGINAL) 0.5-0.5 % lotion Apply  to affected area two (2) times daily as needed for Itching. Yes Provider, Historical   colestipol (COLESTID) 1 gram tablet Take 2 g by mouth daily. Yes Provider, Historical   colestipol (COLESTID) 1 gram tablet Take 3 g by mouth nightly. Yes Provider, Historical   nitrofurantoin, macrocrystal-monohydrate, (MACROBID) 100 mg capsule Take 100 mg by mouth two (2) times a day. Indications: uti 10/18/19 10/28/19 Yes Provider, Historical   ondansetron (ZOFRAN ODT) 4 mg disintegrating tablet Take 4 mg by mouth every eight (8) hours as needed for Nausea. Yes Provider, Historical   raNITIdine (ZANTAC) 150 mg tablet Take 150 mg by mouth nightly. Yes Provider, Historical   atorvastatin (LIPITOR) 20 mg tablet Take 1 Tab by mouth nightly. 10/14/19  Yes Griselda Mu, MD   losartan (COZAAR) 100 mg tablet Take 1 Tab by mouth daily. 8/11/19  Yes Boom Alarcon MD   budesonide (ENTOCORT EC) 3 mg capsule Take 3 Caps by mouth daily. 8/11/19  Yes Boom Alarcon MD   psyllium husk-aspartame (METAMUCIL FIBER) 3.4 gram pwpk packet Take 1 Packet by mouth two (2) times a day. 8/11/19  Yes Boom Alarcon MD   L. acidoph & paracasei- S therm- Bifido (GERALDINE-Q/RISAQUAD) 8 billion cell cap cap Take 1 Cap by mouth daily. 8/11/19  Yes Marielena Branham MD   hydrALAZINE (APRESOLINE) 10 mg tablet Take 1 Tab by mouth three (3) times daily as needed for Other (Systolic BP >231). 8/11/19  Yes Marielena Branham MD   metoprolol tartrate (LOPRESSOR) 50 mg tablet TAKE ONE TABLET BY MOUTH TWICE A DAY 11/9/18  Yes Teodora Cox MD   chlorthalidone (HYGROTEN) 25 mg tablet Take 25 mg by mouth daily. 10/23/18  Yes Provider, Historical   aspirin 81 mg chewable tablet Take 81 mg by mouth nightly. Yes Provider, Historical   doxazosin (CARDURA) 1 mg tablet Take 1 mg by mouth daily. Yes Provider, Historical         Current Facility-Administered Medications:     sodium chloride (NS) flush 5-10 mL, 5-10 mL, IntraVENous, PRN, Aura Lamb MD    [START ON 10/20/2019] lactobac ac& pc-s.therm-b.anim (GERALDINE Q/RISAQUAD), 1 Cap, Oral, DAILY, Aura Lamb MD    dextrose 5% - 0.45% NaCl with KCl 40 mEq/L infusion, , IntraVENous, CONTINUOUS, Aura Lamb MD, Stopped at 10/19/19 1830    dextrose 5 % - 0.45% NaCl 1,000 mL with mvi, adult no. 4 with vit K 10 mL, thiamine 828 mg, folic acid 1 mg, potassium chloride 40 mEq infusion, , IntraVENous, Q24H, Aura Lamb MD, Last Rate: 75 mL/hr at 10/19/19 1805    aspirin chewable tablet 81 mg, 81 mg, Oral, QHS, Aura Lamb MD    naloxone [facility]) injection 0.4 mg, 0.4 mg, IntraVENous, PRN, Aura Lamb MD    acetaminophen (TYLENOL) tablet 650 mg, 650 mg, Oral, Q4H PRN, Aura Lamb MD    diphenhydrAMINE (BENADRYL) capsule 25 mg, 25 mg, Oral, Q4H PRN, Aura Lamb MD    ondansetron [facility]) injection 4 mg, 4 mg, IntraVENous, Q4H PRN, Aura Lamb MD    enoxaparin (LOVENOX) injection 40 mg, 40 mg, SubCUTAneous, Q24H, Aura Lamb MD, 40 mg at 10/19/19 1506    metoprolol tartrate (LOPRESSOR) tablet 50 mg, 50 mg, Oral, BID, Aura Lamb MD, 50 mg at 10/19/19 1638    losartan (COZAAR) tablet 100 mg, 100 mg, Oral, DAILY, Aura Lamb, MD, 100 mg at 10/19/19 1637    magnesium sulfate 1 g/100 ml IVPB (premix or compounded), 1 g, IntraVENous, Q1H, Gaurav Oconnell MD    potassium chloride 10 mEq in 100 ml IVPB, 10 mEq, IntraVENous, Q1H, Gaurav Oconnell MD      Review of Systems:  Constitutional: positive for fatigue, malaise and anorexia, negative for fevers  Ears, nose, mouth, throat, and face: negative for epistaxis, voice change and but positive for difficulty with swallowing: \"lump in my throat\"  Respiratory: negative for hemoptysis, pneumonia, asthma or wheezing  Cardiovascular: positive for dizziness, negative for chest pain, syncope, lower extremity edema  Gastrointestinal: positive for odynophagia, negative for melena, constipation and but postive for diarrhea and nausea, poor appetite  Genitourinary:negative for dysuria and hematuria  Hematologic/lymphatic: negative for bleeding, blood in stool or urine and coughing up blood  Musculoskeletal:positive for muscle weakness, negative for neck pain, back pain and bone pain  Neurological: negative for seizures and but positive for altered thinking       Objective:      VITALS:    Vital signs reviewed; most recent are:    Visit Vitals  /71   Pulse 66   Temp 98.8 °F (37.1 °C)   Resp 17   Ht 5' 5\" (1.651 m)   Wt 60.3 kg (133 lb)   SpO2 97%   BMI 22.13 kg/m²     SpO2 Readings from Last 6 Encounters:   10/19/19 97%   10/17/19 98%   08/11/19 97%   10/31/18 97%   02/07/18 98%   06/13/17 98%        No intake or output data in the 24 hours ending 10/19/19 1939         Exam:   Physical Exam:General appearance: alert, cooperative, no distress, appears stated age  Head: Normocephalic, without obvious abnormality, atraumatic  Eyes: negative findings: anicteric sclera, conjunctivae and sclerae normal and corneas clear  Neck: supple, symmetrical, trachea midline, no adenopathy, thyroid: not enlarged, symmetric, no tenderness/mass/nodules and no JVD  Lungs: posterior basilar crackles left  > right  Heart: regular rate and rhythm, no S3 or S4, no edema  Abdomen: soft, non-tender. Bowel sounds normal. No masses,  no organomegaly  Extremities: extremities normal, atraumatic, no cyanosis or edema  Skin: Skin color, texture, turgor normal. No rashes or lesions  Neurologic: Grossly normal, ? Left pstosis     LABS:  Recent Labs     10/19/19  1638 10/19/19  1537 10/19/19  1426 10/19/19  1015 10/17/19  0606   * 116* 115* 112* 124*   K 2.8* 5.5* HEMOLYZED,RECOLLECT REQUESTED 2.4* 3.5   CL 82* 84* 82* 73* 93*   CO2 27 24 24 26 23   BUN 5* 6 6 6 9   CREA 0.63 0.67 0.65 0.88 0.72   CA 7.5* 6.9* 7.6* 8.7 9.1   ALB  --   --   --  3.5 3.1*   PHOS  --   --   --   --  2.3*   MG 0.9* 0.8* HEMOLYZED,RECOLLECT REQUESTED  --  1.2*     Recent Labs     10/19/19  1015 10/17/19  0606   WBC 16.1* 7.7   HGB 11.7 10.3*   HCT 31.5* 29.5*   * 358     Lab Results   Component Value Date/Time    Glucose (POC) 112 (H) 06/21/2011 06:35 AM    Glucose (POC) 111 (H) 06/20/2011 09:01 PM     No results for input(s): PH, PCO2, PO2, HCO3, FIO2 in the last 72 hours. No results for input(s): INR, INREXT in the last 72 hours. No results for input(s): AJIT, KU, CLU, CREAU in the last 72 hours. No lab exists for component: PROU   Occult blood stool negative  CXR 10-19-19 Indication: Nausea, vomiting     Comparison: 8/3/2019     Portable exam of the chest obtained at 1120 demonstrates mild cardiomegaly. The  patient is status post CABG procedure. There is a shallow inspiratory effort  with bibasilar atelectasis. Chronic rotator cuff tears are noted bilaterally.     IMPRESSION  Impression: Shallow inspiratory effort with bibasilar atelectasis. CT abd/pelvis 10-17-19    FINDINGS:   LUNG BASES: Clear. INCIDENTALLY IMAGED HEART AND MEDIASTINUM: Unremarkable. LIVER: No mass or biliary dilatation. GALLBLADDER: Layering gallstones. SPLEEN: No mass. PANCREAS: No mass or ductal dilatation. ADRENALS: Unremarkable.   KIDNEYS/URETERS: No mass, calculus, or hydronephrosis. Multiple right renal  cysts. STOMACH: Unremarkable. SMALL BOWEL: No dilatation or wall thickening. COLON: No dilatation or wall thickening. Sigmoid diverticulosis  APPENDIX: Unremarkable. PERITONEUM: No ascites or pneumoperitoneum. RETROPERITONEUM: No lymphadenopathy or aortic aneurysm. Severe atherosclerosis. Severe ostial calcification of the renal arteries and SMA. REPRODUCTIVE ORGANS: Small uterus  URINARY BLADDER: No mass or calculus. Mild bladder distention. BONES: No destructive bone lesion. Level convex curvature. Disc desiccation at  multiple levels. ADDITIONAL COMMENTS: N/A     IMPRESSION  IMPRESSION:  Sigmoid diverticulosis  Severe atherosclerosis  Incidental cholelithiasis    CT Head 8-3-19 FINDINGS:  The ventricles and sulci are normal in size, shape and configuration and  midline. There is no significant white matter disease. There is no intracranial  hemorrhage, extra-axial collection, mass, mass effect or midline shift. The  basilar cisterns are open. No acute infarct is identified. The bone windows  demonstrate no abnormalities. The visualized portions of the paranasal sinuses  and mastoid air cells are clear. Vascular calcification is noted.     IMPRESSION  IMPRESSION: No acute abnormality.     Assessment:   Renal Specific Problems  Hyponatremia related to poor solute intake with increased solute loss via stool replaced with hypotonic solutions.    Hyponatremia exacerbated by hypomag and hypokalemia  Volume depletion with hypochloremia  Chronic diarrhea  difficulty with swallowing followed by Dr Erika Sanchez:     Obtain/ Order: labs/cultures/radiology/procedures:  urine lytes and urine creatinine and protein/creat and serial metabolic and Mag levels    Therapeutic:    Replete Mag and K as you are doing  Currently getting 0.45NS (to prevent rapid correction) but will see if this results in a drop in serum Na; if so, then would change to 0.9NS but reduce rate of IV replacement to control rate of correction  Needs to eat (increase solute intake) so will need GI to define the process that is preventing swallowing.  In the meantime, use EnsurePlus or like substabce  Labs can be q.6-8hrs      Risk of deterioration: medium      Total time spent with patient: 30 Minutes                  Discussed:  patient and ICU RN,  at bedside during interview and exam           ___________________________________________________    Attending Physician: Marylu Pichardo MD

## 2019-10-19 NOTE — ED TRIAGE NOTES
EMS reports pt has had nausea and vomiting x 2 days and diarrhea x 3 weeks. Given 4 mg zofran and ~300 cc fluids en route. 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 Currently on abx.  en route.

## 2019-10-19 NOTE — ED NOTES
Bedside and Verbal shift change report given to Tracey Sutherland  (oncoming nurse) by Liborio Pelaez  (offgoing nurse). Report included the following information SBAR, ED Summary, MAR, Recent Results and Cardiac Rhythm A fib .

## 2019-10-19 NOTE — H&P
[facility]    Hospitalist Admission Note      NAME:  Malissa Finley   :   1931   MRN:  [id number]     PCP:  Ciera Hill MD     Date/Time:  10/19/2019 2:16 PM          Subjective:     CHIEF COMPLAINT: confusion     HISTORY OF PRESENT ILLNESS:     Ms. Ama Gimenez is a 80 y.o.  female who presented to the Emergency Department complaining of confusion. Worsening over days. Associated with persistent vomiting and diarrhea, present for weeks. Was in our ER 2 days ago, with weakness and hyponatremia, and sent home on Bactrim. She returns today worse, weak, confused and severe hyponatremia. We will admit her for management. Past Medical History:   Diagnosis Date    Carotid arterial disease ([facility] [address]     mild 0-49%    Coronary atherosclerosis of native coronary artery     Cath  with multivessel cad    Cough due to ACE inhibitor     Diverticulitis     Dyslipidemia     Hypertension     Macular degeneration     S/P CABG (coronary artery bypass graft)     11 CABG - OFF PUMP - CABGx 3, lima, eric, epi aortic ultrasound - off pump, rightt endoscopic vein harvest; [id number] [place] [address] Dr to LAD, Svg Ao to OM1 and OM2        No past surgical history on file. Social History     Tobacco Use    Smoking status: Never Smoker    Smokeless tobacco: Never Used   Substance Use Topics    Alcohol use: Yes     Alcohol/week: 1.0 standard drinks     Types: 1 Standard drinks or equivalent per week     Comment: Occasional        No family history on file. Family hx cannot be fully assessed, due to her confusion    Allergies   Allergen Reactions    Ciprofloxacin Itching and Other (comments)     Cipro, \"turn beet red like sunburn\"    Codeine Itching    Lisinopril Cough    Lovastatin Diarrhea        Prior to Admission medications    Medication Sig Start Date End Date Taking?  Authorizing Provider   trimethoprim-sulfamethoxazole (BACTRIM DS) 160-800 mg per tablet Take 1 Tab by mouth two (2) times a day for 10 days. 10/17/19 10/27/19  Joana Bates MD   trimethoprim-sulfamethoxazole (BACTRIM DS) 160-800 mg per tablet Take 1 Tab by mouth two (2) times a day for 10 days. 10/17/19 10/27/19  Joana Bates MD   atorvastatin (LIPITOR) 20 mg tablet Take 1 Tab by mouth nightly. 10/14/19   Jason De Souza MD   amLODIPine (NORVASC) 5 mg tablet Take 1 Tab by mouth two (2) times a day. 8/11/19   Geneva Martinez MD   losartan (COZAAR) 100 mg tablet Take 1 Tab by mouth daily. 8/11/19   Anthony Mckenna MD   budesonide (ENTOCORT EC) 3 mg capsule Take 3 Caps by mouth daily. 8/11/19   Geneva Martinez MD   camphor-menthol [facility]) 0.5-0.5 % lotion Apply  to affected area two (2) times a day. 8/11/19   Geneva Martinez MD   cholestyramine-aspartame (QUESTRAN LIGHT) 4 gram packet Take 1 Packet by mouth daily (with lunch). 8/11/19   Geneva Martinez MD   psyllium husk-aspartame (METAMUCIL FIBER) 3.4 gram pwpk packet Take 1 Packet by mouth two (2) times a day. 8/11/19   Geneva Martinez MD   L. acidoph & paracasei- S therm- Bifido (GERALDINE-Q/RISAQUAD) 8 billion cell cap cap Take 1 Cap by mouth daily. 8/11/19   Geneva Martinez MD   hydrALAZINE (APRESOLINE) 10 mg tablet Take 1 Tab by mouth three (3) times daily as needed for Other (Systolic BP >639). 8/11/19   Geneva Martinez MD   metoprolol tartrate (LOPRESSOR) 50 mg tablet TAKE ONE TABLET BY MOUTH TWICE A DAY 11/9/18   Catalina Snyder MD   chlorthalidone (HYGROTEN) 25 mg tablet Take 25 mg by mouth daily. 10/23/18   Provider, Historical   aspirin 81 mg chewable tablet Take 81 mg by mouth daily. Provider, Historical   doxazosin (CARDURA) 1 mg tablet Take  by mouth daily. Provider, Historical   naproxen (NAPROSYN) 250 mg tablet Take  by mouth two (2) times daily as needed.     Provider, Historical       Review of Systems:  (bold if positive, if negative)    Gen:  fatigueEyes:  ENT:  CVS:  Pulm:  GI:   nausea, emesis, diarrheaGU: MS:  Skin:  Psych:  Endo:  Hem:  Renal:  Neuro:  weakness,      Objective:      VITALS:    Vital signs reviewed; most recent are:    Visit Vitals  /61   Pulse 90   Temp 98.1 °F (36.7 °C)   Resp 15   Ht 5' 5\" (1.651 m)   Wt 60.3 kg (133 lb)   SpO2 97%   BMI 22.13 kg/m²     SpO2 Readings from Last 6 Encounters:   10/19/19 97%   10/17/19 98%   08/11/19 97%   10/31/18 97%   02/07/18 98%   06/13/17 98%        No intake or output data in the 24 hours ending 10/19/19 1416     Exam:     Physical Exam:    Gen:  Thin, frail, in no acute distress  HEENT:  Pink conjunctivae, PERRL, hearing intact to voice, moist mucous membranes  Neck:  Supple, without masses, thyroid non-tender  Resp:  No accessory muscle use, clear breath sounds without wheezes rales or rhonchi  Card:  No murmurs, normal S1, S2 without thrills, bruits or peripheral edema  Abd:  Soft, non-tender, non-distended, normoactive bowel sounds are present, no mass  Lymph:  No cervical or inguinal adenopathy  Musc:  No cyanosis or clubbing  Skin:  No rashes or ulcers, skin turgor is good  Neuro:  Cranial nerves are grossly intact, general motor weakness, follows commands vaguely  Psych:  Poor insight, oriented to person     Labs:    Recent Labs     10/19/19  1015   WBC 16.1*   HGB 11.7   HCT 31.5*   *     Recent Labs     10/19/19  1015 10/17/19  0606   * 124*   K 2.4* 3.5   CL 73* 93*   CO2 26 23   * 130*   BUN 6 9   CREA 0.88 0.72   CA 8.7 9.1   MG  --  1.2*   PHOS  --  2.3*   ALB 3.5 3.1*   TBILI 0.6 0.4   SGOT 19 18   ALT 21 21     Lab Results   Component Value Date/Time    Glucose (POC) 112 (H) 06/21/2011 06:35 AM    Glucose (POC) 111 (H) 06/20/2011 09:01 PM     No results for input(s): PH, PCO2, PO2, HCO3, FIO2 in the last 72 hours. No results for input(s): INR, INREXT in the last 72 hours. All Micro Results     Procedure Component Value Units Date/Time    ENTERIC BACT.  Parag White [[id number]]     Order Status:  Sent Specimen:  Stool OVA & Misha Kee [[id number]]     Order Status:  Sent Specimen:  Stool     C. DIFFICILE AG & TOXIN A/B [[id number]]     Order Status:  Sent Specimen:  Stool     URINE CULTURE HOLD SAMPLE [[id number]] Collected:  10/19/19 1243    Order Status:  Completed Specimen:  Urine from Serum Updated:  10/19/19 1317     Urine culture hold       URINE ON HOLD IN MICROBIOLOGY DEPT FOR 3 DAYS. IF UNPRESERVED URINE IS SUBMITTED, IT CANNOT BE USED FOR ADDITIONAL TESTING AFTER 24 HRS, RECOLLECTION WILL BE REQUIRED. CULTURE, BLOOD, PERIPHERAL [[id number]] Collected:  10/19/19 1128    Order Status:  Completed Specimen:  Blood Updated:  10/19/19 1208    CULTURE, BLOOD [[id number]] Collected:  10/19/19 1015    Order Status:  Completed Specimen:  Blood Updated:  10/19/19 1040    CULTURE, URINE [[id number]]     Order Status:  Sent Specimen:  Cath Urine     CULTURE, URINE [[id number]]     Order Status:  Canceled Specimen:  Voided Urine              Assessment and Plan:      Hyponatremia / Dehydration / Hypokalemia - POA, due to GI loss, poor PO intake, and Rx of Bactrim 2 days ago. Admit ot ICU.  q4hr BMP. Renal consult. Goal Na up 6 mmol q24hr. She got 2L NS I ER already. Hydrate now with 0.45 NS. Replete K. Acute metabolic encephalopathy - POA, presumed due to hyponatremia. Monitor. Nausea vomiting and diarrhea - POA and persistent for weeks. Follow by Dr Mone Troncoso. Consult him. Continue floraQ but hold entocort, questran, metamucil for now. Check stool studies. Coronary atherosclerosis of native coronary artery S/P CABG x 3 / Essential hypertension, benign - Appears stable. . Continue metoprolol and ASA, but hold chlorthalidone, doxazosin, norvasc, losartan, hydralazine until better. Pure hypercholesterolemia - Hold statin until eating better    Macular degeneration - Supportive care. Leukocytosis - Unclear etiology. Check stool studies. No Abx until Dx    UTI - repeated UA with cath urine.   It is normal.  I do not think she ever had a UTI. More likely contaminated sample from diarrhea      Telemetry reviewed:   normal sinus rhythm    Risk of deterioration: high      Total time spent with patient: 70 Minutes Intensive care. High risk of deterioration and seizure due to hyponatremia. I reviewed chart, notes, data and current medications in the medical record.   I have examined and treated the patient at bedside during this 70 minute period, from 3:00 to 4:10 PM.  Signed out to Dr Venecia Pimentel by  at [facility] discussed with: Patient, Nursing Staff and >50% of time spent in counseling and coordination of care    Discussed:  Care Plan       ___________________________________________________    Attending Physician: Talha Muñoz MD

## 2019-10-19 NOTE — ED PROVIDER NOTES
80 y.o. female with past medical history significant for coronary atherosclerosis of native coronary artery, CABG, HTN, dyslipidemia, diverticulitis, macular degeneration, colitis, and carotid arterial disease who presents from EMS with chief complaint of vomiting. Pt daughter reports N/V since last night. Pt's daughter also notes increased confusion and generalized weakness. Per daughter, Pt also c/o diarrhea for 3 weeks. Pt notes associated abdominal pain and tremor. Per daughter, Pt has a hx of chronic diarrhea. She is followed by Dr. Yola Mercer (Gastroenterology). Per daughter, Pt's urine was positive for enterococcus this week. Pt was initially being treated with keflex, which was changed to bactrim after her ED visit on 10/17/19. Pt's stool sample was negative for C-diff at her PCP's office a few days ago. Pt daughter denies hx of A-fib, fever, or cough. There are no other acute medical concerns at this time. PCP: Gladys Josue MD    Note written by Dontae Cassidy, as dictated by Willis Curiel MD 10:25 AM        The history is provided by the patient and a relative. No  was used. Past Medical History:   Diagnosis Date    Carotid arterial disease ([facility] [address]     mild 0-49%    Coronary atherosclerosis of native coronary artery     Cath 2011 with multivessel cad    Cough due to ACE inhibitor     Diverticulitis     Dyslipidemia     Hypertension     Macular degeneration     S/P CABG (coronary artery bypass graft)     6-19-11 CABG - OFF PUMP - CABGx 3, lima, eric, epi aortic ultrasound - off pump, rightt endoscopic vein harvest; [id number] [place] [address] Dr to LAD, Svg Ao to OM1 and OM2       No past surgical history on file. No family history on file.     Social History     Socioeconomic History    Marital status:      Spouse name: Not on file    Number of children: Not on file    Years of education: Not on file    Highest education level: Not on file Occupational History    Not on file   Social Needs    Financial resource strain: Not on file    Food insecurity:     Worry: Not on file     Inability: Not on file    Transportation needs:     Medical: Not on file     Non-medical: Not on file   Tobacco Use    Smoking status: Never Smoker    Smokeless tobacco: Never Used   Substance and Sexual Activity    Alcohol use: Yes     Alcohol/week: 1.0 standard drinks     Types: 1 Standard drinks or equivalent per week     Comment: Occasional    Drug use: No    Sexual activity: Not Currently   Lifestyle    Physical activity:     Days per week: Not on file     Minutes per session: Not on file    Stress: Not on file   Relationships    Social connections:     Talks on phone: Not on file     Gets together: Not on file     Attends Mandaen service: Not on file     Active member of club or organization: Not on file     Attends meetings of clubs or organizations: Not on file     Relationship status: Not on file    Intimate partner violence:     Fear of current or ex partner: Not on file     Emotionally abused: Not on file     Physically abused: Not on file     Forced sexual activity: Not on file   Other Topics Concern    Not on file   Social History Narrative    Not on file         ALLERGIES: Ciprofloxacin; Codeine; Lisinopril; and Lovastatin    Review of Systems   Constitutional: Positive for activity change. Negative for chills and fever. HENT: Negative for drooling and nosebleeds. Eyes: Negative for pain and itching. Respiratory: Negative for choking and stridor. Cardiovascular: Negative for leg swelling. Gastrointestinal: Positive for abdominal pain, diarrhea, nausea and vomiting. Negative for abdominal distention and rectal pain. Endocrine: Negative for heat intolerance and polyphagia. Genitourinary: Negative for enuresis and genital sores. Musculoskeletal: Negative for arthralgias and joint swelling. Skin: Negative for color change. Allergic/Immunologic: Negative for immunocompromised state. Neurological: Positive for weakness (Generalized). Negative for tremors and speech difficulty. Hematological: Negative for adenopathy. Psychiatric/Behavioral: Negative for dysphoric mood and sleep disturbance. Vitals:    10/19/19 1004 10/19/19 1019 10/19/19 1030   BP: 154/86  (!) 148/91   Pulse: (!) 131  (!) 123   Resp: 24  (!) 31   Temp: 98.1 °F (36.7 °C)     SpO2: 95% 100% 100%   Weight: 60.3 kg (133 lb)     Height: 5' 5\" (1.651 m)              Physical Exam   Constitutional: She appears well-developed and well-nourished. HENT:   Head: Normocephalic. Nose: Nose normal.   Eyes: Conjunctivae and EOM are normal.   Neck: Normal range of motion. Neck supple. Cardiovascular: Regular rhythm and normal heart sounds. Pulmonary/Chest: Effort normal. No respiratory distress. Abdominal: Soft. She exhibits no distension. There is tenderness. There is no CVA tenderness. Mild tenderness to palpation in the lower abdomen. No CVA tenderness to palpation. Musculoskeletal: Normal range of motion. She exhibits no deformity. Neurological: She is alert. Coordination normal.   Skin: Skin is warm and dry. Psychiatric: She has a normal mood and affect. Her behavior is normal.   Nursing note and vitals reviewed.    Note written by Dontae Huddleston, as dictated by Heavenly Kinney MD 10:25 AM      MDM  Number of Diagnoses or Management Options  Acute cystitis with hematuria:   Chronic diarrhea:   Hypokalemia:   Hyponatremia:   Sepsis, due to unspecified organism, unspecified whether acute organ dysfunction present [facility]):      Amount and/or Complexity of Data Reviewed  Discussion of test results with the performing providers: yes  Decide to obtain previous medical records or to obtain history from someone other than the patient: yes  Obtain history from someone other than the patient: yes  Review and summarize past medical records: yes  Discuss the patient with other providers: yes  Independent visualization of images, tracings, or specimens: yes      ED Course as of Oct 19 1131   Sat Oct 19, 2019   1117 Electrolyte abnormalities noted. Potassium repleted. Pt remains HD stable. Will continue to monitor.     [AL]   9603 Pt currently on outpatient abx for UTI managed by PMD. Growing enterococcus per daughter and confirmed per records. Ongoing diarrhea managed by GI Dr Smitty Snellen. Did not take metoprolol dose this AM and no prior hx of afib per old records. [AL]   200 Daughter noting generalized weakness and confusion over last week with nausea and vomiting that began last night, prompting visit to Ed. Likely septic and zosyn administered along with fluids. No pressor requirement at this time. [AL]   7686 ED EKG interpretation:  Rhythm: atrial fib; and irregular. Rate (approx.): 135; Axis: normal; ST/T wave: ST depression in lateral leads;  no STEMI. Pt with no chest pain. Rate likely 2/2 to sepsis. ST depressions noted which could be rate related. Will continue to monitor.         [AL]      ED Course User Index  [AL] Geronimo Rasmussen MD       Procedures    Sepsis Re-Assessment Documentation:     Date: 10/19/2019   Time: 11:06 AM      Vital Signs  Level of Consciousness: Alert  Temp: 98.1 °F (36.7 °C)  Temp Source: Oral  Pulse (Heart Rate): (!) 123  Heart Rate Source: Monitor  Cardiac Rhythm: Sinus tachycardia, Atrial fibrillation  Resp Rate: (!) 31  BP: (!) 148/91  MAP (Monitor): 107  MAP (Calculated): 109  BP 1 Location: Right arm  BP 1 Method: Automatic  BP Patient Position: At rest  MEWS Score: 5      Hospitalist Alcides for Admission  2:01 PM    ED Room Number: PK79/48  Patient Name and age:  Yany Mccollum 80 y.o.  female  Working Diagnosis:   1. Sepsis, due to unspecified organism, unspecified whether acute organ dysfunction present ([facility] [address]    2. Acute cystitis with hematuria    3. Hyponatremia    4. Hypokalemia    5.  Chronic diarrhea Readmission: no  Isolation Requirements:  no  Recommended Level of Care:  ICU  Code Status:  Full  Other: sepsis with recent UTI as outpatient. Hyponatremia and hypokalemia. Chronic diarrhea managed by Dr Marvin Bah. Patient is being admitted to the hospital.  The results of their tests and reasons for their admission have been discussed with them and/or available family. They convey agreement and understanding for the need to be admitted and for their admission diagnosis.

## 2019-10-19 NOTE — PROGRESS NOTES
[facility]: MED RECONCILIATION    Comments/Recommendations: The family member familiar with the patient's medications has left the hospital for an hour to run some errands. The family member present requests the pharmacist return to the room in about an hour.     Thank you,      Nate Noonan, PharmD, BCPS

## 2019-10-19 NOTE — PROGRESS NOTES
1900: Bedside and Verbal shift change report given to Cecilia Knowles RN (oncoming nurse) by Christofer Virgen RN (offgoing nurse). Report included the following information SBAR, Kardex, Procedure Summary, Intake/Output, MAR, Recent Results, Med Rec Status and Cardiac Rhythm NSR .     1945: Spoke with Dr. Hurtado Staff, will gave all Mg and K+ infusions and then will draw labs one hour post and continue to recheck q6h after that. Will call in lab results to MD.     2000: Shift assessment noted, see flow sheet. Patient is oriented x4, VSS, NSR on monitor. Assisted with bedpan and helped patient brush her teeth. No complaints of pain, does complain of difficultly swallowing, stating she is unable to take pills and becomes nauseous. 2200: Patient resting comfortably in bed.  at bedside. 0000: No new changes at this time. 0350: Na 117, Dr. Hurtado Staff aware of am labs. Order to switch Miranda Miller of D51/2NS to [employer], see MAR. Will recheck labs q6h.     0700: Bedside and Verbal shift change report given to Christofer Virgen RN (oncoming nurse) by Cecilia Knowles RN (offgoing nurse). Report included the following information SBAR, Kardex, Procedure Summary, Intake/Output, MAR, Recent Results, Med Rec Status and Cardiac Rhythm NSR .

## 2019-10-20 LAB
ANION GAP SERPL CALC-SCNC: 6 MMOL/L (ref 5–15)
ANION GAP SERPL CALC-SCNC: 6 MMOL/L (ref 5–15)
ANION GAP SERPL CALC-SCNC: 7 MMOL/L (ref 5–15)
ANION GAP SERPL CALC-SCNC: 9 MMOL/L (ref 5–15)
ATRIAL RATE: 129 BPM
BACTERIA SPEC CULT: NORMAL
BUN SERPL-MCNC: 4 MG/DL (ref 6–20)
BUN SERPL-MCNC: 4 MG/DL (ref 6–20)
BUN SERPL-MCNC: 5 MG/DL (ref 6–20)
BUN SERPL-MCNC: 5 MG/DL (ref 6–20)
BUN/CREAT SERPL: 6 (ref 12–20)
BUN/CREAT SERPL: 7 (ref 12–20)
BUN/CREAT SERPL: 8 (ref 12–20)
BUN/CREAT SERPL: 9 (ref 12–20)
C DIFF GDH STL QL: NEGATIVE
C DIFF TOX A+B STL QL IA: NEGATIVE
CALCIUM SERPL-MCNC: 7.3 MG/DL (ref 8.5–10.1)
CALCIUM SERPL-MCNC: 7.4 MG/DL (ref 8.5–10.1)
CALCIUM SERPL-MCNC: 7.5 MG/DL (ref 8.5–10.1)
CALCIUM SERPL-MCNC: 7.7 MG/DL (ref 8.5–10.1)
CALCULATED R AXIS, ECG10: 16 DEGREES
CALCULATED T AXIS, ECG11: -179 DEGREES
CAMPYLOBACTER SPECIES, DNA: NEGATIVE
CC UR VC: NORMAL
CHLORIDE SERPL-SCNC: 83 MMOL/L (ref 97–108)
CHLORIDE SERPL-SCNC: 84 MMOL/L (ref 97–108)
CHLORIDE SERPL-SCNC: 88 MMOL/L (ref 97–108)
CHLORIDE SERPL-SCNC: 89 MMOL/L (ref 97–108)
CO2 SERPL-SCNC: 24 MMOL/L (ref 21–32)
CO2 SERPL-SCNC: 26 MMOL/L (ref 21–32)
CO2 SERPL-SCNC: 26 MMOL/L (ref 21–32)
CO2 SERPL-SCNC: 27 MMOL/L (ref 21–32)
COMMENT, HOLDF: NORMAL
CREAT SERPL-MCNC: 0.55 MG/DL (ref 0.55–1.02)
CREAT SERPL-MCNC: 0.58 MG/DL (ref 0.55–1.02)
CREAT SERPL-MCNC: 0.63 MG/DL (ref 0.55–1.02)
CREAT SERPL-MCNC: 0.63 MG/DL (ref 0.55–1.02)
CREAT UR-MCNC: 25.7 MG/DL
DIAGNOSIS, 93000: NORMAL
ENTEROTOXIGEN E COLI, DNA: NEGATIVE
ERYTHROCYTE [DISTWIDTH] IN BLOOD BY AUTOMATED COUNT: 13.2 % (ref 11.5–14.5)
GLUCOSE SERPL-MCNC: 121 MG/DL (ref 65–100)
GLUCOSE SERPL-MCNC: 124 MG/DL (ref 65–100)
GLUCOSE SERPL-MCNC: 132 MG/DL (ref 65–100)
GLUCOSE SERPL-MCNC: 138 MG/DL (ref 65–100)
HCT VFR BLD AUTO: 27.3 % (ref 35–47)
HGB BLD-MCNC: 9.9 G/DL (ref 11.5–16)
INTERPRETATION: NORMAL
MAGNESIUM SERPL-MCNC: 1.7 MG/DL (ref 1.6–2.4)
MAGNESIUM SERPL-MCNC: 1.8 MG/DL (ref 1.6–2.4)
MAGNESIUM SERPL-MCNC: 1.9 MG/DL (ref 1.6–2.4)
MAGNESIUM SERPL-MCNC: 2 MG/DL (ref 1.6–2.4)
MCH RBC QN AUTO: 30.3 PG (ref 26–34)
MCHC RBC AUTO-ENTMCNC: 36.3 G/DL (ref 30–36.5)
MCV RBC AUTO: 83.5 FL (ref 80–99)
NRBC # BLD: 0 K/UL (ref 0–0.01)
NRBC BLD-RTO: 0 PER 100 WBC
OSMOLALITY UR: 331 MOSM/KG H2O
P SHIGELLOIDES DNA STL QL NAA+PROBE: NEGATIVE
PHOSPHATE SERPL-MCNC: 1 MG/DL (ref 2.6–4.7)
PHOSPHATE SERPL-MCNC: 2.7 MG/DL (ref 2.6–4.7)
PLATELET # BLD AUTO: 350 K/UL (ref 150–400)
PMV BLD AUTO: 8.5 FL (ref 8.9–12.9)
POTASSIUM SERPL-SCNC: 3.4 MMOL/L (ref 3.5–5.1)
POTASSIUM SERPL-SCNC: 3.4 MMOL/L (ref 3.5–5.1)
POTASSIUM SERPL-SCNC: 3.8 MMOL/L (ref 3.5–5.1)
POTASSIUM SERPL-SCNC: 4 MMOL/L (ref 3.5–5.1)
POTASSIUM UR-SCNC: 37 MMOL/L
PROT UR-MCNC: 87 MG/DL (ref 0–11.9)
PROT/CREAT UR-RTO: 3.4
Q-T INTERVAL, ECG07: 328 MS
QRS DURATION, ECG06: 90 MS
QTC CALCULATION (BEZET), ECG08: 492 MS
RBC # BLD AUTO: 3.27 M/UL (ref 3.8–5.2)
SALMONELLA SPECIES, DNA: NEGATIVE
SAMPLES BEING HELD,HOLD: NORMAL
SERVICE CMNT-IMP: NORMAL
SHIGA TOXIN PRODUCING, DNA: NEGATIVE
SHIGELLA SP+EIEC IPAH STL QL NAA+PROBE: NEGATIVE
SODIUM SERPL-SCNC: 116 MMOL/L (ref 136–145)
SODIUM SERPL-SCNC: 117 MMOL/L (ref 136–145)
SODIUM SERPL-SCNC: 120 MMOL/L (ref 136–145)
SODIUM SERPL-SCNC: 122 MMOL/L (ref 136–145)
SODIUM UR-SCNC: 90 MMOL/L
VENTRICULAR RATE, ECG03: 135 BPM
VIBRIO SPECIES, DNA: NEGATIVE
WBC # BLD AUTO: 17.2 K/UL (ref 3.6–11)
WBC #/AREA STL HPF: NORMAL /HPF (ref 0–4)
Y. ENTEROCOLITICA, DNA: NEGATIVE

## 2019-10-20 PROCEDURE — 83735 ASSAY OF MAGNESIUM: CPT

## 2019-10-20 PROCEDURE — 74011250636 HC RX REV CODE- 250/636: Performed by: INTERNAL MEDICINE

## 2019-10-20 PROCEDURE — 83935 ASSAY OF URINE OSMOLALITY: CPT

## 2019-10-20 PROCEDURE — 80048 BASIC METABOLIC PNL TOTAL CA: CPT

## 2019-10-20 PROCEDURE — 74011250637 HC RX REV CODE- 250/637: Performed by: FAMILY MEDICINE

## 2019-10-20 PROCEDURE — 65610000006 HC RM INTENSIVE CARE

## 2019-10-20 PROCEDURE — 84100 ASSAY OF PHOSPHORUS: CPT

## 2019-10-20 PROCEDURE — 84133 ASSAY OF URINE POTASSIUM: CPT

## 2019-10-20 PROCEDURE — 74011250637 HC RX REV CODE- 250/637: Performed by: INTERNAL MEDICINE

## 2019-10-20 PROCEDURE — 77030038269 HC DRN EXT URIN PURWCK BARD -A

## 2019-10-20 PROCEDURE — 84300 ASSAY OF URINE SODIUM: CPT

## 2019-10-20 PROCEDURE — 36415 COLL VENOUS BLD VENIPUNCTURE: CPT

## 2019-10-20 PROCEDURE — 84156 ASSAY OF PROTEIN URINE: CPT

## 2019-10-20 PROCEDURE — 74011000250 HC RX REV CODE- 250: Performed by: INTERNAL MEDICINE

## 2019-10-20 PROCEDURE — 85027 COMPLETE CBC AUTOMATED: CPT

## 2019-10-20 RX ORDER — SODIUM CHLORIDE AND POTASSIUM CHLORIDE .9; .15 G/100ML; G/100ML
SOLUTION INTRAVENOUS CONTINUOUS
Status: DISCONTINUED | OUTPATIENT
Start: 2019-10-20 | End: 2019-10-25

## 2019-10-20 RX ORDER — BUDESONIDE 3 MG/1
9 CAPSULE, COATED PELLETS ORAL
Status: DISCONTINUED | OUTPATIENT
Start: 2019-10-20 | End: 2019-10-29 | Stop reason: HOSPADM

## 2019-10-20 RX ORDER — HYDRALAZINE HYDROCHLORIDE 20 MG/ML
10 INJECTION INTRAMUSCULAR; INTRAVENOUS
Status: DISCONTINUED | OUTPATIENT
Start: 2019-10-20 | End: 2019-10-29 | Stop reason: HOSPADM

## 2019-10-20 RX ORDER — COLESTIPOL HYDROCHLORIDE 5 G/5G
3 GRANULE, FOR SUSPENSION ORAL
Status: DISCONTINUED | OUTPATIENT
Start: 2019-10-20 | End: 2019-10-29 | Stop reason: HOSPADM

## 2019-10-20 RX ORDER — COLESTIPOL HYDROCHLORIDE 5 G/5G
2 GRANULE, FOR SUSPENSION ORAL DAILY
Status: DISCONTINUED | OUTPATIENT
Start: 2019-10-20 | End: 2019-10-29 | Stop reason: HOSPADM

## 2019-10-20 RX ORDER — AMLODIPINE BESYLATE 5 MG/1
5 TABLET ORAL
Status: DISCONTINUED | OUTPATIENT
Start: 2019-10-20 | End: 2019-10-21

## 2019-10-20 RX ORDER — PROCHLORPERAZINE EDISYLATE 5 MG/ML
5 INJECTION INTRAMUSCULAR; INTRAVENOUS
Status: DISCONTINUED | OUTPATIENT
Start: 2019-10-20 | End: 2019-10-29 | Stop reason: HOSPADM

## 2019-10-20 RX ORDER — DOXAZOSIN 2 MG/1
1 TABLET ORAL DAILY
Status: DISCONTINUED | OUTPATIENT
Start: 2019-10-20 | End: 2019-10-29 | Stop reason: HOSPADM

## 2019-10-20 RX ADMIN — POTASSIUM CHLORIDE 10 MEQ: 10 INJECTION, SOLUTION INTRAVENOUS at 01:09

## 2019-10-20 RX ADMIN — FOLIC ACID: 5 INJECTION, SOLUTION INTRAMUSCULAR; INTRAVENOUS; SUBCUTANEOUS at 04:56

## 2019-10-20 RX ADMIN — ENOXAPARIN SODIUM 40 MG: 40 INJECTION SUBCUTANEOUS at 15:19

## 2019-10-20 RX ADMIN — ASPIRIN 81 MG 81 MG: 81 TABLET ORAL at 20:15

## 2019-10-20 RX ADMIN — SODIUM CHLORIDE: 900 INJECTION, SOLUTION INTRAVENOUS at 09:28

## 2019-10-20 RX ADMIN — METOPROLOL TARTRATE 50 MG: 50 TABLET, FILM COATED ORAL at 17:38

## 2019-10-20 RX ADMIN — METOPROLOL TARTRATE 50 MG: 50 TABLET, FILM COATED ORAL at 09:00

## 2019-10-20 RX ADMIN — COLESTIPOL HYDROCHLORIDE 2.5 G: 5 GRANULE, FOR SUSPENSION ORAL at 20:16

## 2019-10-20 RX ADMIN — AMLODIPINE BESYLATE 5 MG: 5 TABLET ORAL at 20:15

## 2019-10-20 RX ADMIN — Medication 1 CAPSULE: at 09:26

## 2019-10-20 RX ADMIN — LOSARTAN POTASSIUM 100 MG: 50 TABLET, FILM COATED ORAL at 09:27

## 2019-10-20 RX ADMIN — PROCHLORPERAZINE EDISYLATE 5 MG: 5 INJECTION INTRAMUSCULAR; INTRAVENOUS at 09:26

## 2019-10-20 RX ADMIN — DOXAZOSIN 1 MG: 2 TABLET ORAL at 12:42

## 2019-10-20 RX ADMIN — COLESTIPOL HYDROCHLORIDE 2.5 G: 5 GRANULE, FOR SUSPENSION ORAL at 11:45

## 2019-10-20 RX ADMIN — SODIUM CHLORIDE AND POTASSIUM CHLORIDE: 9; 1.49 INJECTION, SOLUTION INTRAVENOUS at 17:38

## 2019-10-20 RX ADMIN — BUDESONIDE 9 MG: 3 CAPSULE, GELATIN COATED ORAL at 09:26

## 2019-10-20 NOTE — PROGRESS NOTES
Daily Progress Note: 10/20/2019  Jean Paul Bennett MD    Assessment/Plan:   Hyponatremia / Dehydration / Hypokalemia - POA, due to GI loss, poor PO intake, and Rx of Bactrim 2 days ago. ICU initally. BMP per renal.  Renal consulted. Replete K.     Acute metabolic encephalopathy - POA, presumed due to hyponatremia. Monitor.     Nausea vomiting and diarrhea - POA and persistent for weeks. Follow by Dr Vitaliy Barnes. Consulted GI       Coronary atherosclerosis of native coronary artery S/P CABG x 3 / Essential hypertension, benign - Appears stable. Continue metoprolol and ASA, resume BP meds as able.      Pure hypercholesterolemia - Hold statin until eating better     Macular degeneration - Supportive care.     Leukocytosis - Unclear etiology. Check stool studies. No Abx until Dx     UTI unlikely - awaiting culture - repeated UA with cath urine.   It is normal.  more likely contaminated sample from diarrhea        Problem List:  Problem List as of 10/20/2019 Date Reviewed: 10/19/2019          Codes Class Noted - Resolved    Leukocytosis ICD-10-CM: D72.829  ICD-9-CM: 288.60  10/19/2019 - Present        Acute metabolic encephalopathy UIX-49-HY: G93.41  ICD-9-CM: 348.31  10/19/2019 - Present        Nausea vomiting and diarrhea ICD-10-CM: R11.2, R19.7  ICD-9-CM: 787.91, 787.01  10/19/2019 - Present        Dehydration ICD-10-CM: E86.0  ICD-9-CM: 276.51  8/3/2019 - Present        * (Principal) Hyponatremia ICD-10-CM: E87.1  ICD-9-CM: 276.1  8/3/2019 - Present        Hypokalemia ICD-10-CM: E87.6  ICD-9-CM: 276.8  8/3/2019 - Present        Macular degeneration ICD-10-CM: H35.30  ICD-9-CM: 362.50  10/30/2013 - Present        Carotid arterial disease (HCC) ICD-10-CM: I73.9  ICD-9-CM: 447.9  Unknown - Present    Overview Signed 10/30/2013 10:11 AM by Zunilda Mead MD     mild 0-49%             Coronary atherosclerosis of native coronary artery ICD-10-CM: I25.10  ICD-9-CM: 414.01  Unknown - Present Overview Signed 2/22/2012 10:44 AM by Sergio Guzmán MD     Cath 2011 with multivessel cad             S/P CABG (coronary artery bypass graft) ICD-10-CM: Z95.1  ICD-9-CM: V45.81  Unknown - Present    Overview Signed 2/22/2012 10:44 AM by Sergio Guzmán MD     1-34-52 CABG - OFF PUMP - CABGx 3, lima, eric, epi aortic ultrasound - off pump, rightt endoscopic vein harvest; [id number] [place] [address] Dr to LAD, Svg Ao to OM1 and OM2             S/P CABG x 3 ICD-10-CM: Z95.1  ICD-9-CM: V45.81  6/17/2011 - Present    Overview Signed 7/26/2011 10:51 AM by Stacey TEAGUE to LAD and SVGs to OM-1 and OM-2. RCA small non-dominant diffusely diseased vessel ungrafted. LV EF at cath was 65%.              Essential hypertension, benign ICD-10-CM: I10  ICD-9-CM: 401.1  12/2/2010 - Present        Pure hypercholesterolemia ICD-10-CM: E78.00  ICD-9-CM: 272.0  12/2/2010 - Present        RESOLVED: UTI (urinary tract infection) ICD-10-CM: N39.0  ICD-9-CM: 599.0  8/3/2019 - 10/19/2019        RESOLVED: Sepsis ([address] ICD-10-CM: A41.9  ICD-9-CM: 038.9, 995.91  8/3/2019 - 10/19/2019        RESOLVED: Fever ICD-10-CM: R50.9  ICD-9-CM: 780.60  8/3/2019 - 10/19/2019        RESOLVED: Left shift ICD-10-CM: D72.89  ICD-9-CM: 288.9  8/3/2019 - 10/19/2019        RESOLVED: Syncope ICD-10-CM: R55  ICD-9-CM: 780.2  8/3/2019 - 10/19/2019        RESOLVED: Anemia ICD-10-CM: D64.9  ICD-9-CM: 285.9  8/3/2019 - 10/19/2019        RESOLVED: Cough due to ACE inhibitor ICD-10-CM: R05, T46.4X5A  ICD-9-CM: 786.2, E942.6  Unknown - 8/3/2019        RESOLVED: Edema ICD-10-CM: R60.9  ICD-9-CM: 782.3  6/3/2012 - 8/3/2019        RESOLVED: Medication adverse effect ICD-10-CM: T50.905A  ICD-9-CM: E947.9  6/3/2012 - 8/3/2019        RESOLVED: Diverticulitis ICD-10-CM: U97.38  ICD-9-CM: 562.11  Unknown - 8/3/2019        RESOLVED: Pre-operative cardiovascular examination, unstable angina ([facility] [address] ICD-10-CM: Z01.810, I20.0  ICD-9-CM: 411.1, V72.81  6/19/2011 - 2011        RESOLVED: Diverticulosis of colon with hemorrhage ICD-10-CM: K57.31  ICD-9-CM: 562.12  2010 - 8/3/2019            Subjective:    80 y.o.  female who presented to the Emergency Department complaining of confusion. Worsening over days. Associated with persistent vomiting and diarrhea, present for weeks. Was in our ER 2 days ago, with weakness and hyponatremia, and sent home on Bactrim. She returns today worse, weak, confused and severe hyponatremia. We will admit her for management. (Dr Reshma Morales)    10/20:  Still having loose stools and nausea. Reports she feels generally \"bad. \"  Na+ up to 117. K+ up to 3.4. Confusion at usual levels per daughter. WBC 17K. Phos low at 1.0 - getting KPhos. Review of Systems:   A comprehensive review of systems was negative except for that written in the HPI. Objective:   Physical Exam:     Visit Vitals  /75   Pulse 65   Temp 98.6 °F (37 °C)   Resp 18   Ht 5' 5\" (1.651 m)   Wt 60.3 kg (133 lb)   SpO2 96%   BMI 22.13 kg/m²      O2 Device: Room air    Temp (24hrs), Av.5 °F (36.9 °C), Min:98.1 °F (36.7 °C), Max:98.8 °F (37.1 °C)    No intake/output data recorded. 10/18 1901 - 10/20 0700  In: 1423.8 [I.V.:1423.8]  Out: 630 [Urine:630]  General:  Alert, cooperative at times, frail appearing, no distress, appears stated age. Head:  Normocephalic, without obvious abnormality, atraumatic. Eyes:  Conjunctivae/corneas clear. PERRL, EOMs intact. Throat: Lips, mucosa, and tongue moist..   Neck: Supple, symmetrical, trachea midline, no adenopathy, thyroid: no enlargement/tenderness/nodules, no carotid bruit and no JVD. Lungs:   Clear to auscultation bilaterally. Chest wall:  No tenderness or deformity. Heart:  Regular rate and rhythm, S1, S2 normal, no murmur, click, rub or gallop. Abdomen:   Soft, with mild generalized tenderness. Bowel sounds active. No masses,  No organomegaly. Extremities: no cyanosis or edema.  No calf tenderness or cords. Skin: Skin color, texture, turgor normal. No rashes or lesions   Neurologic:   Alert and oriented to person. Will follow 1 step commands.  equal.  No cogwheeling or rigidity. Gait not tested at this time. Sensation grossly normal to touch. Gross motor of extremities normal.       Data Review:       Recent Days:  Recent Labs     10/20/19  0304 10/19/19  1015   WBC 17.2* 16.1*   HGB 9.9* 11.7   HCT 27.3* 31.5*    496*     Recent Labs     10/20/19  0304 10/19/19  1638 10/19/19  1537  10/19/19  1015   * 117* 116*   < > 112*   K 3.4* 2.8* 5.5*   < > 2.4*   CL 83* 82* 84*   < > 73*   CO2 27 27 24   < > 26   * 134* 172*   < > 184*   BUN 4* 5* 6   < > 6   CREA 0.63 0.63 0.67   < > 0.88   CA 7.4* 7.5* 6.9*   < > 8.7   MG 2.0 0.9* 0.8*   < >  --    PHOS 1.0*  --   --   --   --    ALB  --   --   --   --  3.5   TBILI  --   --   --   --  0.6   SGOT  --   --   --   --  19   ALT  --   --   --   --  21    < > = values in this interval not displayed. No results for input(s): PH, PCO2, PO2, HCO3, FIO2 in the last 72 hours. 24 Hour Results:  Recent Results (from the past 24 hour(s))   POC LACTIC ACID    Collection Time: 10/19/19 10:14 AM   Result Value Ref Range    Lactic Acid (POC) 2.42 (HH) 0.40 - 2.00 mmol/L   CBC WITH AUTOMATED DIFF    Collection Time: 10/19/19 10:15 AM   Result Value Ref Range    WBC 16.1 (H) 3.6 - 11.0 K/uL    RBC 3.80 3.80 - 5.20 M/uL    HGB 11.7 11.5 - 16.0 g/dL    HCT 31.5 (L) 35.0 - 47.0 %    MCV 82.9 80.0 - 99.0 FL    MCH 30.8 26.0 - 34.0 PG    MCHC 37.1 (H) 30.0 - 36.5 g/dL    RDW 13.1 11.5 - 14.5 %    PLATELET 680 (H) 003 - 400 K/uL    MPV 8.8 (L) 8.9 - 12.9 FL    NRBC 0.0 0  WBC    ABSOLUTE NRBC 0.00 0.00 - 0.01 K/uL    NEUTROPHILS 72 32 - 75 %    LYMPHOCYTES 20 12 - 49 %    MONOCYTES 6 5 - 13 %    EOSINOPHILS 0 0 - 7 %    BASOPHILS 0 0 - 1 %    IMMATURE GRANULOCYTES 1 (H) 0.0 - 0.5 %    ABS. NEUTROPHILS 11.6 (H) 1.8 - 8.0 K/UL    ABS. LYMPHOCYTES 3.3 0.8 - 3.5 K/UL    ABS. MONOCYTES 1.0 0.0 - 1.0 K/UL    ABS. EOSINOPHILS 0.1 0.0 - 0.4 K/UL    ABS. BASOPHILS 0.1 0.0 - 0.1 K/UL    ABS. IMM. GRANS. 0.1 (H) 0.00 - 0.04 K/UL    DF AUTOMATED     METABOLIC PANEL, COMPREHENSIVE    Collection Time: 10/19/19 10:15 AM   Result Value Ref Range    Sodium 112 (LL) 136 - 145 mmol/L    Potassium 2.4 (LL) 3.5 - 5.1 mmol/L    Chloride 73 (L) 97 - 108 mmol/L    CO2 26 21 - 32 mmol/L    Anion gap 13 5 - 15 mmol/L    Glucose 184 (H) 65 - 100 mg/dL    BUN 6 6 - 20 MG/DL    Creatinine 0.88 0.55 - 1.02 MG/DL    BUN/Creatinine ratio 7 (L) 12 - 20      GFR est AA >60 >60 ml/min/1.73m2    GFR est non-AA >60 >60 ml/min/1.73m2    Calcium 8.7 8.5 - 10.1 MG/DL    Bilirubin, total 0.6 0.2 - 1.0 MG/DL    ALT (SGPT) 21 12 - 78 U/L    AST (SGOT) 19 15 - 37 U/L    Alk. phosphatase 102 45 - 117 U/L    Protein, total 7.8 6.4 - 8.2 g/dL    Albumin 3.5 3.5 - 5.0 g/dL    Globulin 4.3 (H) 2.0 - 4.0 g/dL    A-G Ratio 0.8 (L) 1.1 - 2.2     TROPONIN I    Collection Time: 10/19/19 10:15 AM   Result Value Ref Range    Troponin-I, Qt. <0.05 <0.05 ng/mL   SAMPLES BEING HELD    Collection Time: 10/19/19 10:15 AM   Result Value Ref Range    SAMPLES BEING HELD 1BL,1RED,1SST,1BLDCS     COMMENT        Add-on orders for these samples will be processed based on acceptable specimen integrity and analyte stability, which may vary by analyte. CULTURE, BLOOD    Collection Time: 10/19/19 10:15 AM   Result Value Ref Range    Special Requests: NO SPECIAL REQUESTS      Culture result: NO GROWTH AFTER 18 HOURS     CULTURE, BLOOD, PERIPHERAL    Collection Time: 10/19/19 11:28 AM   Result Value Ref Range    Special Requests: NO SPECIAL REQUESTS      Culture result: NO GROWTH AFTER 17 HOURS     URINE CULTURE HOLD SAMPLE    Collection Time: 10/19/19 12:43 PM   Result Value Ref Range    Urine culture hold        URINE ON HOLD IN MICROBIOLOGY DEPT FOR 3 DAYS.  IF UNPRESERVED URINE IS SUBMITTED, IT CANNOT BE USED FOR ADDITIONAL TESTING AFTER 24 HRS, RECOLLECTION WILL BE REQUIRED.    URINALYSIS W/MICROSCOPIC    Collection Time: 10/19/19 12:43 PM   Result Value Ref Range    Color YELLOW/STRAW      Appearance CLEAR CLEAR      Specific gravity 1.010 1.003 - 1.030      pH (UA) 6.5 5.0 - 8.0      Protein 100 (A) NEG mg/dL    Glucose 100 (A) NEG mg/dL    Ketone TRACE (A) NEG mg/dL    Bilirubin NEGATIVE  NEG      Blood TRACE (A) NEG      Urobilinogen 0.2 0.2 - 1.0 EU/dL    Nitrites NEGATIVE  NEG      Leukocyte Esterase NEGATIVE  NEG      WBC 0-4 0 - 4 /hpf    RBC 0-5 0 - 5 /hpf    Epithelial cells FEW FEW /lpf    Bacteria NEGATIVE  NEG /hpf    Hyaline cast 0-2 0 - 5 /lpf   POC LACTIC ACID    Collection Time: 10/19/19  1:11 PM   Result Value Ref Range    Lactic Acid (POC) 1.68 0.40 - 2.72 mmol/L   METABOLIC PANEL, BASIC    Collection Time: 10/19/19  2:26 PM   Result Value Ref Range    Sodium 115 (LL) 136 - 145 mmol/L    Potassium HEMOLYZED,RECOLLECT REQUESTED 3.5 - 5.1 mmol/L    Chloride 82 (L) 97 - 108 mmol/L    CO2 24 21 - 32 mmol/L    Anion gap 9 5 - 15 mmol/L    Glucose 129 (H) 65 - 100 mg/dL    BUN 6 6 - 20 MG/DL    Creatinine 0.65 0.55 - 1.02 MG/DL    BUN/Creatinine ratio 9 (L) 12 - 20      GFR est AA >60 >60 ml/min/1.73m2    GFR est non-AA >60 >60 ml/min/1.73m2    Calcium 7.6 (L) 8.5 - 10.1 MG/DL   MAGNESIUM    Collection Time: 10/19/19  2:26 PM   Result Value Ref Range    Magnesium HEMOLYZED,RECOLLECT REQUESTED 1.6 - 2.4 mg/dL   OCCULT BLOOD, STOOL    Collection Time: 10/19/19  2:26 PM   Result Value Ref Range    Occult blood, stool NEGATIVE  NEG     METABOLIC PANEL, BASIC    Collection Time: 10/19/19  3:37 PM   Result Value Ref Range    Sodium 116 (LL) 136 - 145 mmol/L    Potassium 5.5 (H) 3.5 - 5.1 mmol/L    Chloride 84 (L) 97 - 108 mmol/L    CO2 24 21 - 32 mmol/L    Anion gap 8 5 - 15 mmol/L    Glucose 172 (H) 65 - 100 mg/dL    BUN 6 6 - 20 MG/DL    Creatinine 0.67 0.55 - 1.02 MG/DL    BUN/Creatinine ratio 9 (L) 12 - 20 GFR est AA >60 >60 ml/min/1.73m2    GFR est non-AA >60 >60 ml/min/1.73m2    Calcium 6.9 (L) 8.5 - 10.1 MG/DL   MAGNESIUM    Collection Time: 10/19/19  3:37 PM   Result Value Ref Range    Magnesium 0.8 (LL) 1.6 - 2.4 mg/dL   METABOLIC PANEL, BASIC    Collection Time: 10/19/19  4:38 PM   Result Value Ref Range    Sodium 117 (LL) 136 - 145 mmol/L    Potassium 2.8 (L) 3.5 - 5.1 mmol/L    Chloride 82 (L) 97 - 108 mmol/L    CO2 27 21 - 32 mmol/L    Anion gap 8 5 - 15 mmol/L    Glucose 134 (H) 65 - 100 mg/dL    BUN 5 (L) 6 - 20 MG/DL    Creatinine 0.63 0.55 - 1.02 MG/DL    BUN/Creatinine ratio 8 (L) 12 - 20      GFR est AA >60 >60 ml/min/1.73m2    GFR est non-AA >60 >60 ml/min/1.73m2    Calcium 7.5 (L) 8.5 - 10.1 MG/DL   MAGNESIUM    Collection Time: 10/19/19  4:38 PM   Result Value Ref Range    Magnesium 0.9 (LL) 1.6 - 2.4 mg/dL   SAMPLES BEING HELD    Collection Time: 10/19/19  4:38 PM   Result Value Ref Range    SAMPLES BEING HELD 1lav     COMMENT        Add-on orders for these samples will be processed based on acceptable specimen integrity and analyte stability, which may vary by analyte.    CBC W/O DIFF    Collection Time: 10/20/19  3:04 AM   Result Value Ref Range    WBC 17.2 (H) 3.6 - 11.0 K/uL    RBC 3.27 (L) 3.80 - 5.20 M/uL    HGB 9.9 (L) 11.5 - 16.0 g/dL    HCT 27.3 (L) 35.0 - 47.0 %    MCV 83.5 80.0 - 99.0 FL    MCH 30.3 26.0 - 34.0 PG    MCHC 36.3 30.0 - 36.5 g/dL    RDW 13.2 11.5 - 14.5 %    PLATELET 505 239 - 065 K/uL    MPV 8.5 (L) 8.9 - 12.9 FL    NRBC 0.0 0  WBC    ABSOLUTE NRBC 0.00 0.00 - 0.01 K/uL   PHOSPHORUS    Collection Time: 10/20/19  3:04 AM   Result Value Ref Range    Phosphorus 1.0 (L) 2.6 - 4.7 MG/DL   METABOLIC PANEL, BASIC    Collection Time: 10/20/19  3:04 AM   Result Value Ref Range    Sodium 117 (LL) 136 - 145 mmol/L    Potassium 3.4 (L) 3.5 - 5.1 mmol/L    Chloride 83 (L) 97 - 108 mmol/L    CO2 27 21 - 32 mmol/L    Anion gap 7 5 - 15 mmol/L    Glucose 132 (H) 65 - 100 mg/dL BUN 4 (L) 6 - 20 MG/DL    Creatinine 0.63 0.55 - 1.02 MG/DL    BUN/Creatinine ratio 6 (L) 12 - 20      GFR est AA >60 >60 ml/min/1.73m2    GFR est non-AA >60 >60 ml/min/1.73m2    Calcium 7.4 (L) 8.5 - 10.1 MG/DL   MAGNESIUM    Collection Time: 10/20/19  3:04 AM   Result Value Ref Range    Magnesium 2.0 1.6 - 2.4 mg/dL       Medications reviewed  Current Facility-Administered Medications   Medication Dose Route Frequency    0.9% sodium chloride 1,000 mL with mvi, adult no. 4 with vit K 10 mL, thiamine 963 mg, folic acid 1 mg, potassium chloride 40 mEq infusion   IntraVENous DAILY    potassium phosphate 20 mmol in 0.9% sodium chloride 500 mL infusion   IntraVENous ONCE    budesonide (ENTOCORT EC) capsule 9 mg  9 mg Oral 7am    prochlorperazine (COMPAZINE) injection 5 mg  5 mg IntraVENous Q6H PRN    OTHER(NON-FORMULARY) 2 g  2 g Oral DAILY    OTHER(NON-FORMULARY) 3 g  3 g Oral QHS    sodium chloride (NS) flush 5-10 mL  5-10 mL IntraVENous PRN    lactobac ac& pc-s.therm-b.anim (GERALDINE Q/RISAQUAD)  1 Cap Oral DAILY    dextrose 5% - 0.45% NaCl with KCl 40 mEq/L infusion   IntraVENous CONTINUOUS    aspirin chewable tablet 81 mg  81 mg Oral QHS    naloxone (NARCAN) injection 0.4 mg  0.4 mg IntraVENous PRN    acetaminophen (TYLENOL) tablet 650 mg  650 mg Oral Q4H PRN    diphenhydrAMINE (BENADRYL) capsule 25 mg  25 mg Oral Q4H PRN    ondansetron (ZOFRAN) injection 4 mg  4 mg IntraVENous Q4H PRN    enoxaparin (LOVENOX) injection 40 mg  40 mg SubCUTAneous Q24H    metoprolol tartrate (LOPRESSOR) tablet 50 mg  50 mg Oral BID    losartan (COZAAR) tablet 100 mg  100 mg Oral DAILY       Care Plan discussed with: Patient/Family and Nurse    Total time spent with patient: 30 minutes.     Marylen Ro, MD

## 2019-10-20 NOTE — PROGRESS NOTES
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 4 with vit K 10 mL, thiamine 142 mg, folic acid 1 mg, potassium chloride 40 mEq infusion   IntraVENous DAILY    potassium phosphate 20 mmol in 0.9% sodium chloride 500 mL infusion   IntraVENous ONCE    budesonide (ENTOCORT EC) capsule 9 mg  9 mg Oral 7am    prochlorperazine (COMPAZINE) injection 5 mg  5 mg IntraVENous Q6H PRN    colestipol (COLESTID) packet 2.5 g  2.5 g Oral DAILY    colestipol (COLESTID) packet 2.5 g  2.5 g Oral QHS    0.9% sodium chloride with KCl 20 mEq/L infusion   IntraVENous CONTINUOUS    sodium chloride (NS) flush 5-10 mL  5-10 mL IntraVENous PRN    lactobac ac& pc-s.therm-b.anim (GERALDINE Q/RISAQUAD)  1 Cap Oral DAILY    aspirin chewable tablet 81 mg  81 mg Oral QHS    naloxone (NARCAN) injection 0.4 mg  0.4 mg IntraVENous PRN    acetaminophen (TYLENOL) tablet 650 mg  650 mg Oral Q4H PRN    diphenhydrAMINE (BENADRYL) capsule 25 mg  25 mg Oral Q4H PRN    ondansetron (ZOFRAN) injection 4 mg  4 mg IntraVENous Q4H PRN    enoxaparin (LOVENOX) injection 40 mg  40 mg SubCUTAneous Q24H    metoprolol tartrate (LOPRESSOR) tablet 50 mg  50 mg Oral BID    losartan (COZAAR) tablet 100 mg  100 mg Oral DAILY        Objective:     Blood pressure 184/68, pulse 66, temperature 98.6 °F (37 °C), resp. rate 20, height 5' 5\" (1.651 m), weight 60.3 kg (133 lb), SpO2 97 %. No intake/output data recorded.     10/18 1901 - 10/20 0700  In: 1423.8 [I.V.:1423.8]  Out: 630 [Urine:630]    EXAM:  GENERAL: no distress    Data Review    Recent Results (from the past 24 hour(s))   CULTURE, BLOOD, PERIPHERAL    Collection Time: 10/19/19 11:28 AM   Result Value Ref Range    Special Requests: NO SPECIAL REQUESTS Culture result: NO GROWTH AFTER 17 HOURS     URINE CULTURE HOLD SAMPLE    Collection Time: 10/19/19 12:43 PM   Result Value Ref Range    Urine culture hold        URINE ON HOLD IN MICROBIOLOGY DEPT FOR 3 DAYS. IF UNPRESERVED URINE IS SUBMITTED, IT CANNOT BE USED FOR ADDITIONAL TESTING AFTER 24 HRS, RECOLLECTION WILL BE REQUIRED.    URINALYSIS W/MICROSCOPIC    Collection Time: 10/19/19 12:43 PM   Result Value Ref Range    Color YELLOW/STRAW      Appearance CLEAR CLEAR      Specific gravity 1.010 1.003 - 1.030      pH (UA) 6.5 5.0 - 8.0      Protein 100 (A) NEG mg/dL    Glucose 100 (A) NEG mg/dL    Ketone TRACE (A) NEG mg/dL    Bilirubin NEGATIVE  NEG      Blood TRACE (A) NEG      Urobilinogen 0.2 0.2 - 1.0 EU/dL    Nitrites NEGATIVE  NEG      Leukocyte Esterase NEGATIVE  NEG      WBC 0-4 0 - 4 /hpf    RBC 0-5 0 - 5 /hpf    Epithelial cells FEW FEW /lpf    Bacteria NEGATIVE  NEG /hpf    Hyaline cast 0-2 0 - 5 /lpf   POC LACTIC ACID    Collection Time: 10/19/19  1:11 PM   Result Value Ref Range    Lactic Acid (POC) 1.68 0.40 - 0.47 mmol/L   METABOLIC PANEL, BASIC    Collection Time: 10/19/19  2:26 PM   Result Value Ref Range    Sodium 115 (LL) 136 - 145 mmol/L    Potassium HEMOLYZED,RECOLLECT REQUESTED 3.5 - 5.1 mmol/L    Chloride 82 (L) 97 - 108 mmol/L    CO2 24 21 - 32 mmol/L    Anion gap 9 5 - 15 mmol/L    Glucose 129 (H) 65 - 100 mg/dL    BUN 6 6 - 20 MG/DL    Creatinine 0.65 0.55 - 1.02 MG/DL    BUN/Creatinine ratio 9 (L) 12 - 20      GFR est AA >60 >60 ml/min/1.73m2    GFR est non-AA >60 >60 ml/min/1.73m2    Calcium 7.6 (L) 8.5 - 10.1 MG/DL   MAGNESIUM    Collection Time: 10/19/19  2:26 PM   Result Value Ref Range    Magnesium HEMOLYZED,RECOLLECT REQUESTED 1.6 - 2.4 mg/dL   OCCULT BLOOD, STOOL    Collection Time: 10/19/19  2:26 PM   Result Value Ref Range    Occult blood, stool NEGATIVE  NEG     METABOLIC PANEL, BASIC    Collection Time: 10/19/19  3:37 PM   Result Value Ref Range    Sodium 116 (LL) 136 - 145 mmol/L    Potassium 5.5 (H) 3.5 - 5.1 mmol/L    Chloride 84 (L) 97 - 108 mmol/L    CO2 24 21 - 32 mmol/L    Anion gap 8 5 - 15 mmol/L    Glucose 172 (H) 65 - 100 mg/dL    BUN 6 6 - 20 MG/DL    Creatinine 0.67 0.55 - 1.02 MG/DL    BUN/Creatinine ratio 9 (L) 12 - 20      GFR est AA >60 >60 ml/min/1.73m2    GFR est non-AA >60 >60 ml/min/1.73m2    Calcium 6.9 (L) 8.5 - 10.1 MG/DL   MAGNESIUM    Collection Time: 10/19/19  3:37 PM   Result Value Ref Range    Magnesium 0.8 (LL) 1.6 - 2.4 mg/dL   METABOLIC PANEL, BASIC    Collection Time: 10/19/19  4:38 PM   Result Value Ref Range    Sodium 117 (LL) 136 - 145 mmol/L    Potassium 2.8 (L) 3.5 - 5.1 mmol/L    Chloride 82 (L) 97 - 108 mmol/L    CO2 27 21 - 32 mmol/L    Anion gap 8 5 - 15 mmol/L    Glucose 134 (H) 65 - 100 mg/dL    BUN 5 (L) 6 - 20 MG/DL    Creatinine 0.63 0.55 - 1.02 MG/DL    BUN/Creatinine ratio 8 (L) 12 - 20      GFR est AA >60 >60 ml/min/1.73m2    GFR est non-AA >60 >60 ml/min/1.73m2    Calcium 7.5 (L) 8.5 - 10.1 MG/DL   MAGNESIUM    Collection Time: 10/19/19  4:38 PM   Result Value Ref Range    Magnesium 0.9 (LL) 1.6 - 2.4 mg/dL   SAMPLES BEING HELD    Collection Time: 10/19/19  4:38 PM   Result Value Ref Range    SAMPLES BEING HELD 1lav     COMMENT        Add-on orders for these samples will be processed based on acceptable specimen integrity and analyte stability, which may vary by analyte.    CBC W/O DIFF    Collection Time: 10/20/19  3:04 AM   Result Value Ref Range    WBC 17.2 (H) 3.6 - 11.0 K/uL    RBC 3.27 (L) 3.80 - 5.20 M/uL    HGB 9.9 (L) 11.5 - 16.0 g/dL    HCT 27.3 (L) 35.0 - 47.0 %    MCV 83.5 80.0 - 99.0 FL    MCH 30.3 26.0 - 34.0 PG    MCHC 36.3 30.0 - 36.5 g/dL    RDW 13.2 11.5 - 14.5 %    PLATELET 858 108 - 482 K/uL    MPV 8.5 (L) 8.9 - 12.9 FL    NRBC 0.0 0  WBC    ABSOLUTE NRBC 0.00 0.00 - 0.01 K/uL   PHOSPHORUS    Collection Time: 10/20/19  3:04 AM   Result Value Ref Range    Phosphorus 1.0 (L) 2.6 - 4.7 MG/DL   METABOLIC PANEL, BASIC    Collection Time: 10/20/19  3:04 AM   Result Value Ref Range    Sodium 117 (LL) 136 - 145 mmol/L    Potassium 3.4 (L) 3.5 - 5.1 mmol/L    Chloride 83 (L) 97 - 108 mmol/L    CO2 27 21 - 32 mmol/L    Anion gap 7 5 - 15 mmol/L    Glucose 132 (H) 65 - 100 mg/dL    BUN 4 (L) 6 - 20 MG/DL    Creatinine 0.63 0.55 - 1.02 MG/DL    BUN/Creatinine ratio 6 (L) 12 - 20      GFR est AA >60 >60 ml/min/1.73m2    GFR est non-AA >60 >60 ml/min/1.73m2    Calcium 7.4 (L) 8.5 - 10.1 MG/DL   MAGNESIUM    Collection Time: 10/20/19  3:04 AM   Result Value Ref Range    Magnesium 2.0 1.6 - 2.4 mg/dL   METABOLIC PANEL, BASIC    Collection Time: 10/20/19  9:25 AM   Result Value Ref Range    Sodium 116 (LL) 136 - 145 mmol/L    Potassium 3.4 (L) 3.5 - 5.1 mmol/L    Chloride 84 (L) 97 - 108 mmol/L    CO2 26 21 - 32 mmol/L    Anion gap 6 5 - 15 mmol/L    Glucose 121 (H) 65 - 100 mg/dL    BUN 4 (L) 6 - 20 MG/DL    Creatinine 0.58 0.55 - 1.02 MG/DL    BUN/Creatinine ratio 7 (L) 12 - 20      GFR est AA >60 >60 ml/min/1.73m2    GFR est non-AA >60 >60 ml/min/1.73m2    Calcium 7.7 (L) 8.5 - 10.1 MG/DL   MAGNESIUM    Collection Time: 10/20/19  9:25 AM   Result Value Ref Range    Magnesium 1.9 1.6 - 2.4 mg/dL       Assessment:     Principal Problem:    Hyponatremia (8/3/2019)    Active Problems:    Essential hypertension, benign (12/2/2010)      Pure hypercholesterolemia (12/2/2010)      S/P CABG x 3 (6/17/2011)      Overview: LIMA to LAD and SVGs to OM-1 and OM-2. RCA small non-dominant diffusely       diseased vessel ungrafted. LV EF at cath was 65%.       Coronary atherosclerosis of native coronary artery ()      Overview: Cath 2011 with multivessel cad      S/P CABG (coronary artery bypass graft) ()      Overview: 6-19-11 CABG - OFF PUMP - CABGx 3, lima, eric, epi aortic ultrasound - off       pump, rightt endoscopic vein harvest; [id number] [place] [address]  to LAD,       Svg Ao to OM1 and OM2      Macular degeneration (10/30/2013) Carotid arterial disease (HCC) ()      Overview: mild 0-49%      Dehydration (8/3/2019)      Hypokalemia (8/3/2019)      Leukocytosis (10/19/2019)      Acute metabolic encephalopathy (12/13/1576)      Nausea vomiting and diarrhea (10/19/2019)        Plan: Will eave to DR. Hernandez to finish tomorrow as he knows her hx well; some have diarrhea with Losartan too. Statins can also be associated

## 2019-10-20 NOTE — PROGRESS NOTES
@0700 Bedside and Verbal shift change report given to Cecelia Barfield RN (oncoming nurse) by Nikko Chatterjee RN (offgoing nurse). Report included the following information SBAR, Kardex, ED Summary, Intake/Output, MAR, Accordion, Recent Results, Med Rec Status, Cardiac Rhythm Sinus and Alarm Parameters . @0800 Restarted on Entocort EC.    @0900 Lab work obtain and sent. @1240 Restarted on Cardura. @7931 Lab work obtain and sent. @4649 Bedside and Verbal shift change report given to [address] [place] [address] Feliz (oncoming nurse) by Cecelia Barfield RN (offgoing nurse). Report included the following information SBAR, Kardex, ED Summary, Intake/Output, MAR, Accordion, Recent Results, Med Rec Status, Cardiac Rhythm Sinus and Alarm Parameters .

## 2019-10-20 NOTE — CONSULTS
Gastroenterology Consult     Referring Physician: Reshma Morales    Consult Date: 10/19/2019     Subjective:     Chief Complaint: diarrhea    History of Present Illness: Katherin Marcus is a 80 y.o. female who is seen in consultation for diarrrhea and nausea. Followed by  for microscopic colitis and extensive workup that he has in his chart. I will not reinvent the wheel here. She cannot tell me anything about her workup. Refers to her daughter knowing everything. No pain. Past Medical History:   Diagnosis Date    Carotid arterial disease ([address]     mild 0-49%    Coronary atherosclerosis of native coronary artery     Cath 2011 with multivessel cad    Cough due to ACE inhibitor     Diverticulitis     Dyslipidemia     Hypertension     Macular degeneration     S/P CABG (coronary artery bypass graft)     6-19-11 CABG - OFF PUMP - CABGx 3, lima, eric, epi aortic ultrasound - off pump, rightt endoscopic vein harvest; [id number] to LAD, Svg Ao to OM1 and OM2     No past surgical history on file. No family history on file. Social History     Tobacco Use    Smoking status: Never Smoker    Smokeless tobacco: Never Used   Substance Use Topics    Alcohol use: Yes     Alcohol/week: 1.0 standard drinks     Types: 1 Standard drinks or equivalent per week     Comment: Occasional      Allergies   Allergen Reactions    Ciprofloxacin Itching and Other (comments)     Cipro, \"turn beet red like sunburn\"    Codeine Itching    Lisinopril Cough    Lovastatin Diarrhea     Current Facility-Administered Medications   Medication Dose Route Frequency    sodium chloride (NS) flush 5-10 mL  5-10 mL IntraVENous PRN    [START ON 10/20/2019] lactobac ac& pc-s.therm-b.anim (GERALDINE Q/RISAQUAD)  1 Cap Oral DAILY    dextrose 5% - 0.45% NaCl with KCl 40 mEq/L infusion   IntraVENous CONTINUOUS    dextrose 5 % - 0.45% NaCl 1,000 mL with mvi, adult no. 4 with vit K 10 mL, thiamine 080 mg, folic acid 1 mg, potassium chloride 40 mEq infusion   IntraVENous Q24H    aspirin chewable tablet 81 mg  81 mg Oral QHS    naloxone (NARCAN) injection 0.4 mg  0.4 mg IntraVENous PRN    acetaminophen (TYLENOL) tablet 650 mg  650 mg Oral Q4H PRN    diphenhydrAMINE (BENADRYL) capsule 25 mg  25 mg Oral Q4H PRN    ondansetron (ZOFRAN) injection 4 mg  4 mg IntraVENous Q4H PRN    enoxaparin (LOVENOX) injection 40 mg  40 mg SubCUTAneous Q24H    metoprolol tartrate (LOPRESSOR) tablet 50 mg  50 mg Oral BID    losartan (COZAAR) tablet 100 mg  100 mg Oral DAILY    magnesium sulfate 1 g/100 ml IVPB (premix or compounded)  1 g IntraVENous Q1H    potassium chloride 10 mEq in 100 ml IVPB  10 mEq IntraVENous Q1H        Review of Systems:  A detailed 10 organ review of systems is obtained with pertinent positives as listed in the History of Present Illness and Past Medical History. All others are negative. Objective:     Physical Exam:  Visit Vitals  /58   Pulse 65   Temp 98.8 °F (37.1 °C)   Resp 14   Ht 5' 5\" (1.651 m)   Wt 60.3 kg (133 lb)   SpO2 95%   BMI 22.13 kg/m²        Skin:  Extremities and face reveal no rashes. No mondragon erythema. No telangiectasias on the chest wall. HEENT: Sclerae anicteric. Extra-occular muscles are intact. No oral ulcers. No abnormal pigmentation of the lips. The neck is supple. Cardiovascular: Regular rate and rhythm. No murmurs, gallops, or rubs. PMI nondisplaced. Carotids without bruits. Respiratory:  Comfortable breathing with no accessory muscle use. Clear breath sounds with no wheezes, rales, or rhonchi. GI:  Abdomen nondistended, soft, and nontender. Normal active bowel sounds. No enlargement of the liver or spleen. No masses palpable. Rectal:  Deferred  Musculoskeletal:  No pitting edema of the lower legs. Extremities have good range of motion. No costovertebral tenderness. Neurological:  Gross memory appears intact. Patient is alert and oriented.   Psychiatric:  Mood appears appropriate with judgement intact. Lymphatic:  No cervical or supraclavicular adenopathy. Lab/Data Review:  CMP:   Lab Results   Component Value Date/Time     (LL) 10/19/2019 04:38 PM    K 2.8 (L) 10/19/2019 04:38 PM    CL 82 (L) 10/19/2019 04:38 PM    CO2 27 10/19/2019 04:38 PM    AGAP 8 10/19/2019 04:38 PM     (H) 10/19/2019 04:38 PM    BUN 5 (L) 10/19/2019 04:38 PM    CREA 0.63 10/19/2019 04:38 PM    GFRAA >60 10/19/2019 04:38 PM    GFRNA >60 10/19/2019 04:38 PM    CA 7.5 (L) 10/19/2019 04:38 PM    MG 0.9 (LL) 10/19/2019 04:38 PM    ALB 3.5 10/19/2019 10:15 AM    TP 7.8 10/19/2019 10:15 AM    GLOB 4.3 (H) 10/19/2019 10:15 AM    AGRAT 0.8 (L) 10/19/2019 10:15 AM    SGOT 19 10/19/2019 10:15 AM    ALT 21 10/19/2019 10:15 AM     CBC:   Lab Results   Component Value Date/Time    WBC 16.1 (H) 10/19/2019 10:15 AM    HGB 11.7 10/19/2019 10:15 AM    HCT 31.5 (L) 10/19/2019 10:15 AM     (H) 10/19/2019 10:15 AM         Assessment/Plan:     Principal Problem:    Hyponatremia (8/3/2019)    Active Problems:    Essential hypertension, benign (12/2/2010)      Pure hypercholesterolemia (12/2/2010)      S/P CABG x 3 (6/17/2011)      Overview: LIMA to LAD and SVGs to OM-1 and OM-2. RCA small non-dominant diffusely       diseased vessel ungrafted. LV EF at cath was 65%.       Coronary atherosclerosis of native coronary artery ()      Overview: Cath 2011 with multivessel cad      S/P CABG (coronary artery bypass graft) ()      Overview: 6-19-11 CABG - OFF PUMP - CABGx 3, lima, eric, epi aortic ultrasound - off       pump, rightt endoscopic vein harvest; [id number] to LAD,       Svg Ao to OM1 and OM2      Macular degeneration (10/30/2013)      Carotid arterial disease (HCC) ()      Overview: mild 0-49%      Dehydration (8/3/2019)      Hypokalemia (8/3/2019)      Leukocytosis (10/19/2019)      Acute metabolic encephalopathy (11/83/5836)      Nausea vomiting and diarrhea (10/19/2019)         Control lytes and fluids and  to see Monday; check c dif

## 2019-10-20 NOTE — PROGRESS NOTES
[facility]  INTENSIVIST DAILY PROGRESS NOTE  Name: Demetria Herrera   : 1931   MRN: [id number]   Date: 10/20/2019 7:57 AM   I have reviewed the flowsheet and previous days notes. She was admitted to ICU for AMS and severe electrolyte abnormalities. She has microscopic colitis and has had issues with diarrhea and N/V with poor intake. She has received IVF and lytes overnight. Her daughter reports that she has been on budesonide 9mg and colestipol 5 grams for this. She still feel weak. Overnight events reviewed:  She denies any SOB or chest pain. Last BM was around midnight. ROS:    Vital Signs:    Visit Vitals  /64   Pulse 65   Temp 98.5 °F (36.9 °C)   Resp 15   Ht 5' 5\" (1.651 m)   Wt 60.3 kg (133 lb)   SpO2 96%   BMI 22.13 kg/m²       O2 Device: Room air       Temp (24hrs), Av.5 °F (36.9 °C), Min:98.1 °F (36.7 °C), Max:98.8 °F (37.1 °C)       Intake/Output:   Last shift:      No intake/output data recorded. Last 3 shifts: 10/18 1901 - 10/20 0700  In: 1423.8 [I.V.:1423.8]  Out: 630 [Urine:630]    Intake/Output Summary (Last 24 hours) at 10/20/2019 0757  Last data filed at 10/20/2019 0600  Gross per 24 hour   Intake 1423.75 ml   Output 630 ml   Net 793.75 ml       Physical Exam:  General:  Alert, Awake Appears stated age. Head:  Normocephalic, without obvious abnormality, atraumatic. Eyes:  Conjunctivae/corneas clear. Nose: Nares normal. Septum midline. Mucosa normal. No drainage or sinus tenderness. Throat: Lips, mucosa, and tongue normal.  No masses. Neck: Supple, symmetrical, trachea midline, no adenopathy, thyroid: no enlargment/tenderness/nodules, no carotid bruit and no JVD. Lungs:   Clear to auscultation bilaterally. Chest wall:  No tenderness or deformity. Heart:  Regular rate and rhythm, S1, S2 normal, no murmur, click, rub or gallop. Abdomen:   Soft, non-tender. Bowel sounds normal. No masses,  No organomegaly. Extremities: Extremities normal, no edema. Pulses: 2+ and symmetric all extremities. Skin: Pale  No rashes or lesions   Lymph nodes:  no nodes   Neurologic: Grossly nonfocal       DATA:   Current Facility-Administered Medications   Medication Dose Route Frequency    0.9% sodium chloride 1,000 mL with mvi, adult no. 4 with vit K 10 mL, thiamine 869 mg, folic acid 1 mg, potassium chloride 40 mEq infusion   IntraVENous DAILY    potassium phosphate 20 mmol in 0.9% sodium chloride 500 mL infusion   IntraVENous ONCE    sodium chloride (NS) flush 5-10 mL  5-10 mL IntraVENous PRN    lactobac ac& pc-s.therm-b.anim (GERALDINE Q/RISAQUAD)  1 Cap Oral DAILY    dextrose 5% - 0.45% NaCl with KCl 40 mEq/L infusion   IntraVENous CONTINUOUS    aspirin chewable tablet 81 mg  81 mg Oral QHS    naloxone (NARCAN) injection 0.4 mg  0.4 mg IntraVENous PRN    acetaminophen (TYLENOL) tablet 650 mg  650 mg Oral Q4H PRN    diphenhydrAMINE (BENADRYL) capsule 25 mg  25 mg Oral Q4H PRN    ondansetron (ZOFRAN) injection 4 mg  4 mg IntraVENous Q4H PRN    enoxaparin (LOVENOX) injection 40 mg  40 mg SubCUTAneous Q24H    metoprolol tartrate (LOPRESSOR) tablet 50 mg  50 mg Oral BID    losartan (COZAAR) tablet 100 mg  100 mg Oral DAILY       Telemetry:          Labs:  Recent Labs     10/20/19  0304 10/19/19  1015   WBC 17.2* 16.1*   HGB 9.9* 11.7   HCT 27.3* 31.5*    496*     Recent Labs     10/20/19  0304 10/19/19  1638 10/19/19  1537  10/19/19  1015   * 117* 116*   < > 112*   K 3.4* 2.8* 5.5*   < > 2.4*   CL 83* 82* 84*   < > 73*   CO2 27 27 24   < > 26   * 134* 172*   < > 184*   BUN 4* 5* 6   < > 6   CREA 0.63 0.63 0.67   < > 0.88   CA 7.4* 7.5* 6.9*   < > 8.7   MG 2.0 0.9* 0.8*   < >  --    PHOS 1.0*  --   --   --   --    ALB  --   --   --   --  3.5   SGOT  --   --   --   --  19   ALT  --   --   --   --  21    < > = values in this interval not displayed.        Imaging:  I have personally reviewed the patients radiographs and reports. CXR shows shallow inspiration and ATX       IMPRESSION:   · AMS:  Improving  · Hypophosphatemia  · Hypokalemia  · Hyponatremia  · Microscopic colitis: needs budesonide 9mg+ colestid granules 5 grams(unable to swallow tabs)   PLAN:   · 20 mM KPhos  · Budesonide 9mg daily  · Colestid 5 grams daily  · IVF  · PO diet as tolerated  · Compazine for nausea. She did not like Zofran   GLOBAL ISSUES:   · Head of bed elevated  · GI Prophylaxis:   · DVT Prophylaxis:  · Lines:   · ETT: Day  · Medical Decision Maker: The pt is critically ill. See my orders for details    My assessment/plan was discussed with: nursing, patient, and daughter    Total critical care time exclusive of procedures 25 minutes.       Jacques Spencer MD, [facility]  [facility]

## 2019-10-20 NOTE — PROGRESS NOTES
[facility] [place] [facility]     Renal Daily Progress Note:     Admission Date: 10/19/2019     Subjective: feels bloated, still with diarrhea and some nausea, not eating much      Review of Systems  Pertinent items are noted in HPI. Objective:     Visit Vitals  /68   Pulse 66   Temp 98.6 °F (37 °C)   Resp 20   Ht 5' 5\" (1.651 m)   Wt 60.3 kg (133 lb)   SpO2 97%   BMI 22.13 kg/m²     Temp (24hrs), Av.6 °F (37 °C), Min:98.5 °F (36.9 °C), Max:98.8 °F (37.1 °C)        Intake/Output Summary (Last 24 hours) at 10/20/2019 1015  Last data filed at 10/20/2019 0600  Gross per 24 hour   Intake 1423.75 ml   Output 630 ml   Net 793.75 ml     Current Facility-Administered Medications   Medication Dose Route Frequency    0.9% sodium chloride 1,000 mL with mvi, adult no. 4 with vit K 10 mL, thiamine 534 mg, folic acid 1 mg, potassium chloride 40 mEq infusion   IntraVENous DAILY    potassium phosphate 20 mmol in 0.9% sodium chloride 500 mL infusion   IntraVENous ONCE    budesonide (ENTOCORT EC) capsule 9 mg  9 mg Oral 7am    prochlorperazine (COMPAZINE) injection 5 mg  5 mg IntraVENous Q6H PRN    colestipol (COLESTID) packet 2.5 g  2.5 g Oral DAILY    colestipol (COLESTID) packet 2.5 g  2.5 g Oral QHS    0.9% sodium chloride with KCl 20 mEq/L infusion   IntraVENous CONTINUOUS    sodium chloride (NS) flush 5-10 mL  5-10 mL IntraVENous PRN    lactobac ac& pc-s.therm-b.anim (GERALDINE Q/RISAQUAD)  1 Cap Oral DAILY    aspirin chewable tablet 81 mg  81 mg Oral QHS    naloxone (NARCAN) injection 0.4 mg  0.4 mg IntraVENous PRN    acetaminophen (TYLENOL) tablet 650 mg  650 mg Oral Q4H PRN    diphenhydrAMINE (BENADRYL) capsule 25 mg  25 mg Oral Q4H PRN    ondansetron (ZOFRAN) injection 4 mg  4 mg IntraVENous Q4H PRN    enoxaparin (LOVENOX) injection 40 mg  40 mg SubCUTAneous Q24H    metoprolol tartrate (LOPRESSOR) tablet 50 mg  50 mg Oral BID    losartan (COZAAR) tablet 100 mg  100 mg Oral DAILY       Physical Exam:General appearance: alert, cooperative, no distress, appears stated age  Neck: supple, symmetrical, trachea midline, no adenopathy, thyroid: not enlarged, symmetric, no tenderness/mass/nodules and no JVD  Lungs: basilar crackles  Heart: regular rate and rhythm, no S3 or S4  Abdomen: soft, non-tender. Bowel sounds normal. No masses,  no organomegaly  Extremities: no edema  Neurologic: Grossly normal    Data Review:     LABS:  Recent Labs     10/20/19  0304 10/19/19  1638 10/19/19  1537  10/19/19  1015   * 117* 116*   < > 112*   K 3.4* 2.8* 5.5*   < > 2.4*   CL 83* 82* 84*   < > 73*   CO2 27 27 24   < > 26   BUN 4* 5* 6   < > 6   CREA 0.63 0.63 0.67   < > 0.88   CA 7.4* 7.5* 6.9*   < > 8.7   ALB  --   --   --   --  3.5   PHOS 1.0*  --   --   --   --    MG 2.0 0.9* 0.8*   < >  --     < > = values in this interval not displayed. Recent Labs     10/20/19  0304 10/19/19  1015   WBC 17.2* 16.1*   HGB 9.9* 11.7   HCT 27.3* 31.5*    496*     No results for input(s): AJIT, KU, CLU, CREAU in the last 72 hours.     No lab exists for component: PROU    Assessment:   Renal Specific Problems  Hyponatremia  HypoMag  HypoPO4  Hypochloremia  Anemia        Plan:     Obtain/ Order: labs/cultures/radiology/procedures:  renal panel and Mag    Urine Na, OSm    Therapeutic:    Replete Mag, K, will change IV to 0.9NS  serial labs and adjust      Sujata Whalen MD    [phone]

## 2019-10-21 LAB
ALBUMIN SERPL-MCNC: 2.5 G/DL (ref 3.5–5)
ALBUMIN/GLOB SERPL: 0.8 {RATIO} (ref 1.1–2.2)
ALP SERPL-CCNC: 77 U/L (ref 45–117)
ALT SERPL-CCNC: 18 U/L (ref 12–78)
ANION GAP SERPL CALC-SCNC: 6 MMOL/L (ref 5–15)
ANION GAP SERPL CALC-SCNC: 7 MMOL/L (ref 5–15)
ANION GAP SERPL CALC-SCNC: 7 MMOL/L (ref 5–15)
ANION GAP SERPL CALC-SCNC: 8 MMOL/L (ref 5–15)
AST SERPL-CCNC: 16 U/L (ref 15–37)
BACTERIA SPEC CULT: NORMAL
BACTERIA SPEC CULT: NORMAL
BASOPHILS # BLD: 0.1 K/UL (ref 0–0.1)
BASOPHILS NFR BLD: 0 % (ref 0–1)
BILIRUB SERPL-MCNC: 0.4 MG/DL (ref 0.2–1)
BUN SERPL-MCNC: 10 MG/DL (ref 6–20)
BUN SERPL-MCNC: 6 MG/DL (ref 6–20)
BUN SERPL-MCNC: 6 MG/DL (ref 6–20)
BUN SERPL-MCNC: 8 MG/DL (ref 6–20)
BUN/CREAT SERPL: 10 (ref 12–20)
BUN/CREAT SERPL: 11 (ref 12–20)
BUN/CREAT SERPL: 15 (ref 12–20)
BUN/CREAT SERPL: 9 (ref 12–20)
CALCIUM SERPL-MCNC: 7.6 MG/DL (ref 8.5–10.1)
CALCIUM SERPL-MCNC: 7.8 MG/DL (ref 8.5–10.1)
CHLORIDE SERPL-SCNC: 94 MMOL/L (ref 97–108)
CHLORIDE SERPL-SCNC: 94 MMOL/L (ref 97–108)
CHLORIDE SERPL-SCNC: 95 MMOL/L (ref 97–108)
CHLORIDE SERPL-SCNC: 98 MMOL/L (ref 97–108)
CO2 SERPL-SCNC: 23 MMOL/L (ref 21–32)
CO2 SERPL-SCNC: 24 MMOL/L (ref 21–32)
CO2 SERPL-SCNC: 24 MMOL/L (ref 21–32)
CO2 SERPL-SCNC: 25 MMOL/L (ref 21–32)
CREAT SERPL-MCNC: 0.63 MG/DL (ref 0.55–1.02)
CREAT SERPL-MCNC: 0.65 MG/DL (ref 0.55–1.02)
CREAT SERPL-MCNC: 0.67 MG/DL (ref 0.55–1.02)
CREAT SERPL-MCNC: 0.72 MG/DL (ref 0.55–1.02)
CRP SERPL HS-MCNC: >9.5 MG/L
DIFFERENTIAL METHOD BLD: ABNORMAL
EOSINOPHIL # BLD: 0.1 K/UL (ref 0–0.4)
EOSINOPHIL NFR BLD: 1 % (ref 0–7)
ERYTHROCYTE [DISTWIDTH] IN BLOOD BY AUTOMATED COUNT: 14 % (ref 11.5–14.5)
GLOBULIN SER CALC-MCNC: 3.3 G/DL (ref 2–4)
GLUCOSE SERPL-MCNC: 140 MG/DL (ref 65–100)
GLUCOSE SERPL-MCNC: 145 MG/DL (ref 65–100)
GLUCOSE SERPL-MCNC: 148 MG/DL (ref 65–100)
GLUCOSE SERPL-MCNC: 161 MG/DL (ref 65–100)
HCT VFR BLD AUTO: 27.7 % (ref 35–47)
HGB BLD-MCNC: 9.8 G/DL (ref 11.5–16)
IMM GRANULOCYTES # BLD AUTO: 0.1 K/UL (ref 0–0.04)
IMM GRANULOCYTES NFR BLD AUTO: 1 % (ref 0–0.5)
LYMPHOCYTES # BLD: 1.6 K/UL (ref 0.8–3.5)
LYMPHOCYTES NFR BLD: 13 % (ref 12–49)
MAGNESIUM SERPL-MCNC: 1.7 MG/DL (ref 1.6–2.4)
MAGNESIUM SERPL-MCNC: 1.7 MG/DL (ref 1.6–2.4)
MAGNESIUM SERPL-MCNC: 2.1 MG/DL (ref 1.6–2.4)
MAGNESIUM SERPL-MCNC: 2.2 MG/DL (ref 1.6–2.4)
MCH RBC QN AUTO: 30.5 PG (ref 26–34)
MCHC RBC AUTO-ENTMCNC: 35.4 G/DL (ref 30–36.5)
MCV RBC AUTO: 86.3 FL (ref 80–99)
MONOCYTES # BLD: 1 K/UL (ref 0–1)
MONOCYTES NFR BLD: 9 % (ref 5–13)
NEUTS SEG # BLD: 9.3 K/UL (ref 1.8–8)
NEUTS SEG NFR BLD: 76 % (ref 32–75)
NRBC # BLD: 0 K/UL (ref 0–0.01)
NRBC BLD-RTO: 0 PER 100 WBC
OSMOLALITY UR: 282 MOSM/KG H2O
PHOSPHATE SERPL-MCNC: 1.8 MG/DL (ref 2.6–4.7)
PLATELET # BLD AUTO: 321 K/UL (ref 150–400)
PMV BLD AUTO: 8.4 FL (ref 8.9–12.9)
POTASSIUM SERPL-SCNC: 3.4 MMOL/L (ref 3.5–5.1)
POTASSIUM SERPL-SCNC: 3.6 MMOL/L (ref 3.5–5.1)
POTASSIUM SERPL-SCNC: 3.7 MMOL/L (ref 3.5–5.1)
POTASSIUM SERPL-SCNC: 3.8 MMOL/L (ref 3.5–5.1)
POTASSIUM UR-SCNC: 32 MMOL/L
PROT SERPL-MCNC: 5.8 G/DL (ref 6.4–8.2)
RBC # BLD AUTO: 3.21 M/UL (ref 3.8–5.2)
SERVICE CMNT-IMP: NORMAL
SODIUM SERPL-SCNC: 125 MMOL/L (ref 136–145)
SODIUM SERPL-SCNC: 125 MMOL/L (ref 136–145)
SODIUM SERPL-SCNC: 126 MMOL/L (ref 136–145)
SODIUM SERPL-SCNC: 129 MMOL/L (ref 136–145)
SODIUM UR-SCNC: 56 MMOL/L
WBC # BLD AUTO: 12.1 K/UL (ref 3.6–11)

## 2019-10-21 PROCEDURE — 74011000250 HC RX REV CODE- 250: Performed by: INTERNAL MEDICINE

## 2019-10-21 PROCEDURE — 84133 ASSAY OF URINE POTASSIUM: CPT

## 2019-10-21 PROCEDURE — 85025 COMPLETE CBC W/AUTO DIFF WBC: CPT

## 2019-10-21 PROCEDURE — 74011250637 HC RX REV CODE- 250/637: Performed by: INTERNAL MEDICINE

## 2019-10-21 PROCEDURE — 82784 ASSAY IGA/IGD/IGG/IGM EACH: CPT

## 2019-10-21 PROCEDURE — 65610000006 HC RM INTENSIVE CARE

## 2019-10-21 PROCEDURE — 84100 ASSAY OF PHOSPHORUS: CPT

## 2019-10-21 PROCEDURE — 80048 BASIC METABOLIC PNL TOTAL CA: CPT

## 2019-10-21 PROCEDURE — 74011250636 HC RX REV CODE- 250/636: Performed by: INTERNAL MEDICINE

## 2019-10-21 PROCEDURE — 83735 ASSAY OF MAGNESIUM: CPT

## 2019-10-21 PROCEDURE — 80053 COMPREHEN METABOLIC PANEL: CPT

## 2019-10-21 PROCEDURE — 83993 ASSAY FOR CALPROTECTIN FECAL: CPT

## 2019-10-21 PROCEDURE — 83520 IMMUNOASSAY QUANT NOS NONAB: CPT

## 2019-10-21 PROCEDURE — 36415 COLL VENOUS BLD VENIPUNCTURE: CPT

## 2019-10-21 PROCEDURE — 86141 C-REACTIVE PROTEIN HS: CPT

## 2019-10-21 PROCEDURE — 83935 ASSAY OF URINE OSMOLALITY: CPT

## 2019-10-21 PROCEDURE — 74011250637 HC RX REV CODE- 250/637: Performed by: FAMILY MEDICINE

## 2019-10-21 PROCEDURE — 84300 ASSAY OF URINE SODIUM: CPT

## 2019-10-21 PROCEDURE — 77030038269 HC DRN EXT URIN PURWCK BARD -A

## 2019-10-21 RX ORDER — RANITIDINE 15 MG/ML
150 SYRUP ORAL DAILY
Status: DISCONTINUED | OUTPATIENT
Start: 2019-10-21 | End: 2019-10-21 | Stop reason: CLARIF

## 2019-10-21 RX ORDER — DIPHENOXYLATE HYDROCHLORIDE AND ATROPINE SULFATE 2.5; .025 MG/1; MG/1
1 TABLET ORAL
Status: DISCONTINUED | OUTPATIENT
Start: 2019-10-21 | End: 2019-10-28

## 2019-10-21 RX ORDER — MAGNESIUM SULFATE 1 G/100ML
1 INJECTION INTRAVENOUS ONCE
Status: COMPLETED | OUTPATIENT
Start: 2019-10-21 | End: 2019-10-21

## 2019-10-21 RX ORDER — FAMOTIDINE 40 MG/5ML
20 POWDER, FOR SUSPENSION ORAL DAILY
Status: DISCONTINUED | OUTPATIENT
Start: 2019-10-21 | End: 2019-10-23

## 2019-10-21 RX ORDER — AMLODIPINE BESYLATE 5 MG/1
10 TABLET ORAL
Status: DISCONTINUED | OUTPATIENT
Start: 2019-10-21 | End: 2019-10-29 | Stop reason: HOSPADM

## 2019-10-21 RX ORDER — NYSTATIN 100000 [USP'U]/ML
500000 SUSPENSION ORAL 4 TIMES DAILY
Status: DISCONTINUED | OUTPATIENT
Start: 2019-10-21 | End: 2019-10-29 | Stop reason: HOSPADM

## 2019-10-21 RX ADMIN — LOSARTAN POTASSIUM 100 MG: 50 TABLET, FILM COATED ORAL at 08:43

## 2019-10-21 RX ADMIN — METOPROLOL TARTRATE 50 MG: 50 TABLET, FILM COATED ORAL at 08:43

## 2019-10-21 RX ADMIN — DIBASIC SODIUM PHOSPHATE, MONOBASIC POTASSIUM PHOSPHATE AND MONOBASIC SODIUM PHOSPHATE 1 TABLET: 852; 155; 130 TABLET ORAL at 08:43

## 2019-10-21 RX ADMIN — ENOXAPARIN SODIUM 40 MG: 40 INJECTION SUBCUTANEOUS at 16:55

## 2019-10-21 RX ADMIN — SODIUM CHLORIDE AND POTASSIUM CHLORIDE: 9; 1.49 INJECTION, SOLUTION INTRAVENOUS at 06:26

## 2019-10-21 RX ADMIN — RIFAXIMIN 550 MG: 550 TABLET ORAL at 16:55

## 2019-10-21 RX ADMIN — METOPROLOL TARTRATE 50 MG: 50 TABLET, FILM COATED ORAL at 17:00

## 2019-10-21 RX ADMIN — Medication 1 CAPSULE: at 08:43

## 2019-10-21 RX ADMIN — FAMOTIDINE 20 MG: 40 POWDER, FOR SUSPENSION ORAL at 09:00

## 2019-10-21 RX ADMIN — NYSTATIN 500000 UNITS: 100000 SUSPENSION ORAL at 21:14

## 2019-10-21 RX ADMIN — DOXAZOSIN 1 MG: 2 TABLET ORAL at 08:43

## 2019-10-21 RX ADMIN — DIBASIC SODIUM PHOSPHATE, MONOBASIC POTASSIUM PHOSPHATE AND MONOBASIC SODIUM PHOSPHATE 1 TABLET: 852; 155; 130 TABLET ORAL at 17:00

## 2019-10-21 RX ADMIN — RIFAXIMIN 550 MG: 550 TABLET ORAL at 12:39

## 2019-10-21 RX ADMIN — NYSTATIN 500000 UNITS: 100000 SUSPENSION ORAL at 17:00

## 2019-10-21 RX ADMIN — BUDESONIDE 9 MG: 3 CAPSULE, GELATIN COATED ORAL at 06:30

## 2019-10-21 RX ADMIN — FOLIC ACID: 5 INJECTION, SOLUTION INTRAMUSCULAR; INTRAVENOUS; SUBCUTANEOUS at 16:57

## 2019-10-21 RX ADMIN — COLESTIPOL HYDROCHLORIDE 2.5 G: 5 GRANULE, FOR SUSPENSION ORAL at 21:00

## 2019-10-21 RX ADMIN — AMLODIPINE BESYLATE 10 MG: 5 TABLET ORAL at 21:11

## 2019-10-21 RX ADMIN — COLESTIPOL HYDROCHLORIDE 2.5 G: 5 GRANULE, FOR SUSPENSION ORAL at 08:45

## 2019-10-21 RX ADMIN — MAGNESIUM SULFATE HEPTAHYDRATE 1 G: 1 INJECTION, SOLUTION INTRAVENOUS at 12:39

## 2019-10-21 RX ADMIN — ASPIRIN 81 MG 81 MG: 81 TABLET ORAL at 21:00

## 2019-10-21 RX ADMIN — NYSTATIN 500000 UNITS: 100000 SUSPENSION ORAL at 12:39

## 2019-10-21 NOTE — PROGRESS NOTES
Nutrition Assessment:    RECOMMENDATIONS/INTERVENTION(S):   1. Continue Regular diet. 2. Recommend Ensure Enlive TID (1050 kcal, 60 gm protein) to promote PO intake. 3. Encourage/ monitor PO intake. 4. Monitor GI function, labs, and weight trends. ASSESSMENT:   10/21: 81 y/o F admitted with hyponatremia. Pt screen for low BMI/ MST screen >2: weight loss 2-13 lb and eating poorly d/t decreased appetite. PMH - CAD, diverticulitis, dyslipidemia, HTN, macular degeneration, s/p CABG. Pt reports fair appetite with ~50% meal intake. Pt receiving Ensure Clear TID, pt reports ensure clear is fair but wanting to try regular ensure as she drinks carnation instant breakfast at home, daily. Pt reports poor appetite for several weeks PTA. Pt was experiencing diarrhea at that time which resulted in weight loss.  lb. BMI 22.5 c/w underweight for age. Pt reports  lb. Pt with 4% weight loss x 2 mo, per EMR, not significant for timeframe but noted. NKFA. Pt without GI distress at this time. Skin intact. Pt tried Ensure Enlive, was able to drink most of it, tolerated well, and would prefer to have Ensure Enlive vs Ensure Clear. Will update in orders. Labs - Na 126 L, Ca 7.8 L, Phos 1.8 L. Meds - NaCl with KCl 75 ml/hr, budesonide, famotidine, probiotic, Phos K, magnesium sulfate. Diet Order: Regular  % Eaten:    Patient Vitals for the past 72 hrs:   % Diet Eaten   10/21/19 1301 15 %       Pertinent Medications: [x] Reviewed    Labs: [x] Reviewed    Anthropometrics: Height: 5' 5\" (165.1 cm) Weight: 61.2 kg (134 lb 14.7 oz)    IBW (%IBW):   ( ) UBW (%UBW):   (  %)      BMI: Body mass index is 22.45 kg/m². This BMI is indicative of:   [x] Underweight for age    [] Normal    [] Overweight    []  Obesity    []  Extreme Obesity (BMI>40)  Estimated Nutrition Needs (Based on): 1356 Kcals/day(REE 1043 x 1.3) , 65 g(61 - 73 gm (1.0 - 1.2 gm/kg)) Protein  Carbohydrate:  At Least 130 g/day  Fluids: 1356 mL/day (1 ml/kcal)    Last BM: 10/21   [x]Active     []Hyperactive  []Hypoactive       [] Absent   BS  Skin:    [x] Intact   [] Incision  [] Breakdown   [] DTI   [] Tears/Excoriation/Abrasion  []Edema [] Other:    Wt Readings from Last 30 Encounters:   10/20/19 61.2 kg (134 lb 14.7 oz)   10/17/19 60.3 kg (133 lb)   08/03/19 63.5 kg (140 lb)   10/31/18 64.9 kg (143 lb)   02/07/18 63.8 kg (140 lb 9.6 oz)   06/13/17 63.1 kg (139 lb 3.2 oz)   07/20/16 65.3 kg (144 lb)   12/07/15 67.3 kg (148 lb 6.4 oz)   02/02/15 67.6 kg (149 lb)   10/30/13 65.3 kg (144 lb)   07/30/13 66 kg (145 lb 9.6 oz)   12/19/12 66.2 kg (146 lb)   09/26/12 65.3 kg (144 lb)   03/21/12 63 kg (139 lb)   02/22/12 67.1 kg (148 lb)   10/28/11 62.1 kg (137 lb)   07/26/11 63 kg (139 lb)   06/20/11 69.3 kg (152 lb 12.8 oz)   06/16/11 65.3 kg (144 lb)   06/13/11 66.3 kg (146 lb 3.2 oz)   12/29/10 67.4 kg (148 lb 9.6 oz)      NUTRITION DIAGNOSES:   Problem:  Underweight     Etiology: related to decreased ability to consume sufficient energy     Signs/Symptoms: as evidenced by BMI 22.5 c/w underweight for age      NUTRITION INTERVENTIONS:  Meals/Snacks: General/healthful diet   Supplements: Commercial supplement              GOAL:   PO intake >50% + ONS within 3-5 days    Cultural, Faith, or Ethnic Dietary Needs: None     EDUCATION & DISCHARGE NEEDS:    [x] None Identified   [] Identified and Education Provided/Documented   [] Identified and Pt declined/was not appropriate      [x] Interdisciplinary Care Plan Reviewed/Documented    [x] Discharge Needs:    Regular diet + ONS daily   [] No Nutrition Related Discharge Needs    NUTRITION RISK:   Pt Is At Nutrition Risk  [x]     No Nutrition Risk Identified  []       PT SEEN FOR:    []  MD Consult: []Calorie Count      []Diabetic Diet Education        []Diet Education     []Electrolyte Management     []General Nutrition Management and Supplements     []Management of Tube Feeding     []TPN Recommendations    [x] RN Referral:  [x]MST score >=2     []Enteral/Parenteral Nutrition PTA     []Pregnant: Gestational DM or Multigestation                 [] Pressure Ulcer    [x]  Low BMI      []  Length of Stay       [] Dysphagia Diet         [] Ventilator  []  Follow-up     Previous Recommendations:   [] Implemented          [] Not Implemented          [x] Not Applicable    Previous Goal:   [] Met              [] Progressing Towards Goal              [] Not Progressing Towards Goal   [x] Not Applicable            Olimpia Clemens, [address] [place] [address]  Pager [phone]  Phone [phone]

## 2019-10-21 NOTE — PROGRESS NOTES
Shift Summary    0700  Bedside report done. 1390:  Patient incontinent of stool. 0830:  Patient placed back on the bedpan.    0902:  Dr. Valerie Trevizo at the bedside. Order given for ensure clear. Plan to rest for today. Orders to follow for blood and stool. 6144:  Labs sent and new IV started in right wrist.    1000:  Patient with small incontinent stool. Cleaned up. Not enough to send for sample. 1200:  Patient reassessed. No changes. 1400:  Daughter Adama Jimenes to go home and shower. Cell number left with nursing. Patient resting in bed with no needs voiced. 1530:  Labs drawn and sent. Music playing for patient. 1612:  Patient resting in bed. No changes noted. 1730:  No changes noted. Patient resting quietly in bed. 1840:  Patient cleaned up after incontinence episode.

## 2019-10-21 NOTE — PROGRESS NOTES
Reason for Admission:   AMS with severe electrolyte abnormalities,N/V/D                   RRAT Score:    12                 Plan for utilizing home health:                          Current Advanced Directive/Advance Care Plan:                          Transition of Care Plan:                    Pt was admitted to [facility] on 10/19/19 with altered mental status,hypophospatemia,hypokalemia,and hyponatremia. Pt resides with her  @ the address on demographics.  has neuropathy and actually fell @ home x2 on the date pt was admitted to the hospital attempting to assist pt. Daughter informed me that Dr Brendan Espino has actually set pt.'s  up with home health. She could not recall the name of the home health agency but will tell me when she finds out in case her mother needs services. Plan: 1. In talking with pt and her daughter,they are both receptive to home health services if prescribed by physician. 2.Case management will follow PT and OT recommendations and will set up services according to their recommendations. 3.At daughter's request,I gave her a list of personal care agencies as daughter is interested in hiring assistance for her parents @ home for bathing ,cooking,dressing ,etc.    4.Once daughter provides me with name of her father's home health agency,I will provide pt with a choice letter. 5.I gave daughter information on [facility]. 6.I will continue to follow pt for discharge needs.     [address]

## 2019-10-21 NOTE — PROGRESS NOTES
Daily Progress Note: 10/21/2019  Hong Cheng MD    Assessment/Plan:   Hyponatremia / Dehydration / Hypokalemia - POA, due to GI loss, poor PO intake, and Rx of Bactrim. ICU initally. BMP per renal.  Renal consulted. Replete K.     Acute metabolic encephalopathy - POA, presumed due to hyponatremia. Monitor.     Nausea vomiting and diarrhea - POA and persistent for weeks. Follow by Dr Marvin Bah. Consulted GI       Coronary atherosclerosis of native coronary artery S/P CABG x 3 / Essential hypertension, benign - Appears stable. Continue metoprolol and ASA, resume BP meds as able.      Pure hypercholesterolemia - Hold statin until eating better     Macular degeneration - Supportive care.     Leukocytosis - Unclear etiology. Check stool studies. No Abx until Dx     UTI unlikely - awaiting culture - repeated UA with cath urine.   It is normal.  more likely contaminated sample from diarrhea        Problem List:  Problem List as of 10/21/2019 Date Reviewed: 10/19/2019          Codes Class Noted - Resolved    Leukocytosis ICD-10-CM: D72.829  ICD-9-CM: 288.60  10/19/2019 - Present        Acute metabolic encephalopathy [facility]-02-FG: G93.41  ICD-9-CM: 348.31  10/19/2019 - Present        Nausea vomiting and diarrhea ICD-10-CM: R11.2, R19.7  ICD-9-CM: 787.91, 787.01  10/19/2019 - Present        Dehydration ICD-10-CM: E86.0  ICD-9-CM: 276.51  8/3/2019 - Present        * (Principal) Hyponatremia ICD-10-CM: E87.1  ICD-9-CM: 276.1  8/3/2019 - Present        Hypokalemia ICD-10-CM: E87.6  ICD-9-CM: 276.8  8/3/2019 - Present        Macular degeneration ICD-10-CM: H35.30  ICD-9-CM: 362.50  10/30/2013 - Present        Carotid arterial disease (HCC) ICD-10-CM: I73.9  ICD-9-CM: 447.9  Unknown - Present    Overview Signed 10/30/2013 10:11 AM by Vonda Small MD     mild 0-49%             Coronary atherosclerosis of native coronary artery ICD-10-CM: I25.10  ICD-9-CM: 414.01  Unknown - Present    Overview Signed 2/22/2012 10:44 AM by Rosezetta Leyden, MD     Cath 2011 with multivessel cad             S/P CABG (coronary artery bypass graft) ICD-10-CM: Z95.1  ICD-9-CM: V45.81  Unknown - Present    Overview Signed 2/22/2012 10:44 AM by Rosezetta Leyden, MD     5-09-30 CABG - OFF PUMP - CABGx 3, lima, eric, epi aortic ultrasound - off pump, rightt endoscopic vein harvest; [id number] [place] [address] Dr to LAD, Svg Ao to OM1 and OM2             S/P CABG x 3 ICD-10-CM: Z95.1  ICD-9-CM: V45.81  6/17/2011 - Present    Overview Signed 7/26/2011 10:51 AM by Nelson TEAGUE to LAD and SVGs to OM-1 and OM-2. RCA small non-dominant diffusely diseased vessel ungrafted. LV EF at cath was 65%.              Essential hypertension, benign ICD-10-CM: I10  ICD-9-CM: 401.1  12/2/2010 - Present        Pure hypercholesterolemia ICD-10-CM: E78.00  ICD-9-CM: 272.0  12/2/2010 - Present        RESOLVED: UTI (urinary tract infection) ICD-10-CM: N39.0  ICD-9-CM: 599.0  8/3/2019 - 10/19/2019        RESOLVED: Sepsis ([address] ICD-10-CM: A41.9  ICD-9-CM: 038.9, 995.91  8/3/2019 - 10/19/2019        RESOLVED: Fever ICD-10-CM: R50.9  ICD-9-CM: 780.60  8/3/2019 - 10/19/2019        RESOLVED: Left shift ICD-10-CM: D72.89  ICD-9-CM: 288.9  8/3/2019 - 10/19/2019        RESOLVED: Syncope ICD-10-CM: R55  ICD-9-CM: 780.2  8/3/2019 - 10/19/2019        RESOLVED: Anemia ICD-10-CM: D64.9  ICD-9-CM: 285.9  8/3/2019 - 10/19/2019        RESOLVED: Cough due to ACE inhibitor ICD-10-CM: R05, T46.4X5A  ICD-9-CM: 786.2, E942.6  Unknown - 8/3/2019        RESOLVED: Edema ICD-10-CM: R60.9  ICD-9-CM: 782.3  6/3/2012 - 8/3/2019        RESOLVED: Medication adverse effect ICD-10-CM: T50.905A  ICD-9-CM: E947.9  6/3/2012 - 8/3/2019        RESOLVED: Diverticulitis ICD-10-CM: A78.98  ICD-9-CM: 562.11  Unknown - 8/3/2019        RESOLVED: Pre-operative cardiovascular examination, unstable angina ([facility] [address] ICD-10-CM: Z01.810, I20.0  ICD-9-CM: 411.1, V72.81  6/19/2011 - 6/19/2011 RESOLVED: Diverticulosis of colon with hemorrhage ICD-10-CM: K57.31  ICD-9-CM: 562.12  2010 - 8/3/2019            Subjective:    80 y.o.  female who presented to the Emergency Department complaining of confusion. Worsening over days. Associated with persistent vomiting and diarrhea, present for weeks. Was in our ER 2 days ago, with weakness and hyponatremia, and sent home on Bactrim. She returns today worse, weak, confused and severe hyponatremia. We will admit her for management. (Dr Richard Rubio)    10/20:  Still having loose stools and nausea. Reports she feels generally \"bad. \"  Na+ up to 117. K+ up to 3.4. Confusion at usual levels per daughter. WBC 17K. Phos low at 1.0 - getting KPhos. 10/21:  Nausea improved. Feeling a little better. Daughter wants her to have a regular diet. C/O mild GERD sx. Pt and daughter want her to have Zantac daily. No loose stools overnight. Phos still low - replacing. AM labs otherwise pending. Review of Systems:   A comprehensive review of systems was negative except for that written in the HPI. Objective:   Physical Exam:     Visit Vitals  /52 (BP 1 Location: Right arm, BP Patient Position: At rest;Head of bed elevated (Comment degrees))   Pulse 60   Temp 97.8 °F (36.6 °C)   Resp 14   Ht 5' 5\" (1.651 m)   Wt 61.2 kg (134 lb 14.7 oz)   SpO2 97%   BMI 22.45 kg/m²      O2 Device: Room air    Temp (24hrs), Av.1 °F (36.7 °C), Min:97.8 °F (36.6 °C), Max:98.7 °F (37.1 °C)    No intake/output data recorded. 10/19 1901 - 10/21 0700  In: 0154 [I.V.:4189]  Out: 1330 [ZXTRP:4006]  General:  Alert, cooperative at times, frail appearing, no distress, appears stated age. Head:  Normocephalic, without obvious abnormality, atraumatic. Eyes:  Conjunctivae/corneas clear. PERRL, EOMs intact.    Throat: Lips, mucosa, and tongue moist..   Neck: Supple, symmetrical, trachea midline, no adenopathy, thyroid: no enlargement/tenderness/nodules, no carotid bruit and no JVD. Lungs:   Clear to auscultation bilaterally. Chest wall:  No tenderness or deformity. Heart:  Regular rate and rhythm, S1, S2 normal, no murmur, click, rub or gallop. Abdomen:   Soft, with mild generalized tenderness. Bowel sounds active. No masses,  No organomegaly. Extremities: no cyanosis or edema. No calf tenderness or cords. Skin: Skin color, texture, turgor normal. No rashes or lesions   Neurologic:   Alert and oriented to person. Will follow 1 step commands.  equal.  No cogwheeling or rigidity. Gait not tested at this time. Sensation grossly normal to touch. Gross motor of extremities normal.       Data Review:       Recent Days:  Recent Labs     10/20/19  0304 10/19/19  1015   WBC 17.2* 16.1*   HGB 9.9* 11.7   HCT 27.3* 31.5*    496*     Recent Labs     10/21/19  0325 10/20/19  2026 10/20/19  1524 10/20/19  0925 10/20/19  0304  10/19/19  1015   NA  --  122* 120* 116* 117*   < > 112*   K  --  3.8 4.0 3.4* 3.4*   < > 2.4*   CL  --  89* 88* 84* 83*   < > 73*   CO2  --  24 26 26 27   < > 26   GLU  --  124* 138* 121* 132*   < > 184*   BUN  --  5* 5* 4* 4*   < > 6   CREA  --  0.63 0.55 0.58 0.63   < > 0.88   CA  --  7.3* 7.5* 7.7* 7.4*   < > 8.7   MG 1.7 1.7 1.8 1.9 2.0   < >  --    PHOS 1.8*  --  2.7  --  1.0*  --   --    ALB  --   --   --   --   --   --  3.5   TBILI  --   --   --   --   --   --  0.6   SGOT  --   --   --   --   --   --  19   ALT  --   --   --   --   --   --  21    < > = values in this interval not displayed. No results for input(s): PH, PCO2, PO2, HCO3, FIO2 in the last 72 hours.     24 Hour Results:  Recent Results (from the past 24 hour(s))   METABOLIC PANEL, BASIC    Collection Time: 10/20/19  9:25 AM   Result Value Ref Range    Sodium 116 (LL) 136 - 145 mmol/L    Potassium 3.4 (L) 3.5 - 5.1 mmol/L    Chloride 84 (L) 97 - 108 mmol/L    CO2 26 21 - 32 mmol/L    Anion gap 6 5 - 15 mmol/L    Glucose 121 (H) 65 - 100 mg/dL    BUN 4 (L) 6 - 20 MG/DL    Creatinine 0.58 0.55 - 1.02 MG/DL    BUN/Creatinine ratio 7 (L) 12 - 20      GFR est AA >60 >60 ml/min/1.73m2    GFR est non-AA >60 >60 ml/min/1.73m2    Calcium 7.7 (L) 8.5 - 10.1 MG/DL   MAGNESIUM    Collection Time: 10/20/19  9:25 AM   Result Value Ref Range    Magnesium 1.9 1.6 - 2.4 mg/dL   POTASSIUM, UR, RANDOM    Collection Time: 10/20/19 11:58 AM   Result Value Ref Range    Potassium urine, random 37 MMOL/L   OSMOLALITY, UR    Collection Time: 10/20/19 11:58 AM   Result Value Ref Range    Osmolality,urine 331 MOSM/kg H2O   PROTEIN/CREATININE RATIO, URINE    Collection Time: 10/20/19 11:58 AM   Result Value Ref Range    Protein, urine random 87 (H) 0.0 - 11.9 mg/dL    Creatinine, urine 25.70 mg/dL    Protein/Creat. urine Ratio 3.4     SODIUM, UR, RANDOM    Collection Time: 10/20/19 11:58 AM   Result Value Ref Range    Sodium,urine random 90 MMOL/L   SAMPLES BEING HELD    Collection Time: 10/20/19 11:58 AM   Result Value Ref Range    SAMPLES BEING HELD 1YELLOW TOP URINE     COMMENT        Add-on orders for these samples will be processed based on acceptable specimen integrity and analyte stability, which may vary by analyte.    METABOLIC PANEL, BASIC    Collection Time: 10/20/19  3:24 PM   Result Value Ref Range    Sodium 120 (L) 136 - 145 mmol/L    Potassium 4.0 3.5 - 5.1 mmol/L    Chloride 88 (L) 97 - 108 mmol/L    CO2 26 21 - 32 mmol/L    Anion gap 6 5 - 15 mmol/L    Glucose 138 (H) 65 - 100 mg/dL    BUN 5 (L) 6 - 20 MG/DL    Creatinine 0.55 0.55 - 1.02 MG/DL    BUN/Creatinine ratio 9 (L) 12 - 20      GFR est AA >60 >60 ml/min/1.73m2    GFR est non-AA >60 >60 ml/min/1.73m2    Calcium 7.5 (L) 8.5 - 10.1 MG/DL   MAGNESIUM    Collection Time: 10/20/19  3:24 PM   Result Value Ref Range    Magnesium 1.8 1.6 - 2.4 mg/dL   PHOSPHORUS    Collection Time: 10/20/19  3:24 PM   Result Value Ref Range    Phosphorus 2.7 2.6 - 4.7 MG/DL   METABOLIC PANEL, BASIC    Collection Time: 10/20/19  8:26 PM   Result Value Ref Range    Sodium 122 (L) 136 - 145 mmol/L    Potassium 3.8 3.5 - 5.1 mmol/L    Chloride 89 (L) 97 - 108 mmol/L    CO2 24 21 - 32 mmol/L    Anion gap 9 5 - 15 mmol/L    Glucose 124 (H) 65 - 100 mg/dL    BUN 5 (L) 6 - 20 MG/DL    Creatinine 0.63 0.55 - 1.02 MG/DL    BUN/Creatinine ratio 8 (L) 12 - 20      GFR est AA >60 >60 ml/min/1.73m2    GFR est non-AA >60 >60 ml/min/1.73m2    Calcium 7.3 (L) 8.5 - 10.1 MG/DL   MAGNESIUM    Collection Time: 10/20/19  8:26 PM   Result Value Ref Range    Magnesium 1.7 1.6 - 2.4 mg/dL   PHOSPHORUS    Collection Time: 10/21/19  3:25 AM   Result Value Ref Range    Phosphorus 1.8 (L) 2.6 - 4.7 MG/DL   MAGNESIUM    Collection Time: 10/21/19  3:25 AM   Result Value Ref Range    Magnesium 1.7 1.6 - 2.4 mg/dL       Medications reviewed  Current Facility-Administered Medications   Medication Dose Route Frequency    0.9% sodium chloride 1,000 mL with mvi, adult no. 4 with vit K 10 mL, thiamine 812 mg, folic acid 1 mg, potassium chloride 40 mEq infusion   IntraVENous DAILY    budesonide (ENTOCORT EC) capsule 9 mg  9 mg Oral 7am    prochlorperazine (COMPAZINE) injection 5 mg  5 mg IntraVENous Q6H PRN    colestipol (COLESTID) packet 2.5 g  2.5 g Oral DAILY    colestipol (COLESTID) packet 2.5 g  2.5 g Oral QHS    0.9% sodium chloride with KCl 20 mEq/L infusion   IntraVENous CONTINUOUS    doxazosin (CARDURA) tablet 1 mg  1 mg Oral DAILY    amLODIPine (NORVASC) tablet 5 mg  5 mg Oral QHS    hydrALAZINE (APRESOLINE) 20 mg/mL injection 10 mg  10 mg IntraVENous Q6H PRN    sodium chloride (NS) flush 5-10 mL  5-10 mL IntraVENous PRN    lactobac ac& pc-s.therm-b.anim (GERALDINE Q/RISAQUAD)  1 Cap Oral DAILY    aspirin chewable tablet 81 mg  81 mg Oral QHS    naloxone (NARCAN) injection 0.4 mg  0.4 mg IntraVENous PRN    acetaminophen (TYLENOL) tablet 650 mg  650 mg Oral Q4H PRN    diphenhydrAMINE (BENADRYL) capsule 25 mg  25 mg Oral Q4H PRN    ondansetron (ZOFRAN) injection 4 mg  4 mg IntraVENous Q4H PRN    enoxaparin (LOVENOX) injection 40 mg  40 mg SubCUTAneous Q24H    metoprolol tartrate (LOPRESSOR) tablet 50 mg  50 mg Oral BID    losartan (COZAAR) tablet 100 mg  100 mg Oral DAILY       Care Plan discussed with: Patient/Family and Nurse    Total time spent with patient: 30 minutes.     Dash Alvares MD

## 2019-10-21 NOTE — PROGRESS NOTES
..1900: Bedside shift change report given to Rakesh Molina RN (oncoming nurse) by Winter Nicholas RN (offgoing nurse). Report included the following information SBAR, Kardex, ED Summary, Procedure Summary, Intake/Output, MAR, Accordion, Recent Results, Med Rec Status, Cardiac Rhythm Sinus Neida [place], Alarm Parameters , Procedure Verification and Quality Measures. Primary Nurse Rakesh Molina and Winter Nicholas RN performed a dual skin assessment on this patient No impairment noted  Carlton score is 12    2000: Shift  Assessment completed: See flow sheet. Daughter at bedside. External Catheter placed per patient request. Labs obtained. Call bell in reach. Mental Status: A&Ox4, blind, follows commands             Respiratory: Room Air, lungs sounds clear, diminshed at base            Cardiac: sinus Eric; BP elevated. GI/: Voiding via bedpan, Loose stool, BS active            IV Drips: NS with 20K    2100: Patient resting quietly. No needs at this time. Call bell in reach daughter at bedside. 2230: patient removed from bed pan. Unmeasurable void. No further needs call bell in reach. 2249: voided 525 clear yellow urine    0000: Reassessment completed. Changes noted, see flow sheet. Patient resting with daughter at bedside. Call bell in reach. 0200: patient resting, call light in reach    0300: Labs obtained, Tolerated well. Call light in reach. 0400: Reassessment completed. No changes from previous assessment. See flow sheet    0500: CHG bath provided. Felisha care provided. Linen, EKG lead, pulse ox, and gown changed. Call light in reach    0600: Dr. Hernando Garcia notified of potassium of 1.8 and Na of 122: no new orders received. 0700: Bedside shift change report given to Erin Sanchez RN (oncoming nurse) by Rakesh Molina RN (offgoing nurse).  Report included the following information SBAR, Kardex, OR Summary, Procedure Summary, Intake/Output, Accordion, Recent Results, Med Rec Status, Cardiac Rhythm NSR, Alarm Parameters , Procedure Verification and Quality Measures.

## 2019-10-21 NOTE — PROGRESS NOTES
[facility]  INTENSIVIST DAILY PROGRESS NOTE  Name: Adrianna Deluca   : 1931   MRN: [id number]   Date: 10/21/2019 7:57 AM   I have reviewed the flowsheet and previous days notes. She was admitted to ICU for AMS and severe electrolyte abnormalities. She has microscopic colitis and has had issues with diarrhea and N/V with poor intake. She has received IVF and lytes overnight. Her daughter reports that she has been on budesonide 9mg and colestipol 5 grams for this. She still feel weak. Overnight events reviewed:  Abdomen remains distended. Denies SOB/chest pain. Daughter has observed apnic episodes when sleeping, as did I when I was in the room. Pt denies any snoring at home, but also states her  does not snore which daughter reports that he does loudly. 4 stools recorded over the last 24 hrs     Vital Signs:    Visit Vitals  /67   Pulse (!) 58   Temp 98.3 °F (36.8 °C)   Resp 12   Ht 5' 5\" (1.651 m)   Wt 61.2 kg (134 lb 14.7 oz)   SpO2 100%   BMI 22.45 kg/m²       O2 Device: Room air       Temp (24hrs), Av.9 °F (36.6 °C), Min:97.8 °F (36.6 °C), Max:98.3 °F (36.8 °C)       Intake/Output:   Last shift:      10/21 07 - 10/21 1900  In: 75 [I.V.:75]  Out: 300 [Urine:300]  Last 3 shifts: 10/19 1901 - 10/21 07  In: 7984 [I.V.:4189]  Out: 1330 [Urine:1330]    Intake/Output Summary (Last 24 hours) at 10/21/2019 1216  Last data filed at 10/21/2019 0800  Gross per 24 hour   Intake 2840.26 ml   Output 1000 ml   Net 1840.26 ml       Physical Exam:  General:  Alert, Awake Appears stated age. Head:  Normocephalic, without obvious abnormality, atraumatic. Eyes:  Conjunctivae/corneas clear. Nose: Nares normal. Septum midline. Mucosa normal. No drainage or sinus tenderness. Throat: Lips, mucosa, and tongue normal.  No masses. Lungs:   Clear to auscultation bilaterally. Chest wall:  No tenderness or deformity.    Heart:  Regular rate and rhythm, S1, S2 normal, no murmur, click, rub or gallop. Abdomen:   Distended, non-tender   Extremities: Extremities normal, no edema. Pulses: 2+ and symmetric all extremities. Skin: Pale  No rashes or lesions   Lymph nodes:  no nodes   Neurologic: Grossly nonfocal       DATA:   Current Facility-Administered Medications   Medication Dose Route Frequency    famotidine (PEPCID) 40 mg/5 mL (8 mg/mL) oral suspension 20 mg  20 mg Oral DAILY    phosphorus (K PHOS NEUTRAL) 250 mg tablet 1 Tab  1 Tab Oral BID    rifAXIMin (XIFAXAN) tablet 550 mg  550 mg Oral TIDAC    diphenoxylate-atropine (LOMOTIL) tablet 1 Tab  1 Tab Oral TID PRN    nystatin (MYCOSTATIN) 100,000 unit/mL oral suspension 500,000 Units  500,000 Units Oral QID    magnesium sulfate 1 g/100 ml IVPB (premix or compounded)  1 g IntraVENous ONCE    0.9% sodium chloride 1,000 mL with mvi, adult no. 4 with vit K 10 mL, thiamine 254 mg, folic acid 1 mg, potassium chloride 40 mEq infusion   IntraVENous DAILY    budesonide (ENTOCORT EC) capsule 9 mg  9 mg Oral 7am    prochlorperazine (COMPAZINE) injection 5 mg  5 mg IntraVENous Q6H PRN    colestipol (COLESTID) packet 2.5 g  2.5 g Oral DAILY    colestipol (COLESTID) packet 2.5 g  2.5 g Oral QHS    0.9% sodium chloride with KCl 20 mEq/L infusion   IntraVENous CONTINUOUS    doxazosin (CARDURA) tablet 1 mg  1 mg Oral DAILY    amLODIPine (NORVASC) tablet 5 mg  5 mg Oral QHS    hydrALAZINE (APRESOLINE) 20 mg/mL injection 10 mg  10 mg IntraVENous Q6H PRN    sodium chloride (NS) flush 5-10 mL  5-10 mL IntraVENous PRN    lactobac ac& pc-s.therm-b.anim (GERALDINE Q/RISAQUAD)  1 Cap Oral DAILY    aspirin chewable tablet 81 mg  81 mg Oral QHS    naloxone (NARCAN) injection 0.4 mg  0.4 mg IntraVENous PRN    acetaminophen (TYLENOL) tablet 650 mg  650 mg Oral Q4H PRN    diphenhydrAMINE (BENADRYL) capsule 25 mg  25 mg Oral Q4H PRN    ondansetron (ZOFRAN) injection 4 mg  4 mg IntraVENous Q4H PRN    enoxaparin (LOVENOX) injection 40 mg 40 mg SubCUTAneous Q24H    metoprolol tartrate (LOPRESSOR) tablet 50 mg  50 mg Oral BID    losartan (COZAAR) tablet 100 mg  100 mg Oral DAILY       Telemetry:          Labs:  Recent Labs     10/21/19  0953 10/20/19  0304 10/19/19  1015   WBC 12.1* 17.2* 16.1*   HGB 9.8* 9.9* 11.7   HCT 27.7* 27.3* 31.5*    350 496*     Recent Labs     10/21/19  0953 10/21/19  0325 10/20/19  2026 10/20/19  1524  10/20/19  0304  10/19/19  1015   *  125*  --  122* 120*   < > 117*   < > 112*   K 3.4*  3.6  --  3.8 4.0   < > 3.4*   < > 2.4*   CL 94*  94*  --  89* 88*   < > 83*   < > 73*   CO2 24  24  --  24 26   < > 27   < > 26   *  148*  --  124* 138*   < > 132*   < > 184*   BUN 6  6  --  5* 5*   < > 4*   < > 6   CREA 0.67  0.63  --  0.63 0.55   < > 0.63   < > 0.88   CA 7.6*  7.8*  --  7.3* 7.5*   < > 7.4*   < > 8.7   MG 1.7 1.7 1.7 1.8   < > 2.0   < >  --    PHOS  --  1.8*  --  2.7  --  1.0*  --   --    ALB 2.5*  --   --   --   --   --   --  3.5   SGOT 16  --   --   --   --   --   --  19   ALT 18  --   --   --   --   --   --  21    < > = values in this interval not displayed. Imaging:  I have personally reviewed the patients radiographs and reports. CXR shows shallow inspiration and ATX       IMPRESSION:   · AMS:  Improving  · Hypophosphatemia  · Hypokalemia  · Hyponatremia  · Microscopic colitis: needs budesonide 9mg+ colestid granules 5 grams(unable to swallow tabs)   PLAN:     · Budesonide 9mg daily  · Colestid 5 grams daily  · IVF  · PO diet as tolerated. Encouraged to take PO  · Compazine for nausea. · PT/OT- pt has not gotten out of bed in days  · Case discussed with Dr. Edna Jovel  · May need sleep eval as outpatient   GLOBAL ISSUES:   · Head of bed elevated  · GI Prophylaxis:   · DVT Prophylaxis:  · Medical Decision Maker: The pt is critically ill.     See my orders for details    My assessment/plan was discussed with: nursing, patient, and daughter    Manuela Hung MD,  [facility]

## 2019-10-21 NOTE — PROGRESS NOTES
Dashawn Freed M.D.  [phone]           GI PROGRESS NOTE        NAME: Josette Van   :  1931   MRN:  [id number]       Subjective:   Reports feeling ok, still with loose stools, two this morning so far. None overnight. Denies abdominal pain or bleeding. Feels full easily after eating and reports discomfort and pain upon swallowing. No vomiting. Objective:       VITALS:   Last 24hrs VS reviewed since prior progress note. Most recent are:  Visit Vitals  /67   Pulse (!) 58   Temp 98.3 °F (36.8 °C)   Resp 12   Ht 5' 5\" (1.651 m)   Wt 61.2 kg (134 lb 14.7 oz)   SpO2 100%   BMI 22.45 kg/m²       Intake/Output Summary (Last 24 hours) at 10/21/2019 0904  Last data filed at 10/21/2019 0800  Gross per 24 hour   Intake 2840.26 ml   Output 1000 ml   Net 1840.26 ml       PHYSICAL EXAM:  General: Alert, in no acute distress    HEENT: Anicteric sclerae. Lungs:            CTA Bilaterally.    Heart:  Regular  rhythm,    Abdomen: Soft, mildly distended, mild tenderness LLQ.  (+)Bowel sounds, no HSM, tympanitic upper and dull lower abdomen  Extremities: No c/c/e      Lab Data Reviewed:   Recent Labs     10/20/19  0304 10/19/19  1015   WBC 17.2* 16.1*   HGB 9.9* 11.7   HCT 27.3* 31.5*    496*     Recent Labs     10/21/19  0325 10/20/19  2026 10/20/19  1524   NA  --  122* 120*   K  --  3.8 4.0   CL  --  89* 88*   CO2  --  24 26   BUN  --  5* 5*   CREA  --  0.63 0.55   GLU  --  124* 138*   PHOS 1.8*  --  2.7   CA  --  7.3* 7.5*     Recent Labs     10/19/19  1015   SGOT 19      TP 7.8   ALB 3.5   GLOB 4.3*       ________________________________________________________________________  Patient Active Problem List   Diagnosis Code    Essential hypertension, benign I10    Pure hypercholesterolemia E78.00    S/P CABG x 3 Z95.1    Coronary atherosclerosis of native coronary artery I25.10    S/P CABG (coronary artery bypass graft) Z95.1    Macular degeneration H35.30  Carotid arterial disease (HCC) I73.9    Dehydration E86.0    Hyponatremia E87.1    Hypokalemia E87.6    Leukocytosis D72.829    Acute metabolic encephalopathy K82.34    Nausea vomiting and diarrhea R11.2, R19.7         Assessment and Plan:  Chronic and recurrent diarrhea, had colonoscopy in 2015 with evidence of microscopic colitis that responded to budesonide, recurred in August of this year and responded to budesonide which was tapered and diarrhea recurred, then developed UTI, was on bactrim and recently switched to nitrofurantoin. Symptoms of nausea, vomiting, AMS related to hyponatremia and dehydration. Budesonide and high dose colestipol were started mid-week last week. So far stool studies are negative, twice negative for C. Diff. Suspect significantly disturbed bacterial gladys with microscopic colitis. Will continue to stabilize and adjust sodium today, will get more stool studies and labs. Continue with budesonide and bile acid resin, add lomotil and will add rifaximin. If symptoms fail to improve, will need flex sig/colonoscopy and biopsies. Will add nystatin for possible candida esophagitis and continue with pepcid.        Signed By: Yoel Su MD     10/21/2019  9:04 AM

## 2019-10-22 LAB
ALBUMIN SERPL-MCNC: 2.5 G/DL (ref 3.5–5)
ALBUMIN/GLOB SERPL: 0.8 {RATIO} (ref 1.1–2.2)
ALP SERPL-CCNC: 76 U/L (ref 45–117)
ALT SERPL-CCNC: 17 U/L (ref 12–78)
ANION GAP SERPL CALC-SCNC: 8 MMOL/L (ref 5–15)
AST SERPL-CCNC: 10 U/L (ref 15–37)
BASOPHILS # BLD: 0.1 K/UL (ref 0–0.1)
BASOPHILS NFR BLD: 1 % (ref 0–1)
BILIRUB SERPL-MCNC: 0.4 MG/DL (ref 0.2–1)
BUN SERPL-MCNC: 9 MG/DL (ref 6–20)
BUN/CREAT SERPL: 15 (ref 12–20)
CALCIUM SERPL-MCNC: 7.8 MG/DL (ref 8.5–10.1)
CHLORIDE SERPL-SCNC: 99 MMOL/L (ref 97–108)
CO2 SERPL-SCNC: 22 MMOL/L (ref 21–32)
CREAT SERPL-MCNC: 0.6 MG/DL (ref 0.55–1.02)
DIFFERENTIAL METHOD BLD: ABNORMAL
EOSINOPHIL # BLD: 0.8 K/UL (ref 0–0.4)
EOSINOPHIL NFR BLD: 5 % (ref 0–7)
ERYTHROCYTE [DISTWIDTH] IN BLOOD BY AUTOMATED COUNT: 14 % (ref 11.5–14.5)
GLOBULIN SER CALC-MCNC: 3.2 G/DL (ref 2–4)
GLUCOSE SERPL-MCNC: 108 MG/DL (ref 65–100)
HCT VFR BLD AUTO: 27 % (ref 35–47)
HGB BLD-MCNC: 9.6 G/DL (ref 11.5–16)
IMM GRANULOCYTES # BLD AUTO: 0.1 K/UL (ref 0–0.04)
IMM GRANULOCYTES NFR BLD AUTO: 1 % (ref 0–0.5)
LYMPHOCYTES # BLD: 3.6 K/UL (ref 0.8–3.5)
LYMPHOCYTES NFR BLD: 25 % (ref 12–49)
MCH RBC QN AUTO: 31 PG (ref 26–34)
MCHC RBC AUTO-ENTMCNC: 35.6 G/DL (ref 30–36.5)
MCV RBC AUTO: 87.1 FL (ref 80–99)
MONOCYTES # BLD: 1.4 K/UL (ref 0–1)
MONOCYTES NFR BLD: 10 % (ref 5–13)
NEUTS SEG # BLD: 8.3 K/UL (ref 1.8–8)
NEUTS SEG NFR BLD: 58 % (ref 32–75)
NRBC # BLD: 0 K/UL (ref 0–0.01)
NRBC BLD-RTO: 0 PER 100 WBC
PHOSPHATE SERPL-MCNC: 1.9 MG/DL (ref 2.6–4.7)
PLATELET # BLD AUTO: 378 K/UL (ref 150–400)
PMV BLD AUTO: 8.9 FL (ref 8.9–12.9)
POTASSIUM SERPL-SCNC: 3.8 MMOL/L (ref 3.5–5.1)
PROT SERPL-MCNC: 5.7 G/DL (ref 6.4–8.2)
RBC # BLD AUTO: 3.1 M/UL (ref 3.8–5.2)
SODIUM SERPL-SCNC: 129 MMOL/L (ref 136–145)
WBC # BLD AUTO: 14.2 K/UL (ref 3.6–11)

## 2019-10-22 PROCEDURE — 74011250637 HC RX REV CODE- 250/637: Performed by: INTERNAL MEDICINE

## 2019-10-22 PROCEDURE — 97116 GAIT TRAINING THERAPY: CPT

## 2019-10-22 PROCEDURE — 97530 THERAPEUTIC ACTIVITIES: CPT

## 2019-10-22 PROCEDURE — 74011250637 HC RX REV CODE- 250/637: Performed by: FAMILY MEDICINE

## 2019-10-22 PROCEDURE — 77030038269 HC DRN EXT URIN PURWCK BARD -A

## 2019-10-22 PROCEDURE — 84100 ASSAY OF PHOSPHORUS: CPT

## 2019-10-22 PROCEDURE — 85025 COMPLETE CBC W/AUTO DIFF WBC: CPT

## 2019-10-22 PROCEDURE — 65660000000 HC RM CCU STEPDOWN

## 2019-10-22 PROCEDURE — 97165 OT EVAL LOW COMPLEX 30 MIN: CPT

## 2019-10-22 PROCEDURE — 36415 COLL VENOUS BLD VENIPUNCTURE: CPT

## 2019-10-22 PROCEDURE — 80053 COMPREHEN METABOLIC PANEL: CPT

## 2019-10-22 PROCEDURE — 74011250636 HC RX REV CODE- 250/636: Performed by: FAMILY MEDICINE

## 2019-10-22 PROCEDURE — 74011000250 HC RX REV CODE- 250: Performed by: INTERNAL MEDICINE

## 2019-10-22 PROCEDURE — 74011250636 HC RX REV CODE- 250/636: Performed by: INTERNAL MEDICINE

## 2019-10-22 PROCEDURE — 97161 PT EVAL LOW COMPLEX 20 MIN: CPT

## 2019-10-22 RX ADMIN — NYSTATIN 500000 UNITS: 100000 SUSPENSION ORAL at 21:40

## 2019-10-22 RX ADMIN — FAMOTIDINE 20 MG: 40 POWDER, FOR SUSPENSION ORAL at 09:25

## 2019-10-22 RX ADMIN — DIBASIC SODIUM PHOSPHATE, MONOBASIC POTASSIUM PHOSPHATE AND MONOBASIC SODIUM PHOSPHATE 2 TABLET: 852; 155; 130 TABLET ORAL at 17:54

## 2019-10-22 RX ADMIN — ASPIRIN 81 MG 81 MG: 81 TABLET ORAL at 21:40

## 2019-10-22 RX ADMIN — RIFAXIMIN 550 MG: 550 TABLET ORAL at 07:30

## 2019-10-22 RX ADMIN — Medication 1 CAPSULE: at 09:24

## 2019-10-22 RX ADMIN — FOLIC ACID: 5 INJECTION, SOLUTION INTRAMUSCULAR; INTRAVENOUS; SUBCUTANEOUS at 17:54

## 2019-10-22 RX ADMIN — DIPHENOXYLATE HYDROCHLORIDE AND ATROPINE SULFATE 1 TABLET: 2.5; .025 TABLET ORAL at 02:56

## 2019-10-22 RX ADMIN — NYSTATIN 500000 UNITS: 100000 SUSPENSION ORAL at 17:54

## 2019-10-22 RX ADMIN — NYSTATIN 500000 UNITS: 100000 SUSPENSION ORAL at 09:24

## 2019-10-22 RX ADMIN — DIBASIC SODIUM PHOSPHATE, MONOBASIC POTASSIUM PHOSPHATE AND MONOBASIC SODIUM PHOSPHATE 1 TABLET: 852; 155; 130 TABLET ORAL at 09:24

## 2019-10-22 RX ADMIN — DIPHENOXYLATE HYDROCHLORIDE AND ATROPINE SULFATE 1 TABLET: 2.5; .025 TABLET ORAL at 21:59

## 2019-10-22 RX ADMIN — BUDESONIDE 9 MG: 3 CAPSULE, GELATIN COATED ORAL at 06:03

## 2019-10-22 RX ADMIN — ENOXAPARIN SODIUM 40 MG: 40 INJECTION SUBCUTANEOUS at 15:15

## 2019-10-22 RX ADMIN — COLESTIPOL HYDROCHLORIDE 2.5 G: 5 GRANULE, FOR SUSPENSION ORAL at 09:25

## 2019-10-22 RX ADMIN — DOXAZOSIN 1 MG: 2 TABLET ORAL at 09:24

## 2019-10-22 RX ADMIN — METOPROLOL TARTRATE 50 MG: 50 TABLET, FILM COATED ORAL at 17:54

## 2019-10-22 RX ADMIN — METOPROLOL TARTRATE 50 MG: 50 TABLET, FILM COATED ORAL at 09:24

## 2019-10-22 RX ADMIN — RIFAXIMIN 550 MG: 550 TABLET ORAL at 15:14

## 2019-10-22 RX ADMIN — LOSARTAN POTASSIUM 100 MG: 50 TABLET, FILM COATED ORAL at 09:24

## 2019-10-22 RX ADMIN — COLESTIPOL HYDROCHLORIDE 2.5 G: 5 GRANULE, FOR SUSPENSION ORAL at 21:45

## 2019-10-22 RX ADMIN — NYSTATIN 500000 UNITS: 100000 SUSPENSION ORAL at 15:14

## 2019-10-22 RX ADMIN — AMLODIPINE BESYLATE 10 MG: 5 TABLET ORAL at 21:40

## 2019-10-22 NOTE — PROGRESS NOTES
I met with the liason from [facility] who informed me that pt.'s  is open to him for services. Daughter wants to keep the services together for her parents so home health orders for pt for the [facility],medication management,home PT/OT sent ythrough [employer] to [facility]. Daughter plans to notify [address] for personal care services for her parents. I will continue to follow pt for discharge needs. Plan:Choice letter signed for [facility]. 2.Clnicals with orders sent to [facility]. 3.Daughter is contacting [address] for assistance with cooking,cleaning,etc.    4.Case Management will continue to follow pt for discharge needs.     Evan Hinds

## 2019-10-22 NOTE — PROGRESS NOTES
Problem: Self Care Deficits Care Plan (Adult)  Goal: *Acute Goals and Plan of Care (Insert Text)  Description    FUNCTIONAL STATUS PRIOR TO ADMISSION: Patient was modified independent using a Rollator occasionally for functional mobility. HOME SUPPORT: The patient lived with  but did not require assist.    Occupational Therapy Goals  Initiated 10/22/2019  1. Patient will perform upper body dressing and bathing with independence within 7 day(s). 2.  Patient will perform lower body dressing and bathing with supervision/set-up within 7 day(s). 3.  Patient will perform toilet transfers with modified independence within 7 day(s). 4.  Patient will perform all aspects of toileting with modified independence within 7 day(s). 5.  Patient will participate in upper extremity therapeutic exercise/activities with modified independence for 10 minutes within 7 day(s). 6.  Patient will utilize energy conservation techniques during functional activities with verbal cues within 7 day(s). Outcome: Progressing Towards Goal   OCCUPATIONAL THERAPY EVALUATION  Patient: Josette Van (23 y.o. female)  Date: 10/22/2019  Primary Diagnosis: Hyponatremia [E87.1]        Precautions: fall       ASSESSMENT  Based on the objective data described below, the patient presents with decreased activity tolerance, generalized weakness, and impaired balance following admission on 10/19 for hyponatremia after experiencing several days of nausea/vomiting/diarrhea at home. Patient's daughter was present for activity and supportive. Patient was alert, oriented x4, and following 100% of commands. She was able to come to sitting with good overall sitting balance and transfer to the chair with minimum assistance. Patient engaged in sitting and standing ADLs with good tolerance. She was educated on safe transfer techniques, energy conservation techniques, and adaptive equipment recommendations for home use (BSC);  Daughter agreeable to purchase BS. Patient would benefit from continued skilled OT to progress towards goals and improve overall independence. Current Level of Function Impacting Discharge (ADLs/self-care): Patient required min A for both bed mobility and OOB transfers. Patient is independent to supervision level for UB ADLs and requires min A to max A for LB ADLs. Functional Outcome Measure: The patient scored 40/100 on the Barthel Index outcome measure. Other factors to consider for discharge: Lives with  who is unable to provide assistance; Daughter is very supportive      Patient will benefit from skilled therapy intervention to address the above noted impairments. PLAN :  Recommendations and Planned Interventions: self care training, functional mobility training, therapeutic exercise, balance training, therapeutic activities, endurance activities, patient education, home safety training and family training/education    Frequency/Duration: Patient will be followed by occupational therapy 5 times a week to address goals. Recommendation for discharge: (in order for the patient to meet his/her long term goals)  Occupational therapy at least 2 days/week in the home     This discharge recommendation:  Has been made in collaboration with the attending provider and/or case management    IF patient discharges home will need the following DME: none       SUBJECTIVE:   Patient stated It feels good to sit up here (recliner).     OBJECTIVE DATA SUMMARY:   HISTORY:   Past Medical History:   Diagnosis Date    Carotid arterial disease ([facility] [address]     mild 0-49%    Coronary atherosclerosis of native coronary artery     Cath 2011 with multivessel cad    Cough due to ACE inhibitor     Diverticulitis     Dyslipidemia     Hypertension     Macular degeneration     S/P CABG (coronary artery bypass graft)     6-19-11 CABG - OFF PUMP - CABGx 3, lima, eric, epi aortic ultrasound - off pump, rightt endoscopic vein harvest; [id number] [place] [address] Dr to MELISSA, [place] Ao to OM1 and OM2   No past surgical history on file. Expanded or extensive additional review of patient history:     Home Situation  Home Environment: Other (comment)(condo)  One/Two Story Residence: Other (Comment)  Living Alone: No  Support Systems: Spouse/Significant Other/Partner, Child(luis daniel)  Patient Expects to be Discharged to[de-identified] Private residence  Current DME Used/Available at Home: Cletis Kava, rollator  Tub or Shower Type: Shower    Hand dominance: Right    EXAMINATION OF PERFORMANCE DEFICITS:  Cognitive/Behavioral Status:  Neurologic State: Alert  Orientation Level: Oriented X4  Cognition: Appropriate decision making; Appropriate for age attention/concentration; Appropriate safety awareness  Perception: Appears intact  Perseveration: No perseveration noted  Safety/Judgement: Awareness of environment;Good awareness of safety precautions    Skin: Intact in the uppers    Edema: None noted in the uppers    Hearing: Auditory  Auditory Impairment: None    Vision/Perceptual:    Tracking: Able to track stimulus in all quadrants w/o difficulty    Diplopia: No    Acuity: Within Defined Limits       Range of Motion:  AROM: Within functional limits  PROM: Within functional limits    Strength:  Strength: Generally decreased, functional in the uppers    Coordination:  Fine Motor Skills-Upper: Left Intact; Right Intact    Gross Motor Skills-Upper: Left Intact; Right Intact    Tone & Sensation:  Tone: normal  Sensation: intact    Balance:  Sitting: Intact; High guard  Standing: Impaired; With support  Standing - Static: Fair  Standing - Dynamic : Fair    Functional Mobility and Transfers for ADLs:  Bed Mobility:  Rolling: Minimum assistance  Supine to Sit: Minimum assistance  Sit to Supine: Minimum assistance  Scooting: Minimum assistance    Transfers:  Sit to Stand: Minimum assistance  Stand to Sit: Minimum assistance  Bed to Chair: Minimum assistance  Bathroom Mobility: Minimum assistance  Toilet Transfer : Minimum assistance    ADL Assessment:  Feeding: Independent    Oral Facial Hygiene/Grooming: Independent    Bathing: Minimum assistance    Upper Body Dressing: Supervision;Setup    Lower Body Dressing: Maximum assistance    Toileting: Moderate assistance    Cognitive Retraining  Safety/Judgement: Awareness of environment;Good awareness of safety precautions    Functional Measure:  Barthel Index:    Bathin  Bladder: 5  Bowels: 5  Groomin  Dressin  Feeding: 10  Mobility: 0  Stairs: 0  Toilet Use: 0  Transfer (Bed to Chair and Back): 10  Total: 40/100        The Barthel ADL Index: Guidelines  1. The index should be used as a record of what a patient does, not as a record of what a patient could do. 2. The main aim is to establish degree of independence from any help, physical or verbal, however minor and for whatever reason. 3. The need for supervision renders the patient not independent. 4. A patient's performance should be established using the best available evidence. Asking the patient, friends/relatives and nurses are the usual sources, but direct observation and common sense are also important. However direct testing is not needed. 5. Usually the patient's performance over the preceding 24-48 hours is important, but occasionally longer periods will be relevant. 6. Middle categories imply that the patient supplies over 50 per cent of the effort. 7. Use of aids to be independent is allowed. Jone Magallon., Barthel, D.W. ([id number]). Functional evaluation: the Barthel Index. [facility] (14)2. Cecilia Van, BRODERICKJMichaelMBEVERLY, Samantha Metzger., Hanane Del Real., [place], [address] [place] [address] (). Measuring the change indisability after inpatient rehabilitation; comparison of the responsiveness of the Barthel Index and Functional [place] Measure. Journal of Neurology, Neurosurgery, and Psychiatry, 66(4), 338-717.   Lucina Weiss, N.J.A, DOUGLAS nAaya, Daniela Zamora, M.A. (2004.) Assessment of post-stroke quality of life in cost-effectiveness studies: The usefulness of the Barthel Index and the EuroQoL-5D. Quality of Life Research, 15, 219-53        Occupational Therapy Evaluation Charge Determination   History Examination Decision-Making   LOW Complexity : Brief history review  LOW Complexity : 1-3 performance deficits relating to physical, cognitive , or psychosocial skils that result in activity limitations and / or participation restrictions  LOW Complexity : No comorbidities that affect functional and no verbal or physical assistance needed to complete eval tasks       Based on the above components, the patient evaluation is determined to be of the following complexity level: LOW     Activity Tolerance:   Good  Please refer to the flowsheet for vital signs taken during this treatment. After treatment patient left in no apparent distress:    Sitting in chair, Call bell within reach and Bed / chair alarm activated    COMMUNICATION/EDUCATION:   The patients plan of care was discussed with: Physical Therapist, Registered Nurse and patient . Home safety education was provided and the patient/caregiver indicated understanding., Patient/family have participated as able in goal setting and plan of care. and Patient/family agree to work toward stated goals and plan of care. This patients plan of care is appropriate for delegation to \[facility]\"".     Thank you for this referral.  Beronica Murphy OTR/L  Time Calculation: 34 mins

## 2019-10-22 NOTE — PROGRESS NOTES
Problem: Risk for Spread of Infection  Goal: Prevent transmission of infectious organism to others  Description  Prevent the transmission of infectious organisms to other patients, staff members, and visitors. Outcome: Resolved/Met     Problem: Patient Education:  Go to Education Activity  Goal: Patient/Family Education  Outcome: Resolved/Met     Problem: Falls - Risk of  Goal: *Absence of Falls  Description  Document Rozina Fall Risk and appropriate interventions in the flowsheet. Outcome: Resolved/Met  Note:   Fall Risk Interventions:  Mobility Interventions: Assess mobility with egress test, Bed/chair exit alarm         Medication Interventions: Assess postural VS orthostatic hypotension, Bed/chair exit alarm, Teach patient to arise slowly    Elimination Interventions: Bed/chair exit alarm, Call light in reach, Toileting schedule/hourly rounds              Problem: Patient Education: Go to Patient Education Activity  Goal: Patient/Family Education  Outcome: Resolved/Met     Problem: Pressure Injury - Risk of  Goal: *Prevention of pressure injury  Description  Document Carlton Scale and appropriate interventions in the flowsheet. Outcome: Resolved/Met  Note:   Pressure Injury Interventions:  Sensory Interventions: Assess need for specialty bed, Assess changes in LOC    Moisture Interventions: Absorbent underpads, Apply protective barrier, creams and emollients, Offer toileting Q_hr    Activity Interventions: Assess need for specialty bed    Mobility Interventions: Assess need for specialty bed, Turn and reposition approx.  every two hours(pillow and wedges)    Nutrition Interventions: Document food/fluid/supplement intake    Friction and Shear Interventions: Apply protective barrier, creams and emollients

## 2019-10-22 NOTE — PROGRESS NOTES
Problem: Mobility Impaired (Adult and Pediatric)  Goal: *Acute Goals and Plan of Care (Insert Text)  Description  FUNCTIONAL STATUS PRIOR TO ADMISSION: Patient was independent and active without use of DME.    HOME SUPPORT PRIOR TO ADMISSION: The patient lived with spouse but did not require assist.    Physical Therapy Goals  Initiated 10/22/2019  1. Patient will move from supine to sit and sit to supine , scoot up and down and roll side to side in bed with independence within 7 day(s). 2.  Patient will transfer from bed to chair and chair to bed with modified independence using the least restrictive device within 7 day(s). 3.  Patient will perform sit to stand with modified independence within 7 day(s). 4.  Patient will ambulate with modified independence for 200 feet with the least restrictive device within 7 day(s). 5.  Patient will ascend/descend 4 stairs with  handrail(s) with modified independence within 7 day(s). Outcome: Progressing Towards Goal   PHYSICAL THERAPY EVALUATION  Patient: Ammon Bumpers (41 y.o. female)  Date: 10/22/2019  Primary Diagnosis: Hyponatremia [E87.1]        Precautions: fall         ASSESSMENT  Based on the objective data described below, the patient presents with generalized weakness and debility. Sit on the edge of bed with min assist, ambulate with rolling walker min assist towards the recliner min assist. OOB to chair as tolerated performed some active range of motion exercise on both LE all planes. Performed some active range of motion exercise on both LE all planes. Communicated with nurse who agreed to monitor patient and assist back to bed when ready to go back. Current Level of Function Impacting Discharge (mobility/balance): min assist with transfers and ambulation    Functional Outcome Measure: The patient scored 35/100 on the barthel index outcome measure.       Other factors to consider for discharge: none     Patient will benefit from skilled therapy intervention to address the above noted impairments. PLAN :  Recommendations and Planned Interventions: bed mobility training, transfer training, gait training, therapeutic exercises and therapeutic activities      Frequency/Duration: Patient will be followed by physical therapy:  5 times a week to address goals. Recommendation for discharge: (in order for the patient to meet his/her long term goals)  Physical therapy at least 2 days/week in the home     This discharge recommendation:  Has been made in collaboration with the attending provider and/or case management    IF patient discharges home will need the following DME: patient owns DME required for discharge         SUBJECTIVE:   Patient stated [address]     OBJECTIVE DATA SUMMARY:   HISTORY:    Past Medical History:   Diagnosis Date    Carotid arterial disease ([facility] [address]     mild 0-49%    Coronary atherosclerosis of native coronary artery     Cath 2011 with multivessel cad    Cough due to ACE inhibitor     Diverticulitis     Dyslipidemia     Hypertension     Macular degeneration     S/P CABG (coronary artery bypass graft)     6-19-11 CABG - OFF PUMP - CABGx 3, lima, eric, epi aortic ultrasound - off pump, rightt endoscopic vein harvest; [id number] [place] [address] Dr to LAD, [place] Ao to OM1 and OM2   No past surgical history on file.     Personal factors and/or comorbidities impacting plan of care:     Home Situation  Home Environment: Other (comment)(condo)  One/Two Story Residence: Other (Comment)  Living Alone: No  Support Systems: [employer]ianism / nisreen community, Family member(s), Friends \ neighbors  Patient Expects to be Discharged to[de-identified] Other (comment)  Current DME Used/Available at Home: Blood pressure cuff    EXAMINATION/PRESENTATION/DECISION MAKING:   Critical Behavior:  Neurologic State: Alert, Eyes open spontaneously  Orientation Level: Oriented X4  Cognition: Appropriate decision making, Appropriate for age attention/concentration, Appropriate safety awareness, Follows commands     Hearing: Auditory  Auditory Impairment: None    Range Of Motion:  AROM: Within functional limits           PROM: Within functional limits           Strength:    Strength: Generally decreased, functional                    Tone & Sensation:                                  Coordination:  Coordination: Within functional limits  Vision:      Functional Mobility:  Bed Mobility:  Rolling: Minimum assistance  Supine to Sit: Minimum assistance  Sit to Supine: Minimum assistance  Scooting: Minimum assistance  Transfers:  Sit to Stand: Minimum assistance  Stand to Sit: Minimum assistance  Stand Pivot Transfers: Minimum assistance     Bed to Chair: Minimum assistance              Balance:   Sitting: Intact; High guard  Standing: Impaired; With support  Standing - Static: Fair  Standing - Dynamic : Fair  Ambulation/Gait Training:  Distance (ft): 10 Feet (ft)  Assistive Device: Walker, rolling;Gait belt  Ambulation - Level of Assistance: Minimal assistance     Gait Description (WDL): Exceptions to WDL  Gait Abnormalities: Path deviations        Base of Support: Widened     Speed/Jana: Pace decreased (<100 feet/min)                   Therapeutic Exercises:    Instructed patient to continue active range of motion exercise on both legs while up on chair or on bed. Functional Measure:  Barthel Index:    Bathin  Bladder: 5  Bowels: 5  Groomin  Dressin  Feeding: 10  Mobility: 0  Stairs: 0  Toilet Use: 0  Transfer (Bed to Chair and Back): 0  Total: 35/100       The Barthel ADL Index: Guidelines  1. The index should be used as a record of what a patient does, not as a record of what a patient could do. 2. The main aim is to establish degree of independence from any help, physical or verbal, however minor and for whatever reason. 3. The need for supervision renders the patient not independent. 4. A patient's performance should be established using the best available evidence.  Asking the patient, friends/relatives and nurses are the usual sources, but direct observation and common sense are also important. However direct testing is not needed. 5. Usually the patient's performance over the preceding 24-48 hours is important, but occasionally longer periods will be relevant. 6. Middle categories imply that the patient supplies over 50 per cent of the effort. 7. Use of aids to be independent is allowed. Jone Magallon., Barthel, D.W. (1657). Functional evaluation: the Barthel Index. [facility] (14)2. Cecilia Duff eunice NICOLE Corral, Samantha Metzger., Hanane Del Real., [place], [address] (). Measuring the change indisability after inpatient rehabilitation; comparison of the responsiveness of the Barthel Index and Functional [place] Measure. Journal of Neurology, Neurosurgery, and Psychiatry, 66(4), 349-610. JUAN Rocha, DOUGLAS Anaya, & Jose Antonio Olmedo, M.A. (2004.) Assessment of post-stroke quality of life in cost-effectiveness studies: The usefulness of the Barthel Index and the EuroQoL-5D. Quality of Life Research, 15, 566-36           Physical Therapy Evaluation Charge Determination   History Examination Presentation Decision-Making   LOW Complexity : Zero comorbidities / personal factors that will impact the outcome / POC LOW Complexity : 1-2 Standardized tests and measures addressing body structure, function, activity limitation and / or participation in recreation  LOW Complexity : Stable, uncomplicated  Other outcome measures barthel  LOW       Based on the above components, the patient evaluation is determined to be of the following complexity level: LOW     Pain Ratin/10    Activity Tolerance:   Good  Please refer to the flowsheet for vital signs taken during this treatment.     After treatment patient left in no apparent distress:   Sitting in chair, Call bell within reach, Bed / chair alarm activated and Caregiver / family present    COMMUNICATION/EDUCATION:   The patients plan of care was discussed with: Occupational Therapist, Registered Nurse and . Fall prevention education was provided and the patient/caregiver indicated understanding. Thank you for this referral.  Darin Marshall, PT,WCC.    Time Calculation: 26 mins

## 2019-10-22 NOTE — PROGRESS NOTES
[facility]     Renal Daily Progress Note:     Admission Date: 10/19/2019     Subjective: Na 129 in am, Phos 1.9       Review of Systems  Appetite improving slowly     Objective:     Visit Vitals  /49 (BP 1 Location: Left arm, BP Patient Position: At rest)   Pulse 66   Temp 98.4 °F (36.9 °C)   Resp 10   Ht 5' 5\" (1.651 m)   Wt 61.2 kg (134 lb 14.7 oz)   SpO2 97%   BMI 22.45 kg/m²     Temp (24hrs), Av.3 °F (36.8 °C), Min:97.8 °F (36.6 °C), Max:98.9 °F (37.2 °C)        Intake/Output Summary (Last 24 hours) at 10/22/2019 1000  Last data filed at 10/22/2019 0800  Gross per 24 hour   Intake 1871.25 ml   Output 350 ml   Net 1521.25 ml     Current Facility-Administered Medications   Medication Dose Route Frequency    phosphorus (K PHOS NEUTRAL) 250 mg tablet 2 Tab  2 Tab Oral BID    famotidine (PEPCID) 40 mg/5 mL (8 mg/mL) oral suspension 20 mg  20 mg Oral DAILY    rifAXIMin (XIFAXAN) tablet 550 mg  550 mg Oral TIDAC    diphenoxylate-atropine (LOMOTIL) tablet 1 Tab  1 Tab Oral TID PRN    nystatin (MYCOSTATIN) 100,000 unit/mL oral suspension 500,000 Units  500,000 Units Oral QID    amLODIPine (NORVASC) tablet 10 mg  10 mg Oral QHS    0.9% sodium chloride 1,000 mL with mvi, adult no. 4 with vit K 10 mL, thiamine 357 mg, folic acid 1 mg, potassium chloride 40 mEq infusion   IntraVENous DAILY    budesonide (ENTOCORT EC) capsule 9 mg  9 mg Oral 7am    prochlorperazine (COMPAZINE) injection 5 mg  5 mg IntraVENous Q6H PRN    colestipol (COLESTID) packet 2.5 g  2.5 g Oral DAILY    colestipol (COLESTID) packet 2.5 g  2.5 g Oral QHS    0.9% sodium chloride with KCl 20 mEq/L infusion   IntraVENous CONTINUOUS    doxazosin (CARDURA) tablet 1 mg  1 mg Oral DAILY    hydrALAZINE (APRESOLINE) 20 mg/mL injection 10 mg  10 mg IntraVENous Q6H PRN    sodium chloride (NS) flush 5-10 mL  5-10 mL IntraVENous PRN    lactobac ac& pc-s.therm-b.anim (GERALDINE Q/RISAQUAD)  1 Cap Oral DAILY    aspirin chewable tablet 81 mg  81 mg Oral QHS    naloxone (NARCAN) injection 0.4 mg  0.4 mg IntraVENous PRN    acetaminophen (TYLENOL) tablet 650 mg  650 mg Oral Q4H PRN    diphenhydrAMINE (BENADRYL) capsule 25 mg  25 mg Oral Q4H PRN    ondansetron (ZOFRAN) injection 4 mg  4 mg IntraVENous Q4H PRN    enoxaparin (LOVENOX) injection 40 mg  40 mg SubCUTAneous Q24H    metoprolol tartrate (LOPRESSOR) tablet 50 mg  50 mg Oral BID    losartan (COZAAR) tablet 100 mg  100 mg Oral DAILY       Physical Exam:General appearance: alert, cooperative, no distress, appears stated age. Neck: supple, symmetrical,   Lungs: No crackles, no wheezes   Heart: regular rate and rhythm, no S3 or S4  Abdomen: soft, non-tender. Bowel sounds normal  Extremities: no edema  Neurologic: Grossly normal    Data Review:     LABS:  Recent Labs     10/22/19  0305 10/21/19  2125 10/21/19  1537 10/21/19  0953 10/21/19  0325  10/20/19  1524  10/19/19  1015   * 129* 126* 125*  125*  --    < > 120*   < > 112*   K 3.8 3.8 3.7 3.4*  3.6  --    < > 4.0   < > 2.4*   CL 99 98 95* 94*  94*  --    < > 88*   < > 73*   CO2 22 25 23 24  24  --    < > 26   < > 26   BUN 9 10 8 6  6  --    < > 5*   < > 6   CREA 0.60 0.65 0.72 0.67  0.63  --    < > 0.55   < > 0.88   CA 7.8* 7.8* 7.8* 7.6*  7.8*  --    < > 7.5*   < > 8.7   ALB 2.5*  --   --  2.5*  --   --   --   --  3.5   PHOS 1.9*  --   --   --  1.8*  --  2.7   < >  --    MG  --  2.1 2.2 1.7 1.7   < > 1.8   < >  --     < > = values in this interval not displayed.      Recent Labs     10/22/19  0305 10/21/19  0953 10/20/19  0304   WBC 14.2* 12.1* 17.2*   HGB 9.6* 9.8* 9.9*   HCT 27.0* 27.7* 27.3*    321 350     Recent Labs     10/21/19  1302 10/20/19  1158   AJIT 56 90   KU 32 37   CREAU  --  25.70       Assessment:   Renal Specific Problems  Hyponatremia--Na up to 129 in am   HypoMag  HypoPO4--Na 129 IN AM   HTN --BP better controlled         Plan:     Obtain/ Order: labs/cultures/radiology/procedures:      Therapeutic: Continue IVF     Increase Neutra phos to 2 tabs bid     BMP daily      Avoid hypotonic solutions     D/W patient and daughter     Signed By: Palma Alcazar MD     October 22, 2019

## 2019-10-22 NOTE — PROGRESS NOTES
Daily Progress Note: 10/22/2019  Lara Rogers MD    Assessment/Plan:   Hyponatremia / Dehydration / Hypokalemia - POA, due to GI loss, poor PO intake, and Rx of Bactrim. ICU initally. Renal consulted.     Acute metabolic encephalopathy - POA, presumed due to hyponatremia. Monitor.     Nausea vomiting and diarrhea/microcytic colitis - POA and persistent for weeks. Follow by Dr Alicia Dc. - Luna Narinder       Coronary atherosclerosis of native coronary artery S/P CABG x 3 / Essential hypertension, benign - Appears stable. Continue metoprolol and ASA, resume BP meds as able.      Pure hypercholesterolemia - Hold statin until eating better     Macular degeneration - Supportive care.     Leukocytosis - Unclear etiology. Check stool studies. No Abx until Dx     UTI unlikely - culture negative - repeated UA with cath urine.   It is normal.  more likely contaminated sample from diarrhea        Problem List:  Problem List as of 10/22/2019 Date Reviewed: 10/19/2019          Codes Class Noted - Resolved    Leukocytosis ICD-10-CM: D72.829  ICD-9-CM: 288.60  10/19/2019 - Present        Acute metabolic encephalopathy VSS-18-XX: G93.41  ICD-9-CM: 348.31  10/19/2019 - Present        Nausea vomiting and diarrhea ICD-10-CM: R11.2, R19.7  ICD-9-CM: 787.91, 787.01  10/19/2019 - Present        Dehydration ICD-10-CM: E86.0  ICD-9-CM: 276.51  8/3/2019 - Present        * (Principal) Hyponatremia ICD-10-CM: E87.1  ICD-9-CM: 276.1  8/3/2019 - Present        Hypokalemia ICD-10-CM: E87.6  ICD-9-CM: 276.8  8/3/2019 - Present        Macular degeneration ICD-10-CM: H35.30  ICD-9-CM: 362.50  10/30/2013 - Present        Carotid arterial disease (HCC) ICD-10-CM: I73.9  ICD-9-CM: 447.9  Unknown - Present    Overview Signed 10/30/2013 10:11 AM by Silvia Stringer MD     mild 0-49%             Coronary atherosclerosis of native coronary artery ICD-10-CM: I25.10  ICD-9-CM: 414.01  Unknown - Present    Overview Signed 2/22/2012 10:44 AM by Anna Coker MD     Cath 2011 with multivessel cad             S/P CABG (coronary artery bypass graft) ICD-10-CM: Z95.1  ICD-9-CM: V45.81  Unknown - Present    Overview Signed 2/22/2012 10:44 AM by Anna Coker MD     1-76-47 CABG - OFF PUMP - CABGx 3, lima, eric, epi aortic ultrasound - off pump, rightt endoscopic vein harvest; [id number] [facility] [address] to LAD, Svg Ao to OM1 and OM2             S/P CABG x 3 ICD-10-CM: Z95.1  ICD-9-CM: V45.81  6/17/2011 - Present    Overview Signed 7/26/2011 10:51 AM by Mari TEAGUE to LAD and SVGs to OM-1 and OM-2. RCA small non-dominant diffusely diseased vessel ungrafted. LV EF at cath was 65%.              Essential hypertension, benign ICD-10-CM: I10  ICD-9-CM: 401.1  12/2/2010 - Present        Pure hypercholesterolemia ICD-10-CM: E78.00  ICD-9-CM: 272.0  12/2/2010 - Present        RESOLVED: UTI (urinary tract infection) ICD-10-CM: N39.0  ICD-9-CM: 599.0  8/3/2019 - 10/19/2019        RESOLVED: Sepsis ([address] ICD-10-CM: A41.9  ICD-9-CM: 038.9, 995.91  8/3/2019 - 10/19/2019        RESOLVED: Fever ICD-10-CM: R50.9  ICD-9-CM: 780.60  8/3/2019 - 10/19/2019        RESOLVED: Left shift ICD-10-CM: D72.89  ICD-9-CM: 288.9  8/3/2019 - 10/19/2019        RESOLVED: Syncope ICD-10-CM: R55  ICD-9-CM: 780.2  8/3/2019 - 10/19/2019        RESOLVED: Anemia ICD-10-CM: D64.9  ICD-9-CM: 285.9  8/3/2019 - 10/19/2019        RESOLVED: Cough due to ACE inhibitor ICD-10-CM: R05, T46.4X5A  ICD-9-CM: 786.2, E942.6  Unknown - 8/3/2019        RESOLVED: Edema ICD-10-CM: R60.9  ICD-9-CM: 782.3  6/3/2012 - 8/3/2019        RESOLVED: Medication adverse effect ICD-10-CM: T50.905A  ICD-9-CM: E947.9  6/3/2012 - 8/3/2019        RESOLVED: Diverticulitis ICD-10-CM: R23.37  ICD-9-CM: 562.11  Unknown - 8/3/2019        RESOLVED: Pre-operative cardiovascular examination, unstable angina ([facility] [address] ICD-10-CM: Z01.810, I20.0  ICD-9-CM: 411.1, V72.81  6/19/2011 - 6/19/2011 RESOLVED: Diverticulosis of colon with hemorrhage ICD-10-CM: K57.31  ICD-9-CM: 562.12  2010 - 8/3/2019            Subjective:    80 y.o.  female who presented to the Emergency Department complaining of confusion. Worsening over days. Associated with persistent vomiting and diarrhea, present for weeks. Was in our ER 2 days ago, with weakness and hyponatremia, and sent home on Bactrim. She returns today worse, weak, confused and severe hyponatremia. We will admit her for management. (Dr Aurelia Domínguez)    10/20:  Still having loose stools and nausea. Reports she feels generally \"bad. \"  Na+ up to 117. K+ up to 3.4. Confusion at usual levels per daughter. WBC 17K. Phos low at 1.0 - getting KPhos. 10/21:  Nausea improved. Feeling a little better. Daughter wants her to have a regular diet. C/O mild GERD sx. Pt and daughter want her to have Zantac daily. No loose stools overnight. Phos still low - replacing. AM labs otherwise pending. 10/22:  Still with frequent loose stools. Continues to grad improve. Phos grad improving. Na+ stable at 129 - will decrease frequency of blood draws at daughters request.     Review of Systems:   A comprehensive review of systems was negative except for that written in the HPI. Objective:   Physical Exam:   Visit Vitals  /49 (BP 1 Location: Left arm, BP Patient Position: At rest)   Pulse 66   Temp 98.4 °F (36.9 °C)   Resp 10   Ht 5' 5\" (1.651 m)   Wt 61.2 kg (134 lb 14.7 oz)   SpO2 97%   BMI 22.45 kg/m²      O2 Device: Room air  Temp (24hrs), Av.4 °F (36.9 °C), Min:97.8 °F (36.6 °C), Max:98.9 °F (37.2 °C)    10/22 0701 - 10/22 1900  In: 75 [I.V.:75]  Out: -    10/20 1901 - 10/22 0700  In: 3485.1 [P.O.:120; I.V.:3365.1]  Out: 650 [Urine:650]  General:  Alert, cooperative at times, frail appearing, no distress, appears stated age. Head:  Normocephalic, without obvious abnormality, atraumatic. Eyes:  Conjunctivae/corneas clear.  PERRL, EOMs intact. Throat: Lips, mucosa, and tongue moist..   Neck: Supple, symmetrical, trachea midline, no adenopathy, thyroid: no enlargement/tenderness/nodules, no carotid bruit and no JVD. Lungs:   Clear to auscultation bilaterally. Chest wall:  No tenderness or deformity. Heart:  Regular rate and rhythm, S1, S2 normal, no murmur, click, rub or gallop. Abdomen:   Soft, with mild generalized tenderness. Bowel sounds active. No masses,  No organomegaly. Extremities: no cyanosis or edema. No calf tenderness or cords. Skin: Skin color, texture, turgor normal. No rashes or lesions   Neurologic:   Alert and oriented to person. Will follow 1 step commands.  equal.  No cogwheeling or rigidity. Gait not tested at this time. Sensation grossly normal to touch. Gross motor of extremities normal.       Data Review:       Recent Days:  Recent Labs     10/22/19  0305 10/21/19  0953 10/20/19  0304   WBC 14.2* 12.1* 17.2*   HGB 9.6* 9.8* 9.9*   HCT 27.0* 27.7* 27.3*    321 350     Recent Labs     10/22/19  0305 10/21/19  2125 10/21/19  1537 10/21/19  0953 10/21/19  0325  10/20/19  1524   * 129* 126* 125*  125*  --    < > 120*   K 3.8 3.8 3.7 3.4*  3.6  --    < > 4.0   CL 99 98 95* 94*  94*  --    < > 88*   CO2 22 25 23 24  24  --    < > 26   * 140* 161* 145*  148*  --    < > 138*   BUN 9 10 8 6  6  --    < > 5*   CREA 0.60 0.65 0.72 0.67  0.63  --    < > 0.55   CA 7.8* 7.8* 7.8* 7.6*  7.8*  --    < > 7.5*   MG  --  2.1 2.2 1.7 1.7   < > 1.8   PHOS 1.9*  --   --   --  1.8*  --  2.7   ALB 2.5*  --   --  2.5*  --   --   --    TBILI 0.4  --   --  0.4  --   --   --    SGOT 10*  --   --  16  --   --   --    ALT 17  --   --  18  --   --   --     < > = values in this interval not displayed. No results for input(s): PH, PCO2, PO2, HCO3, FIO2 in the last 72 hours.     24 Hour Results:  Recent Results (from the past 24 hour(s))   OSMOLALITY, UR    Collection Time: 10/21/19  1:02 PM   Result Value Ref Range    Osmolality,urine 282 MOSM/kg H2O   SODIUM, UR, RANDOM    Collection Time: 10/21/19  1:02 PM   Result Value Ref Range    Sodium,urine random 56 MMOL/L   POTASSIUM, UR, RANDOM    Collection Time: 10/21/19  1:02 PM   Result Value Ref Range    Potassium urine, random 32 MMOL/L   CRP, HIGH SENSITIVITY    Collection Time: 10/21/19  3:37 PM   Result Value Ref Range    CRP, High sensitivity >3.2 mg/L   METABOLIC PANEL, BASIC    Collection Time: 10/21/19  3:37 PM   Result Value Ref Range    Sodium 126 (L) 136 - 145 mmol/L    Potassium 3.7 3.5 - 5.1 mmol/L    Chloride 95 (L) 97 - 108 mmol/L    CO2 23 21 - 32 mmol/L    Anion gap 8 5 - 15 mmol/L    Glucose 161 (H) 65 - 100 mg/dL    BUN 8 6 - 20 MG/DL    Creatinine 0.72 0.55 - 1.02 MG/DL    BUN/Creatinine ratio 11 (L) 12 - 20      GFR est AA >60 >60 ml/min/1.73m2    GFR est non-AA >60 >60 ml/min/1.73m2    Calcium 7.8 (L) 8.5 - 10.1 MG/DL   MAGNESIUM    Collection Time: 10/21/19  3:37 PM   Result Value Ref Range    Magnesium 2.2 1.6 - 2.4 mg/dL   METABOLIC PANEL, BASIC    Collection Time: 10/21/19  9:25 PM   Result Value Ref Range    Sodium 129 (L) 136 - 145 mmol/L    Potassium 3.8 3.5 - 5.1 mmol/L    Chloride 98 97 - 108 mmol/L    CO2 25 21 - 32 mmol/L    Anion gap 6 5 - 15 mmol/L    Glucose 140 (H) 65 - 100 mg/dL    BUN 10 6 - 20 MG/DL    Creatinine 0.65 0.55 - 1.02 MG/DL    BUN/Creatinine ratio 15 12 - 20      GFR est AA >60 >60 ml/min/1.73m2    GFR est non-AA >60 >60 ml/min/1.73m2    Calcium 7.8 (L) 8.5 - 10.1 MG/DL   MAGNESIUM    Collection Time: 10/21/19  9:25 PM   Result Value Ref Range    Magnesium 2.1 1.6 - 2.4 mg/dL   METABOLIC PANEL, COMPREHENSIVE    Collection Time: 10/22/19  3:05 AM   Result Value Ref Range    Sodium 129 (L) 136 - 145 mmol/L    Potassium 3.8 3.5 - 5.1 mmol/L    Chloride 99 97 - 108 mmol/L    CO2 22 21 - 32 mmol/L    Anion gap 8 5 - 15 mmol/L    Glucose 108 (H) 65 - 100 mg/dL    BUN 9 6 - 20 MG/DL    Creatinine 0.60 0.55 - 1.02 MG/DL    BUN/Creatinine ratio 15 12 - 20      GFR est AA >60 >60 ml/min/1.73m2    GFR est non-AA >60 >60 ml/min/1.73m2    Calcium 7.8 (L) 8.5 - 10.1 MG/DL    Bilirubin, total 0.4 0.2 - 1.0 MG/DL    ALT (SGPT) 17 12 - 78 U/L    AST (SGOT) 10 (L) 15 - 37 U/L    Alk. phosphatase 76 45 - 117 U/L    Protein, total 5.7 (L) 6.4 - 8.2 g/dL    Albumin 2.5 (L) 3.5 - 5.0 g/dL    Globulin 3.2 2.0 - 4.0 g/dL    A-G Ratio 0.8 (L) 1.1 - 2.2     CBC WITH AUTOMATED DIFF    Collection Time: 10/22/19  3:05 AM   Result Value Ref Range    WBC 14.2 (H) 3.6 - 11.0 K/uL    RBC 3.10 (L) 3.80 - 5.20 M/uL    HGB 9.6 (L) 11.5 - 16.0 g/dL    HCT 27.0 (L) 35.0 - 47.0 %    MCV 87.1 80.0 - 99.0 FL    MCH 31.0 26.0 - 34.0 PG    MCHC 35.6 30.0 - 36.5 g/dL    RDW 14.0 11.5 - 14.5 %    PLATELET 403 431 - 598 K/uL    MPV 8.9 8.9 - 12.9 FL    NRBC 0.0 0  WBC    ABSOLUTE NRBC 0.00 0.00 - 0.01 K/uL    NEUTROPHILS 58 32 - 75 %    LYMPHOCYTES 25 12 - 49 %    MONOCYTES 10 5 - 13 %    EOSINOPHILS 5 0 - 7 %    BASOPHILS 1 0 - 1 %    IMMATURE GRANULOCYTES 1 (H) 0.0 - 0.5 %    ABS. NEUTROPHILS 8.3 (H) 1.8 - 8.0 K/UL    ABS. LYMPHOCYTES 3.6 (H) 0.8 - 3.5 K/UL    ABS. MONOCYTES 1.4 (H) 0.0 - 1.0 K/UL    ABS. EOSINOPHILS 0.8 (H) 0.0 - 0.4 K/UL    ABS. BASOPHILS 0.1 0.0 - 0.1 K/UL    ABS. IMM.  GRANS. 0.1 (H) 0.00 - 0.04 K/UL    DF AUTOMATED     PHOSPHORUS    Collection Time: 10/22/19  3:05 AM   Result Value Ref Range    Phosphorus 1.9 (L) 2.6 - 4.7 MG/DL       Medications reviewed  Current Facility-Administered Medications   Medication Dose Route Frequency    phosphorus (K PHOS NEUTRAL) 250 mg tablet 2 Tab  2 Tab Oral BID    famotidine (PEPCID) 40 mg/5 mL (8 mg/mL) oral suspension 20 mg  20 mg Oral DAILY    rifAXIMin (XIFAXAN) tablet 550 mg  550 mg Oral TIDAC    diphenoxylate-atropine (LOMOTIL) tablet 1 Tab  1 Tab Oral TID PRN    nystatin (MYCOSTATIN) 100,000 unit/mL oral suspension 500,000 Units  500,000 Units Oral QID    amLODIPine (NORVASC) tablet 10 mg  10 mg Oral QHS    0.9% sodium chloride 1,000 mL with mvi, adult no. 4 with vit K 10 mL, thiamine 959 mg, folic acid 1 mg, potassium chloride 40 mEq infusion   IntraVENous DAILY    budesonide (ENTOCORT EC) capsule 9 mg  9 mg Oral 7am    prochlorperazine (COMPAZINE) injection 5 mg  5 mg IntraVENous Q6H PRN    colestipol (COLESTID) packet 2.5 g  2.5 g Oral DAILY    colestipol (COLESTID) packet 2.5 g  2.5 g Oral QHS    0.9% sodium chloride with KCl 20 mEq/L infusion   IntraVENous CONTINUOUS    doxazosin (CARDURA) tablet 1 mg  1 mg Oral DAILY    hydrALAZINE (APRESOLINE) 20 mg/mL injection 10 mg  10 mg IntraVENous Q6H PRN    sodium chloride (NS) flush 5-10 mL  5-10 mL IntraVENous PRN    lactobac ac& pc-s.therm-b.anim (GERALDINE Q/RISAQUAD)  1 Cap Oral DAILY    aspirin chewable tablet 81 mg  81 mg Oral QHS    naloxone (NARCAN) injection 0.4 mg  0.4 mg IntraVENous PRN    acetaminophen (TYLENOL) tablet 650 mg  650 mg Oral Q4H PRN    diphenhydrAMINE (BENADRYL) capsule 25 mg  25 mg Oral Q4H PRN    ondansetron (ZOFRAN) injection 4 mg  4 mg IntraVENous Q4H PRN    enoxaparin (LOVENOX) injection 40 mg  40 mg SubCUTAneous Q24H    metoprolol tartrate (LOPRESSOR) tablet 50 mg  50 mg Oral BID    losartan (COZAAR) tablet 100 mg  100 mg Oral DAILY       Care Plan discussed with: Patient/Family and Nurse  Total time spent with patient: 30 minutes.   Aliza Gutierrez MD

## 2019-10-22 NOTE — PROGRESS NOTES
[address]  [phone]           GI PROGRESS NOTE        NAME: Rosa Maria Horan   :  1931   MRN:  [id number]       Subjective:   Complains of bloating and gas. Has had 2 bowel movements - early this morning and before lunch. Objective:   Resting in bed in NAD. VITALS:   Last 24hrs VS reviewed since prior progress note. Most recent are:  Visit Vitals  /66 (BP 1 Location: Left arm, BP Patient Position: At rest)   Pulse 62   Temp 98 °F (36.7 °C)   Resp 16   Ht 5' 5\" (1.651 m)   Wt 61.2 kg (134 lb 14.7 oz)   SpO2 98%   BMI 22.45 kg/m²       Intake/Output Summary (Last 24 hours) at 10/22/2019 1441  Last data filed at 10/22/2019 0800  Gross per 24 hour   Intake 1351.25 ml   Output 100 ml   Net 1251.25 ml       PHYSICAL EXAM:  General: Alert, in no acute distress    HEENT: Anicteric sclerae. Lungs:            CTA Bilaterally. Heart:  Regular  rhythm,    Abdomen: Soft, mildly distended, TTP in LUQ  (+)Bowel sounds, no HSM  Extremities: No c/c/e  Neurologic:  CN 2-12 gi, Alert and oriented X 3. No acute neurological distress   Psych:   Good insight. Not anxious nor agitated. Lab Data Reviewed:   Recent Labs     10/22/19  0305 10/21/19  0953   WBC 14.2* 12.1*   HGB 9.6* 9.8*   HCT 27.0* 27.7*    321     Recent Labs     10/22/19  0305 10/21/19  2125  10/21/19  0325   * 129*   < >  --    K 3.8 3.8   < >  --    CL 99 98   < >  --    CO2 22 25   < >  --    BUN 9 10   < >  --    CREA 0.60 0.65   < >  --    * 140*   < >  --    PHOS 1.9*  --   --  1.8*   CA 7.8* 7.8*   < >  --     < > = values in this interval not displayed.      Recent Labs     10/22/19  0305 10/21/19  0953   SGOT 10* 16   AP 76 77   TP 5.7* 5.8*   ALB 2.5* 2.5*   GLOB 3.2 3.3       ________________________________________________________________________  Patient Active Problem List   Diagnosis Code    Essential hypertension, benign I10    Pure hypercholesterolemia E78.00    S/P CABG x 3 Z95.1    Coronary atherosclerosis of native coronary artery I25.10    S/P CABG (coronary artery bypass graft) Z95.1    Macular degeneration H35.30    Carotid arterial disease (HCC) I73.9    Dehydration E86.0    Hyponatremia E87.1    Hypokalemia E87.6    Leukocytosis D72.829    Acute metabolic encephalopathy V21.77    Nausea vomiting and diarrhea R11.2, R19.7         Assessment and Plan:  Microscopic Colitis:  Gradually improving. 2 Stools so far today,  Tolerating Regular diet. Fecal Calprotectin and Fecal Elastase pending results. - Continue Budesonide  - Continue Colestid  - Continue probiotic  - Continue Xifaxan  - Continue Oral Nystatin  - Monitor and replace electrolytes  - Consider Flex Sig on Friday if she does not improve. Continuing to follow.      Signed By: Tarik Patterson NP     10/22/2019  2:41 PM

## 2019-10-22 NOTE — PROGRESS NOTES
1230: Bedside and Verbal shift change report given to Leopold Schiller, RN (oncoming nurse) by Eugenio Quevedo RN (offgoing nurse). Report included the following information SBAR, Kardex, MAR and Cardiac Rhythm NSR.     1300: Patient resting in bedside chair. No complaints to pain or discomfort. 1500: Patient without complaints of pain or discomfort. 1700: Patient without complaints. Resting in bed. Daughter at bedside. 1820: Helped patient to bedside commode. Bedside and Verbal shift change report given to Casper Roberts RN (oncoming nurse) by Leopold Schiller, RN (offgoing nurse). Report included the following information SBAR, Kardex, [facility] and Cardiac Rhythm NSR/Sinus Fleurette Maxim.

## 2019-10-22 NOTE — PROGRESS NOTES
Physical Therapy orders acknowledged, chart reviewed and discussed with nurse patient on the bed pan sleeping daughter in the room with the patient asked if patient finished and we can assist and evaluate patient. Daughter asked to come back later patient was up all night, on and off bed pan. We will continue to follow up with the patient for therapy. Thank you.

## 2019-10-22 NOTE — PROGRESS NOTES
Shift Summary    0700:  Bedside report done. Patient resting queitly in bed with daughter at the bedside. Patient on room air with no signs of distress. NS with 20 KCL at 76. Call bell within reach. No needs or concerns voiced at this time. 0720:  Patient assisted with bedpan. 3555:  Patient cleaned up and repositioned in bed. Assessment done. Patient requesting to rest as she was up most of the night on the bedpan. Informed her she could rest a little longer. PT/OT will come in and get her up today. Will need to get labs later this morning as well. No other needs voiced. 1340:  Patient medicated as ordered with AM meds. No needs voiced. No changes at this time. 1016:  Patient resting. Daughter at the bedside. Dr. Eulogio Harman at the bedside to assess. 1100:  Patient resting with her eyes closed. Daughter at bedside. No changes noted. No needs voiced.

## 2019-10-22 NOTE — PROGRESS NOTES
..1900: Bedside shift change report given to David Quintero RN (oncoming nurse) by Jamil Mitchell RN (offgoing nurse). Report included the following information SBAR, Kardex, ED Summary, Procedure Summary, Intake/Output, MAR, Accordion, Med Rec Status, Cardiac Rhythm Sinus Bradycardia, Alarm Parameters , Procedure Verification and Quality Measures. Primary Nurse David Quintero and Jamil Mitchell RN performed a dual skin assessment on this patient No impairment noted  Carlton score is 14    2000: Shift  Assessment completed: See flow sheet. Daughter at bedside. Patient incontinent of bowel. Loose brown stool; moderate amount. Complains of gas and uncontrolled BM. Felisha care and incontinence care provided. Linen and bed pad changed out. Mental Status:  A&Ox4, follows commands            Respiratory: Room air: lung sounds clear, diminshed at base            Cardiac: Sinus Eric, BP elevated. GI/: bed pan          2100: administered medications, patient placed on bed pan, labs obtained, hygiene cares provided. 2200: patient resting, external cath placed per Patient request. Daughter spending the night. Call bell in reach. 2300: Patient resting. Call bell in reach. Daughter at bedside. 0000: Reassessment completed. No changes from previous assessment. Resting quietly, call light in reach. 0200: resting in bed,  turned self on left side, call light in reach. No needs at this time. 0330: Patient on bed pan: Loose stool, brown small amt. CHG and felisha care provided. Labs obtained. 0400: Reassessment completed. Changes noted, see flow sheet. Call light in reach. 0600: medications administered. No needs at this time. 0700: Bedside shift change report given to Jamil Mitchell RN (oncoming nurse) by David Quintreo RN (offgoing nurse).  Report included the following information SBAR, Kardex, ED Summary, Procedure Summary, Intake/Output, MAR, Accordion, Med Rec Status, Cardiac Rhythm Sinus Bradycardia, Alarm Parameters , Procedure Verification and Quality Measures.

## 2019-10-22 NOTE — PROGRESS NOTES
1900: Bedside and Verbal shift change report given to Smitha Bahena (oncoming nurse) by Steve Esparza RN (offgoing nurse). Report included the following information SBAR, Kardex, ED Summary, Intake/Output, MAR, Recent Results and Cardiac Rhythm NSR. Primary Nurse Kandice Macdonald, MARY CARMEN and Lisseth Mason RN performed a dual skin assessment on this patient No impairment noted  Carlton score is 14    2000: Shift  Assessment completed-see flowsheet            Mental Status:  Patient A&Ox4, NADm VASQUEZ, no numbness/tingling noted. Respiratory: Lungs clear, no SOB            Cardiac: Sinus Eric on monitor, RRR            GI/: Abdomen soft, non tender, small BM, bowel sounds active, no nausea. IV Drips: Goody bag running at 75ml/hr    2030- TRANSFER - OUT REPORT:    Verbal report given to Johnson Beltrán RN(name) on Britt Maradiaga  being transferred to [facility](unit) for routine progression of care       Report consisted of patients Situation, Background, Assessment and   Recommendations(SBAR). Information from the following report(s) SBAR, Kardex, ED Summary, Intake/Output, MAR, Recent Results and Cardiac Rhythm NSR/Sinus Jaqueline Coyer was reviewed with the receiving nurse. Lines:   Peripheral IV 10/21/19 Right Wrist (Active)   Site Assessment Clean, dry, & intact 10/22/2019  8:00 PM   Phlebitis Assessment 0 10/22/2019  8:00 PM   Infiltration Assessment 0 10/22/2019  8:00 PM   Dressing Status Clean, dry, & intact 10/22/2019  8:00 PM   Dressing Type Transparent 10/22/2019  8:00 PM   Hub Color/Line Status Pink; Infusing 10/22/2019  8:00 PM   Action Taken Open ports on tubing capped 10/22/2019  8:00 PM   Alcohol Cap Used Yes 10/22/2019  8:00 PM        Opportunity for questions and clarification was provided.       Patient transported with:   Monitor  Patient-specific medications from [facility]

## 2019-10-22 NOTE — ROUTINE PROCESS
0700 [facility] SBAR, Kardex, Intake/Output, MAR, Accordion, Recent Results and Cardiac Rhythm SB/SR reviewed. Primary Nurse Shiloh Chatman and MARY CARMEN Duvall performed a dual skin assessment on this patient No impairment noted Carlton score is 14 
 
1230  Bedside report given to Collette Banuelos, [place]. SBAR, Kardex, Procedure Summary, Intake/Output, MAR, Accordion, Recent Results and Cardiac Rhythm SB reviewed. Patient is stable at this time. Call light within reach, bed alarm on, bed in low position.

## 2019-10-22 NOTE — PROGRESS NOTES
At daughter and pt.'s request,I contacted attending with pt.'s request that she be followed by Dr Amado Gunter while hospitalized. Dr Amado Gunter will round on pt tomorrow. Pt and daughter had deferred PT this morning when offered so once pt is willing to participate with therapy,then I will discuss their recommendations with pt and daughter. Plan: 1. Case Management will assess pt for inpatient rehab versus SNF versus home health needs. 2.Pt,;s  has home health has had multiple falls @ home). 3.Daughter has a list of personal care agencies to contact for additional home services such as cooking,cleaning,etc.as daughter realizes that additional services are needed if pt and  are to remain in their home successfully. 4.I weill continue to follow pt for discharge needs.     Terra Tanner

## 2019-10-23 LAB
ALBUMIN SERPL-MCNC: 2.4 G/DL (ref 3.5–5)
ALBUMIN/GLOB SERPL: 0.8 {RATIO} (ref 1.1–2.2)
ALP SERPL-CCNC: 70 U/L (ref 45–117)
ALT SERPL-CCNC: 15 U/L (ref 12–78)
ANION GAP SERPL CALC-SCNC: 6 MMOL/L (ref 5–15)
AST SERPL-CCNC: 10 U/L (ref 15–37)
BASOPHILS # BLD: 0.1 K/UL (ref 0–0.1)
BASOPHILS NFR BLD: 1 % (ref 0–1)
BILIRUB SERPL-MCNC: 0.3 MG/DL (ref 0.2–1)
BUN SERPL-MCNC: 9 MG/DL (ref 6–20)
BUN/CREAT SERPL: 16 (ref 12–20)
CALCIUM SERPL-MCNC: 8.2 MG/DL (ref 8.5–10.1)
CHLORIDE SERPL-SCNC: 104 MMOL/L (ref 97–108)
CO2 SERPL-SCNC: 23 MMOL/L (ref 21–32)
CREAT SERPL-MCNC: 0.55 MG/DL (ref 0.55–1.02)
DIFFERENTIAL METHOD BLD: ABNORMAL
EOSINOPHIL # BLD: 0.8 K/UL (ref 0–0.4)
EOSINOPHIL NFR BLD: 5 % (ref 0–7)
ERYTHROCYTE [DISTWIDTH] IN BLOOD BY AUTOMATED COUNT: 14.3 % (ref 11.5–14.5)
GLOBULIN SER CALC-MCNC: 3.1 G/DL (ref 2–4)
GLUCOSE SERPL-MCNC: 108 MG/DL (ref 65–100)
HCT VFR BLD AUTO: 26.4 % (ref 35–47)
HGB BLD-MCNC: 9.1 G/DL (ref 11.5–16)
IMM GRANULOCYTES # BLD AUTO: 0.1 K/UL (ref 0–0.04)
IMM GRANULOCYTES NFR BLD AUTO: 1 % (ref 0–0.5)
LYMPHOCYTES # BLD: 3.9 K/UL (ref 0.8–3.5)
LYMPHOCYTES NFR BLD: 26 % (ref 12–49)
MCH RBC QN AUTO: 30.8 PG (ref 26–34)
MCHC RBC AUTO-ENTMCNC: 34.5 G/DL (ref 30–36.5)
MCV RBC AUTO: 89.5 FL (ref 80–99)
MONOCYTES # BLD: 1.3 K/UL (ref 0–1)
MONOCYTES NFR BLD: 9 % (ref 5–13)
NEUTS SEG # BLD: 9 K/UL (ref 1.8–8)
NEUTS SEG NFR BLD: 58 % (ref 32–75)
NRBC # BLD: 0 K/UL (ref 0–0.01)
NRBC BLD-RTO: 0 PER 100 WBC
PLATELET # BLD AUTO: 359 K/UL (ref 150–400)
PMV BLD AUTO: 9.2 FL (ref 8.9–12.9)
POTASSIUM SERPL-SCNC: 3.8 MMOL/L (ref 3.5–5.1)
PROT SERPL-MCNC: 5.5 G/DL (ref 6.4–8.2)
RBC # BLD AUTO: 2.95 M/UL (ref 3.8–5.2)
SODIUM SERPL-SCNC: 133 MMOL/L (ref 136–145)
WBC # BLD AUTO: 15.1 K/UL (ref 3.6–11)

## 2019-10-23 PROCEDURE — 65660000000 HC RM CCU STEPDOWN

## 2019-10-23 PROCEDURE — 97110 THERAPEUTIC EXERCISES: CPT

## 2019-10-23 PROCEDURE — 80053 COMPREHEN METABOLIC PANEL: CPT

## 2019-10-23 PROCEDURE — 77030038269 HC DRN EXT URIN PURWCK BARD -A

## 2019-10-23 PROCEDURE — 74011250637 HC RX REV CODE- 250/637: Performed by: FAMILY MEDICINE

## 2019-10-23 PROCEDURE — 97530 THERAPEUTIC ACTIVITIES: CPT

## 2019-10-23 PROCEDURE — 74011250637 HC RX REV CODE- 250/637: Performed by: INTERNAL MEDICINE

## 2019-10-23 PROCEDURE — 85025 COMPLETE CBC W/AUTO DIFF WBC: CPT

## 2019-10-23 PROCEDURE — 74011250636 HC RX REV CODE- 250/636: Performed by: INTERNAL MEDICINE

## 2019-10-23 PROCEDURE — 97116 GAIT TRAINING THERAPY: CPT

## 2019-10-23 PROCEDURE — 36415 COLL VENOUS BLD VENIPUNCTURE: CPT

## 2019-10-23 PROCEDURE — 74011000250 HC RX REV CODE- 250: Performed by: INTERNAL MEDICINE

## 2019-10-23 PROCEDURE — 74011250636 HC RX REV CODE- 250/636: Performed by: FAMILY MEDICINE

## 2019-10-23 RX ORDER — RANITIDINE 15 MG/ML
150 SYRUP ORAL DAILY
Status: DISCONTINUED | OUTPATIENT
Start: 2019-10-24 | End: 2019-10-29 | Stop reason: HOSPADM

## 2019-10-23 RX ADMIN — LOSARTAN POTASSIUM 100 MG: 50 TABLET, FILM COATED ORAL at 08:43

## 2019-10-23 RX ADMIN — HYDRALAZINE HYDROCHLORIDE 10 MG: 20 INJECTION INTRAMUSCULAR; INTRAVENOUS at 10:49

## 2019-10-23 RX ADMIN — FOLIC ACID: 5 INJECTION, SOLUTION INTRAMUSCULAR; INTRAVENOUS; SUBCUTANEOUS at 18:12

## 2019-10-23 RX ADMIN — DOXAZOSIN 1 MG: 2 TABLET ORAL at 08:43

## 2019-10-23 RX ADMIN — DIBASIC SODIUM PHOSPHATE, MONOBASIC POTASSIUM PHOSPHATE AND MONOBASIC SODIUM PHOSPHATE 2 TABLET: 852; 155; 130 TABLET ORAL at 08:43

## 2019-10-23 RX ADMIN — COLESTIPOL HYDROCHLORIDE 2.5 G: 5 GRANULE, FOR SUSPENSION ORAL at 08:44

## 2019-10-23 RX ADMIN — Medication 1 CAPSULE: at 08:43

## 2019-10-23 RX ADMIN — FAMOTIDINE 20 MG: 40 POWDER, FOR SUSPENSION ORAL at 09:00

## 2019-10-23 RX ADMIN — NYSTATIN 500000 UNITS: 100000 SUSPENSION ORAL at 21:36

## 2019-10-23 RX ADMIN — NYSTATIN 500000 UNITS: 100000 SUSPENSION ORAL at 17:29

## 2019-10-23 RX ADMIN — BUDESONIDE 9 MG: 3 CAPSULE, GELATIN COATED ORAL at 05:14

## 2019-10-23 RX ADMIN — METOPROLOL TARTRATE 50 MG: 50 TABLET, FILM COATED ORAL at 08:43

## 2019-10-23 RX ADMIN — NYSTATIN 500000 UNITS: 100000 SUSPENSION ORAL at 13:16

## 2019-10-23 RX ADMIN — Medication 10 ML: at 13:17

## 2019-10-23 RX ADMIN — METOPROLOL TARTRATE 50 MG: 50 TABLET, FILM COATED ORAL at 17:29

## 2019-10-23 RX ADMIN — RIFAXIMIN 550 MG: 550 TABLET ORAL at 17:29

## 2019-10-23 RX ADMIN — AMLODIPINE BESYLATE 10 MG: 5 TABLET ORAL at 20:10

## 2019-10-23 RX ADMIN — RIFAXIMIN 550 MG: 550 TABLET ORAL at 05:13

## 2019-10-23 RX ADMIN — ENOXAPARIN SODIUM 40 MG: 40 INJECTION SUBCUTANEOUS at 16:30

## 2019-10-23 RX ADMIN — ASPIRIN 81 MG 81 MG: 81 TABLET ORAL at 20:10

## 2019-10-23 RX ADMIN — COLESTIPOL HYDROCHLORIDE 2.5 G: 5 GRANULE, FOR SUSPENSION ORAL at 20:10

## 2019-10-23 RX ADMIN — DIBASIC SODIUM PHOSPHATE, MONOBASIC POTASSIUM PHOSPHATE AND MONOBASIC SODIUM PHOSPHATE 2 TABLET: 852; 155; 130 TABLET ORAL at 17:29

## 2019-10-23 RX ADMIN — NYSTATIN 500000 UNITS: 100000 SUSPENSION ORAL at 08:45

## 2019-10-23 RX ADMIN — RIFAXIMIN 550 MG: 550 TABLET ORAL at 10:47

## 2019-10-23 NOTE — PROGRESS NOTES
Patient is out the ICU and does not have any pulmonary issues at this time. We will sign off and be available as needed.

## 2019-10-23 NOTE — PROGRESS NOTES
Problem: Mobility Impaired (Adult and Pediatric)  Goal: *Acute Goals and Plan of Care (Insert Text)  Description  FUNCTIONAL STATUS PRIOR TO ADMISSION: Patient was independent and active without use of DME.    HOME SUPPORT PRIOR TO ADMISSION: The patient lived with spouse but did not require assist.    Physical Therapy Goals  Initiated 10/22/2019  1. Patient will move from supine to sit and sit to supine , scoot up and down and roll side to side in bed with independence within 7 day(s). 2.  Patient will transfer from bed to chair and chair to bed with modified independence using the least restrictive device within 7 day(s). 3.  Patient will perform sit to stand with modified independence within 7 day(s). 4.  Patient will ambulate with modified independence for 200 feet with the least restrictive device within 7 day(s). 5.  Patient will ascend/descend 4 stairs with  handrail(s) with modified independence within 7 day(s). Outcome: Progressing Towards Goal   PHYSICAL THERAPY TREATMENT  Patient: Yolanda Snow (25 y.o. female)  Date: 10/23/2019  Diagnosis: Hyponatremia [E87.1] Hyponatremia       Precautions:    Chart, physical therapy assessment, plan of care and goals were reviewed. ASSESSMENT  Patient continues with skilled PT services and is progressing towards goals. Patient received in bed. States she has already done her therapy when she got up to the bedside commode. BP elevated initially, nursing aware and getting meds. Patient out of bed with stand by assist and stood with contact guard. Patient able to danny her own shoes today in sitting at bedside. Performed gait training in the ruano with the rolling walker with contact guard and occasional assist with steering. Patient uses a rollator at home which steers with less effort. Returned to bedside chair and instructed and performed AROM ex in sitting fro bilateral lower extremity strengthening and edema control.   Daughter present for all therapy and encouraging her mom. Current Level of Function Impacting Discharge (mobility/balance): assist of one with out of bed, approaching baseline of mobility    Other factors to consider for discharge: uses a rollator at home not rolling walker         PLAN :  Patient continues to benefit from skilled intervention to address the above impairments. Continue treatment per established plan of care. to address goals. Recommendation for discharge: (in order for the patient to meet his/her long term goals)  Physical therapy at least 2 days/week in the home     This discharge recommendation:  Has not yet been discussed the attending provider and/or case management    IF patient discharges home will need the following DME: already owns a rollator       SUBJECTIVE:   [facility] [[facility]].     OBJECTIVE DATA SUMMARY:   Critical Behavior:  Neurologic State: Alert  Orientation Level: Oriented X4  Cognition: Appropriate decision making, Appropriate for age attention/concentration, Appropriate safety awareness, Follows commands  Safety/Judgement: Awareness of environment, Good awareness of safety precautions  Functional Mobility Training:  Bed Mobility:     Supine to Sit: Stand-by assistance              Transfers:  Sit to Stand: Contact guard assistance  Stand to Sit: Contact guard assistance;Assist x1;Additional time                             Balance:     Ambulation/Gait Training:  Distance (ft): 200 Feet (ft)  Assistive Device: Walker, rolling;Gait belt  Ambulation - Level of Assistance: Contact guard assistance                                                           Therapeutic Exercises:   Instructed in AROM ex for bilateral LEs, including PF, DF, hip flex, knee ext all while seated  Pain Rating:  None reported    Activity Tolerance:   Good  Please refer to the flowsheet for vital signs taken during this treatment.     After treatment patient left in no apparent distress: Sitting in chair, Call bell within reach, Bed / chair alarm activated and Caregiver / family present    COMMUNICATION/COLLABORATION:   The patients plan of care was discussed with: Registered Nurse    Leon Crump, PT   Time Calculation: 39 mins

## 2019-10-23 NOTE — PROGRESS NOTES
Daily Progress Note: 10/23/2019  Alexy Goff MD    Assessment/Plan:   Hyponatremia / Dehydration / Hypokalemia - POA, due to GI loss, poor PO intake, and Rx of Bactrim. ICU initally. Renal consulted. Improving     Acute metabolic encephalopathy - POA, presumed due to hyponatremia. Monitor.     Nausea vomiting and diarrhea/microcytic colitis - POA and persistent for weeks. Follow by Dr Valerie Lopez - [facility] Pollock. Xifaxan and Budesonide.      Coronary atherosclerosis of native coronary artery S/P CABG x 3 / Essential hypertension, benign - Appears stable. Continue metoprolol and ASA, resume BP meds as able.      Pure hypercholesterolemia - Hold statin until eating better     Macular degeneration - Supportive care.     Leukocytosis - Unclear etiology. Check stool studies. No Abx until Dx     UTI unlikely - culture negative - repeated UA with cath urine.   It is normal.  more likely contaminated sample from diarrhea        Problem List:  Problem List as of 10/23/2019 Date Reviewed: 10/19/2019          Codes Class Noted - Resolved    Leukocytosis ICD-10-CM: D72.829  ICD-9-CM: 288.60  10/19/2019 - Present        Acute metabolic encephalopathy RWQ-51-AS: G93.41  ICD-9-CM: 348.31  10/19/2019 - Present        Nausea vomiting and diarrhea ICD-10-CM: R11.2, R19.7  ICD-9-CM: 787.91, 787.01  10/19/2019 - Present        Dehydration ICD-10-CM: E86.0  ICD-9-CM: 276.51  8/3/2019 - Present        * (Principal) Hyponatremia ICD-10-CM: E87.1  ICD-9-CM: 276.1  8/3/2019 - Present        Hypokalemia ICD-10-CM: E87.6  ICD-9-CM: 276.8  8/3/2019 - Present        Macular degeneration ICD-10-CM: H35.30  ICD-9-CM: 362.50  10/30/2013 - Present        Carotid arterial disease (HCC) ICD-10-CM: I73.9  ICD-9-CM: 447.9  Unknown - Present    Overview Signed 10/30/2013 10:11 AM by Alex Simmons MD     mild 0-49%             Coronary atherosclerosis of native coronary artery ICD-10-CM: I25.10  ICD-9-CM: 414.01 Unknown - Present    Overview Signed 2/22/2012 10:44 AM by James Chirinos MD     Cath 2011 with multivessel cad             S/P CABG (coronary artery bypass graft) ICD-10-CM: Z95.1  ICD-9-CM: V45.81  Unknown - Present    Overview Signed 2/22/2012 10:44 AM by James Chirinos MD     3-10-46 CABG - OFF PUMP - CABGx 3, lima, eric, epi aortic ultrasound - off pump, rightt endoscopic vein harvest; [id number] [facility] [address] to LAD, Svg Ao to OM1 and OM2             S/P CABG x 3 ICD-10-CM: Z95.1  ICD-9-CM: V45.81  6/17/2011 - Present    Overview Signed 7/26/2011 10:51 AM by Josue TEAGUE to LAD and SVGs to OM-1 and OM-2. RCA small non-dominant diffusely diseased vessel ungrafted. LV EF at cath was 65%.              Essential hypertension, benign ICD-10-CM: I10  ICD-9-CM: 401.1  12/2/2010 - Present        Pure hypercholesterolemia ICD-10-CM: E78.00  ICD-9-CM: 272.0  12/2/2010 - Present        RESOLVED: UTI (urinary tract infection) ICD-10-CM: N39.0  ICD-9-CM: 599.0  8/3/2019 - 10/19/2019        RESOLVED: Sepsis ([address] ICD-10-CM: A41.9  ICD-9-CM: 038.9, 995.91  8/3/2019 - 10/19/2019        RESOLVED: Fever ICD-10-CM: R50.9  ICD-9-CM: 780.60  8/3/2019 - 10/19/2019        RESOLVED: Left shift ICD-10-CM: D72.89  ICD-9-CM: 288.9  8/3/2019 - 10/19/2019        RESOLVED: Syncope ICD-10-CM: R55  ICD-9-CM: 780.2  8/3/2019 - 10/19/2019        RESOLVED: Anemia ICD-10-CM: D64.9  ICD-9-CM: 285.9  8/3/2019 - 10/19/2019        RESOLVED: Cough due to ACE inhibitor ICD-10-CM: R05, T46.4X5A  ICD-9-CM: 786.2, E942.6  Unknown - 8/3/2019        RESOLVED: Edema ICD-10-CM: R60.9  ICD-9-CM: 782.3  6/3/2012 - 8/3/2019        RESOLVED: Medication adverse effect ICD-10-CM: T50.905A  ICD-9-CM: E947.9  6/3/2012 - 8/3/2019        RESOLVED: Diverticulitis ICD-10-CM: V15.41  ICD-9-CM: 562.11  Unknown - 8/3/2019        RESOLVED: Pre-operative cardiovascular examination, unstable angina ([facility] [address] ICD-10-CM: Z01.810, I20.0  ICD-9-CM: 411.1, V72.81  2011 - 2011        RESOLVED: Diverticulosis of colon with hemorrhage ICD-10-CM: K57.31  ICD-9-CM: 562.12  2010 - 8/3/2019            Subjective:    80 y.o.  female who presented to the Emergency Department complaining of confusion. Worsening over days. Associated with persistent vomiting and diarrhea, present for weeks. Was in our ER 2 days ago, with weakness and hyponatremia, and sent home on Bactrim. She returns today worse, weak, confused and severe hyponatremia. We will admit her for management. (Dr Coby Jeffery)    10/20:  Still having loose stools and nausea. Reports she feels generally \"bad. \"  Na+ up to 117. K+ up to 3.4. Confusion at usual levels per daughter. WBC 17K. Phos low at 1.0 - getting KPhos. 10/21:  Nausea improved. Feeling a little better. Daughter wants her to have a regular diet. C/O mild GERD sx. Pt and daughter want her to have Zantac daily. No loose stools overnight. Phos still low - replacing. AM labs otherwise pending. 10/22:  Still with frequent loose stools. Continues to grad improve. Phos grad improving. Na+ stable at 129 - will decrease frequency of blood draws at daughters request.     10/23: Still with frequent stools but had a 6 hour period of time during the night without a stool. Na 133 this am. Tolerating liquids well so will decrease IVF a bit. Review of Systems:   A comprehensive review of systems was negative except for that written in the HPI. Objective:   Physical Exam:   Visit Vitals  /50 (BP 1 Location: Right arm, BP Patient Position: At rest)   Pulse (!) 57   Temp 98.3 °F (36.8 °C)   Resp 13   Ht 5' 5\" (1.651 m)   Wt 142 lb (64.4 kg)   SpO2 98%   BMI 23.63 kg/m²      O2 Device: Room air  Temp (24hrs), Av.1 °F (36.7 °C), Min:98 °F (36.7 °C), Max:98.4 °F (36.9 °C)    No intake/output data recorded.    10/21 1901 - 10/23 0700  In: 2443.8 [P.O.:340; I.V.:2103.8]  Out: 100 [Urine:100]  General:  Alert, cooperative at times, frail appearing, no distress, appears stated age. Head:  Normocephalic, without obvious abnormality, atraumatic. Eyes:  Conjunctivae/corneas clear. PERRL, EOMs intact. Throat: Lips, mucosa, and tongue moist..   Neck: Supple, symmetrical, trachea midline, no adenopathy, thyroid: no enlargement/tenderness/nodules, no carotid bruit and no JVD. Lungs:   Clear to auscultation bilaterally. Chest wall:  No tenderness or deformity. Heart:  Regular rate and rhythm, S1, S2 normal, no murmur, click, rub or gallop. Abdomen:   Soft, with mild generalized tenderness. Bowel sounds active. No masses,  No organomegaly. Extremities: no cyanosis or edema. No calf tenderness or cords. Skin: Skin color, texture, turgor normal. No rashes or lesions   Neurologic:   Alert and oriented to person. Will follow 1 step commands.  equal.  No cogwheeling or rigidity. Gait not tested at this time. Sensation grossly normal to touch.   Gross motor of extremities normal.       Data Review:       Recent Days:  Recent Labs     10/23/19  0103 10/22/19  0305 10/21/19  0953   WBC 15.1* 14.2* 12.1*   HGB 9.1* 9.6* 9.8*   HCT 26.4* 27.0* 27.7*    378 321     Recent Labs     10/23/19  0103 10/22/19  0305 10/21/19  2125 10/21/19  1537 10/21/19  0953 10/21/19  0325  10/20/19  1524   * 129* 129* 126* 125*  125*  --    < > 120*   K 3.8 3.8 3.8 3.7 3.4*  3.6  --    < > 4.0    99 98 95* 94*  94*  --    < > 88*   CO2 23 22 25 23 24  24  --    < > 26   * 108* 140* 161* 145*  148*  --    < > 138*   BUN 9 9 10 8 6  6  --    < > 5*   CREA 0.55 0.60 0.65 0.72 0.67  0.63  --    < > 0.55   CA 8.2* 7.8* 7.8* 7.8* 7.6*  7.8*  --    < > 7.5*   MG  --   --  2.1 2.2 1.7 1.7   < > 1.8   PHOS  --  1.9*  --   --   --  1.8*  --  2.7   ALB 2.4* 2.5*  --   --  2.5*  --   --   --    TBILI 0.3 0.4  --   --  0.4  --   --   --    SGOT 10* 10*  --   --  16  --   --   --    ALT 15 17  --   --  18  --   -- --     < > = values in this interval not displayed. No results for input(s): PH, PCO2, PO2, HCO3, FIO2 in the last 72 hours. 24 Hour Results:  Recent Results (from the past 24 hour(s))   CBC WITH AUTOMATED DIFF    Collection Time: 10/23/19  1:03 AM   Result Value Ref Range    WBC 15.1 (H) 3.6 - 11.0 K/uL    RBC 2.95 (L) 3.80 - 5.20 M/uL    HGB 9.1 (L) 11.5 - 16.0 g/dL    HCT 26.4 (L) 35.0 - 47.0 %    MCV 89.5 80.0 - 99.0 FL    MCH 30.8 26.0 - 34.0 PG    MCHC 34.5 30.0 - 36.5 g/dL    RDW 14.3 11.5 - 14.5 %    PLATELET 396 562 - 711 K/uL    MPV 9.2 8.9 - 12.9 FL    NRBC 0.0 0  WBC    ABSOLUTE NRBC 0.00 0.00 - 0.01 K/uL    NEUTROPHILS 58 32 - 75 %    LYMPHOCYTES 26 12 - 49 %    MONOCYTES 9 5 - 13 %    EOSINOPHILS 5 0 - 7 %    BASOPHILS 1 0 - 1 %    IMMATURE GRANULOCYTES 1 (H) 0.0 - 0.5 %    ABS. NEUTROPHILS 9.0 (H) 1.8 - 8.0 K/UL    ABS. LYMPHOCYTES 3.9 (H) 0.8 - 3.5 K/UL    ABS. MONOCYTES 1.3 (H) 0.0 - 1.0 K/UL    ABS. EOSINOPHILS 0.8 (H) 0.0 - 0.4 K/UL    ABS. BASOPHILS 0.1 0.0 - 0.1 K/UL    ABS. IMM. GRANS. 0.1 (H) 0.00 - 0.04 K/UL    DF AUTOMATED     METABOLIC PANEL, COMPREHENSIVE    Collection Time: 10/23/19  1:03 AM   Result Value Ref Range    Sodium 133 (L) 136 - 145 mmol/L    Potassium 3.8 3.5 - 5.1 mmol/L    Chloride 104 97 - 108 mmol/L    CO2 23 21 - 32 mmol/L    Anion gap 6 5 - 15 mmol/L    Glucose 108 (H) 65 - 100 mg/dL    BUN 9 6 - 20 MG/DL    Creatinine 0.55 0.55 - 1.02 MG/DL    BUN/Creatinine ratio 16 12 - 20      GFR est AA >60 >60 ml/min/1.73m2    GFR est non-AA >60 >60 ml/min/1.73m2    Calcium 8.2 (L) 8.5 - 10.1 MG/DL    Bilirubin, total 0.3 0.2 - 1.0 MG/DL    ALT (SGPT) 15 12 - 78 U/L    AST (SGOT) 10 (L) 15 - 37 U/L    Alk.  phosphatase 70 45 - 117 U/L    Protein, total 5.5 (L) 6.4 - 8.2 g/dL    Albumin 2.4 (L) 3.5 - 5.0 g/dL    Globulin 3.1 2.0 - 4.0 g/dL    A-G Ratio 0.8 (L) 1.1 - 2.2         Medications reviewed  Current Facility-Administered Medications   Medication Dose Route Frequency    phosphorus (K PHOS NEUTRAL) 250 mg tablet 2 Tab  2 Tab Oral BID    famotidine (PEPCID) 40 mg/5 mL (8 mg/mL) oral suspension 20 mg  20 mg Oral DAILY    rifAXIMin (XIFAXAN) tablet 550 mg  550 mg Oral TIDAC    diphenoxylate-atropine (LOMOTIL) tablet 1 Tab  1 Tab Oral TID PRN    nystatin (MYCOSTATIN) 100,000 unit/mL oral suspension 500,000 Units  500,000 Units Oral QID    amLODIPine (NORVASC) tablet 10 mg  10 mg Oral QHS    0.9% sodium chloride 1,000 mL with mvi, adult no. 4 with vit K 10 mL, thiamine 725 mg, folic acid 1 mg, potassium chloride 40 mEq infusion   IntraVENous DAILY    budesonide (ENTOCORT EC) capsule 9 mg  9 mg Oral 7am    prochlorperazine (COMPAZINE) injection 5 mg  5 mg IntraVENous Q6H PRN    colestipol (COLESTID) packet 2.5 g  2.5 g Oral DAILY    colestipol (COLESTID) packet 2.5 g  2.5 g Oral QHS    0.9% sodium chloride with KCl 20 mEq/L infusion   IntraVENous CONTINUOUS    doxazosin (CARDURA) tablet 1 mg  1 mg Oral DAILY    hydrALAZINE (APRESOLINE) 20 mg/mL injection 10 mg  10 mg IntraVENous Q6H PRN    sodium chloride (NS) flush 5-10 mL  5-10 mL IntraVENous PRN    lactobac ac& pc-s.therm-b.anim (GERALDINE Q/RISAQUAD)  1 Cap Oral DAILY    aspirin chewable tablet 81 mg  81 mg Oral QHS    naloxone (NARCAN) injection 0.4 mg  0.4 mg IntraVENous PRN    acetaminophen (TYLENOL) tablet 650 mg  650 mg Oral Q4H PRN    diphenhydrAMINE (BENADRYL) capsule 25 mg  25 mg Oral Q4H PRN    ondansetron (ZOFRAN) injection 4 mg  4 mg IntraVENous Q4H PRN    enoxaparin (LOVENOX) injection 40 mg  40 mg SubCUTAneous Q24H    metoprolol tartrate (LOPRESSOR) tablet 50 mg  50 mg Oral BID    losartan (COZAAR) tablet 100 mg  100 mg Oral DAILY       Care Plan discussed with: Patient/Family and Nurse  Total time spent with patient: 30 minutes.   Shi Rivera MD

## 2019-10-23 NOTE — PROGRESS NOTES
Chart Reviewed  Plan  1. Home with family assistance. 2. Home health has been set up through [facility]. 3. CM will continue to follow.    SHIRAZ Luevano

## 2019-10-23 NOTE — PROGRESS NOTES
[address] NP  [phone]           GI PROGRESS NOTE        NAME: Nell Farias   :  1931   MRN:  [id number]       Subjective:   \"I feel a lot better today. \"  Isnt sure if stools are becoming more formed. Pain from bloating has improved. Appetite has improved. Objective:   Lying in bed in NAD      VITALS:   Last 24hrs VS reviewed since prior progress note. Most recent are:  Visit Vitals  /45 (BP 1 Location: Right arm, BP Patient Position: At rest)   Pulse (!) 59   Temp 98 °F (36.7 °C)   Resp 14   Ht 5' 5\" (1.651 m)   Wt 64.4 kg (142 lb)   SpO2 99%   BMI 23.63 kg/m²       Intake/Output Summary (Last 24 hours) at 10/23/2019 1533  Last data filed at 10/23/2019 1049  Gross per 24 hour   Intake 1218.75 ml   Output 400 ml   Net 818.75 ml       PHYSICAL EXAM:  General: Alert, in no acute distress    HEENT: Anicteric sclerae. Lungs:            CTA Bilaterally. Heart:  Regular  rhythm,    Abdomen: Soft, Moderately distended, Non tender.  (+)Bowel sounds, no HSM  Extremities: No c/c/e  Neurologic:  CN 2-12 gi, Alert and oriented X 3. No acute neurological distress   Psych:   Good insight. Not anxious nor agitated. Lab Data Reviewed:   Recent Labs     10/23/19  0103 10/22/19  0305   WBC 15.1* 14.2*   HGB 9.1* 9.6*   HCT 26.4* 27.0*    378     Recent Labs     10/23/19  0103 10/22/19  0305  10/21/19  0325   * 129*   < >  --    K 3.8 3.8   < >  --     99   < >  --    CO2 23 22   < >  --    BUN 9 9   < >  --    CREA 0.55 0.60   < >  --    * 108*   < >  --    PHOS  --  1.9*  --  1.8*   CA 8.2* 7.8*   < >  --     < > = values in this interval not displayed.      Recent Labs     10/23/19  0103 10/22/19  0305   SGOT 10* 10*   AP 70 76   TP 5.5* 5.7*   ALB 2.4* 2.5*   GLOB 3.1 3.2       ________________________________________________________________________  Patient Active Problem List   Diagnosis Code    Essential hypertension, benign I10    Pure hypercholesterolemia E78.00    S/P CABG x 3 Z95.1    Coronary atherosclerosis of native coronary artery I25.10    S/P CABG (coronary artery bypass graft) Z95.1    Macular degeneration H35.30    Carotid arterial disease (HCC) I73.9    Dehydration E86.0    Hyponatremia E87.1    Hypokalemia E87.6    Leukocytosis D72.829    Acute metabolic encephalopathy P24.51    Nausea vomiting and diarrhea R11.2, R19.7         Assessment and Plan:  Microscopic Colitis:  Continuing to improve. Tolerating Regular diet. Fecal Calprotectin and Fecal Elastase pending results.     - Continue Budesonide  - Continue Colestid  - Continue probiotic  - Continue Xifaxan  - Continue Oral Nystatin  - Monitor and replace electrolytes  - Consider Flex Sig on Friday if she does not improve.     Continuing to follow       Signed By: Vinny Chandra NP     10/23/2019  3:33 PM

## 2019-10-23 NOTE — PROGRESS NOTES
[facility]  INTENSIVIST DAILY PROGRESS NOTE  Name: Nadja Cool   : 1931   MRN: [id number]   Date: 10/22/2019 7:57 AM   I have reviewed the flowsheet and previous days notes. She was admitted to ICU for AMS and severe electrolyte abnormalities. She has microscopic colitis and has had issues with diarrhea and N/V with poor intake. She has received IVF and lytes overnight. Her daughter reports that she has been on budesonide 9mg and colestipol 5 grams for this. She still feel weak. Overnight events reviewed:  9 stools overnight. Pt tired this morning from being up all night. Na 129     Vital Signs:    Visit Vitals  /60 (BP 1 Location: Left arm, BP Patient Position: At rest)   Pulse 77   Temp 98.1 °F (36.7 °C)   Resp 15   Ht 5' 5\" (1.651 m)   Wt 64.4 kg (142 lb)   SpO2 98%   BMI 23.63 kg/m²       O2 Device: Room air       Temp (24hrs), Av.3 °F (36.8 °C), Min:98 °F (36.7 °C), Max:98.9 °F (37.2 °C)       Intake/Output:   Last shift:      10/22 1901 - 10/23 0700  In: 157.5 [I.V.:157.5]  Out: -   Last 3 shifts: 10/21 07 - 10/22 1900  In: 2316.3 [P.O.:340; I.V.:1976.3]  Out: 650 [Urine:650]    Intake/Output Summary (Last 24 hours) at 10/22/2019 2212  Last data filed at 10/22/2019 2000  Gross per 24 hour   Intake 1127.5 ml   Output 100 ml   Net 1027.5 ml       Physical Exam:  General:  Sleepy   Head:  Normocephalic, without obvious abnormality, atraumatic. Eyes:  Conjunctivae/corneas clear. Nose: Nares normal. Septum midline. Mucosa normal. No drainage or sinus tenderness. Throat: Lips, mucosa, and tongue normal.  No masses. Lungs:   Clear to auscultation bilaterally. Chest wall:  No tenderness or deformity. Heart:  Regular rate and rhythm, S1, S2 normal, no murmur, click, rub or gallop. Abdomen:   Distended, non-tender   Extremities: Extremities normal, no edema. Pulses: 2+ and symmetric all extremities.    Skin: Pale  No rashes or lesions   Lymph nodes:  no nodes   Neurologic: Grossly nonfocal       DATA:   Current Facility-Administered Medications   Medication Dose Route Frequency    phosphorus (K PHOS NEUTRAL) 250 mg tablet 2 Tab  2 Tab Oral BID    famotidine (PEPCID) 40 mg/5 mL (8 mg/mL) oral suspension 20 mg  20 mg Oral DAILY    rifAXIMin (XIFAXAN) tablet 550 mg  550 mg Oral TIDAC    diphenoxylate-atropine (LOMOTIL) tablet 1 Tab  1 Tab Oral TID PRN    nystatin (MYCOSTATIN) 100,000 unit/mL oral suspension 500,000 Units  500,000 Units Oral QID    amLODIPine (NORVASC) tablet 10 mg  10 mg Oral QHS    0.9% sodium chloride 1,000 mL with mvi, adult no. 4 with vit K 10 mL, thiamine 060 mg, folic acid 1 mg, potassium chloride 40 mEq infusion   IntraVENous DAILY    budesonide (ENTOCORT EC) capsule 9 mg  9 mg Oral 7am    prochlorperazine (COMPAZINE) injection 5 mg  5 mg IntraVENous Q6H PRN    colestipol (COLESTID) packet 2.5 g  2.5 g Oral DAILY    colestipol (COLESTID) packet 2.5 g  2.5 g Oral QHS    0.9% sodium chloride with KCl 20 mEq/L infusion   IntraVENous CONTINUOUS    doxazosin (CARDURA) tablet 1 mg  1 mg Oral DAILY    hydrALAZINE (APRESOLINE) 20 mg/mL injection 10 mg  10 mg IntraVENous Q6H PRN    sodium chloride (NS) flush 5-10 mL  5-10 mL IntraVENous PRN    lactobac ac& pc-s.therm-b.anim (GERALDINE Q/RISAQUAD)  1 Cap Oral DAILY    aspirin chewable tablet 81 mg  81 mg Oral QHS    naloxone (NARCAN) injection 0.4 mg  0.4 mg IntraVENous PRN    acetaminophen (TYLENOL) tablet 650 mg  650 mg Oral Q4H PRN    diphenhydrAMINE (BENADRYL) capsule 25 mg  25 mg Oral Q4H PRN    ondansetron (ZOFRAN) injection 4 mg  4 mg IntraVENous Q4H PRN    enoxaparin (LOVENOX) injection 40 mg  40 mg SubCUTAneous Q24H    metoprolol tartrate (LOPRESSOR) tablet 50 mg  50 mg Oral BID    losartan (COZAAR) tablet 100 mg  100 mg Oral DAILY       Telemetry:          Labs:  Recent Labs     10/22/19  0305 10/21/19  0953 10/20/19  0304   WBC 14.2* 12.1* 17.2*   HGB 9.6* 9.8* 9.9*   HCT 27.0* 27.7* 27.3*    321 350     Recent Labs     10/22/19  0305 10/21/19  2125 10/21/19  1537 10/21/19  0953 10/21/19  0325  10/20/19  1524   * 129* 126* 125*  125*  --    < > 120*   K 3.8 3.8 3.7 3.4*  3.6  --    < > 4.0   CL 99 98 95* 94*  94*  --    < > 88*   CO2 22 25 23 24  24  --    < > 26   * 140* 161* 145*  148*  --    < > 138*   BUN 9 10 8 6  6  --    < > 5*   CREA 0.60 0.65 0.72 0.67  0.63  --    < > 0.55   CA 7.8* 7.8* 7.8* 7.6*  7.8*  --    < > 7.5*   MG  --  2.1 2.2 1.7 1.7   < > 1.8   PHOS 1.9*  --   --   --  1.8*  --  2.7   ALB 2.5*  --   --  2.5*  --   --   --    SGOT 10*  --   --  16  --   --   --    ALT 17  --   --  18  --   --   --     < > = values in this interval not displayed. Imaging:  I have personally reviewed the patients radiographs and reports. CXR shows shallow inspiration and ATX       IMPRESSION:   · AMS:  Improving  · Hypophosphatemia  · Hypokalemia  · Hyponatremia  · Microscopic colitis: needs budesonide 9mg+ colestid granules 5 grams(unable to swallow tabs)   PLAN:     · Budesonide 9mg daily  · Colestid 5 grams daily  · IVF  · q12h BMP  · PO diet as tolerated  · Compazine for nausea.    · PT/OT  · Case discussed with Dr. Lisha Crane  · May need sleep eval as outpatient  · Downgrade to stepdown  · Discussed plan with daughter   GLOBAL ISSUES:   · Head of bed elevated  · GI Prophylaxis:   · DVT Prophylaxis:  · Medical Decision Maker:       Eloise Toscano MD,  [facility]

## 2019-10-23 NOTE — ROUTINE PROCESS
2207 
Daughter expressed concern with patient blood pressure. Advised there was a prn I could give. Pulled up medication and was about to administer it when patient daughter said she felt since she was getting po medication to hold off on prn hydralazine. Has had two loose stools since arriving to unit. 2347 Patient and daughter expressed concern over rifaximin being ordered with meals but not scheduled with them. Spoke with pharmacy regarding this and then discussed it with the patient and daughter but they still declined rifaximin at this time. 3917 Output greater than documented due to incontinent episodes. 7815 Bedside and Verbal shift change report given to Kat (oncoming nurse) by Vera Tomlin (offgoing nurse). Report included the following information SBAR, Kardex, ED Summary, Procedure Summary, Intake/Output, MAR, Recent Results and Cardiac Rhythm NSR.

## 2019-10-23 NOTE — PROGRESS NOTES
Occupational Therapy Note: Pt declined activity this afternoon. Stated she had a busy morning and wants to nap. . Will attempt OT next tx date.

## 2019-10-23 NOTE — PROGRESS NOTES
Bedside and Verbal shift change report given to Kat (oncoming nurse) by Tyler Tam. / Divina Stokes (offgoing nurse). Report included the following information SBAR, Kardex, ED Summary and Cardiac Rhythm NSR.

## 2019-10-23 NOTE — PROGRESS NOTES
[facility] KIDNEY     Renal Daily Progress Note:     Admission Date: 10/19/2019     Subjective: Seen working with PT. Colleen 133 in am       Review of Systems  Appetite improving slowly     Objective:     Visit Vitals  /67   Pulse 67   Temp 99 °F (37.2 °C)   Resp 14   Ht 5' 5\" (1.651 m)   Wt 64.4 kg (142 lb)   SpO2 99%   BMI 23.63 kg/m²     Temp (24hrs), Av.2 °F (36.8 °C), Min:98 °F (36.7 °C), Max:99 °F (37.2 °C)        Intake/Output Summary (Last 24 hours) at 10/23/2019 1101  Last data filed at 10/23/2019 0657  Gross per 24 hour   Intake 1318.75 ml   Output    Net 1318.75 ml     Current Facility-Administered Medications   Medication Dose Route Frequency    phosphorus (K PHOS NEUTRAL) 250 mg tablet 2 Tab  2 Tab Oral BID    famotidine (PEPCID) 40 mg/5 mL (8 mg/mL) oral suspension 20 mg  20 mg Oral DAILY    rifAXIMin (XIFAXAN) tablet 550 mg  550 mg Oral TIDAC    diphenoxylate-atropine (LOMOTIL) tablet 1 Tab  1 Tab Oral TID PRN    nystatin (MYCOSTATIN) 100,000 unit/mL oral suspension 500,000 Units  500,000 Units Oral QID    amLODIPine (NORVASC) tablet 10 mg  10 mg Oral QHS    0.9% sodium chloride 1,000 mL with mvi, adult no. 4 with vit K 10 mL, thiamine 109 mg, folic acid 1 mg, potassium chloride 40 mEq infusion   IntraVENous DAILY    budesonide (ENTOCORT EC) capsule 9 mg  9 mg Oral 7am    prochlorperazine (COMPAZINE) injection 5 mg  5 mg IntraVENous Q6H PRN    colestipol (COLESTID) packet 2.5 g  2.5 g Oral DAILY    colestipol (COLESTID) packet 2.5 g  2.5 g Oral QHS    0.9% sodium chloride with KCl 20 mEq/L infusion   IntraVENous CONTINUOUS    doxazosin (CARDURA) tablet 1 mg  1 mg Oral DAILY    hydrALAZINE (APRESOLINE) 20 mg/mL injection 10 mg  10 mg IntraVENous Q6H PRN    sodium chloride (NS) flush 5-10 mL  5-10 mL IntraVENous PRN    lactobac ac& pc-s.therm-b.anim (GERALDINE Q/RISAQUAD)  1 Cap Oral DAILY    aspirin chewable tablet 81 mg  81 mg Oral QHS    naloxone (NARCAN) injection 0.4 mg  0.4 mg IntraVENous PRN    acetaminophen (TYLENOL) tablet 650 mg  650 mg Oral Q4H PRN    diphenhydrAMINE (BENADRYL) capsule 25 mg  25 mg Oral Q4H PRN    ondansetron (ZOFRAN) injection 4 mg  4 mg IntraVENous Q4H PRN    enoxaparin (LOVENOX) injection 40 mg  40 mg SubCUTAneous Q24H    metoprolol tartrate (LOPRESSOR) tablet 50 mg  50 mg Oral BID    losartan (COZAAR) tablet 100 mg  100 mg Oral DAILY       Physical Exam:General appearance: alert, cooperative, no distress, appears stated age. Daughter and PT nurse with her   Neck: supple, symmetrical,   Lungs: No crackles, no wheezes   Heart: regular rate and rhythm, no S3 or S4  Extremities: no edema  Neurologic: Grossly normal    Data Review:     LABS:  Recent Labs     10/23/19  0103 10/22/19  0305 10/21/19  2125 10/21/19  1537 10/21/19  0953 10/21/19  0325  10/20/19  1524   * 129* 129* 126* 125*  125*  --    < > 120*   K 3.8 3.8 3.8 3.7 3.4*  3.6  --    < > 4.0    99 98 95* 94*  94*  --    < > 88*   CO2 23 22 25 23 24  24  --    < > 26   BUN 9 9 10 8 6  6  --    < > 5*   CREA 0.55 0.60 0.65 0.72 0.67  0.63  --    < > 0.55   CA 8.2* 7.8* 7.8* 7.8* 7.6*  7.8*  --    < > 7.5*   ALB 2.4* 2.5*  --   --  2.5*  --   --   --    PHOS  --  1.9*  --   --   --  1.8*  --  2.7   MG  --   --  2.1 2.2 1.7 1.7   < > 1.8    < > = values in this interval not displayed.      Recent Labs     10/23/19  0103 10/22/19  0305 10/21/19  0953   WBC 15.1* 14.2* 12.1*   HGB 9.1* 9.6* 9.8*   HCT 26.4* 27.0* 27.7*    378 321     Recent Labs     10/21/19  1302 10/20/19  1158   AJIT 64 90   KU 32 37   CREAU  --  25.70       Assessment:   Renal Specific Problems  Hyponatremia--Na up to 133 in am   HypoMag  HypoPO4  HTN         Plan:     Obtain/ Order: labs/cultures/radiology/procedures:      Therapeutic:      Continue IVF at rate of 50 cc/hr     Recheck serum Mg and Phos in am     BMP daily      Avoid hypotonic solutions     D/W patient and daughter     Signed By: Bernardo Rangel, MD     October 23, 2019

## 2019-10-24 LAB
ALBUMIN SERPL-MCNC: 2.2 G/DL (ref 3.5–5)
ALBUMIN/GLOB SERPL: 0.7 {RATIO} (ref 1.1–2.2)
ALP SERPL-CCNC: 65 U/L (ref 45–117)
ALT SERPL-CCNC: 16 U/L (ref 12–78)
ANION GAP SERPL CALC-SCNC: 6 MMOL/L (ref 5–15)
AST SERPL-CCNC: 9 U/L (ref 15–37)
BASOPHILS # BLD: 0.1 K/UL (ref 0–0.1)
BASOPHILS NFR BLD: 1 % (ref 0–1)
BILIRUB SERPL-MCNC: 0.4 MG/DL (ref 0.2–1)
BUN SERPL-MCNC: 12 MG/DL (ref 6–20)
BUN/CREAT SERPL: 20 (ref 12–20)
CALCIUM SERPL-MCNC: 7.9 MG/DL (ref 8.5–10.1)
CALPROTECTIN STL-MCNT: 112 UG/G (ref 0–120)
CHLORIDE SERPL-SCNC: 107 MMOL/L (ref 97–108)
CO2 SERPL-SCNC: 23 MMOL/L (ref 21–32)
CREAT SERPL-MCNC: 0.6 MG/DL (ref 0.55–1.02)
DIFFERENTIAL METHOD BLD: ABNORMAL
ELASTASE PANC STL-MCNT: 487 UG ELAST./G
EOSINOPHIL # BLD: 0.7 K/UL (ref 0–0.4)
EOSINOPHIL NFR BLD: 6 % (ref 0–7)
ERYTHROCYTE [DISTWIDTH] IN BLOOD BY AUTOMATED COUNT: 14.4 % (ref 11.5–14.5)
GLIADIN PEPTIDE IGA SER-ACNC: 2 UNITS (ref 0–19)
GLIADIN PEPTIDE IGG SER-ACNC: 2 UNITS (ref 0–19)
GLOBULIN SER CALC-MCNC: 3.1 G/DL (ref 2–4)
GLUCOSE SERPL-MCNC: 95 MG/DL (ref 65–100)
HCT VFR BLD AUTO: 23.8 % (ref 35–47)
HGB BLD-MCNC: 8.2 G/DL (ref 11.5–16)
IGA SERPL-MCNC: 177 MG/DL (ref 64–422)
IMM GRANULOCYTES # BLD AUTO: 0.1 K/UL (ref 0–0.04)
IMM GRANULOCYTES NFR BLD AUTO: 1 % (ref 0–0.5)
LYMPHOCYTES # BLD: 3.8 K/UL (ref 0.8–3.5)
LYMPHOCYTES NFR BLD: 31 % (ref 12–49)
MAGNESIUM SERPL-MCNC: 1.5 MG/DL (ref 1.6–2.4)
MCH RBC QN AUTO: 30.9 PG (ref 26–34)
MCHC RBC AUTO-ENTMCNC: 34.5 G/DL (ref 30–36.5)
MCV RBC AUTO: 89.8 FL (ref 80–99)
MONOCYTES # BLD: 1 K/UL (ref 0–1)
MONOCYTES NFR BLD: 8 % (ref 5–13)
NEUTS SEG # BLD: 6.7 K/UL (ref 1.8–8)
NEUTS SEG NFR BLD: 53 % (ref 32–75)
NRBC # BLD: 0 K/UL (ref 0–0.01)
NRBC BLD-RTO: 0 PER 100 WBC
O+P SPEC MICRO: NORMAL
O+P STL CONC: NORMAL
PHOSPHATE SERPL-MCNC: 3.4 MG/DL (ref 2.6–4.7)
PLATELET # BLD AUTO: 333 K/UL (ref 150–400)
PMV BLD AUTO: 9.1 FL (ref 8.9–12.9)
POTASSIUM SERPL-SCNC: 4 MMOL/L (ref 3.5–5.1)
PROT SERPL-MCNC: 5.3 G/DL (ref 6.4–8.2)
RBC # BLD AUTO: 2.65 M/UL (ref 3.8–5.2)
SODIUM SERPL-SCNC: 136 MMOL/L (ref 136–145)
SPECIMEN SOURCE: NORMAL
TTG IGA SER-ACNC: <2 U/ML (ref 0–3)
TTG IGG SER-ACNC: <2 U/ML (ref 0–5)
WBC # BLD AUTO: 12.4 K/UL (ref 3.6–11)

## 2019-10-24 PROCEDURE — 74011250637 HC RX REV CODE- 250/637: Performed by: FAMILY MEDICINE

## 2019-10-24 PROCEDURE — 77030037759

## 2019-10-24 PROCEDURE — 74011250637 HC RX REV CODE- 250/637: Performed by: INTERNAL MEDICINE

## 2019-10-24 PROCEDURE — 65660000000 HC RM CCU STEPDOWN

## 2019-10-24 PROCEDURE — 83735 ASSAY OF MAGNESIUM: CPT

## 2019-10-24 PROCEDURE — 84100 ASSAY OF PHOSPHORUS: CPT

## 2019-10-24 PROCEDURE — 74011250636 HC RX REV CODE- 250/636: Performed by: FAMILY MEDICINE

## 2019-10-24 PROCEDURE — 85025 COMPLETE CBC W/AUTO DIFF WBC: CPT

## 2019-10-24 PROCEDURE — 80053 COMPREHEN METABOLIC PANEL: CPT

## 2019-10-24 PROCEDURE — 36415 COLL VENOUS BLD VENIPUNCTURE: CPT

## 2019-10-24 PROCEDURE — 97530 THERAPEUTIC ACTIVITIES: CPT

## 2019-10-24 PROCEDURE — 74011250636 HC RX REV CODE- 250/636: Performed by: INTERNAL MEDICINE

## 2019-10-24 PROCEDURE — 97116 GAIT TRAINING THERAPY: CPT

## 2019-10-24 PROCEDURE — 77030038269 HC DRN EXT URIN PURWCK BARD -A

## 2019-10-24 RX ORDER — FOLIC ACID 1 MG/1
1 TABLET ORAL DAILY
Status: DISCONTINUED | OUTPATIENT
Start: 2019-10-24 | End: 2019-10-29 | Stop reason: HOSPADM

## 2019-10-24 RX ORDER — MAGNESIUM SULFATE HEPTAHYDRATE 40 MG/ML
2 INJECTION, SOLUTION INTRAVENOUS ONCE
Status: COMPLETED | OUTPATIENT
Start: 2019-10-24 | End: 2019-10-24

## 2019-10-24 RX ORDER — ASPIRIN 325 MG/1
100 TABLET, FILM COATED ORAL DAILY
Status: DISCONTINUED | OUTPATIENT
Start: 2019-10-24 | End: 2019-10-29 | Stop reason: HOSPADM

## 2019-10-24 RX ORDER — MULTIVITAMIN WITH IRON
1 TABLET ORAL DAILY
Status: DISCONTINUED | OUTPATIENT
Start: 2019-10-24 | End: 2019-10-29 | Stop reason: HOSPADM

## 2019-10-24 RX ADMIN — NYSTATIN 500000 UNITS: 100000 SUSPENSION ORAL at 22:09

## 2019-10-24 RX ADMIN — METOPROLOL TARTRATE 50 MG: 50 TABLET, FILM COATED ORAL at 08:17

## 2019-10-24 RX ADMIN — LOSARTAN POTASSIUM 100 MG: 50 TABLET, FILM COATED ORAL at 08:17

## 2019-10-24 RX ADMIN — NYSTATIN 500000 UNITS: 100000 SUSPENSION ORAL at 14:30

## 2019-10-24 RX ADMIN — RIFAXIMIN 550 MG: 550 TABLET ORAL at 17:21

## 2019-10-24 RX ADMIN — COLESTIPOL HYDROCHLORIDE 2.5 G: 5 GRANULE, FOR SUSPENSION ORAL at 08:18

## 2019-10-24 RX ADMIN — ENOXAPARIN SODIUM 40 MG: 40 INJECTION SUBCUTANEOUS at 16:21

## 2019-10-24 RX ADMIN — DOXAZOSIN 1 MG: 2 TABLET ORAL at 08:18

## 2019-10-24 RX ADMIN — RIFAXIMIN 550 MG: 550 TABLET ORAL at 06:35

## 2019-10-24 RX ADMIN — Medication 100 MG: at 14:30

## 2019-10-24 RX ADMIN — MAGNESIUM SULFATE HEPTAHYDRATE 2 G: 40 INJECTION, SOLUTION INTRAVENOUS at 11:07

## 2019-10-24 RX ADMIN — DIBASIC SODIUM PHOSPHATE, MONOBASIC POTASSIUM PHOSPHATE AND MONOBASIC SODIUM PHOSPHATE 2 TABLET: 852; 155; 130 TABLET ORAL at 08:18

## 2019-10-24 RX ADMIN — COLESTIPOL HYDROCHLORIDE 2.5 G: 5 GRANULE, FOR SUSPENSION ORAL at 22:11

## 2019-10-24 RX ADMIN — RIFAXIMIN 550 MG: 550 TABLET ORAL at 11:59

## 2019-10-24 RX ADMIN — BUDESONIDE 9 MG: 3 CAPSULE, GELATIN COATED ORAL at 06:38

## 2019-10-24 RX ADMIN — ASPIRIN 81 MG 81 MG: 81 TABLET ORAL at 22:09

## 2019-10-24 RX ADMIN — RANITIDINE 150 MG: 15 SYRUP ORAL at 08:18

## 2019-10-24 RX ADMIN — AMLODIPINE BESYLATE 10 MG: 5 TABLET ORAL at 22:09

## 2019-10-24 RX ADMIN — Medication 1 CAPSULE: at 08:18

## 2019-10-24 RX ADMIN — NYSTATIN 500000 UNITS: 100000 SUSPENSION ORAL at 08:18

## 2019-10-24 RX ADMIN — Medication 10 ML: at 22:12

## 2019-10-24 RX ADMIN — DIBASIC SODIUM PHOSPHATE, MONOBASIC POTASSIUM PHOSPHATE AND MONOBASIC SODIUM PHOSPHATE 2 TABLET: 852; 155; 130 TABLET ORAL at 17:21

## 2019-10-24 RX ADMIN — NYSTATIN 500000 UNITS: 100000 SUSPENSION ORAL at 17:21

## 2019-10-24 RX ADMIN — Medication 1 TABLET: at 14:30

## 2019-10-24 RX ADMIN — FOLIC ACID 1 MG: 1 TABLET ORAL at 14:30

## 2019-10-24 RX ADMIN — METOPROLOL TARTRATE 50 MG: 50 TABLET, FILM COATED ORAL at 17:22

## 2019-10-24 NOTE — PROGRESS NOTES
Nutrition Assessment:    RECOMMENDATIONS/INTERVENTION(S):   1. Continue Regular diet. 2. Change ONS to Ensure Clear TID per pt request.    3. Encourage/ monitor PO intake. 4. Monitor GI function, labs, and weight trends. ASSESSMENT:   10/24: Follow up. Pt remains on Regular diet. States PO intakes are slowly improving, still not quite eating 50% of meals. Pt feels like she is doing better. C/o diarrhea continuing. Had Ensure Enlive ordered which she likes but daughter wants her to avoid milk products until diarrhea improves. Explained that Ensure Enlive is lactose free. Pt states she has tried vanilla and chocolate flavors and wants to try one of the strawberry while she is here. Previously had Ensure Clear ordered which pt also likes and currently wants to receive those. Labs reviewed. Meds: goody bag, probiotic, MgSu, kphos. S/P fecal calprotectin test - WNL, s/p fecal elastase - result still pending. Skin intact. 10/21: 79 y/o F admitted with hyponatremia. Pt screen for low BMI/ MST screen >2: weight loss 2-13 lb and eating poorly d/t decreased appetite. PMH - CAD, diverticulitis, dyslipidemia, HTN, macular degeneration, s/p CABG. Pt reports fair appetite with ~50% meal intake. Pt receiving Ensure Clear TID, pt reports ensure clear is fair but wanting to try regular ensure as she drinks carnation instant breakfast at home, daily. Pt reports poor appetite for several weeks PTA. Pt was experiencing diarrhea at that time which resulted in weight loss.  lb. BMI 22.5 c/w underweight for age. Pt reports  lb. Pt with 4% weight loss x 2 mo, per EMR, not significant for timeframe but noted. NKFA. Pt without GI distress at this time. Skin intact. Pt tried Ensure Enlive, was able to drink most of it, tolerated well, and would prefer to have Ensure Enlive vs Ensure Clear. Will update in orders. Labs - Na 126 L, Ca 7.8 L, Phos 1.8 L.  Meds - NaCl with KCl 75 ml/hr, budesonide, famotidine, probiotic, Phos K, magnesium sulfate. Diet Order: Regular  % Eaten:    Patient Vitals for the past 72 hrs:   % Diet Eaten   10/23/19 1200 30 %   10/23/19 0838 30 %   10/21/19 1301 15 %       Pertinent Medications: [x] Reviewed    Labs: [x] Reviewed    Anthropometrics: Height: 5' 5\" (165.1 cm) Weight: 65 kg (143 lb 4.8 oz)    IBW (%IBW):   ( ) UBW (%UBW):   (  %)      BMI: Body mass index is 23.85 kg/m². This BMI is indicative of:   [] Underweight for age    [x] Normal    [] Overweight    []  Obesity    []  Extreme Obesity (BMI>40)  Estimated Nutrition Needs (Based on): 1356 Kcals/day(REE 1043 x 1.3) , 65 g(61 - 73 gm (1.0 - 1.2 gm/kg)) Protein  Carbohydrate: At Least 130 g/day  Fluids: 1356 mL/day (1 ml/kcal)    Last BM: 10/23 (diarrhea per pt)   [x]Active     []Hyperactive  []Hypoactive       [] Absent   BS  Skin:    [x] Intact   [] Incision  [] Breakdown   [] DTI   [] Tears/Excoriation/Abrasion  []Edema [] Other: Wt Readings from Last 30 Encounters:   10/24/19 65 kg (143 lb 4.8 oz)   10/17/19 60.3 kg (133 lb)   08/03/19 63.5 kg (140 lb)   10/31/18 64.9 kg (143 lb)   02/07/18 63.8 kg (140 lb 9.6 oz)   06/13/17 63.1 kg (139 lb 3.2 oz)   07/20/16 65.3 kg (144 lb)   12/07/15 67.3 kg (148 lb 6.4 oz)   02/02/15 67.6 kg (149 lb)   10/30/13 65.3 kg (144 lb)   07/30/13 66 kg (145 lb 9.6 oz)   12/19/12 66.2 kg (146 lb)   09/26/12 65.3 kg (144 lb)   03/21/12 63 kg (139 lb)   02/22/12 67.1 kg (148 lb)   10/28/11 62.1 kg (137 lb)   07/26/11 63 kg (139 lb)   06/20/11 69.3 kg (152 lb 12.8 oz)   06/16/11 65.3 kg (144 lb)   06/13/11 66.3 kg (146 lb 3.2 oz)   12/29/10 67.4 kg (148 lb 9.6 oz)      NUTRITION DIAGNOSES:   Problem:  Underweight     Etiology: related to decreased ability to consume sufficient energy     Signs/Symptoms: as evidenced by BMI 22.5 c/w underweight for age      10/24: Nutrition Dx resolved at this time with BMI now 23.     NUTRITION INTERVENTIONS:  Meals/Snacks: General/healthful diet Supplements: Commercial supplement              GOAL:   PO intake >50% + ONS within 3-5 days    Cultural, Church, or Ethnic Dietary Needs: None     EDUCATION & DISCHARGE NEEDS:    [x] None Identified   [] Identified and Education Provided/Documented   [] Identified and Pt declined/was not appropriate      [x] Interdisciplinary Care Plan Reviewed/Documented    [x] Discharge Needs:    Regular diet + ONS daily   [] No Nutrition Related Discharge Needs    NUTRITION RISK:   Pt Is At Nutrition Risk  [x]     No Nutrition Risk Identified  []       PT SEEN FOR:    []  MD Consult: []Calorie Count      []Diabetic Diet Education        []Diet Education     []Electrolyte Management     []General Nutrition Management and Supplements     []Management of Tube Feeding     []TPN Recommendations    []  RN Referral:  [x]MST score >=2     []Enteral/Parenteral Nutrition PTA     []Pregnant: Gestational DM or Multigestation                 [] Pressure Ulcer    []  Low BMI      []  Length of Stay       [] Dysphagia Diet         [] Ventilator  [x]  Follow-up     Previous Recommendations:   [x] Implemented          [] Not Implemented          [] Not Applicable    Previous Goal:   [] Met              [x] Progressing Towards Goal              [] Not Progressing Towards Goal   [] Not Applicable            Shan Villalta, [address] [facility]  Pager [phone]  Phone [phone]

## 2019-10-24 NOTE — PROGRESS NOTES
Problem: Mobility Impaired (Adult and Pediatric)  Goal: *Acute Goals and Plan of Care (Insert Text)  Description  FUNCTIONAL STATUS PRIOR TO ADMISSION: Patient was independent and active without use of DME.    HOME SUPPORT PRIOR TO ADMISSION: The patient lived with spouse but did not require assist.    Physical Therapy Goals  Initiated 10/22/2019  1. Patient will move from supine to sit and sit to supine , scoot up and down and roll side to side in bed with independence within 7 day(s). 2.  Patient will transfer from bed to chair and chair to bed with modified independence using the least restrictive device within 7 day(s). 3.  Patient will perform sit to stand with modified independence within 7 day(s). 4.  Patient will ambulate with modified independence for 200 feet with the least restrictive device within 7 day(s). 5.  Patient will ascend/descend 4 stairs with  handrail(s) with modified independence within 7 day(s). Outcome: Progressing Towards Goal   PHYSICAL THERAPY TREATMENT  Patient: Dc Yoo (81 y.o. female)  Date: 10/24/2019  Diagnosis: Hyponatremia [E87.1] Hyponatremia       Precautions:    Chart, physical therapy assessment, plan of care and goals were reviewed. ASSESSMENT  Patient continues with skilled PT services and is progressing towards goals. Sit on the edge of bed with SBA, ambulate with rolling walker and without on the [facility] hallway CGA, no loss of balance need some verbal cues to slow down. Family in the room and agreed with all goals set for the patient. OOB to chair as tolerated performed some active range of motion exercise on both LE all planes. No loss of balance communicated with nurse natacha holman to monitor patient.      Current Level of Function Impacting Discharge (mobility/balance): min assist with ambulation with and without rolling walker    Other factors to consider for discharge: none         PLAN :  Patient continues to benefit from skilled intervention to address the above impairments. Continue treatment per established plan of care. to address goals. Recommendation for discharge: (in order for the patient to meet his/her long term goals)  Physical therapy at least 2 days/week in the home     This discharge recommendation:  Has been made in collaboration with the attending provider and/or case management    IF patient discharges home will need the following DME: patient owns DME required for discharge       SUBJECTIVE:   Patient stated [address].     OBJECTIVE DATA SUMMARY:   Critical Behavior:  Neurologic State: Alert  Orientation Level: Oriented X4  Cognition: Follows commands  Safety/Judgement: Awareness of environment  Functional Mobility Training:  Bed Mobility:  Rolling: Stand-by assistance  Supine to Sit: Stand-by assistance  Sit to Supine: Stand-by assistance  Scooting: Stand-by assistance        Transfers:  Sit to Stand: Stand-by assistance  Stand to Sit: Stand-by assistance  Stand Pivot Transfers: Stand-by assistance     Bed to Chair: Stand-by assistance                    Balance:  Sitting: Intact; High guard  Standing: Intact; Without support  Standing - Static: Good  Standing - Dynamic : Fair  Ambulation/Gait Training:  Distance (ft): 200 Feet (ft)  Assistive Device: Walker, rolling  Ambulation - Level of Assistance: Minimal assistance                 Base of Support: Widened     Speed/Jana: Pace decreased (<100 feet/min); Fluctuations                      Therapeutic Exercises:    Instructed patient to continue active range of motion exercise on both legs while up on chair or on bed. Pain Ratin/10    Activity Tolerance:   Good  Please refer to the flowsheet for vital signs taken during this treatment.     After treatment patient left in no apparent distress:   Sitting in chair, Call bell within reach, Bed / chair alarm activated and Caregiver / family present    COMMUNICATION/COLLABORATION:   The patients plan of care was discussed with: Occupational Therapist, Registered Nurse and     Denise Alex.    Time Calculation: 24 mins

## 2019-10-24 NOTE — ROUTINE PROCESS
Bedside and Verbal shift change report given to Kim Aguiar RN (oncoming nurse) by Forest Mckeon RN (offgoing nurse). Report included the following information SBAR, Kardex, Intake/Output, MAR, Accordion, Recent Results, Cardiac Rhythm SR and Alarm Parameters .

## 2019-10-24 NOTE — PROGRESS NOTES
10- CASE MANAGEMENT NOTE:  I met with the patient and her daughter, Enrique Jonas ([phone]), to introduce myself and explain the role of case management. The patient lives with her  (has neuropathy) in a one story condo with an entry ramp. The patient has been independent with her ADL's, has a rollator but is legally blind so does not drive. Her  drives but only for short distances. The daughter lives 2 miles away and is available to assist and drive to appointments. The patient's  is open to [facility] and the patient has been referred to them for RN,PT,OT services. The daughter has hired an aide to start today to assist her father with his shower. 1. Continue to follow to determine additional discharge needs  2.  [facility] of discharge    [address] [place] [address]

## 2019-10-24 NOTE — PROGRESS NOTES
Problem: Patient Education: Go to Patient Education Activity  Goal: Patient/Family Education  Outcome: Progressing Towards Goal     Problem: Patient Education: Go to Patient Education Activity  Goal: Patient/Family Education  Outcome: Progressing Towards Goal     Problem: Falls - Risk of  Goal: *Absence of Falls  Description  Document Lesvia Pace Fall Risk and appropriate interventions in the flowsheet. Outcome: Progressing Towards Goal  Note:   Fall Risk Interventions:  Mobility Interventions: Assess mobility with egress test, Bed/chair exit alarm, Communicate number of staff needed for ambulation/transfer, OT consult for ADLs, Patient to call before getting OOB, PT Consult for mobility concerns, PT Consult for assist device competence    Mentation Interventions: Adequate sleep, hydration, pain control, Bed/chair exit alarm, Door open when patient unattended, Evaluate medications/consider consulting pharmacy, Eyeglasses and hearing aids, Increase mobility, More frequent rounding, Reorient patient, Room close to nurse's station, Self-releasing belt, Toileting rounds, Update white board    Medication Interventions: Assess postural VS orthostatic hypotension, Bed/chair exit alarm, Evaluate medications/consider consulting pharmacy, Patient to call before getting OOB, Teach patient to arise slowly    Elimination Interventions: Bed/chair exit alarm, Call light in reach, Elevated toilet seat, Patient to call for help with toileting needs, Stay With Me (per policy), Toilet paper/wipes in reach, Toileting schedule/hourly rounds              Problem: Patient Education: Go to Patient Education Activity  Goal: Patient/Family Education  Outcome: Progressing Towards Goal     Problem: Pressure Injury - Risk of  Goal: *Prevention of pressure injury  Description  Document Carlton Scale and appropriate interventions in the flowsheet.   Outcome: Progressing Towards Goal  Note:   Pressure Injury Interventions:  Sensory Interventions: Assess changes in LOC, Avoid rigorous massage over bony prominences, Chair cushion, Check visual cues for pain, Discuss PT/OT consult with provider, Keep linens dry and wrinkle-free, Minimize linen layers, Monitor skin under medical devices, Pad between skin to skin, Turn and reposition approx. every two hours (pillows and wedges if needed)    Moisture Interventions: Absorbent underpads, Apply protective barrier, creams and emollients, Check for incontinence Q2 hours and as needed, Maintain skin hydration (lotion/cream), Minimize layers, Moisture barrier, Offer toileting Q_hr    Activity Interventions: Assess need for specialty bed, Chair cushion, Increase time out of bed, PT/OT evaluation    Mobility Interventions: Assess need for specialty bed, Chair cushion, Float heels, PT/OT evaluation, Turn and reposition approx.  every two hours(pillow and wedges)    Nutrition Interventions: Document food/fluid/supplement intake, Discuss nutritional consult with provider, Offer support with meals,snacks and hydration    Friction and Shear Interventions: Apply protective barrier, creams and emollients, HOB 30 degrees or less, Lift sheet, Lift team/patient mobility team, Minimize layers                Problem: Patient Education: Go to Patient Education Activity  Goal: Patient/Family Education  Outcome: Progressing Towards Goal     Problem: Fluid Volume - Risk of, Imbalanced  Goal: *Balanced intake and output  Outcome: Progressing Towards Goal     Problem: Patient Education: Go to Patient Education Activity  Goal: Patient/Family Education  Outcome: Progressing Towards Goal

## 2019-10-24 NOTE — PROGRESS NOTES
Bedside and Verbal shift change report given to Kat (oncoming nurse) by Dinorah Rosario (offgoing nurse). Report included the following information SBAR, Kardex and Cardiac Rhythm NSR.     0730 noted during report that IV has infiltrated, IV paused as arm is swollen. Will attempt to have US guided IV placed today. [address] [facility] guided IV placed    1130 aware of patients BPs, patient is not to be given hydralazine unless SBP is greater than 185.     1300 patient OOB and in chair    1400 patient working with physical therapy. 1500 patient in chair,  at bedside, no needs at this time. 1750 patient has not had a bowel movement throughout the day and is eating meals regularly, eating greater than 50% of meal.     Bedside and Verbal shift change report given to Noe Cox (oncoming nurse) by Betty Martin (offgoing nurse). Report included the following information SBAR, Kardex and Cardiac Rhythm NSR.

## 2019-10-24 NOTE — PROGRESS NOTES
Problem: Self Care Deficits Care Plan (Adult)  Goal: *Acute Goals and Plan of Care (Insert Text)  Description    FUNCTIONAL STATUS PRIOR TO ADMISSION: Patient was modified independent using a Rollator occasionally for functional mobility. HOME SUPPORT: The patient lived with  but did not require assist.    Occupational Therapy Goals  Initiated 10/22/2019  1. Patient will perform upper body dressing and bathing with independence within 7 day(s). 2.  Patient will perform lower body dressing and bathing with supervision/set-up within 7 day(s). 3.  Patient will perform toilet transfers with modified independence within 7 day(s). 4.  Patient will perform all aspects of toileting with modified independence within 7 day(s). 5.  Patient will participate in upper extremity therapeutic exercise/activities with modified independence for 10 minutes within 7 day(s). 6.  Patient will utilize energy conservation techniques during functional activities with verbal cues within 7 day(s). Outcome: Progressing Towards Goal   OCCUPATIONAL THERAPY TREATMENT  Patient: Adrianna Deluca (85 y.o. female)  Date: 10/24/2019  Diagnosis: Hyponatremia [E87.1] Hyponatremia       Precautions:    Chart, occupational therapy assessment, plan of care, and goals were reviewed. ASSESSMENT  Patient continues with skilled OT services and is progressing towards goals. Patient moves to EOB with supervision. She is able to doff socks using cross leg technique and don slip on rubber bottom shoes for ambulation with supervision. Patient dons protective brief over feet with supervision but requires CGA for standing to pull over hips. Patient able to transfer to commode in bathroom with CGA. She manages clothing with CGA, though able to manage hygiene in sitting.       Current Level of Function Impacting Discharge (ADLs): CGA/SBA for transfers and ADLs    Other factors to consider for discharge: lives with spouse, will have assist at home         PLAN :  Patient continues to benefit from skilled intervention to address the above impairments. Continue treatment per established plan of care. to address goals. Recommend with staff: to bathroom for toileting during day, may use BSC at night    Recommend next OT session: progression of goals    Recommendation for discharge: (in order for the patient to meet his/her long term goals)  Occupational therapy at least 2 days/week in the home     This discharge recommendation:  Has been made in collaboration with the attending provider and/or case management    IF patient discharges home will need the following DME: none       SUBJECTIVE:   Patient stated I would love to get out of the bed.     OBJECTIVE DATA SUMMARY:   Cognitive/Behavioral Status:  Neurologic State: Alert  Orientation Level: Oriented X4  Cognition: Follows commands  Perception: Appears intact  Perseveration: No perseveration noted  Safety/Judgement: Awareness of environment    Functional Mobility and Transfers for ADLs:  Bed Mobility:  Rolling: Stand-by assistance  Supine to Sit: Stand-by assistance  Sit to Supine: Stand-by assistance  Scooting: Stand-by assistance    Transfers:  Sit to Stand: Stand-by assistance     Bed to Chair: Stand-by assistance    Balance:  Sitting: Intact; High guard  Standing: Intact; Without support  Standing - Static: Good  Standing - Dynamic : Fair    ADL Intervention:                           Lower Body Dressing Assistance  Protective Undergarmet: Contact guard assistance(standing to pull over hips)  Socks: Supervision(to doff)  Slip on Shoes Without Back: Supervision  Leg Crossed Method Used: Yes  Position Performed: Seated edge of bed    Toileting  Toileting Assistance: Contact guard assistance  Bladder Hygiene: Supervision(seated )  Clothing Management: Contact guard assistance(standing)  Cues: Verbal cues provided  Adaptive Equipment: Grab bars; Walker    Cognitive Retraining  Safety/Judgement: Awareness of environment    Therapeutic Exercises:     Pain:      Activity Tolerance:   Good  Please refer to the flowsheet for vital signs taken during this treatment.     After treatment patient left in no apparent distress:   Sitting in chair, Call bell within reach and Caregiver / family present    COMMUNICATION/COLLABORATION:   The patients plan of care was discussed with: Physical Therapist and Registered Nurse    Lorie Andrea, OTR/L  Time Calculation: 26 mins

## 2019-10-24 NOTE — ROUTINE PROCESS
Bedside and Verbal shift change report given to MARY CARMEN Stephens (oncoming nurse) by Nickolas Dumont RN (offgoing nurse). Report included the following information SBAR, Kardex, Intake/Output, MAR, Accordion, Recent Results, Cardiac Rhythm SR and Alarm Parameters .

## 2019-10-24 NOTE — PROGRESS NOTES
Daily Progress Note: 10/24/2019  Alpesh Iraheta MD    Assessment/Plan:   Hyponatremia / Dehydration / Hypokalemia - POA, due to GI loss, poor PO intake, and Rx of Bactrim. - ICU initally. - Renal consulted. Improving     Acute metabolic encephalopathy - POA, presumed due to hyponatremia.    - Monitor.     Nausea vomiting and diarrhea/microcytic colitis - POA and persistent for weeks. - Follow by Dr Sandy Vera. -   - Consulted GI. Xifaxan and Budesonide.   - Possible colonoscopy tomorrow     Coronary atherosclerosis of native coronary artery S/P CABG x 3 / Essential hypertension,    - Appears stable. - Continue metoprolol and ASA,   - resume BP meds as able.      Pure hypercholesterolemia -   - Hold statin until eating better     Macular degeneration -   - Supportive care.     Leukocytosis - Unclear etiology. - Check stool studies.    - No Abx until Dx     UTI unlikely - culture negative - repeated UA with cath urine.   It is normal.  more likely contaminated sample from diarrhea        Problem List:  Problem List as of 10/24/2019 Date Reviewed: 10/19/2019          Codes Class Noted - Resolved    Leukocytosis ICD-10-CM: D72.829  ICD-9-CM: 288.60  10/19/2019 - Present        Acute metabolic encephalopathy [id number]: G93.41  ICD-9-CM: 348.31  10/19/2019 - Present        Nausea vomiting and diarrhea ICD-10-CM: R11.2, R19.7  ICD-9-CM: 787.91, 787.01  10/19/2019 - Present        Dehydration ICD-10-CM: E86.0  ICD-9-CM: 276.51  8/3/2019 - Present        * (Principal) Hyponatremia ICD-10-CM: E87.1  ICD-9-CM: 276.1  8/3/2019 - Present        Hypokalemia ICD-10-CM: E87.6  ICD-9-CM: 276.8  8/3/2019 - Present        Macular degeneration ICD-10-CM: H35.30  ICD-9-CM: 362.50  10/30/2013 - Present        Carotid arterial disease (HCC) ICD-10-CM: I73.9  ICD-9-CM: 447.9  Unknown - Present    Overview Signed 10/30/2013 10:11 AM by Randell Barnett MD     mild 0-49%             Coronary atherosclerosis of native coronary artery ICD-10-CM: I25.10  ICD-9-CM: 414.01  Unknown - Present    Overview Signed 2/22/2012 10:44 AM by Katelynn Delcid MD     Cath 2011 with multivessel cad             S/P CABG (coronary artery bypass graft) ICD-10-CM: Z95.1  ICD-9-CM: V45.81  Unknown - Present    Overview Signed 2/22/2012 10:44 AM by Katelynn Delcid MD     5-17-32 CABG - OFF PUMP - CABGx 3, lima, eric, epi aortic ultrasound - off pump, rightt endoscopic vein harvest; [id number] [facility] [address] to LAD, Svg Ao to OM1 and OM2             S/P CABG x 3 ICD-10-CM: Z95.1  ICD-9-CM: V45.81  6/17/2011 - Present    Overview Signed 7/26/2011 10:51 AM by Maria C TEAGUE to LAD and SVGs to OM-1 and OM-2. RCA small non-dominant diffusely diseased vessel ungrafted. LV EF at cath was 65%.              Essential hypertension, benign ICD-10-CM: I10  ICD-9-CM: 401.1  12/2/2010 - Present        Pure hypercholesterolemia ICD-10-CM: E78.00  ICD-9-CM: 272.0  12/2/2010 - Present        RESOLVED: UTI (urinary tract infection) ICD-10-CM: N39.0  ICD-9-CM: 599.0  8/3/2019 - 10/19/2019        RESOLVED: Sepsis ([address] ICD-10-CM: A41.9  ICD-9-CM: 038.9, 995.91  8/3/2019 - 10/19/2019        RESOLVED: Fever ICD-10-CM: R50.9  ICD-9-CM: 780.60  8/3/2019 - 10/19/2019        RESOLVED: Left shift ICD-10-CM: D72.89  ICD-9-CM: 288.9  8/3/2019 - 10/19/2019        RESOLVED: Syncope ICD-10-CM: R55  ICD-9-CM: 780.2  8/3/2019 - 10/19/2019        RESOLVED: Anemia ICD-10-CM: D64.9  ICD-9-CM: 285.9  8/3/2019 - 10/19/2019        RESOLVED: Cough due to ACE inhibitor ICD-10-CM: R05, T46.4X5A  ICD-9-CM: 786.2, E942.6  Unknown - 8/3/2019        RESOLVED: Edema ICD-10-CM: R60.9  ICD-9-CM: 782.3  6/3/2012 - 8/3/2019        RESOLVED: Medication adverse effect ICD-10-CM: T50.905A  ICD-9-CM: E947.9  6/3/2012 - 8/3/2019        RESOLVED: Diverticulitis ICD-10-CM: D24.14  ICD-9-CM: 562.11  Unknown - 8/3/2019        RESOLVED: Pre-operative cardiovascular examination, unstable angina ([facility] [address] ICD-10-CM: Z01.810, I20.0  ICD-9-CM: 411.1, V72.81  6/19/2011 - 6/19/2011        RESOLVED: Diverticulosis of colon with hemorrhage ICD-10-CM: K57.31  ICD-9-CM: 562.12  12/2/2010 - 8/3/2019            Subjective:    80 y.o.  female who presented to the Emergency Department complaining of confusion. Worsening over days. Associated with persistent vomiting and diarrhea, present for weeks. Was in our ER 2 days ago, with weakness and hyponatremia, and sent home on Bactrim. She returns today worse, weak, confused and severe hyponatremia. We will admit her for management. (Dr Anjel Figueroa)    10/20:  Still having loose stools and nausea. Reports she feels generally \"bad. \"  Na+ up to 117. K+ up to 3.4. Confusion at usual levels per daughter. WBC 17K. Phos low at 1.0 - getting KPhos. 10/21:  Nausea improved. Feeling a little better. Daughter wants her to have a regular diet. C/O mild GERD sx. Pt and daughter want her to have Zantac daily. No loose stools overnight. Phos still low - replacing. AM labs otherwise pending. 10/22:  Still with frequent loose stools. Continues to grad improve. Phos grad improving. Na+ stable at 129 - will decrease frequency of blood draws at daughters request.     10/23: Still with frequent stools but had a 6 hour period of time during the night without a stool. Na 133 this am. Tolerating liquids well so will decrease IVF a bit. 10/24: Stools are less frequent but still very watery. Mag is down so will replete IV. K+ normal. Will ask to see if we can get a mid line placed as she is difficult to stick and her arm is swelling at the IV site. Na stable. Review of Systems:   A comprehensive review of systems was negative except for that written in the HPI.     Objective:   Physical Exam:   Visit Vitals  /53 (BP 1 Location: Left arm, BP Patient Position: At rest)   Pulse 71   Temp 98.2 °F (36.8 °C)   Resp 13   Ht 5' 5\" (1.651 m)   Wt 143 lb 4.8 oz (65 kg)   SpO2 98%   BMI 23.85 kg/m²      O2 Device: Room air  Temp (24hrs), Av.3 °F (36.8 °C), Min:98 °F (36.7 °C), Max:98.5 °F (36.9 °C)    No intake/output data recorded. 10/22 1901 - 10/24 0700  In: 4538.8 [P.O.:1020; I.V.:3518.8]  Out: 1300 [Urine:1300]  General:  Alert, cooperative at times, frail appearing, no distress, appears stated age. Head:  Normocephalic, without obvious abnormality, atraumatic. Eyes:  Conjunctivae/corneas clear. PERRL, EOMs intact. Throat: Lips, mucosa, and tongue moist..   Neck: Supple, symmetrical, trachea midline, no adenopathy, thyroid: no enlargement/tenderness/nodules, no carotid bruit and no JVD. Lungs:   Clear to auscultation bilaterally. Chest wall:  No tenderness or deformity. Heart:  Regular rate and rhythm, S1, S2 normal, no murmur, click, rub or gallop. Abdomen:   Soft, with mild generalized tenderness. Bowel sounds active. No masses,  No organomegaly. Extremities: no cyanosis or edema. No calf tenderness or cords. Skin: Skin color, texture, turgor normal. No rashes or lesions   Neurologic:   Alert and oriented to person. Will follow 1 step commands.  equal.  No cogwheeling or rigidity. Gait not tested at this time. Sensation grossly normal to touch.   Gross motor of extremities normal.       Data Review:       Recent Days:  Recent Labs     10/24/19  0117 10/23/19  0103 10/22/19  0305   WBC 12.4* 15.1* 14.2*   HGB 8.2* 9.1* 9.6*   HCT 23.8* 26.4* 27.0*    359 378     Recent Labs     10/24/19  0117 10/23/19  0103 10/22/19  0305 10/21/19  2125 10/21/19  1537    133* 129* 129* 126*   K 4.0 3.8 3.8 3.8 3.7    104 99 98 95*   CO2 23 23 22 25 23   GLU 95 108* 108* 140* 161*   BUN 12 9 9 10 8   CREA 0.60 0.55 0.60 0.65 0.72   CA 7.9* 8.2* 7.8* 7.8* 7.8*   MG 1.5*  --   --  2.1 2.2   PHOS 3.4  --  1.9*  --   --    ALB 2.2* 2.4* 2.5*  --   --    TBILI 0.4 0.3 0.4  --   --    SGOT 9* 10* 10*  --   --    ALT 16 15 17  --   --      No results for input(s): PH, PCO2, PO2, HCO3, FIO2 in the last 72 hours. 24 Hour Results:  Recent Results (from the past 24 hour(s))   CBC WITH AUTOMATED DIFF    Collection Time: 10/24/19  1:17 AM   Result Value Ref Range    WBC 12.4 (H) 3.6 - 11.0 K/uL    RBC 2.65 (L) 3.80 - 5.20 M/uL    HGB 8.2 (L) 11.5 - 16.0 g/dL    HCT 23.8 (L) 35.0 - 47.0 %    MCV 89.8 80.0 - 99.0 FL    MCH 30.9 26.0 - 34.0 PG    MCHC 34.5 30.0 - 36.5 g/dL    RDW 14.4 11.5 - 14.5 %    PLATELET 763 627 - 122 K/uL    MPV 9.1 8.9 - 12.9 FL    NRBC 0.0 0  WBC    ABSOLUTE NRBC 0.00 0.00 - 0.01 K/uL    NEUTROPHILS 53 32 - 75 %    LYMPHOCYTES 31 12 - 49 %    MONOCYTES 8 5 - 13 %    EOSINOPHILS 6 0 - 7 %    BASOPHILS 1 0 - 1 %    IMMATURE GRANULOCYTES 1 (H) 0.0 - 0.5 %    ABS. NEUTROPHILS 6.7 1.8 - 8.0 K/UL    ABS. LYMPHOCYTES 3.8 (H) 0.8 - 3.5 K/UL    ABS. MONOCYTES 1.0 0.0 - 1.0 K/UL    ABS. EOSINOPHILS 0.7 (H) 0.0 - 0.4 K/UL    ABS. BASOPHILS 0.1 0.0 - 0.1 K/UL    ABS. IMM. GRANS. 0.1 (H) 0.00 - 0.04 K/UL    DF AUTOMATED     METABOLIC PANEL, COMPREHENSIVE    Collection Time: 10/24/19  1:17 AM   Result Value Ref Range    Sodium 136 136 - 145 mmol/L    Potassium 4.0 3.5 - 5.1 mmol/L    Chloride 107 97 - 108 mmol/L    CO2 23 21 - 32 mmol/L    Anion gap 6 5 - 15 mmol/L    Glucose 95 65 - 100 mg/dL    BUN 12 6 - 20 MG/DL    Creatinine 0.60 0.55 - 1.02 MG/DL    BUN/Creatinine ratio 20 12 - 20      GFR est AA >60 >60 ml/min/1.73m2    GFR est non-AA >60 >60 ml/min/1.73m2    Calcium 7.9 (L) 8.5 - 10.1 MG/DL    Bilirubin, total 0.4 0.2 - 1.0 MG/DL    ALT (SGPT) 16 12 - 78 U/L    AST (SGOT) 9 (L) 15 - 37 U/L    Alk.  phosphatase 65 45 - 117 U/L    Protein, total 5.3 (L) 6.4 - 8.2 g/dL    Albumin 2.2 (L) 3.5 - 5.0 g/dL    Globulin 3.1 2.0 - 4.0 g/dL    A-G Ratio 0.7 (L) 1.1 - 2.2     MAGNESIUM    Collection Time: 10/24/19  1:17 AM   Result Value Ref Range    Magnesium 1.5 (L) 1.6 - 2.4 mg/dL   PHOSPHORUS    Collection Time: 10/24/19  1:17 AM   Result Value Ref Range    Phosphorus 3.4 2.6 - 4.7 MG/DL       Medications reviewed  Current Facility-Administered Medications   Medication Dose Route Frequency    magnesium sulfate 2 g/50 ml IVPB (premix or compounded)  2 g IntraVENous ONCE    raNITIdine (ZANTAC) 15 mg/mL syrup 150 mg  150 mg Oral DAILY    phosphorus (K PHOS NEUTRAL) 250 mg tablet 2 Tab  2 Tab Oral BID    rifAXIMin (XIFAXAN) tablet 550 mg  550 mg Oral TIDAC    diphenoxylate-atropine (LOMOTIL) tablet 1 Tab  1 Tab Oral TID PRN    nystatin (MYCOSTATIN) 100,000 unit/mL oral suspension 500,000 Units  500,000 Units Oral QID    amLODIPine (NORVASC) tablet 10 mg  10 mg Oral QHS    0.9% sodium chloride 1,000 mL with mvi, adult no. 4 with vit K 10 mL, thiamine 098 mg, folic acid 1 mg, potassium chloride 40 mEq infusion   IntraVENous DAILY    budesonide (ENTOCORT EC) capsule 9 mg  9 mg Oral 7am    prochlorperazine (COMPAZINE) injection 5 mg  5 mg IntraVENous Q6H PRN    colestipol (COLESTID) packet 2.5 g  2.5 g Oral DAILY    colestipol (COLESTID) packet 2.5 g  2.5 g Oral QHS    0.9% sodium chloride with KCl 20 mEq/L infusion   IntraVENous CONTINUOUS    doxazosin (CARDURA) tablet 1 mg  1 mg Oral DAILY    hydrALAZINE (APRESOLINE) 20 mg/mL injection 10 mg  10 mg IntraVENous Q6H PRN    sodium chloride (NS) flush 5-10 mL  5-10 mL IntraVENous PRN    lactobac ac& pc-s.therm-b.anim (GERALDINE Q/RISAQUAD)  1 Cap Oral DAILY    aspirin chewable tablet 81 mg  81 mg Oral QHS    naloxone (NARCAN) injection 0.4 mg  0.4 mg IntraVENous PRN    acetaminophen (TYLENOL) tablet 650 mg  650 mg Oral Q4H PRN    diphenhydrAMINE (BENADRYL) capsule 25 mg  25 mg Oral Q4H PRN    ondansetron (ZOFRAN) injection 4 mg  4 mg IntraVENous Q4H PRN    enoxaparin (LOVENOX) injection 40 mg  40 mg SubCUTAneous Q24H    metoprolol tartrate (LOPRESSOR) tablet 50 mg  50 mg Oral BID    losartan (COZAAR) tablet 100 mg  100 mg Oral DAILY       Care Plan discussed with: Patient/Family and Nurse  Total time spent with patient: 30 minutes.   Mimi Doss MD

## 2019-10-24 NOTE — PROGRESS NOTES
[facility] [place] [facility]     Renal Daily Progress Note:     Admission Date: 10/19/2019     Subjective: Na 136 in am.       Review of Systems  Feels better overall.      Objective:     Visit Vitals  /44 (BP 1 Location: Left leg, BP Patient Position: At rest)   Pulse 60   Temp 98.2 °F (36.8 °C)   Resp 13   Ht 5' 5\" (1.651 m)   Wt 65 kg (143 lb 4.8 oz)   SpO2 98%   BMI 23.85 kg/m²     Temp (24hrs), Av.3 °F (36.8 °C), Min:98.2 °F (36.8 °C), Max:98.5 °F (36.9 °C)        Intake/Output Summary (Last 24 hours) at 10/24/2019 1406  Last data filed at 10/24/2019 0651  Gross per 24 hour   Intake 2840 ml   Output 900 ml   Net 1940 ml     Current Facility-Administered Medications   Medication Dose Route Frequency    thiamine mononitrate (B-1) tablet 100 mg  100 mg Oral DAILY    folic acid (FOLVITE) tablet 1 mg  1 mg Oral DAILY    multivitamin with iron tablet 1 Tab  1 Tab Oral DAILY    raNITIdine (ZANTAC) 15 mg/mL syrup 150 mg  150 mg Oral DAILY    phosphorus (K PHOS NEUTRAL) 250 mg tablet 2 Tab  2 Tab Oral BID    rifAXIMin (XIFAXAN) tablet 550 mg  550 mg Oral TIDAC    diphenoxylate-atropine (LOMOTIL) tablet 1 Tab  1 Tab Oral TID PRN    nystatin (MYCOSTATIN) 100,000 unit/mL oral suspension 500,000 Units  500,000 Units Oral QID    amLODIPine (NORVASC) tablet 10 mg  10 mg Oral QHS    budesonide (ENTOCORT EC) capsule 9 mg  9 mg Oral 7am    prochlorperazine (COMPAZINE) injection 5 mg  5 mg IntraVENous Q6H PRN    colestipol (COLESTID) packet 2.5 g  2.5 g Oral DAILY    colestipol (COLESTID) packet 2.5 g  2.5 g Oral QHS    0.9% sodium chloride with KCl 20 mEq/L infusion   IntraVENous CONTINUOUS    doxazosin (CARDURA) tablet 1 mg  1 mg Oral DAILY    hydrALAZINE (APRESOLINE) 20 mg/mL injection 10 mg  10 mg IntraVENous Q6H PRN    sodium chloride (NS) flush 5-10 mL  5-10 mL IntraVENous PRN    lactobac ac& pc-s.therm-b.anim (GERALDINE Q/RISAQUAD)  1 Cap Oral DAILY    aspirin chewable tablet 81 mg  81 mg Oral QHS    naloxone (NARCAN) injection 0.4 mg  0.4 mg IntraVENous PRN    acetaminophen (TYLENOL) tablet 650 mg  650 mg Oral Q4H PRN    diphenhydrAMINE (BENADRYL) capsule 25 mg  25 mg Oral Q4H PRN    ondansetron (ZOFRAN) injection 4 mg  4 mg IntraVENous Q4H PRN    enoxaparin (LOVENOX) injection 40 mg  40 mg SubCUTAneous Q24H    metoprolol tartrate (LOPRESSOR) tablet 50 mg  50 mg Oral BID    losartan (COZAAR) tablet 100 mg  100 mg Oral DAILY       Physical Exam:General appearance: alert, cooperative, no distress, appears stated age. Lying comfortably in bed. Daughter in the room   Neck: supple, symmetrical,   Lungs: No crackles, no wheezes   Heart: regular rate and rhythm, no S3 or S4  Extremities: no edema  Neurologic: Grossly normal    Data Review:     LABS:  Recent Labs     10/24/19  0117 10/23/19  0103 10/22/19  0305 10/21/19  2125 10/21/19  1537    133* 129* 129* 126*   K 4.0 3.8 3.8 3.8 3.7    104 99 98 95*   CO2 23 23 22 25 23   BUN 12 9 9 10 8   CREA 0.60 0.55 0.60 0.65 0.72   CA 7.9* 8.2* 7.8* 7.8* 7.8*   ALB 2.2* 2.4* 2.5*  --   --    PHOS 3.4  --  1.9*  --   --    MG 1.5*  --   --  2.1 2.2     Recent Labs     10/24/19  0117 10/23/19  0103 10/22/19  0305   WBC 12.4* 15.1* 14.2*   HGB 8.2* 9.1* 9.6*   HCT 23.8* 26.4* 27.0*    359 378     No results for input(s): AJIT, KU, CLU, CREAU in the last 72 hours. No lab exists for component: PROU    Assessment:   Renal Specific Problems  Hyponatremia--resolved    HypoMag  HypoPO4--resolved   HTN         Plan:     Obtain/ Order: labs/cultures/radiology/procedures:      Therapeutic:      Serum sodium has been corrected     Replace Mg     Nephrology service will sign off at this time. Please call if any questions or concerns. Thank you for allowing us to participate in the care of Ms. Olayinka Jonesvon     D/W patient and daughter     Signed By: Jian Julian MD     October 24, 2019

## 2019-10-24 NOTE — PROGRESS NOTES
[address] NP  [phone]           GI PROGRESS NOTE        NAME: Josette Van   :  1931   MRN:  [id number]       Subjective:   \"I feel much better today\"  She has been having less frequency of stools and one was noted to have more consistency. Still has discomfort from bloating but this is slowly improving. Objective:   Resting in Bed in NAD. VITALS:   Last 24hrs VS reviewed since prior progress note. Most recent are:  Visit Vitals  /53 (BP 1 Location: Left arm, BP Patient Position: At rest)   Pulse 71   Temp 98.2 °F (36.8 °C)   Resp 13   Ht 5' 5\" (1.651 m)   Wt 65 kg (143 lb 4.8 oz)   SpO2 98%   BMI 23.85 kg/m²       Intake/Output Summary (Last 24 hours) at 10/24/2019 1113  Last data filed at 10/24/2019 0651  Gross per 24 hour   Intake 3320 ml   Output 900 ml   Net 2420 ml       PHYSICAL EXAM:  General: Alert, in no acute distress    HEENT: Anicteric sclerae. Lungs:            CTA Bilaterally. Heart:  Regular  rhythm,    Abdomen: Soft, Moderately distended, Non tender.  (+)Bowel sounds, no HSM  Extremities: No c/c/e  Neurologic:  CN 2-12 gi, Alert and oriented X 3. No acute neurological distress   Psych:   Good insight. Not anxious nor agitated.     Lab Data Reviewed:   Recent Labs     10/24/19  0117 10/23/19  0103   WBC 12.4* 15.1*   HGB 8.2* 9.1*   HCT 23.8* 26.4*    359     Recent Labs     10/24/19  0117 10/23/19  0103 10/22/19  0305    133* 129*   K 4.0 3.8 3.8    104 99   CO2 23 23 22   BUN 12 9 9   CREA 0.60 0.55 0.60   GLU 95 108* 108*   PHOS 3.4  --  1.9*   CA 7.9* 8.2* 7.8*     Recent Labs     10/24/19  0117 10/23/19  0103   SGOT 9* 10*   AP 65 70   TP 5.3* 5.5*   ALB 2.2* 2.4*   GLOB 3.1 3.1       ________________________________________________________________________  Patient Active Problem List   Diagnosis Code    Essential hypertension, benign I10    Pure hypercholesterolemia E78.00    S/P CABG x 3 Z95.1  Coronary atherosclerosis of native coronary artery I25.10    S/P CABG (coronary artery bypass graft) Z95.1    Macular degeneration H35.30    Carotid arterial disease (HCC) I73.9    Dehydration E86.0    Hyponatremia E87.1    Hypokalemia E87.6    Leukocytosis D72.829    Acute metabolic encephalopathy N94.38    Nausea vomiting and diarrhea R11.2, R19.7         Assessment and Plan:  Microscopic Colitis:  Continuing to improve. Tolerating Regular diet.  Fecal Calprotectin is WNL. Fecal Elastase pending results.     - Continue Budesonide  - Continue Colestid  - Continue probiotic  - Continue Xifaxan  - Continue Oral Nystatin  - Monitor and replace electrolytes  - No plans for flex sig at this time    Will continue to follow.          Signed By: Shravan Barnes NP     10/24/2019  11:13 AM

## 2019-10-25 LAB
ALBUMIN SERPL-MCNC: 2.3 G/DL (ref 3.5–5)
ALBUMIN/GLOB SERPL: 0.8 {RATIO} (ref 1.1–2.2)
ALP SERPL-CCNC: 67 U/L (ref 45–117)
ALT SERPL-CCNC: 19 U/L (ref 12–78)
ANION GAP SERPL CALC-SCNC: 7 MMOL/L (ref 5–15)
AST SERPL-CCNC: 10 U/L (ref 15–37)
BACTERIA SPEC CULT: NORMAL
BACTERIA SPEC CULT: NORMAL
BASOPHILS # BLD: 0.1 K/UL (ref 0–0.1)
BASOPHILS NFR BLD: 1 % (ref 0–1)
BILIRUB SERPL-MCNC: 0.3 MG/DL (ref 0.2–1)
BUN SERPL-MCNC: 10 MG/DL (ref 6–20)
BUN/CREAT SERPL: 15 (ref 12–20)
CALCIUM SERPL-MCNC: 7.9 MG/DL (ref 8.5–10.1)
CHLORIDE SERPL-SCNC: 107 MMOL/L (ref 97–108)
CO2 SERPL-SCNC: 22 MMOL/L (ref 21–32)
CREAT SERPL-MCNC: 0.66 MG/DL (ref 0.55–1.02)
DIFFERENTIAL METHOD BLD: ABNORMAL
EOSINOPHIL # BLD: 0.6 K/UL (ref 0–0.4)
EOSINOPHIL NFR BLD: 6 % (ref 0–7)
ERYTHROCYTE [DISTWIDTH] IN BLOOD BY AUTOMATED COUNT: 14.8 % (ref 11.5–14.5)
GLOBULIN SER CALC-MCNC: 2.9 G/DL (ref 2–4)
GLUCOSE SERPL-MCNC: 90 MG/DL (ref 65–100)
HCT VFR BLD AUTO: 23.7 % (ref 35–47)
HGB BLD-MCNC: 7.7 G/DL (ref 11.5–16)
IMM GRANULOCYTES # BLD AUTO: 0.1 K/UL (ref 0–0.04)
IMM GRANULOCYTES NFR BLD AUTO: 1 % (ref 0–0.5)
LYMPHOCYTES # BLD: 3.3 K/UL (ref 0.8–3.5)
LYMPHOCYTES NFR BLD: 31 % (ref 12–49)
MAGNESIUM SERPL-MCNC: 1.7 MG/DL (ref 1.6–2.4)
MCH RBC QN AUTO: 30.4 PG (ref 26–34)
MCHC RBC AUTO-ENTMCNC: 32.5 G/DL (ref 30–36.5)
MCV RBC AUTO: 93.7 FL (ref 80–99)
MONOCYTES # BLD: 0.9 K/UL (ref 0–1)
MONOCYTES NFR BLD: 8 % (ref 5–13)
NEUTS SEG # BLD: 5.6 K/UL (ref 1.8–8)
NEUTS SEG NFR BLD: 53 % (ref 32–75)
NRBC # BLD: 0 K/UL (ref 0–0.01)
NRBC BLD-RTO: 0 PER 100 WBC
PLATELET # BLD AUTO: 318 K/UL (ref 150–400)
PMV BLD AUTO: 9.1 FL (ref 8.9–12.9)
POTASSIUM SERPL-SCNC: 3.6 MMOL/L (ref 3.5–5.1)
PROT SERPL-MCNC: 5.2 G/DL (ref 6.4–8.2)
RBC # BLD AUTO: 2.53 M/UL (ref 3.8–5.2)
SERVICE CMNT-IMP: NORMAL
SERVICE CMNT-IMP: NORMAL
SODIUM SERPL-SCNC: 136 MMOL/L (ref 136–145)
WBC # BLD AUTO: 10.5 K/UL (ref 3.6–11)

## 2019-10-25 PROCEDURE — 74011250637 HC RX REV CODE- 250/637: Performed by: FAMILY MEDICINE

## 2019-10-25 PROCEDURE — 85025 COMPLETE CBC W/AUTO DIFF WBC: CPT

## 2019-10-25 PROCEDURE — 97530 THERAPEUTIC ACTIVITIES: CPT

## 2019-10-25 PROCEDURE — 74011250637 HC RX REV CODE- 250/637: Performed by: INTERNAL MEDICINE

## 2019-10-25 PROCEDURE — 83735 ASSAY OF MAGNESIUM: CPT

## 2019-10-25 PROCEDURE — 74011250636 HC RX REV CODE- 250/636: Performed by: INTERNAL MEDICINE

## 2019-10-25 PROCEDURE — 51798 US URINE CAPACITY MEASURE: CPT

## 2019-10-25 PROCEDURE — 74011250636 HC RX REV CODE- 250/636: Performed by: FAMILY MEDICINE

## 2019-10-25 PROCEDURE — 65660000000 HC RM CCU STEPDOWN

## 2019-10-25 PROCEDURE — 77030038269 HC DRN EXT URIN PURWCK BARD -A

## 2019-10-25 PROCEDURE — 80053 COMPREHEN METABOLIC PANEL: CPT

## 2019-10-25 PROCEDURE — 36415 COLL VENOUS BLD VENIPUNCTURE: CPT

## 2019-10-25 PROCEDURE — 97110 THERAPEUTIC EXERCISES: CPT

## 2019-10-25 RX ADMIN — RIFAXIMIN 550 MG: 550 TABLET ORAL at 06:37

## 2019-10-25 RX ADMIN — AMLODIPINE BESYLATE 10 MG: 5 TABLET ORAL at 22:02

## 2019-10-25 RX ADMIN — METOPROLOL TARTRATE 50 MG: 50 TABLET, FILM COATED ORAL at 08:21

## 2019-10-25 RX ADMIN — DIBASIC SODIUM PHOSPHATE, MONOBASIC POTASSIUM PHOSPHATE AND MONOBASIC SODIUM PHOSPHATE 2 TABLET: 852; 155; 130 TABLET ORAL at 08:25

## 2019-10-25 RX ADMIN — COLESTIPOL HYDROCHLORIDE 2.5 G: 5 GRANULE, FOR SUSPENSION ORAL at 22:15

## 2019-10-25 RX ADMIN — DIBASIC SODIUM PHOSPHATE, MONOBASIC POTASSIUM PHOSPHATE AND MONOBASIC SODIUM PHOSPHATE 2 TABLET: 852; 155; 130 TABLET ORAL at 18:11

## 2019-10-25 RX ADMIN — RIFAXIMIN 550 MG: 550 TABLET ORAL at 16:46

## 2019-10-25 RX ADMIN — METOPROLOL TARTRATE 50 MG: 50 TABLET, FILM COATED ORAL at 18:10

## 2019-10-25 RX ADMIN — NYSTATIN 500000 UNITS: 100000 SUSPENSION ORAL at 08:23

## 2019-10-25 RX ADMIN — Medication 100 MG: at 08:26

## 2019-10-25 RX ADMIN — ASPIRIN 81 MG 81 MG: 81 TABLET ORAL at 22:03

## 2019-10-25 RX ADMIN — BUDESONIDE 9 MG: 3 CAPSULE, GELATIN COATED ORAL at 06:38

## 2019-10-25 RX ADMIN — ENOXAPARIN SODIUM 40 MG: 40 INJECTION SUBCUTANEOUS at 16:46

## 2019-10-25 RX ADMIN — LOSARTAN POTASSIUM 100 MG: 50 TABLET, FILM COATED ORAL at 08:25

## 2019-10-25 RX ADMIN — FOLIC ACID 1 MG: 1 TABLET ORAL at 08:25

## 2019-10-25 RX ADMIN — DOXAZOSIN 1 MG: 2 TABLET ORAL at 08:21

## 2019-10-25 RX ADMIN — Medication 1 TABLET: at 08:25

## 2019-10-25 RX ADMIN — SODIUM CHLORIDE AND POTASSIUM CHLORIDE: 9; 1.49 INJECTION, SOLUTION INTRAVENOUS at 01:30

## 2019-10-25 RX ADMIN — Medication 1 CAPSULE: at 08:23

## 2019-10-25 RX ADMIN — COLESTIPOL HYDROCHLORIDE 2.5 G: 5 GRANULE, FOR SUSPENSION ORAL at 08:38

## 2019-10-25 RX ADMIN — RIFAXIMIN 550 MG: 550 TABLET ORAL at 10:32

## 2019-10-25 RX ADMIN — RANITIDINE 150 MG: 15 SYRUP ORAL at 08:23

## 2019-10-25 NOTE — PROGRESS NOTES
Spiritual Care Assessment/Progress Note  [address]      NAME: Trista Black      MRN: [id number]  AGE: 80 y.o.  SEX: female  Anabaptist Affiliation: Presbyterian   Language: English     10/25/2019     Total Time (in minutes): 7     Spiritual Assessment begun in SF 3 PROG CARE TELE 2 through conversation with:         [x]Patient        [] Family    [] Friend(s)        Reason for Consult: Interdisciplinary rounds, patient floor     Spiritual beliefs: (Please include comment if needed)     [x] Identifies with a nisreen tradition:  Presbyterian        [x] Supported by a nisreen community:            [] Claims no spiritual orientation:           [] Seeking spiritual identity:                [] Adheres to an individual form of spirituality:           [] Not able to assess:                           Identified resources for coping:      [x] Prayer                               [x] Music                  [] Guided Imagery     [x] Family/friends                 [] Pet visits     [] Devotional reading                         [] Unknown     [] Other:                                             Interventions offered during this visit: (See comments for more details)    Patient Interventions: Affirmation of emotions/emotional suffering, Catharsis/review of pertinent events in supportive environment, Coping skills reviewed/reinforced           Plan of Care:     [x] Support spiritual and/or cultural needs    [] Support AMD and/or advance care planning process      [] Support grieving process   [] Coordinate Rites and/or Rituals    [] Coordination with community clergy   [] No spiritual needs identified at this time   [] Detailed Plan of Care below (See Comments)  [] Make referral to Music Therapy  [] Make referral to Pet Therapy     [] Make referral to Addiction services  [] Make referral to [facility]  [] Make referral to Spiritual Care Partner  [] No future visits requested        [x] Follow up visits as needed     Comments:      visited patient, Leyla Flaherty" for an initial spiritual assessment on the [facility] floor. Sridevi was awake, alert, and sitting up in bed when the  came into the room. Her daughter Ginette Barcenas was sitting at the bedside.  introduced herself and extended support through active listening and pastoral presence. Sridevi made good eye contact and greeted the  warmly. She expressed she feels great today and is looking forward to being discharged home from the hospital tomorrow. Wilbert has good family and Shinto support and discussed the various activities she and her family and involved in at [employer]. Sridevi thanked the  for stopping by and is aware of the 's availability.  will follow up as able and/or needed  [email or web address]. El Lombardi.      Paging Service: Kasandra-CONI [phone])

## 2019-10-25 NOTE — PROGRESS NOTES
10- CASE MANAGEMENT NOTE:  I met with the patient and her daughter, Radha Walters ([phone]), to continue discharge planning. The daughter stated she has signed paperwork with [address] for a private duty aide to assist the patient and her  at home. They are both anticipating discharge tomorrow and I notified [facility] of this possibility. 1. Continue to follow and assist with discharge needs  2.  Hand-off to weekend staff to notify [facility] if discharged    Alluitsup Gage, [address], CM

## 2019-10-25 NOTE — PROGRESS NOTES
Daily Progress Note: 10/25/2019  Deana Lorenzana MD    Assessment/Plan:   Hyponatremia / Dehydration / Hypokalemia - POA, due to GI loss, poor PO intake, and Rx of Bactrim. - ICU initally. - Renal consulted. Improving     Acute metabolic encephalopathy - POA, presumed due to hyponatremia.    - Monitor.     Nausea vomiting and diarrhea/microcytic colitis - POA and persistent for weeks. - Follow by Dr Namita Cortes. -   - Consulted GI. Xifaxan and Budesonide.      Coronary atherosclerosis of native coronary artery S/P CABG x 3 / Essential hypertension,    - Appears stable. - Continue metoprolol and ASA,   - resume BP meds as able.      Pure hypercholesterolemia -   - Hold statin until eating better     Macular degeneration -   - Supportive care.     Leukocytosis - Unclear etiology. - Check stool studies.    - No Abx until Dx     UTI unlikely - culture negative - repeated UA with cath urine.   It is normal.  more likely contaminated sample from diarrhea        Problem List:  Problem List as of 10/25/2019 Date Reviewed: 10/19/2019          Codes Class Noted - Resolved    Leukocytosis ICD-10-CM: D72.829  ICD-9-CM: 288.60  10/19/2019 - Present        Acute metabolic encephalopathy MNQ-68-VH: G93.41  ICD-9-CM: 348.31  10/19/2019 - Present        Nausea vomiting and diarrhea ICD-10-CM: R11.2, R19.7  ICD-9-CM: 787.91, 787.01  10/19/2019 - Present        Dehydration ICD-10-CM: E86.0  ICD-9-CM: 276.51  8/3/2019 - Present        * (Principal) Hyponatremia ICD-10-CM: E87.1  ICD-9-CM: 276.1  8/3/2019 - Present        Hypokalemia ICD-10-CM: E87.6  ICD-9-CM: 276.8  8/3/2019 - Present        Macular degeneration ICD-10-CM: H35.30  ICD-9-CM: 362.50  10/30/2013 - Present        Carotid arterial disease (HCC) ICD-10-CM: I73.9  ICD-9-CM: 447.9  Unknown - Present    Overview Signed 10/30/2013 10:11 AM by Teodora Cox MD     mild 0-49%             Coronary atherosclerosis of native coronary artery ICD-10-CM: I25.10  ICD-9-CM: 414.01  Unknown - Present    Overview Signed 2/22/2012 10:44 AM by Akua Whipple MD     Cath 2011 with multivessel cad             S/P CABG (coronary artery bypass graft) ICD-10-CM: Z95.1  ICD-9-CM: V45.81  Unknown - Present    Overview Signed 2/22/2012 10:44 AM by Akua Whipple MD     1-89-26 CABG - OFF PUMP - CABGx 3, lima, eric, epi aortic ultrasound - off pump, rightt endoscopic vein harvest; [id number] [place] [address] Dr to LAD, Svg Ao to OM1 and OM2             S/P CABG x 3 ICD-10-CM: Z95.1  ICD-9-CM: V45.81  6/17/2011 - Present    Overview Signed 7/26/2011 10:51 AM by Kelvin TEAGUE to LAD and SVGs to OM-1 and OM-2. RCA small non-dominant diffusely diseased vessel ungrafted. LV EF at cath was 65%.              Essential hypertension, benign ICD-10-CM: I10  ICD-9-CM: 401.1  12/2/2010 - Present        Pure hypercholesterolemia ICD-10-CM: E78.00  ICD-9-CM: 272.0  12/2/2010 - Present        RESOLVED: UTI (urinary tract infection) ICD-10-CM: N39.0  ICD-9-CM: 599.0  8/3/2019 - 10/19/2019        RESOLVED: Sepsis ([address] ICD-10-CM: A41.9  ICD-9-CM: 038.9, 995.91  8/3/2019 - 10/19/2019        RESOLVED: Fever ICD-10-CM: R50.9  ICD-9-CM: 780.60  8/3/2019 - 10/19/2019        RESOLVED: Left shift ICD-10-CM: D72.89  ICD-9-CM: 288.9  8/3/2019 - 10/19/2019        RESOLVED: Syncope ICD-10-CM: R55  ICD-9-CM: 780.2  8/3/2019 - 10/19/2019        RESOLVED: Anemia ICD-10-CM: D64.9  ICD-9-CM: 285.9  8/3/2019 - 10/19/2019        RESOLVED: Cough due to ACE inhibitor ICD-10-CM: R05, T46.4X5A  ICD-9-CM: 786.2, E942.6  Unknown - 8/3/2019        RESOLVED: Edema ICD-10-CM: R60.9  ICD-9-CM: 782.3  6/3/2012 - 8/3/2019        RESOLVED: Medication adverse effect ICD-10-CM: T50.905A  ICD-9-CM: E947.9  6/3/2012 - 8/3/2019        RESOLVED: Diverticulitis ICD-10-CM: D75.46  ICD-9-CM: 562.11  Unknown - 8/3/2019        RESOLVED: Pre-operative cardiovascular examination, unstable angina ([address] ICD-10-CM: Z01.810, I20.0  ICD-9-CM: 411.1, V72.81  6/19/2011 - 6/19/2011        RESOLVED: Diverticulosis of colon with hemorrhage ICD-10-CM: K57.31  ICD-9-CM: 562.12  12/2/2010 - 8/3/2019            Subjective:    80 y.o.  female who presented to the Emergency Department complaining of confusion. Worsening over days. Associated with persistent vomiting and diarrhea, present for weeks. Was in our ER 2 days ago, with weakness and hyponatremia, and sent home on Bactrim. She returns today worse, weak, confused and severe hyponatremia. We will admit her for management. (Dr June Jama)    10/20:  Still having loose stools and nausea. Reports she feels generally \"bad. \"  Na+ up to 117. K+ up to 3.4. Confusion at usual levels per daughter. WBC 17K. Phos low at 1.0 - getting KPhos. 10/21:  Nausea improved. Feeling a little better. Daughter wants her to have a regular diet. C/O mild GERD sx. Pt and daughter want her to have Zantac daily. No loose stools overnight. Phos still low - replacing. AM labs otherwise pending. 10/22:  Still with frequent loose stools. Continues to grad improve. Phos grad improving. Na+ stable at 129 - will decrease frequency of blood draws at daughters request.     10/23: Still with frequent stools but had a 6 hour period of time during the night without a stool. Na 133 this am. Tolerating liquids well so will decrease IVF a bit. 10/24: Stools are less frequent but still very watery. Mag is down so will replete IV. K+ normal. Will ask to see if we can get a mid line placed as she is difficult to stick and her arm is swelling at the IV site. Na stable. 10/25: Stools are mushy now and less often. Tolerating diet better. Will stop IVF. Mag and K+ normal. Hb 7.7 this am.   Review of Systems:   A comprehensive review of systems was negative except for that written in the HPI.     Objective:   Physical Exam:   Visit Vitals  /42 (BP 1 Location: Left arm, BP Patient Position: At rest)   Pulse 68   Temp 98.5 °F (36.9 °C)   Resp 16   Ht 5' 5\" (1.651 m)   Wt 141 lb 1.6 oz (64 kg)   SpO2 98%   BMI 23.48 kg/m²      O2 Device: Room air  Temp (24hrs), Av.3 °F (36.8 °C), Min:97.3 °F (36.3 °C), Max:99.5 °F (37.5 °C)    No intake/output data recorded. 10/23 1901 - 10/25 0700  In: 4160 [P.O.:1620; I.V.:2540]  Out: 1216 [Urine:1650]  General:  Alert, cooperative at times, frail appearing, no distress, appears stated age. Head:  Normocephalic, without obvious abnormality, atraumatic. Eyes:  Conjunctivae/corneas clear. PERRL, EOMs intact. Throat: Lips, mucosa, and tongue moist..   Neck: Supple, symmetrical, trachea midline, no adenopathy, thyroid: no enlargement/tenderness/nodules, no carotid bruit and no JVD. Lungs:   Clear to auscultation bilaterally. Chest wall:  No tenderness or deformity. Heart:  Regular rate and rhythm, S1, S2 normal, no murmur, click, rub or gallop. Abdomen:   Soft, with mild generalized tenderness. Bowel sounds active. No masses,  No organomegaly. Extremities: no cyanosis or edema. No calf tenderness or cords. Skin: Skin color, texture, turgor normal. No rashes or lesions   Neurologic:   Alert and oriented.  equal.  No cogwheeling or rigidity. Gait not tested at this time. Sensation grossly normal to touch. Gross motor of extremities normal.       Data Review:       Recent Days:  Recent Labs     10/25/19  0503 10/24/19  0117 10/23/19  0103   WBC 10.5 12.4* 15.1*   HGB 7.7* 8.2* 9.1*   HCT 23.7* 23.8* 26.4*    333 359     Recent Labs     10/25/19  0503 10/24/19  0117 10/23/19  0103    136 133*   K 3.6 4.0 3.8    107 104   CO2 22 23 23   GLU 90 95 108*   BUN 10 12 9   CREA 0.66 0.60 0.55   CA 7.9* 7.9* 8.2*   MG 1.7 1.5*  --    PHOS  --  3.4  --    ALB 2.3* 2.2* 2.4*   TBILI 0.3 0.4 0.3   SGOT 10* 9* 10*   ALT 19 16 15     No results for input(s): PH, PCO2, PO2, HCO3, FIO2 in the last 72 hours.     24 Hour Results:  Recent Results (from the past 24 hour(s))   CBC WITH AUTOMATED DIFF    Collection Time: 10/25/19  5:03 AM   Result Value Ref Range    WBC 10.5 3.6 - 11.0 K/uL    RBC 2.53 (L) 3.80 - 5.20 M/uL    HGB 7.7 (L) 11.5 - 16.0 g/dL    HCT 23.7 (L) 35.0 - 47.0 %    MCV 93.7 80.0 - 99.0 FL    MCH 30.4 26.0 - 34.0 PG    MCHC 32.5 30.0 - 36.5 g/dL    RDW 14.8 (H) 11.5 - 14.5 %    PLATELET 867 515 - 276 K/uL    MPV 9.1 8.9 - 12.9 FL    NRBC 0.0 0  WBC    ABSOLUTE NRBC 0.00 0.00 - 0.01 K/uL    NEUTROPHILS 53 32 - 75 %    LYMPHOCYTES 31 12 - 49 %    MONOCYTES 8 5 - 13 %    EOSINOPHILS 6 0 - 7 %    BASOPHILS 1 0 - 1 %    IMMATURE GRANULOCYTES 1 (H) 0.0 - 0.5 %    ABS. NEUTROPHILS 5.6 1.8 - 8.0 K/UL    ABS. LYMPHOCYTES 3.3 0.8 - 3.5 K/UL    ABS. MONOCYTES 0.9 0.0 - 1.0 K/UL    ABS. EOSINOPHILS 0.6 (H) 0.0 - 0.4 K/UL    ABS. BASOPHILS 0.1 0.0 - 0.1 K/UL    ABS. IMM. GRANS. 0.1 (H) 0.00 - 0.04 K/UL    DF AUTOMATED     METABOLIC PANEL, COMPREHENSIVE    Collection Time: 10/25/19  5:03 AM   Result Value Ref Range    Sodium 136 136 - 145 mmol/L    Potassium 3.6 3.5 - 5.1 mmol/L    Chloride 107 97 - 108 mmol/L    CO2 22 21 - 32 mmol/L    Anion gap 7 5 - 15 mmol/L    Glucose 90 65 - 100 mg/dL    BUN 10 6 - 20 MG/DL    Creatinine 0.66 0.55 - 1.02 MG/DL    BUN/Creatinine ratio 15 12 - 20      GFR est AA >60 >60 ml/min/1.73m2    GFR est non-AA >60 >60 ml/min/1.73m2    Calcium 7.9 (L) 8.5 - 10.1 MG/DL    Bilirubin, total 0.3 0.2 - 1.0 MG/DL    ALT (SGPT) 19 12 - 78 U/L    AST (SGOT) 10 (L) 15 - 37 U/L    Alk.  phosphatase 67 45 - 117 U/L    Protein, total 5.2 (L) 6.4 - 8.2 g/dL    Albumin 2.3 (L) 3.5 - 5.0 g/dL    Globulin 2.9 2.0 - 4.0 g/dL    A-G Ratio 0.8 (L) 1.1 - 2.2     MAGNESIUM    Collection Time: 10/25/19  5:03 AM   Result Value Ref Range    Magnesium 1.7 1.6 - 2.4 mg/dL       Medications reviewed  Current Facility-Administered Medications   Medication Dose Route Frequency    thiamine mononitrate (B-1) tablet 100 mg 100 mg Oral DAILY    folic acid (FOLVITE) tablet 1 mg  1 mg Oral DAILY    multivitamin with iron tablet 1 Tab  1 Tab Oral DAILY    raNITIdine (ZANTAC) 15 mg/mL syrup 150 mg  150 mg Oral DAILY    phosphorus (K PHOS NEUTRAL) 250 mg tablet 2 Tab  2 Tab Oral BID    rifAXIMin (XIFAXAN) tablet 550 mg  550 mg Oral TIDAC    diphenoxylate-atropine (LOMOTIL) tablet 1 Tab  1 Tab Oral TID PRN    nystatin (MYCOSTATIN) 100,000 unit/mL oral suspension 500,000 Units  500,000 Units Oral QID    amLODIPine (NORVASC) tablet 10 mg  10 mg Oral QHS    budesonide (ENTOCORT EC) capsule 9 mg  9 mg Oral 7am    prochlorperazine (COMPAZINE) injection 5 mg  5 mg IntraVENous Q6H PRN    colestipol (COLESTID) packet 2.5 g  2.5 g Oral DAILY    colestipol (COLESTID) packet 2.5 g  2.5 g Oral QHS    doxazosin (CARDURA) tablet 1 mg  1 mg Oral DAILY    hydrALAZINE (APRESOLINE) 20 mg/mL injection 10 mg  10 mg IntraVENous Q6H PRN    sodium chloride (NS) flush 5-10 mL  5-10 mL IntraVENous PRN    lactobac ac& pc-s.therm-b.anim (GERALDINE Q/RISAQUAD)  1 Cap Oral DAILY    aspirin chewable tablet 81 mg  81 mg Oral QHS    naloxone (NARCAN) injection 0.4 mg  0.4 mg IntraVENous PRN    acetaminophen (TYLENOL) tablet 650 mg  650 mg Oral Q4H PRN    diphenhydrAMINE (BENADRYL) capsule 25 mg  25 mg Oral Q4H PRN    ondansetron (ZOFRAN) injection 4 mg  4 mg IntraVENous Q4H PRN    enoxaparin (LOVENOX) injection 40 mg  40 mg SubCUTAneous Q24H    metoprolol tartrate (LOPRESSOR) tablet 50 mg  50 mg Oral BID    losartan (COZAAR) tablet 100 mg  100 mg Oral DAILY       Care Plan discussed with: Patient/Family and Nurse  Total time spent with patient: 30 minutes.   Jennetta Severin, MD

## 2019-10-25 NOTE — PROGRESS NOTES
Physical therapy    1140 chart reviewed spoke with RN. Pt recently returned to bed after sitting in chair most of the morning. Requesting PT return later today. Brian Magana

## 2019-10-25 NOTE — PROGRESS NOTES
Bedside and Verbal shift change report given to Deidre Aguilar (oncoming nurse) by Erickson Mohan RN (offgoing nurse). Report included the following information SBAR, Kardex, ED Summary, Intake/Output, Recent Results, Med Rec Status and Cardiac Rhythm NSR.     0630: Pt rested well throughout the night, denies pain. Bedside and Verbal shift change report given to David Tse RN (oncoming nurse) by Deidre Aguilar (offgoing nurse). Report included the following information SBAR, Kardex, ED Summary, Intake/Output, Recent Results, Med Rec Status and Cardiac Rhythm NSR/SB.

## 2019-10-25 NOTE — PROGRESS NOTES
Problem: Self Care Deficits Care Plan (Adult)  Goal: *Acute Goals and Plan of Care (Insert Text)  Description    FUNCTIONAL STATUS PRIOR TO ADMISSION: Patient was modified independent using a Rollator occasionally for functional mobility. HOME SUPPORT: The patient lived with  but did not require assist.    Occupational Therapy Goals  Initiated 10/22/2019  1. Patient will perform upper body dressing and bathing with independence within 7 day(s). 2.  Patient will perform lower body dressing and bathing with supervision/set-up within 7 day(s). 3.  Patient will perform toilet transfers with modified independence within 7 day(s). 4.  Patient will perform all aspects of toileting with modified independence within 7 day(s). 5.  Patient will participate in upper extremity therapeutic exercise/activities with modified independence for 10 minutes within 7 day(s). 6.  Patient will utilize energy conservation techniques during functional activities with verbal cues within 7 day(s). Outcome: Progressing Towards Goal   OCCUPATIONAL THERAPY TREATMENT  Patient: Nadja Cool (45 y.o. female)  Date: 10/25/2019  Diagnosis: Hyponatremia [E87.1] Hyponatremia       Precautions:    Chart, occupational therapy assessment, plan of care, and goals were reviewed. ASSESSMENT  Patient continues with skilled OT services and is progressing towards goals. Ms. Moore Bees agreeable to minimal activity as she reports fatigue from being up earlier in the day. She is progressing well with functional mobility requiring decreased assist when repositioning. Patient educated on the benefits of exercise and proper technique. She engaged in exercises with good tolerance. Patient would benefit from continued skilled OT to progress towards goals and improve overall independence. Current Level of Function Impacting Discharge (ADLs): Patient supervision level for bed mobility. She tolerated exercises well. PLAN :  Patient continues to benefit from skilled intervention to address the above impairments. Continue treatment per established plan of care. to address goals. Recommendation for discharge: (in order for the patient to meet his/her long term goals)  Occupational therapy at least 2 days/week in the home     This discharge recommendation:  Has been made in collaboration with the attending provider and/or case management    IF patient discharges home will need the following DME: none       SUBJECTIVE:   Patient was agreeable to OT tx. OBJECTIVE DATA SUMMARY:   Cognitive/Behavioral Status:  Neurologic State: Alert  Orientation Level: Oriented X4  Cognition: Appropriate decision making; Appropriate for age attention/concentration; Appropriate safety awareness  Perception: Appears intact  Perseveration: No perseveration noted  Safety/Judgement: Awareness of environment    Functional Mobility and Transfers for ADLs:  Bed Mobility:  Rolling: Supervision  Supine to Sit: Supervision  Sit to Supine: Supervision    Transfers:  Sit to Stand: Supervision    Balance:  Sitting: Intact  Standing: Intact; With support    Cognitive Retraining  Safety/Judgement: Awareness of environment    Therapeutic Exercises:   Patient educated on the benefits of exercise and encouraged to complete frequently throughout the day as tolerated. Instruction provided for the following exercises:       EXERCISE   Sets   Reps   Active Active Assist   Passive   Shoulder Flexion 1 10 ?    ?    ? Shoulder Shrugs 1 10 ?    ?    ? Chest Press/Back Row 1 10 ?    ?    ? Elbow flexion/extension 1 10 ?    ?    ? Wrist flexion/extension 1 10 ?    ?    ? Finger flexion/extension 1 10 ?    ?    ? Activity Tolerance:   Good  Please refer to the flowsheet for vital signs taken during this treatment.     After treatment patient left in no apparent distress:   Supine in bed and Call bell within reach    COMMUNICATION/COLLABORATION:   The patients plan of care was discussed with: Physical Therapist, Registered Nurse and patient.      Hiro Casey OTR/L  Time Calculation: 25 mins

## 2019-10-25 NOTE — PROGRESS NOTES
Problem: Falls - Risk of  Goal: *Absence of Falls  Description  Document Samira Tai Fall Risk and appropriate interventions in the flowsheet. Outcome: Progressing Towards Goal  Note:   Fall Risk Interventions:  Mobility Interventions: Assess mobility with egress test, Bed/chair exit alarm, Communicate number of staff needed for ambulation/transfer, OT consult for ADLs, PT Consult for mobility concerns, Patient to call before getting OOB, PT Consult for assist device competence, Strengthening exercises (ROM-active/passive), Utilize walker, cane, or other assistive device    Mentation Interventions: Adequate sleep, hydration, pain control, Bed/chair exit alarm, Door open when patient unattended, Evaluate medications/consider consulting pharmacy, Eyeglasses and hearing aids, Family/sitter at bedside, More frequent rounding, Room close to nurse's station, Toileting rounds, Update white board    Medication Interventions: Bed/chair exit alarm, Evaluate medications/consider consulting pharmacy, Patient to call before getting OOB, Teach patient to arise slowly    Elimination Interventions: Call light in reach, Patient to call for help with toileting needs, Stay With Me (per policy), Toilet paper/wipes in reach, Toileting schedule/hourly rounds              Problem: Patient Education: Go to Patient Education Activity  Goal: Patient/Family Education  Outcome: Progressing Towards Goal     Problem: Pressure Injury - Risk of  Goal: *Prevention of pressure injury  Description  Document Carlton Scale and appropriate interventions in the flowsheet.   Outcome: Progressing Towards Goal  Note:   Pressure Injury Interventions:  Sensory Interventions: Assess changes in LOC, Check visual cues for pain, Discuss PT/OT consult with provider, Keep linens dry and wrinkle-free, Maintain/enhance activity level, Minimize linen layers, Pressure redistribution bed/mattress (bed type), Sit a 90-degree angle/use footstool if needed    Moisture Interventions: Absorbent underpads, Apply protective barrier, creams and emollients, Assess need for specialty bed, Check for incontinence Q2 hours and as needed, Internal/External urinary devices, Maintain skin hydration (lotion/cream), Minimize layers, Moisture barrier, Offer toileting Q_hr    Activity Interventions: Assess need for specialty bed, Increase time out of bed, Pressure redistribution bed/mattress(bed type), PT/OT evaluation    Mobility Interventions: Assess need for specialty bed, Float heels, HOB 30 degrees or less, Pressure redistribution bed/mattress (bed type), PT/OT evaluation    Nutrition Interventions: Document food/fluid/supplement intake    Friction and Shear Interventions: HOB 30 degrees or less, Minimize layers, Sit at 90-degree angle, Apply protective barrier, creams and emollients                Problem: Patient Education: Go to Patient Education Activity  Goal: Patient/Family Education  Outcome: Progressing Towards Goal

## 2019-10-25 NOTE — PROGRESS NOTES
Bedside and Verbal shift change report given to Farrukh Velázquez (oncoming nurse) by Heron Grover (offgoing nurse). Report included the following information SBAR, Kardex, ED Summary, Intake/Output, MAR and Recent Results. 1715 bladder scan shows >1500.   1720Moved pt to commode, output 500ml. Incontinence episode or urine on bed before moving pt to commode. 1735 another 200ml out on commode. 1745 PCT bladder scanning pt. >1055cc. Phone call to Dr. Fouzia Peralta, telephone orders to place hope for 24hr for urinary retention. 1845 hope placed with no complications. 1100cc drained, hope clamped at this time. Monitoring BP. Bedside and Verbal shift change report given to Heron Grover (oncoming nurse) by Farrukh Velázquez (offgoing nurse). Report included the following information SBAR, Kardex, ED Summary, Intake/Output, MAR, Recent Results and Cardiac Rhythm NSR.

## 2019-10-26 LAB
ALBUMIN SERPL-MCNC: 2.2 G/DL (ref 3.5–5)
ALBUMIN/GLOB SERPL: 0.7 {RATIO} (ref 1.1–2.2)
ALP SERPL-CCNC: 70 U/L (ref 45–117)
ALT SERPL-CCNC: 22 U/L (ref 12–78)
ANION GAP SERPL CALC-SCNC: 5 MMOL/L (ref 5–15)
APPEARANCE UR: CLEAR
AST SERPL-CCNC: 17 U/L (ref 15–37)
BACTERIA URNS QL MICRO: NEGATIVE /HPF
BASOPHILS # BLD: 0.1 K/UL (ref 0–0.1)
BASOPHILS NFR BLD: 1 % (ref 0–1)
BILIRUB SERPL-MCNC: 0.3 MG/DL (ref 0.2–1)
BILIRUB UR QL: NEGATIVE
BUN SERPL-MCNC: 10 MG/DL (ref 6–20)
BUN/CREAT SERPL: 14 (ref 12–20)
CALCIUM SERPL-MCNC: 7.9 MG/DL (ref 8.5–10.1)
CHLORIDE SERPL-SCNC: 107 MMOL/L (ref 97–108)
CO2 SERPL-SCNC: 27 MMOL/L (ref 21–32)
COLOR UR: ABNORMAL
CREAT SERPL-MCNC: 0.69 MG/DL (ref 0.55–1.02)
DIFFERENTIAL METHOD BLD: ABNORMAL
EOSINOPHIL # BLD: 0.5 K/UL (ref 0–0.4)
EOSINOPHIL NFR BLD: 5 % (ref 0–7)
EPITH CASTS URNS QL MICRO: ABNORMAL /LPF
ERYTHROCYTE [DISTWIDTH] IN BLOOD BY AUTOMATED COUNT: 14.6 % (ref 11.5–14.5)
GLOBULIN SER CALC-MCNC: 3.2 G/DL (ref 2–4)
GLUCOSE SERPL-MCNC: 92 MG/DL (ref 65–100)
GLUCOSE UR STRIP.AUTO-MCNC: NEGATIVE MG/DL
HCT VFR BLD AUTO: 24.6 % (ref 35–47)
HGB BLD-MCNC: 8.2 G/DL (ref 11.5–16)
HGB UR QL STRIP: NEGATIVE
HYALINE CASTS URNS QL MICRO: ABNORMAL /LPF (ref 0–5)
IMM GRANULOCYTES # BLD AUTO: 0.1 K/UL (ref 0–0.04)
IMM GRANULOCYTES NFR BLD AUTO: 1 % (ref 0–0.5)
KETONES UR QL STRIP.AUTO: NEGATIVE MG/DL
LEUKOCYTE ESTERASE UR QL STRIP.AUTO: ABNORMAL
LYMPHOCYTES # BLD: 3.5 K/UL (ref 0.8–3.5)
LYMPHOCYTES NFR BLD: 35 % (ref 12–49)
MCH RBC QN AUTO: 30.5 PG (ref 26–34)
MCHC RBC AUTO-ENTMCNC: 33.3 G/DL (ref 30–36.5)
MCV RBC AUTO: 91.4 FL (ref 80–99)
MONOCYTES # BLD: 0.8 K/UL (ref 0–1)
MONOCYTES NFR BLD: 8 % (ref 5–13)
NEUTS SEG # BLD: 5.1 K/UL (ref 1.8–8)
NEUTS SEG NFR BLD: 50 % (ref 32–75)
NITRITE UR QL STRIP.AUTO: NEGATIVE
NRBC # BLD: 0 K/UL (ref 0–0.01)
NRBC BLD-RTO: 0 PER 100 WBC
PH UR STRIP: 6 [PH] (ref 5–8)
PLATELET # BLD AUTO: 296 K/UL (ref 150–400)
PMV BLD AUTO: 9.2 FL (ref 8.9–12.9)
POTASSIUM SERPL-SCNC: 3.3 MMOL/L (ref 3.5–5.1)
PROT SERPL-MCNC: 5.4 G/DL (ref 6.4–8.2)
PROT UR STRIP-MCNC: 100 MG/DL
RBC # BLD AUTO: 2.69 M/UL (ref 3.8–5.2)
RBC #/AREA URNS HPF: ABNORMAL /HPF (ref 0–5)
SODIUM SERPL-SCNC: 139 MMOL/L (ref 136–145)
SP GR UR REFRACTOMETRY: 1.01 (ref 1–1.03)
UR CULT HOLD, URHOLD: NORMAL
UROBILINOGEN UR QL STRIP.AUTO: 0.2 EU/DL (ref 0.2–1)
WBC # BLD AUTO: 10.1 K/UL (ref 3.6–11)
WBC URNS QL MICRO: ABNORMAL /HPF (ref 0–4)

## 2019-10-26 PROCEDURE — 74011250637 HC RX REV CODE- 250/637: Performed by: FAMILY MEDICINE

## 2019-10-26 PROCEDURE — 74011250637 HC RX REV CODE- 250/637: Performed by: INTERNAL MEDICINE

## 2019-10-26 PROCEDURE — 51798 US URINE CAPACITY MEASURE: CPT

## 2019-10-26 PROCEDURE — 65270000029 HC RM PRIVATE

## 2019-10-26 PROCEDURE — 81001 URINALYSIS AUTO W/SCOPE: CPT

## 2019-10-26 PROCEDURE — 65660000000 HC RM CCU STEPDOWN

## 2019-10-26 PROCEDURE — 36415 COLL VENOUS BLD VENIPUNCTURE: CPT

## 2019-10-26 PROCEDURE — 74011250636 HC RX REV CODE- 250/636: Performed by: INTERNAL MEDICINE

## 2019-10-26 PROCEDURE — 85025 COMPLETE CBC W/AUTO DIFF WBC: CPT

## 2019-10-26 PROCEDURE — 80053 COMPREHEN METABOLIC PANEL: CPT

## 2019-10-26 RX ADMIN — DIBASIC SODIUM PHOSPHATE, MONOBASIC POTASSIUM PHOSPHATE AND MONOBASIC SODIUM PHOSPHATE 2 TABLET: 852; 155; 130 TABLET ORAL at 21:13

## 2019-10-26 RX ADMIN — Medication 1 CAPSULE: at 09:59

## 2019-10-26 RX ADMIN — Medication 1 TABLET: at 09:59

## 2019-10-26 RX ADMIN — METOPROLOL TARTRATE 50 MG: 50 TABLET, FILM COATED ORAL at 18:02

## 2019-10-26 RX ADMIN — DIBASIC SODIUM PHOSPHATE, MONOBASIC POTASSIUM PHOSPHATE AND MONOBASIC SODIUM PHOSPHATE 2 TABLET: 852; 155; 130 TABLET ORAL at 18:02

## 2019-10-26 RX ADMIN — RIFAXIMIN 550 MG: 550 TABLET ORAL at 11:48

## 2019-10-26 RX ADMIN — ENOXAPARIN SODIUM 40 MG: 40 INJECTION SUBCUTANEOUS at 18:01

## 2019-10-26 RX ADMIN — Medication 10 ML: at 07:28

## 2019-10-26 RX ADMIN — FOLIC ACID 1 MG: 1 TABLET ORAL at 09:59

## 2019-10-26 RX ADMIN — RANITIDINE 150 MG: 15 SYRUP ORAL at 09:57

## 2019-10-26 RX ADMIN — BUDESONIDE 9 MG: 3 CAPSULE, GELATIN COATED ORAL at 07:28

## 2019-10-26 RX ADMIN — DOXAZOSIN 1 MG: 2 TABLET ORAL at 09:57

## 2019-10-26 RX ADMIN — COLESTIPOL HYDROCHLORIDE 2.5 G: 5 GRANULE, FOR SUSPENSION ORAL at 09:51

## 2019-10-26 RX ADMIN — LOSARTAN POTASSIUM 100 MG: 50 TABLET, FILM COATED ORAL at 09:59

## 2019-10-26 RX ADMIN — DIBASIC SODIUM PHOSPHATE, MONOBASIC POTASSIUM PHOSPHATE AND MONOBASIC SODIUM PHOSPHATE 2 TABLET: 852; 155; 130 TABLET ORAL at 09:58

## 2019-10-26 RX ADMIN — RIFAXIMIN 550 MG: 550 TABLET ORAL at 18:02

## 2019-10-26 RX ADMIN — METOPROLOL TARTRATE 50 MG: 50 TABLET, FILM COATED ORAL at 09:59

## 2019-10-26 RX ADMIN — ASPIRIN 81 MG 81 MG: 81 TABLET ORAL at 21:06

## 2019-10-26 RX ADMIN — RIFAXIMIN 550 MG: 550 TABLET ORAL at 07:28

## 2019-10-26 RX ADMIN — AMLODIPINE BESYLATE 10 MG: 5 TABLET ORAL at 21:06

## 2019-10-26 RX ADMIN — Medication 100 MG: at 09:59

## 2019-10-26 RX ADMIN — COLESTIPOL HYDROCHLORIDE 2.5 G: 5 GRANULE, FOR SUSPENSION ORAL at 21:02

## 2019-10-26 NOTE — PROGRESS NOTES
[facility] [place] [facility]     Renal Daily Progress Note:     Admission Date: 10/19/2019     Subjective: Had an episode of urinary retention. Martinez catheter was placed         Review of Systems  +daughter reports mild change in mental status     Objective:     Visit Vitals  /48 (BP 1 Location: Right arm, BP Patient Position: Post activity; At rest)   Pulse 71   Temp 97.6 °F (36.4 °C)   Resp 15   Ht 5' 5\" (1.651 m)   Wt 66.4 kg (146 lb 6.2 oz)   SpO2 97%   BMI 24.36 kg/m²     Temp (24hrs), Av.9 °F (36.6 °C), Min:97.5 °F (36.4 °C), Max:98.6 °F (37 °C)        Intake/Output Summary (Last 24 hours) at 10/26/2019 1100  Last data filed at 10/26/2019 0620  Gross per 24 hour   Intake 650 ml   Output 3300 ml   Net -2650 ml     Current Facility-Administered Medications   Medication Dose Route Frequency    phosphorus (K PHOS NEUTRAL) 250 mg tablet 2 Tab  2 Tab Oral TID    thiamine mononitrate (B-1) tablet 100 mg  100 mg Oral DAILY    folic acid (FOLVITE) tablet 1 mg  1 mg Oral DAILY    multivitamin with iron tablet 1 Tab  1 Tab Oral DAILY    raNITIdine (ZANTAC) 15 mg/mL syrup 150 mg  150 mg Oral DAILY    rifAXIMin (XIFAXAN) tablet 550 mg  550 mg Oral TIDAC    diphenoxylate-atropine (LOMOTIL) tablet 1 Tab  1 Tab Oral TID PRN    nystatin (MYCOSTATIN) 100,000 unit/mL oral suspension 500,000 Units  500,000 Units Oral QID    amLODIPine (NORVASC) tablet 10 mg  10 mg Oral QHS    budesonide (ENTOCORT EC) capsule 9 mg  9 mg Oral 7am    prochlorperazine (COMPAZINE) injection 5 mg  5 mg IntraVENous Q6H PRN    colestipol (COLESTID) packet 2.5 g  2.5 g Oral DAILY    colestipol (COLESTID) packet 2.5 g  2.5 g Oral QHS    doxazosin (CARDURA) tablet 1 mg  1 mg Oral DAILY    hydrALAZINE (APRESOLINE) 20 mg/mL injection 10 mg  10 mg IntraVENous Q6H PRN    sodium chloride (NS) flush 5-10 mL  5-10 mL IntraVENous PRN    lactobac ac& pc-s.therm-b.anim (GERALDINE Q/RISAQUAD)  1 Cap Oral DAILY    aspirin chewable tablet 81 mg  81 mg Oral QHS    naloxone (NARCAN) injection 0.4 mg  0.4 mg IntraVENous PRN    acetaminophen (TYLENOL) tablet 650 mg  650 mg Oral Q4H PRN    diphenhydrAMINE (BENADRYL) capsule 25 mg  25 mg Oral Q4H PRN    ondansetron (ZOFRAN) injection 4 mg  4 mg IntraVENous Q4H PRN    enoxaparin (LOVENOX) injection 40 mg  40 mg SubCUTAneous Q24H    metoprolol tartrate (LOPRESSOR) tablet 50 mg  50 mg Oral BID    losartan (COZAAR) tablet 100 mg  100 mg Oral DAILY       Physical Exam:General appearance: alert, cooperative, no distress, appears stated age. Lying comfortably in bed. Daughter in the room   Neck: supple, symmetrical,   Lungs: No crackles, no wheezes   Heart: regular rate and rhythm, no S3 or S4  : Martinez in place. Clear urine   Extremities: no edema  Neurologic: Grossly normal    Data Review:     LABS:  Recent Labs     10/26/19  0406 10/25/19  0503 10/24/19  0117    136 136   K 3.3* 3.6 4.0    107 107   CO2 27 22 23   BUN 10 10 12   CREA 0.69 0.66 0.60   CA 7.9* 7.9* 7.9*   ALB 2.2* 2.3* 2.2*   PHOS  --   --  3.4   MG  --  1.7 1.5*     Recent Labs     10/26/19  0406 10/25/19  0503 10/24/19  0117   WBC 10.1 10.5 12.4*   HGB 8.2* 7.7* 8.2*   HCT 24.6* 23.7* 23.8*    318 333     No results for input(s): AJIT, KU, CLU, CREAU in the last 72 hours.     No lab exists for component: PROU    Assessment:   Renal Specific Problems  Hyponatremia--resolved    HypoMag  HypoPO4--resolved   HTN         Plan:     Obtain/ Order: labs/cultures/radiology/procedures:      Therapeutic:      Replace K     Check UA     Management of urinary rentention per primary service      Signed By: Jewel Jean MD     October 26, 2019

## 2019-10-26 NOTE — PROGRESS NOTES
TRANSFER - IN REPORT:    Verbal report received from [place], [facility] (name) on Dominick Rankin  being received from [facility] (unit) for routine progression of care      Report consisted of patients Situation, Background, Assessment and   Recommendations(SBAR). Information from the following report(s) SBAR was reviewed with the receiving nurse. Opportunity for questions and clarification was provided. Assessment completed upon patients arrival to unit and care assumed.

## 2019-10-26 NOTE — PROGRESS NOTES
Bedside and Verbal shift change report given to Ayaz Rice (oncoming nurse) by Monica Charles RN (offgoing nurse). Report included the following information SBAR, Kardex, ED Summary, Intake/Output, Recent Results, Med Rec Status and Cardiac Rhythm NSR.    2000: unclamped hope; drained 400 cc of additional urine. 0630: Pt rested well throughout the night, denies pain. Hope draining clear light yellow urine of 1700 cc total through out the night. Bedside and Verbal shift change report given to Daniel Mary RN (oncoming nurse) by Ayaz Rice (offgoing nurse). Report included the following information SBAR, Kardex, ED Summary, Intake/Output, Recent Results, Med Rec Status and Cardiac Rhythm NSR. Kiara Moralez

## 2019-10-26 NOTE — PROGRESS NOTES
Bedside and Verbal shift change report given to [place] (oncoming nurse) by Radhika Lubin (offgoing nurse). Report included the following information SBAR, Kardex, ED Summary, Procedure Summary, Intake/Output, MAR, Recent Results and Cardiac Rhythm nsr. Daughter at bedside. [id number] Dr Jacobo Salomon aware of hope placement & retention, will round on pt and make decisions re: removal/consults    1110 order for UA per nephrology Dr Morse Chimera    [phone] Dr Alem Gregory at bedside, consult to urology noted    [id number] pt downgraded to remote tele, hope clamped for UA sample    1347 UA neg for bacteria    TRANSFER - OUT REPORT:    Verbal report given to [address] [place] [address] (name) on [facility]  being transferred to [place] (unit) for routine progression of care       Report consisted of patients Situation, Background, Assessment and   Recommendations(SBAR). Information from the following report(s) SBAR, Kardex, ED Summary, Procedure Summary, Intake/Output, MAR, Recent Results and Cardiac Rhythm nsr was reviewed with the receiving nurse. Lines:   Peripheral IV 24/17/02 Cephalic (Active)   Site Assessment Clean, dry, & intact 10/26/2019  8:54 AM   Phlebitis Assessment 0 10/26/2019  8:54 AM   Infiltration Assessment 0 10/26/2019  8:54 AM   Dressing Status Clean, dry, & intact 10/26/2019  8:54 AM   Dressing Type Transparent 10/26/2019  8:54 AM   Hub Color/Line Status Pink;Capped 10/26/2019  8:54 AM   Action Taken Open ports on tubing capped 10/26/2019  8:54 AM   Alcohol Cap Used Yes 10/26/2019  8:54 AM        Opportunity for questions and clarification was provided.       Patient transported with:   Monitor  Tech    Pt and all belongings transferred to room 536, daughter at bedside

## 2019-10-26 NOTE — PROGRESS NOTES
Bedside and Verbal shift change report given to [place] (oncoming nurse) by Mariann Blair (offgoing nurse). Report included the following information SBAR, Kardex, ED Summary, Procedure Summary, Intake/Output, MAR, Recent Results and Cardiac Rhythm nsr. Daughter at bedside. 2402 Dr Hieu Russell aware of hope placement & retention, will round on pt and make decisions re: removal/consults    1110 order for UA per nephrology Dr Guerda Doshi    [phone] Dr Delfina Piña at bedside, consult to urology noted    [id number] pt downgraded to remote tele, hope clamped for UA sample    1347 UA neg for bacteria    TRANSFER - OUT REPORT:    Verbal report given to [address] [place] [address] (name) on Shanti Coker  being transferred to [place] (unit) for routine progression of care       Report consisted of patients Situation, Background, Assessment and   Recommendations(SBAR). Information from the following report(s) SBAR, Kardex, ED Summary, Procedure Summary, Intake/Output, MAR, Recent Results and Cardiac Rhythm nsr was reviewed with the receiving nurse. Lines:   Peripheral IV 66/13/81 Cephalic (Active)   Site Assessment Clean, dry, & intact 10/26/2019  8:54 AM   Phlebitis Assessment 0 10/26/2019  8:54 AM   Infiltration Assessment 0 10/26/2019  8:54 AM   Dressing Status Clean, dry, & intact 10/26/2019  8:54 AM   Dressing Type Transparent 10/26/2019  8:54 AM   Hub Color/Line Status Pink;Capped 10/26/2019  8:54 AM   Action Taken Open ports on tubing capped 10/26/2019  8:54 AM   Alcohol Cap Used Yes 10/26/2019  8:54 AM        Opportunity for questions and clarification was provided.       Patient transported with:   Monitor  Tech

## 2019-10-26 NOTE — PROGRESS NOTES
Bedside and Verbal shift change report given to Wicho Membreno RN (oncoming nurse) by Kathi Langford RN (offgoing nurse).  Report included the following information SBAR, Kardex, Intake/Output, MAR and Accordion.   -

## 2019-10-26 NOTE — PROGRESS NOTES
Daily Progress Note: 10/26/2019  Kinza Cha MD    Assessment/Plan:   Hyponatremia / Dehydration / Hypokalemia - POA, due to GI loss, poor PO intake, and Rx of Bactrim. - ICU initally. - Renal consulted. Improving     Acute metabolic encephalopathy - POA, presumed due to hyponatremia, but persists, so will check urine today.     Nausea vomiting and diarrhea/microcytic colitis - POA and persistent for weeks, improving.  - GI has signed off  - Xifaxan and Budesonide, florastor      Coronary atherosclerosis of native coronary artery S/P CABG x 3 / Essential hypertension,    - Appears stable. - Continue metoprolol and ASA,   - resume BP meds as able.      Pure hypercholesterolemia -   - Hold statin until eating better     Macular degeneration -   - Supportive care.     Leukocytosis - Unclear etiology. - Check stool studies.    - No Abx until Dx     UTI unlikely - will repeat urine studies today.     Urinary retention: new,  Hope placed yesterday at 6pm.  --family requesting uro consult to discuss plan going forward    Dispo: home tomorrow with [place] and likely with hope    Transfer to remote tele        Problem List:  Problem List as of 10/26/2019 Date Reviewed: 10/19/2019          Codes Class Noted - Resolved    Leukocytosis ICD-10-CM: D72.829  ICD-9-CM: 288.60  10/19/2019 - Present        Acute metabolic encephalopathy QEF-23-SZ: G93.41  ICD-9-CM: 348.31  10/19/2019 - Present        Nausea vomiting and diarrhea ICD-10-CM: R11.2, R19.7  ICD-9-CM: 787.91, 787.01  10/19/2019 - Present        Dehydration ICD-10-CM: E86.0  ICD-9-CM: 276.51  8/3/2019 - Present        * (Principal) Hyponatremia ICD-10-CM: E87.1  ICD-9-CM: 276.1  8/3/2019 - Present        Hypokalemia ICD-10-CM: E87.6  ICD-9-CM: 276.8  8/3/2019 - Present        Macular degeneration ICD-10-CM: H35.30  ICD-9-CM: 362.50  10/30/2013 - Present        Carotid arterial disease ([facility] [address] ICD-10-CM: I73.9  ICD-9-CM: 447.9 Unknown - Present    Overview Signed 10/30/2013 10:11 AM by Teodora Cox MD     mild 0-49%             Coronary atherosclerosis of native coronary artery ICD-10-CM: I25.10  ICD-9-CM: 414.01  Unknown - Present    Overview Signed 2/22/2012 10:44 AM by Teodora Cox MD     Cath 2011 with multivessel cad             S/P CABG (coronary artery bypass graft) ICD-10-CM: Z95.1  ICD-9-CM: V45.81  Unknown - Present    Overview Signed 2/22/2012 10:44 AM by Teodora Cox MD     5-96-73 CABG - OFF PUMP - CABGx 3, lima, eric, epi aortic ultrasound - off pump, rightt endoscopic vein harvest; [id number] [facility] [address] to LAD, Svg Ao to OM1 and OM2             S/P CABG x 3 ICD-10-CM: Z95.1  ICD-9-CM: V45.81  6/17/2011 - Present    Overview Signed 7/26/2011 10:51 AM by Prashant Francisco     TEAGUE to LAD and SVGs to OM-1 and OM-2. RCA small non-dominant diffusely diseased vessel ungrafted. LV EF at cath was 65%.              Essential hypertension, benign ICD-10-CM: I10  ICD-9-CM: 401.1  12/2/2010 - Present        Pure hypercholesterolemia ICD-10-CM: E78.00  ICD-9-CM: 272.0  12/2/2010 - Present        RESOLVED: UTI (urinary tract infection) ICD-10-CM: N39.0  ICD-9-CM: 599.0  8/3/2019 - 10/19/2019        RESOLVED: Sepsis ([address] ICD-10-CM: A41.9  ICD-9-CM: 038.9, 995.91  8/3/2019 - 10/19/2019        RESOLVED: Fever ICD-10-CM: R50.9  ICD-9-CM: 780.60  8/3/2019 - 10/19/2019        RESOLVED: Left shift ICD-10-CM: D72.89  ICD-9-CM: 288.9  8/3/2019 - 10/19/2019        RESOLVED: Syncope ICD-10-CM: R55  ICD-9-CM: 780.2  8/3/2019 - 10/19/2019        RESOLVED: Anemia ICD-10-CM: D64.9  ICD-9-CM: 285.9  8/3/2019 - 10/19/2019        RESOLVED: Cough due to ACE inhibitor ICD-10-CM: R05, T46.4X5A  ICD-9-CM: 786.2, E942.6  Unknown - 8/3/2019        RESOLVED: Edema ICD-10-CM: R60.9  ICD-9-CM: 782.3  6/3/2012 - 8/3/2019        RESOLVED: Medication adverse effect ICD-10-CM: T50.905A  ICD-9-CM: E947.9  6/3/2012 - 8/3/2019        RESOLVED: Diverticulitis ICD-10-CM: O76.70  ICD-9-CM: 562.11  Unknown - 8/3/2019        RESOLVED: Pre-operative cardiovascular examination, unstable angina ([facility] [address] ICD-10-CM: Z01.810, I20.0  ICD-9-CM: 411.1, V72.81  6/19/2011 - 6/19/2011        RESOLVED: Diverticulosis of colon with hemorrhage ICD-10-CM: K57.31  ICD-9-CM: 562.12  12/2/2010 - 8/3/2019            Subjective:    80 y.o.  female who presented to the Emergency Department complaining of confusion. Worsening over days. Associated with persistent vomiting and diarrhea, present for weeks. Was in our ER 2 days ago, with weakness and hyponatremia, and sent home on Bactrim. She returns today worse, weak, confused and severe hyponatremia. We will admit her for management. (Dr Kaur Speaker)    10/20:  Still having loose stools and nausea. Reports she feels generally \"bad. \"  Na+ up to 117. K+ up to 3.4. Confusion at usual levels per daughter. WBC 17K. Phos low at 1.0 - getting KPhos. 10/21:  Nausea improved. Feeling a little better. Daughter wants her to have a regular diet. C/O mild GERD sx. Pt and daughter want her to have Zantac daily. No loose stools overnight. Phos still low - replacing. AM labs otherwise pending. 10/22:  Still with frequent loose stools. Continues to grad improve. Phos grad improving. Na+ stable at 129 - will decrease frequency of blood draws at daughters request.     10/23: Still with frequent stools but had a 6 hour period of time during the night without a stool. Na 133 this am. Tolerating liquids well so will decrease IVF a bit. 10/24: Stools are less frequent but still very watery. Mag is down so will replete IV. K+ normal. Will ask to see if we can get a mid line placed as she is difficult to stick and her arm is swelling at the IV site. Na stable. 10/25: Stools are mushy now and less often. Tolerating diet better. Will stop IVF. Mag and K+ normal. Hb 7.7 this am.     10/26: GI is ok if she goes home.   Had urinary retention of >2L yesterday, hope placed. Pt and da want uro consult. Really don't want to go home with hope unless absolutely necessary. I explained that she will likely need the hope for 1 wk if doesn't pass voiding trial tonight, but will defer to urology's input. Da is also concerned that mother is more confused this admission. Has had 2 checks for UTI that were ruled out, but another was ordered today by renal.  Will treat accordingly. Tolerating PO. BMs are getting firmer, pt is happy with this. Da wants her to walk more today. I am fine with this. Review of Systems:   A comprehensive review of systems was negative except for that written in the HPI. Objective:   Physical Exam:   Visit Vitals  /48 (BP 1 Location: Right arm, BP Patient Position: Post activity; At rest)   Pulse 71   Temp 97.6 °F (36.4 °C)   Resp 15   Ht 5' 5\" (1.651 m)   Wt 66.4 kg (146 lb 6.2 oz)   SpO2 97%   BMI 24.36 kg/m²      O2 Device: Room air  Temp (24hrs), Av.9 °F (36.6 °C), Min:97.5 °F (36.4 °C), Max:98.6 °F (37 °C)    No intake/output data recorded. 10/24 1901 - 10/26 0700  In: 650 [P.O.:650]  Out: 3750 [Urine:3750]  General:  Alert, cooperative at times, frail appearing, no distress, appears stated age. Head:  Normocephalic, without obvious abnormality, atraumatic. Eyes:  Conjunctivae/corneas clear. PERRL, EOMs intact. Throat: Lips, mucosa, and tongue moist..   Neck: Supple, symmetrical, trachea midline, no adenopathy, thyroid: no enlargement/tenderness/nodules, no carotid bruit and no JVD. Lungs:   Clear to auscultation bilaterally. Chest wall:  No tenderness or deformity. Heart:  Regular rate and rhythm, S1, S2 normal, no murmur, click, rub or gallop. Abdomen:   Soft, with mild generalized tenderness. Bowel sounds active. No masses,  No organomegaly. : hope in place with straw colored urine. Extremities: no cyanosis or edema. No calf tenderness or cords.      Skin: Skin color, texture, turgor normal. No rashes or lesions   Neurologic:   Alert and oriented.  equal.  No cogwheeling or rigidity. Gait not tested at this time. Sensation grossly normal to touch. Gross motor of extremities normal.       Data Review:       Recent Days:  Recent Labs     10/26/19  0406 10/25/19  0503 10/24/19  0117   WBC 10.1 10.5 12.4*   HGB 8.2* 7.7* 8.2*   HCT 24.6* 23.7* 23.8*    318 333     Recent Labs     10/26/19  0406 10/25/19  0503 10/24/19  0117    136 136   K 3.3* 3.6 4.0    107 107   CO2 27 22 23   GLU 92 90 95   BUN 10 10 12   CREA 0.69 0.66 0.60   CA 7.9* 7.9* 7.9*   MG  --  1.7 1.5*   PHOS  --   --  3.4   ALB 2.2* 2.3* 2.2*   TBILI 0.3 0.3 0.4   SGOT 17 10* 9*   ALT 22 19 16     No results for input(s): PH, PCO2, PO2, HCO3, FIO2 in the last 72 hours. 24 Hour Results:  Recent Results (from the past 24 hour(s))   CBC WITH AUTOMATED DIFF    Collection Time: 10/26/19  4:06 AM   Result Value Ref Range    WBC 10.1 3.6 - 11.0 K/uL    RBC 2.69 (L) 3.80 - 5.20 M/uL    HGB 8.2 (L) 11.5 - 16.0 g/dL    HCT 24.6 (L) 35.0 - 47.0 %    MCV 91.4 80.0 - 99.0 FL    MCH 30.5 26.0 - 34.0 PG    MCHC 33.3 30.0 - 36.5 g/dL    RDW 14.6 (H) 11.5 - 14.5 %    PLATELET 098 990 - 462 K/uL    MPV 9.2 8.9 - 12.9 FL    NRBC 0.0 0  WBC    ABSOLUTE NRBC 0.00 0.00 - 0.01 K/uL    NEUTROPHILS 50 32 - 75 %    LYMPHOCYTES 35 12 - 49 %    MONOCYTES 8 5 - 13 %    EOSINOPHILS 5 0 - 7 %    BASOPHILS 1 0 - 1 %    IMMATURE GRANULOCYTES 1 (H) 0.0 - 0.5 %    ABS. NEUTROPHILS 5.1 1.8 - 8.0 K/UL    ABS. LYMPHOCYTES 3.5 0.8 - 3.5 K/UL    ABS. MONOCYTES 0.8 0.0 - 1.0 K/UL    ABS. EOSINOPHILS 0.5 (H) 0.0 - 0.4 K/UL    ABS. BASOPHILS 0.1 0.0 - 0.1 K/UL    ABS. IMM.  GRANS. 0.1 (H) 0.00 - 0.04 K/UL    DF AUTOMATED     METABOLIC PANEL, COMPREHENSIVE    Collection Time: 10/26/19  4:06 AM   Result Value Ref Range    Sodium 139 136 - 145 mmol/L    Potassium 3.3 (L) 3.5 - 5.1 mmol/L    Chloride 107 97 - 108 mmol/L    CO2 27 21 - 32 mmol/L    Anion gap 5 5 - 15 mmol/L    Glucose 92 65 - 100 mg/dL    BUN 10 6 - 20 MG/DL    Creatinine 0.69 0.55 - 1.02 MG/DL    BUN/Creatinine ratio 14 12 - 20      GFR est AA >60 >60 ml/min/1.73m2    GFR est non-AA >60 >60 ml/min/1.73m2    Calcium 7.9 (L) 8.5 - 10.1 MG/DL    Bilirubin, total 0.3 0.2 - 1.0 MG/DL    ALT (SGPT) 22 12 - 78 U/L    AST (SGOT) 17 15 - 37 U/L    Alk.  phosphatase 70 45 - 117 U/L    Protein, total 5.4 (L) 6.4 - 8.2 g/dL    Albumin 2.2 (L) 3.5 - 5.0 g/dL    Globulin 3.2 2.0 - 4.0 g/dL    A-G Ratio 0.7 (L) 1.1 - 2.2         Medications reviewed  Current Facility-Administered Medications   Medication Dose Route Frequency    thiamine mononitrate (B-1) tablet 100 mg  100 mg Oral DAILY    folic acid (FOLVITE) tablet 1 mg  1 mg Oral DAILY    multivitamin with iron tablet 1 Tab  1 Tab Oral DAILY    raNITIdine (ZANTAC) 15 mg/mL syrup 150 mg  150 mg Oral DAILY    phosphorus (K PHOS NEUTRAL) 250 mg tablet 2 Tab  2 Tab Oral BID    rifAXIMin (XIFAXAN) tablet 550 mg  550 mg Oral TIDAC    diphenoxylate-atropine (LOMOTIL) tablet 1 Tab  1 Tab Oral TID PRN    nystatin (MYCOSTATIN) 100,000 unit/mL oral suspension 500,000 Units  500,000 Units Oral QID    amLODIPine (NORVASC) tablet 10 mg  10 mg Oral QHS    budesonide (ENTOCORT EC) capsule 9 mg  9 mg Oral 7am    prochlorperazine (COMPAZINE) injection 5 mg  5 mg IntraVENous Q6H PRN    colestipol (COLESTID) packet 2.5 g  2.5 g Oral DAILY    colestipol (COLESTID) packet 2.5 g  2.5 g Oral QHS    doxazosin (CARDURA) tablet 1 mg  1 mg Oral DAILY    hydrALAZINE (APRESOLINE) 20 mg/mL injection 10 mg  10 mg IntraVENous Q6H PRN    sodium chloride (NS) flush 5-10 mL  5-10 mL IntraVENous PRN    lactobac ac& pc-s.therm-b.anim (GERALDINE Q/RISAQUAD)  1 Cap Oral DAILY    aspirin chewable tablet 81 mg  81 mg Oral QHS    naloxone (NARCAN) injection 0.4 mg  0.4 mg IntraVENous PRN    acetaminophen (TYLENOL) tablet 650 mg  650 mg Oral Q4H PRN    diphenhydrAMINE (BENADRYL) capsule 25 mg  25 mg Oral Q4H PRN    ondansetron (ZOFRAN) injection 4 mg  4 mg IntraVENous Q4H PRN    enoxaparin (LOVENOX) injection 40 mg  40 mg SubCUTAneous Q24H    metoprolol tartrate (LOPRESSOR) tablet 50 mg  50 mg Oral BID    losartan (COZAAR) tablet 100 mg  100 mg Oral DAILY       Care Plan discussed with: Patient/Family and Nurse  Total time spent with patient: 30 minutes.   Jalen Alvarez MD

## 2019-10-27 LAB
ALBUMIN SERPL-MCNC: 2.3 G/DL (ref 3.5–5)
ALBUMIN/GLOB SERPL: 0.7 {RATIO} (ref 1.1–2.2)
ALP SERPL-CCNC: 66 U/L (ref 45–117)
ALT SERPL-CCNC: 20 U/L (ref 12–78)
ANION GAP SERPL CALC-SCNC: 5 MMOL/L (ref 5–15)
ANION GAP SERPL CALC-SCNC: 6 MMOL/L (ref 5–15)
AST SERPL-CCNC: 12 U/L (ref 15–37)
BASOPHILS # BLD: 0.1 K/UL (ref 0–0.1)
BASOPHILS NFR BLD: 1 % (ref 0–1)
BILIRUB SERPL-MCNC: 0.3 MG/DL (ref 0.2–1)
BUN SERPL-MCNC: 11 MG/DL (ref 6–20)
BUN SERPL-MCNC: 12 MG/DL (ref 6–20)
BUN/CREAT SERPL: 15 (ref 12–20)
BUN/CREAT SERPL: 17 (ref 12–20)
CALCIUM SERPL-MCNC: 8 MG/DL (ref 8.5–10.1)
CALCIUM SERPL-MCNC: 8.1 MG/DL (ref 8.5–10.1)
CHLORIDE SERPL-SCNC: 102 MMOL/L (ref 97–108)
CHLORIDE SERPL-SCNC: 103 MMOL/L (ref 97–108)
CO2 SERPL-SCNC: 31 MMOL/L (ref 21–32)
CO2 SERPL-SCNC: 31 MMOL/L (ref 21–32)
CREAT SERPL-MCNC: 0.63 MG/DL (ref 0.55–1.02)
CREAT SERPL-MCNC: 0.81 MG/DL (ref 0.55–1.02)
DIFFERENTIAL METHOD BLD: ABNORMAL
EOSINOPHIL # BLD: 0.6 K/UL (ref 0–0.4)
EOSINOPHIL NFR BLD: 6 % (ref 0–7)
ERYTHROCYTE [DISTWIDTH] IN BLOOD BY AUTOMATED COUNT: 14.4 % (ref 11.5–14.5)
GLOBULIN SER CALC-MCNC: 3.1 G/DL (ref 2–4)
GLUCOSE SERPL-MCNC: 155 MG/DL (ref 65–100)
GLUCOSE SERPL-MCNC: 97 MG/DL (ref 65–100)
HCT VFR BLD AUTO: 24.1 % (ref 35–47)
HGB BLD-MCNC: 8.1 G/DL (ref 11.5–16)
IMM GRANULOCYTES # BLD AUTO: 0.1 K/UL (ref 0–0.04)
IMM GRANULOCYTES NFR BLD AUTO: 1 % (ref 0–0.5)
LYMPHOCYTES # BLD: 3.9 K/UL (ref 0.8–3.5)
LYMPHOCYTES NFR BLD: 40 % (ref 12–49)
MAGNESIUM SERPL-MCNC: 1.9 MG/DL (ref 1.6–2.4)
MCH RBC QN AUTO: 30.6 PG (ref 26–34)
MCHC RBC AUTO-ENTMCNC: 33.6 G/DL (ref 30–36.5)
MCV RBC AUTO: 90.9 FL (ref 80–99)
MONOCYTES # BLD: 0.8 K/UL (ref 0–1)
MONOCYTES NFR BLD: 8 % (ref 5–13)
NEUTS SEG # BLD: 4.4 K/UL (ref 1.8–8)
NEUTS SEG NFR BLD: 44 % (ref 32–75)
NRBC # BLD: 0 K/UL (ref 0–0.01)
NRBC BLD-RTO: 0 PER 100 WBC
PLATELET # BLD AUTO: 305 K/UL (ref 150–400)
PMV BLD AUTO: 8.9 FL (ref 8.9–12.9)
POTASSIUM SERPL-SCNC: 3.1 MMOL/L (ref 3.5–5.1)
POTASSIUM SERPL-SCNC: 3.2 MMOL/L (ref 3.5–5.1)
PROT SERPL-MCNC: 5.4 G/DL (ref 6.4–8.2)
RBC # BLD AUTO: 2.65 M/UL (ref 3.8–5.2)
SODIUM SERPL-SCNC: 139 MMOL/L (ref 136–145)
SODIUM SERPL-SCNC: 139 MMOL/L (ref 136–145)
WBC # BLD AUTO: 9.8 K/UL (ref 3.6–11)

## 2019-10-27 PROCEDURE — 74011250637 HC RX REV CODE- 250/637: Performed by: FAMILY MEDICINE

## 2019-10-27 PROCEDURE — 74011250636 HC RX REV CODE- 250/636: Performed by: FAMILY MEDICINE

## 2019-10-27 PROCEDURE — 74011250637 HC RX REV CODE- 250/637: Performed by: INTERNAL MEDICINE

## 2019-10-27 PROCEDURE — 36415 COLL VENOUS BLD VENIPUNCTURE: CPT

## 2019-10-27 PROCEDURE — 74011250636 HC RX REV CODE- 250/636: Performed by: INTERNAL MEDICINE

## 2019-10-27 PROCEDURE — 65660000000 HC RM CCU STEPDOWN

## 2019-10-27 PROCEDURE — 83735 ASSAY OF MAGNESIUM: CPT

## 2019-10-27 PROCEDURE — 80053 COMPREHEN METABOLIC PANEL: CPT

## 2019-10-27 PROCEDURE — 85025 COMPLETE CBC W/AUTO DIFF WBC: CPT

## 2019-10-27 RX ORDER — MAGNESIUM SULFATE HEPTAHYDRATE 40 MG/ML
2 INJECTION, SOLUTION INTRAVENOUS ONCE
Status: COMPLETED | OUTPATIENT
Start: 2019-10-27 | End: 2019-10-27

## 2019-10-27 RX ORDER — HYDRALAZINE HYDROCHLORIDE 10 MG/1
10 TABLET, FILM COATED ORAL
Status: DISCONTINUED | OUTPATIENT
Start: 2019-10-27 | End: 2019-10-29 | Stop reason: HOSPADM

## 2019-10-27 RX ADMIN — ENOXAPARIN SODIUM 40 MG: 40 INJECTION SUBCUTANEOUS at 17:13

## 2019-10-27 RX ADMIN — METOPROLOL TARTRATE 50 MG: 50 TABLET, FILM COATED ORAL at 09:04

## 2019-10-27 RX ADMIN — RIFAXIMIN 550 MG: 550 TABLET ORAL at 17:13

## 2019-10-27 RX ADMIN — AMLODIPINE BESYLATE 10 MG: 5 TABLET ORAL at 20:36

## 2019-10-27 RX ADMIN — DIBASIC SODIUM PHOSPHATE, MONOBASIC POTASSIUM PHOSPHATE AND MONOBASIC SODIUM PHOSPHATE 2 TABLET: 852; 155; 130 TABLET ORAL at 20:36

## 2019-10-27 RX ADMIN — DIBASIC SODIUM PHOSPHATE, MONOBASIC POTASSIUM PHOSPHATE AND MONOBASIC SODIUM PHOSPHATE 2 TABLET: 852; 155; 130 TABLET ORAL at 17:13

## 2019-10-27 RX ADMIN — RIFAXIMIN 550 MG: 550 TABLET ORAL at 06:23

## 2019-10-27 RX ADMIN — COLESTIPOL HYDROCHLORIDE 2.5 G: 5 GRANULE, FOR SUSPENSION ORAL at 20:36

## 2019-10-27 RX ADMIN — BUDESONIDE 9 MG: 3 CAPSULE, GELATIN COATED ORAL at 06:38

## 2019-10-27 RX ADMIN — FOLIC ACID 1 MG: 1 TABLET ORAL at 09:03

## 2019-10-27 RX ADMIN — ASPIRIN 81 MG 81 MG: 81 TABLET ORAL at 20:36

## 2019-10-27 RX ADMIN — MAGNESIUM SULFATE HEPTAHYDRATE 2 G: 40 INJECTION, SOLUTION INTRAVENOUS at 09:11

## 2019-10-27 RX ADMIN — Medication 1 CAPSULE: at 09:03

## 2019-10-27 RX ADMIN — DIBASIC SODIUM PHOSPHATE, MONOBASIC POTASSIUM PHOSPHATE AND MONOBASIC SODIUM PHOSPHATE 2 TABLET: 852; 155; 130 TABLET ORAL at 09:03

## 2019-10-27 RX ADMIN — RIFAXIMIN 550 MG: 550 TABLET ORAL at 09:03

## 2019-10-27 RX ADMIN — HYDRALAZINE HYDROCHLORIDE 10 MG: 20 INJECTION INTRAMUSCULAR; INTRAVENOUS at 23:43

## 2019-10-27 RX ADMIN — METOPROLOL TARTRATE 50 MG: 50 TABLET, FILM COATED ORAL at 17:13

## 2019-10-27 RX ADMIN — DOXAZOSIN 1 MG: 2 TABLET ORAL at 09:03

## 2019-10-27 RX ADMIN — RANITIDINE 150 MG: 15 SYRUP ORAL at 14:12

## 2019-10-27 RX ADMIN — Medication 1 TABLET: at 09:03

## 2019-10-27 RX ADMIN — Medication 100 MG: at 09:03

## 2019-10-27 RX ADMIN — LOSARTAN POTASSIUM 100 MG: 50 TABLET, FILM COATED ORAL at 09:03

## 2019-10-27 NOTE — PROGRESS NOTES
Daily Progress Note: 10/27/2019  Jacob Syed MD    Assessment/Plan:   Hyponatremia / Dehydration / Hypokalemia - POA, due to GI loss, poor PO intake, and Rx of Bactrim. - ICU initally. - Renal consulted. Improving     Acute metabolic encephalopathy - POA, presumed due to hyponatremia, but persists, so will check urine today.     Nausea vomiting and diarrhea/microcytic colitis - POA and persistent for weeks, improving.  - GI has signed off  - Xifaxan and Budesonide, florastor      Coronary atherosclerosis of native coronary artery S/P CABG x 3 / Essential hypertension: some white coat HTN here  - Appears stable.    - Continue metoprolol and ASA   -cont home meds  -hydralazine PO for severe highs here    Pure hypercholesterolemia -   - Hold statin     Macular degeneration -   - Supportive care.     Leukocytosis - improving  -stool studies normal  - No Abx until Dx     UTI unlikely - 3rd UA is neg for bacteria    Urinary retention: new  --uro rec hope x3-5d and f/u outpt for voiding trial    Hypokalemia/hypomag:  -repleting today  -recheck this afternoon    Dispo:  Home tomorrow with HH and hope-- if lytes stable         Problem List:  Problem List as of 10/27/2019 Date Reviewed: 10/19/2019          Codes Class Noted - Resolved    Leukocytosis ICD-10-CM: D72.829  ICD-9-CM: 288.60  10/19/2019 - Present        Acute metabolic encephalopathy [id number]: G93.41  ICD-9-CM: 348.31  10/19/2019 - Present        Nausea vomiting and diarrhea ICD-10-CM: R11.2, R19.7  ICD-9-CM: 787.91, 787.01  10/19/2019 - Present        Dehydration ICD-10-CM: E86.0  ICD-9-CM: 276.51  8/3/2019 - Present        * (Principal) Hyponatremia ICD-10-CM: E87.1  ICD-9-CM: 276.1  8/3/2019 - Present        Hypokalemia ICD-10-CM: E87.6  ICD-9-CM: 276.8  8/3/2019 - Present        Macular degeneration ICD-10-CM: H35.30  ICD-9-CM: 362.50  10/30/2013 - Present        Carotid arterial disease ([address] ICD-10-CM: I73.9  ICD-9-CM: 447.9  Unknown - Present    Overview Signed 10/30/2013 10:11 AM by Holly Rosario MD     mild 0-49%             Coronary atherosclerosis of native coronary artery ICD-10-CM: I25.10  ICD-9-CM: 414.01  Unknown - Present    Overview Signed 2/22/2012 10:44 AM by Holly Rosario MD     Cath 2011 with multivessel cad             S/P CABG (coronary artery bypass graft) ICD-10-CM: Z95.1  ICD-9-CM: V45.81  Unknown - Present    Overview Signed 2/22/2012 10:44 AM by Holly Rosario MD     7-93-51 CABG - OFF PUMP - CABGx 3, lima, eric, epi aortic ultrasound - off pump, rightt endoscopic vein harvest; [id number] [place] [address] Dr to LAD, Svg Ao to OM1 and OM2             S/P CABG x 3 ICD-10-CM: Z95.1  ICD-9-CM: V45.81  6/17/2011 - Present    Overview Signed 7/26/2011 10:51 AM by Danny TEAGUE to LAD and SVGs to OM-1 and OM-2. RCA small non-dominant diffusely diseased vessel ungrafted. LV EF at cath was 65%.              Essential hypertension, benign ICD-10-CM: I10  ICD-9-CM: 401.1  12/2/2010 - Present        Pure hypercholesterolemia ICD-10-CM: E78.00  ICD-9-CM: 272.0  12/2/2010 - Present        RESOLVED: UTI (urinary tract infection) ICD-10-CM: N39.0  ICD-9-CM: 599.0  8/3/2019 - 10/19/2019        RESOLVED: Sepsis ([address] ICD-10-CM: A41.9  ICD-9-CM: 038.9, 995.91  8/3/2019 - 10/19/2019        RESOLVED: Fever ICD-10-CM: R50.9  ICD-9-CM: 780.60  8/3/2019 - 10/19/2019        RESOLVED: Left shift ICD-10-CM: D72.89  ICD-9-CM: 288.9  8/3/2019 - 10/19/2019        RESOLVED: Syncope ICD-10-CM: R55  ICD-9-CM: 780.2  8/3/2019 - 10/19/2019        RESOLVED: Anemia ICD-10-CM: D64.9  ICD-9-CM: 285.9  8/3/2019 - 10/19/2019        RESOLVED: Cough due to ACE inhibitor ICD-10-CM: R05, T46.4X5A  ICD-9-CM: 786.2, E942.6  Unknown - 8/3/2019        RESOLVED: Edema ICD-10-CM: R60.9  ICD-9-CM: 782.3  6/3/2012 - 8/3/2019        RESOLVED: Medication adverse effect ICD-10-CM: T50.905A  ICD-9-CM: E947.9  6/3/2012 - 8/3/2019        RESOLVED: Diverticulitis ICD-10-CM: K57.92  ICD-9-CM: 562.11  Unknown - 8/3/2019        RESOLVED: Pre-operative cardiovascular examination, unstable angina ([facility] [address] ICD-10-CM: Z01.810, I20.0  ICD-9-CM: 411.1, V72.81  6/19/2011 - 6/19/2011        RESOLVED: Diverticulosis of colon with hemorrhage ICD-10-CM: K57.31  ICD-9-CM: 562.12  12/2/2010 - 8/3/2019            Subjective:    80 y.o.  female who presented to the Emergency Department complaining of confusion. Worsening over days. Associated with persistent vomiting and diarrhea, present for weeks. Was in our ER 2 days ago, with weakness and hyponatremia, and sent home on Bactrim. She returns today worse, weak, confused and severe hyponatremia. We will admit her for management. (Dr Catherine Alves)    10/20:  Still having loose stools and nausea. Reports she feels generally \"bad. \"  Na+ up to 117. K+ up to 3.4. Confusion at usual levels per daughter. WBC 17K. Phos low at 1.0 - getting KPhos. 10/21:  Nausea improved. Feeling a little better. Daughter wants her to have a regular diet. C/O mild GERD sx. Pt and daughter want her to have Zantac daily. No loose stools overnight. Phos still low - replacing. AM labs otherwise pending. 10/22:  Still with frequent loose stools. Continues to grad improve. Phos grad improving. Na+ stable at 129 - will decrease frequency of blood draws at daughters request.     10/23: Still with frequent stools but had a 6 hour period of time during the night without a stool. Na 133 this am. Tolerating liquids well so will decrease IVF a bit. 10/24: Stools are less frequent but still very watery. Mag is down so will replete IV. K+ normal. Will ask to see if we can get a mid line placed as she is difficult to stick and her arm is swelling at the IV site. Na stable. 10/25: Stools are mushy now and less often. Tolerating diet better. Will stop IVF.  Mag and K+ normal. Hb 7.7 this am.     10/26: GI is ok if she goes home. Had urinary retention of >2L yesterday, hope placed. Pt and da want uro consult. Really don't want to go home with hope unless absolutely necessary. I explained that she will likely need the hope for 1 wk if doesn't pass voiding trial tonight, but will defer to urology's input. Da is also concerned that mother is more confused this admission. Has had 2 checks for UTI that were ruled out, but another was ordered today by renal.  Will treat accordingly. Tolerating PO. BMs are getting firmer, pt is happy with this. Da wants her to walk more today. I am fine with this. 10/27: urology rec keep hope for 3-5d and follow up in uro office for voiding trial.  K still low, despite TID dosing. Need to replete mag. BPs running higher, but not needing PRN hydralazine at home or here. Tolerating PO. Only one BM per day and much more solid than previously. Review of Systems:   A comprehensive review of systems was negative except for that written in the HPI. Objective:   Physical Exam:   Visit Vitals  /63 (BP 1 Location: Right arm, BP Patient Position: At rest)   Pulse 76   Temp 97.7 °F (36.5 °C)   Resp 17   Ht 5' 5\" (1.651 m)   Wt 66.4 kg (146 lb 6.2 oz)   SpO2 96%   BMI 24.36 kg/m²      O2 Device: Room air  Temp (24hrs), Av.8 °F (36.6 °C), Min:97.5 °F (36.4 °C), Max:98 °F (36.7 °C)    No intake/output data recorded. 10/25 1901 - 10/27 0700  In: 1130 [P.O.:1130]  Out: 2750 [Urine:2750]  General:  Alert, cooperative at times, frail appearing, no distress, appears stated age. Head:  Normocephalic, without obvious abnormality, atraumatic. Eyes:  Conjunctivae/corneas clear. PERRL, EOMs intact. Throat: Lips, mucosa, and tongue moist..   Neck: Supple, symmetrical, trachea midline, no adenopathy, thyroid: no enlargement/tenderness/nodules, no carotid bruit and no JVD. Lungs:   Clear to auscultation bilaterally. Chest wall:  No tenderness or deformity.    Heart:  Regular rate and rhythm, S1, S2 normal, no murmur, click, rub or gallop. Abdomen:   Soft, with mild generalized tenderness. Bowel sounds active. No masses,  No organomegaly. : hope in place with straw colored urine. Extremities: no cyanosis or edema. No calf tenderness or cords. Skin: Skin color, texture, turgor normal. No rashes or lesions   Neurologic:   Alert and oriented.  equal.  Gait not tested at this time. Sensation grossly normal to touch. Gross motor of extremities normal.       Data Review:       Recent Days:  Recent Labs     10/27/19  0347 10/26/19  0406 10/25/19  0503   WBC 9.8 10.1 10.5   HGB 8.1* 8.2* 7.7*   HCT 24.1* 24.6* 23.7*    296 318     Recent Labs     10/27/19  0347 10/26/19  0406 10/25/19  0503    139 136   K 3.2* 3.3* 3.6    107 107   CO2 31 27 22   GLU 97 92 90   BUN 11 10 10   CREA 0.63 0.69 0.66   CA 8.0* 7.9* 7.9*   MG  --   --  1.7   ALB 2.3* 2.2* 2.3*   TBILI 0.3 0.3 0.3   SGOT 12* 17 10*   ALT 20 22 19     No results for input(s): PH, PCO2, PO2, HCO3, FIO2 in the last 72 hours. 24 Hour Results:  Recent Results (from the past 24 hour(s))   URINALYSIS W/MICROSCOPIC    Collection Time: 10/26/19 12:59 PM   Result Value Ref Range    Color YELLOW/STRAW      Appearance CLEAR CLEAR      Specific gravity 1.011 1.003 - 1.030      pH (UA) 6.0 5.0 - 8.0      Protein 100 (A) NEG mg/dL    Glucose NEGATIVE  NEG mg/dL    Ketone NEGATIVE  NEG mg/dL    Bilirubin NEGATIVE  NEG      Blood NEGATIVE  NEG      Urobilinogen 0.2 0.2 - 1.0 EU/dL    Nitrites NEGATIVE  NEG      Leukocyte Esterase TRACE (A) NEG      WBC 10-20 0 - 4 /hpf    RBC 0-5 0 - 5 /hpf    Epithelial cells FEW FEW /lpf    Bacteria NEGATIVE  NEG /hpf    Hyaline cast 0-2 0 - 5 /lpf   URINE CULTURE HOLD SAMPLE    Collection Time: 10/26/19 12:59 PM   Result Value Ref Range    Urine culture hold        URINE ON HOLD IN MICROBIOLOGY DEPT FOR 3 DAYS.  IF UNPRESERVED URINE IS SUBMITTED, IT CANNOT BE USED FOR ADDITIONAL TESTING AFTER 24 HRS, RECOLLECTION WILL BE REQUIRED. CBC WITH AUTOMATED DIFF    Collection Time: 10/27/19  3:47 AM   Result Value Ref Range    WBC 9.8 3.6 - 11.0 K/uL    RBC 2.65 (L) 3.80 - 5.20 M/uL    HGB 8.1 (L) 11.5 - 16.0 g/dL    HCT 24.1 (L) 35.0 - 47.0 %    MCV 90.9 80.0 - 99.0 FL    MCH 30.6 26.0 - 34.0 PG    MCHC 33.6 30.0 - 36.5 g/dL    RDW 14.4 11.5 - 14.5 %    PLATELET 581 506 - 412 K/uL    MPV 8.9 8.9 - 12.9 FL    NRBC 0.0 0  WBC    ABSOLUTE NRBC 0.00 0.00 - 0.01 K/uL    NEUTROPHILS 44 32 - 75 %    LYMPHOCYTES 40 12 - 49 %    MONOCYTES 8 5 - 13 %    EOSINOPHILS 6 0 - 7 %    BASOPHILS 1 0 - 1 %    IMMATURE GRANULOCYTES 1 (H) 0.0 - 0.5 %    ABS. NEUTROPHILS 4.4 1.8 - 8.0 K/UL    ABS. LYMPHOCYTES 3.9 (H) 0.8 - 3.5 K/UL    ABS. MONOCYTES 0.8 0.0 - 1.0 K/UL    ABS. EOSINOPHILS 0.6 (H) 0.0 - 0.4 K/UL    ABS. BASOPHILS 0.1 0.0 - 0.1 K/UL    ABS. IMM. GRANS. 0.1 (H) 0.00 - 0.04 K/UL    DF AUTOMATED     METABOLIC PANEL, COMPREHENSIVE    Collection Time: 10/27/19  3:47 AM   Result Value Ref Range    Sodium 139 136 - 145 mmol/L    Potassium 3.2 (L) 3.5 - 5.1 mmol/L    Chloride 103 97 - 108 mmol/L    CO2 31 21 - 32 mmol/L    Anion gap 5 5 - 15 mmol/L    Glucose 97 65 - 100 mg/dL    BUN 11 6 - 20 MG/DL    Creatinine 0.63 0.55 - 1.02 MG/DL    BUN/Creatinine ratio 17 12 - 20      GFR est AA >60 >60 ml/min/1.73m2    GFR est non-AA >60 >60 ml/min/1.73m2    Calcium 8.0 (L) 8.5 - 10.1 MG/DL    Bilirubin, total 0.3 0.2 - 1.0 MG/DL    ALT (SGPT) 20 12 - 78 U/L    AST (SGOT) 12 (L) 15 - 37 U/L    Alk.  phosphatase 66 45 - 117 U/L    Protein, total 5.4 (L) 6.4 - 8.2 g/dL    Albumin 2.3 (L) 3.5 - 5.0 g/dL    Globulin 3.1 2.0 - 4.0 g/dL    A-G Ratio 0.7 (L) 1.1 - 2.2         Medications reviewed  Current Facility-Administered Medications   Medication Dose Route Frequency    phosphorus (K PHOS NEUTRAL) 250 mg tablet 2 Tab  2 Tab Oral TID    thiamine mononitrate (B-1) tablet 100 mg  100 mg Oral DAILY    folic acid (FOLVITE) tablet 1 mg  1 mg Oral DAILY    multivitamin with iron tablet 1 Tab  1 Tab Oral DAILY    raNITIdine (ZANTAC) 15 mg/mL syrup 150 mg  150 mg Oral DAILY    rifAXIMin (XIFAXAN) tablet 550 mg  550 mg Oral TIDAC    diphenoxylate-atropine (LOMOTIL) tablet 1 Tab  1 Tab Oral TID PRN    nystatin (MYCOSTATIN) 100,000 unit/mL oral suspension 500,000 Units  500,000 Units Oral QID    amLODIPine (NORVASC) tablet 10 mg  10 mg Oral QHS    budesonide (ENTOCORT EC) capsule 9 mg  9 mg Oral 7am    prochlorperazine (COMPAZINE) injection 5 mg  5 mg IntraVENous Q6H PRN    colestipol (COLESTID) packet 2.5 g  2.5 g Oral DAILY    colestipol (COLESTID) packet 2.5 g  2.5 g Oral QHS    doxazosin (CARDURA) tablet 1 mg  1 mg Oral DAILY    hydrALAZINE (APRESOLINE) 20 mg/mL injection 10 mg  10 mg IntraVENous Q6H PRN    sodium chloride (NS) flush 5-10 mL  5-10 mL IntraVENous PRN    lactobac ac& pc-s.therm-b.anim (GERALDINE Q/RISAQUAD)  1 Cap Oral DAILY    aspirin chewable tablet 81 mg  81 mg Oral QHS    naloxone (NARCAN) injection 0.4 mg  0.4 mg IntraVENous PRN    acetaminophen (TYLENOL) tablet 650 mg  650 mg Oral Q4H PRN    diphenhydrAMINE (BENADRYL) capsule 25 mg  25 mg Oral Q4H PRN    ondansetron (ZOFRAN) injection 4 mg  4 mg IntraVENous Q4H PRN    enoxaparin (LOVENOX) injection 40 mg  40 mg SubCUTAneous Q24H    metoprolol tartrate (LOPRESSOR) tablet 50 mg  50 mg Oral BID    losartan (COZAAR) tablet 100 mg  100 mg Oral DAILY       Care Plan discussed with: Patient/Family and Nurse  Total time spent with patient: 30 minutes.   Ney Neri MD

## 2019-10-27 NOTE — PROGRESS NOTES
[facility]     Renal Daily Progress Note:     Admission Date: 10/19/2019     Subjective:     F/U  Hypokalemia , Hyponatremia - 10/27/19       Had a good day. There were no new complaints. Her appetite was stable.             Objective:     Visit Vitals  /71 (BP 1 Location: Left arm, BP Patient Position: At rest)   Pulse 60   Temp 98.8 °F (37.1 °C)   Resp 18   Ht 5' 5\" (1.651 m)   Wt 62.3 kg (137 lb 5.6 oz)   SpO2 97%   BMI 22.86 kg/m²     Temp (24hrs), Av.2 °F (36.8 °C), Min:97.7 °F (36.5 °C), Max:98.8 °F (37.1 °C)        Intake/Output Summary (Last 24 hours) at 10/27/2019 1758  Last data filed at 10/27/2019 1355  Gross per 24 hour   Intake    Output 1775 ml   Net -1775 ml     Current Facility-Administered Medications   Medication Dose Route Frequency    hydrALAZINE (APRESOLINE) tablet 10 mg  10 mg Oral TID PRN    phosphorus (K PHOS NEUTRAL) 250 mg tablet 2 Tab  2 Tab Oral TID    thiamine mononitrate (B-1) tablet 100 mg  100 mg Oral DAILY    folic acid (FOLVITE) tablet 1 mg  1 mg Oral DAILY    multivitamin with iron tablet 1 Tab  1 Tab Oral DAILY    raNITIdine (ZANTAC) 15 mg/mL syrup 150 mg  150 mg Oral DAILY    rifAXIMin (XIFAXAN) tablet 550 mg  550 mg Oral TIDAC    diphenoxylate-atropine (LOMOTIL) tablet 1 Tab  1 Tab Oral TID PRN    nystatin (MYCOSTATIN) 100,000 unit/mL oral suspension 500,000 Units  500,000 Units Oral QID    amLODIPine (NORVASC) tablet 10 mg  10 mg Oral QHS    budesonide (ENTOCORT EC) capsule 9 mg  9 mg Oral 7am    prochlorperazine (COMPAZINE) injection 5 mg  5 mg IntraVENous Q6H PRN    colestipol (COLESTID) packet 2.5 g  2.5 g Oral DAILY    colestipol (COLESTID) packet 2.5 g  2.5 g Oral QHS    doxazosin (CARDURA) tablet 1 mg  1 mg Oral DAILY    hydrALAZINE (APRESOLINE) 20 mg/mL injection 10 mg  10 mg IntraVENous Q6H PRN    sodium chloride (NS) flush 5-10 mL  5-10 mL IntraVENous PRN    lactobac ac& pc-s.therm-b.anim (GERALDINE Q/RISAQUAD)  1 Cap Oral DAILY    aspirin chewable tablet 81 mg  81 mg Oral QHS    naloxone (NARCAN) injection 0.4 mg  0.4 mg IntraVENous PRN    acetaminophen (TYLENOL) tablet 650 mg  650 mg Oral Q4H PRN    diphenhydrAMINE (BENADRYL) capsule 25 mg  25 mg Oral Q4H PRN    ondansetron (ZOFRAN) injection 4 mg  4 mg IntraVENous Q4H PRN    enoxaparin (LOVENOX) injection 40 mg  40 mg SubCUTAneous Q24H    metoprolol tartrate (LOPRESSOR) tablet 50 mg  50 mg Oral BID    losartan (COZAAR) tablet 100 mg  100 mg Oral DAILY         Seen in Room 536      Physical Exam:     Sitting in the chair comfortably. She was having a meal.    Lungs:  Good air entry, no wheezes , no rales    Heart: No S 3 gallop , No pericardial rub    Abd : No distension    : Martinez in place. Clear urine     Extremities: no edema      Neurologic: Responding appropriately    Psyche : Smiling        Data Review:     LABS:  Recent Labs     10/27/19  1311 10/27/19  0347 10/26/19  0406 10/25/19  0503    139 139 136   K 3.1* 3.2* 3.3* 3.6    103 107 107   CO2 31 31 27 22   BUN 12 11 10 10   CREA 0.81 0.63 0.69 0.66   CA 8.1* 8.0* 7.9* 7.9*   ALB  --  2.3* 2.2* 2.3*   MG 1.9  --   --  1.7     Recent Labs     10/27/19  0347 10/26/19  0406 10/25/19  0503   WBC 9.8 10.1 10.5   HGB 8.1* 8.2* 7.7*   HCT 24.1* 24.6* 23.7*    296 318     No results for input(s): AJIT, KU, CLU, CREAU in the last 72 hours.     No lab exists for component: PROU    Assessment:   Renal Specific Problems  Hyponatremia--resolved    HypoMag  HypoPO4--resolved   HTN         Plan:     Obtain/ Order: labs/cultures/radiology/procedures:      Therapeutic:      Replace K       Management of urinary rentention per primary service      Signed By: Jaylon Card MD     October 27, 2019

## 2019-10-27 NOTE — PROGRESS NOTES
Bedside and Verbal shift change report given to  (oncoming nurse) by Khushi Bauer (offgoing nurse). Report included the following information SBAR and Kardex.

## 2019-10-27 NOTE — PROGRESS NOTES
Bedside and Verbal shift change report given to Medhat Costello RN (oncoming nurse) by Angelika Ta RN (offgoing nurse). Report included the following information SBAR and Kardex.

## 2019-10-27 NOTE — SENIOR SERVICES NOTE
Bedside and Verbal shift change report given to [address] (oncoming nurse) by [employer] nurse. Report given with SBAR, Kardex, MAR and Recent Results.

## 2019-10-27 NOTE — CONSULTS
Urology Consult    Subjective:     Date of Consultation:  October 27, 2019    Referring Physician: Darshana Lucero    Reason for Consultation:  Retention    History of Present Illness:   Patient is a 80 y.o.  female who is being seen for urinary retention. History is provided primarily by daughter. Denies any problems with urination in the past. No h/o UTIs. Never needed to see . CT 10/17 without  pathology. 2 days ago pt was noted to have 700+ ml in bladder by bedside scan. Martinez placed. Admitted with diarrhea which has resolved. Not on narcotics now. Urine culture on admission neg. Repeat culture yesterday pending results. She was admitted to the hospital for Hyponatremia [E87.1]. Past Medical History:   Diagnosis Date    Carotid arterial disease ([facility] [address]     mild 0-49%    Coronary atherosclerosis of native coronary artery     Cath 2011 with multivessel cad    Cough due to ACE inhibitor     Diverticulitis     Dyslipidemia     Hypertension     Macular degeneration     S/P CABG (coronary artery bypass graft)     6-19-11 CABG - OFF PUMP - CABGx 3, lima, eric, epi aortic ultrasound - off pump, rightt endoscopic vein harvest; [address] [place] [address] Dr to LAD, Svg Ao to OM1 and OM2      No past surgical history on file. No family history on file. Social History     Tobacco Use    Smoking status: Never Smoker    Smokeless tobacco: Never Used   Substance Use Topics    Alcohol use: Yes     Alcohol/week: 1.0 standard drinks     Types: 1 Standard drinks or equivalent per week     Comment: Occasional     Allergies   Allergen Reactions    Ciprofloxacin Itching and Other (comments)     Cipro, \"turn beet red like sunburn\"    Codeine Itching    Lisinopril Cough    Lovastatin Diarrhea      Prior to Admission medications    Medication Sig Start Date End Date Taking? Authorizing Provider   amLODIPine (NORVASC) 5 mg tablet Take 5 mg by mouth nightly.    Yes Provider, Historical   camphor-menthol MATTHEW DE JESUS [facility] ORIGINAL) 0.5-0.5 % lotion Apply  to affected area two (2) times daily as needed for Itching. Yes Provider, Historical   colestipol (COLESTID) 1 gram tablet Take 2 g by mouth daily. Yes Provider, Historical   colestipol (COLESTID) 1 gram tablet Take 3 g by mouth nightly. Yes Provider, Historical   nitrofurantoin, macrocrystal-monohydrate, (MACROBID) 100 mg capsule Take 100 mg by mouth two (2) times a day. Indications: uti 10/18/19 10/28/19 Yes Provider, Historical   ondansetron (ZOFRAN ODT) 4 mg disintegrating tablet Take 4 mg by mouth every eight (8) hours as needed for Nausea. Yes Provider, Historical   raNITIdine (ZANTAC) 150 mg tablet Take 150 mg by mouth nightly. Yes Provider, Historical   atorvastatin (LIPITOR) 20 mg tablet Take 1 Tab by mouth nightly. 10/14/19  Yes Jamarcus Lion MD   losartan (COZAAR) 100 mg tablet Take 1 Tab by mouth daily. 8/11/19  Yes Padilla Jacobo MD   budesonide (ENTOCORT EC) 3 mg capsule Take 3 Caps by mouth daily. 8/11/19  Yes Padilla Jacobo MD   psyllium husk-aspartame (METAMUCIL FIBER) 3.4 gram pwpk packet Take 1 Packet by mouth two (2) times a day. 8/11/19  Yes Padilla Jacobo MD   L. acidoph & paracasei- S therm- Bifido (GERALDINE-Q/RISAQUAD) 8 billion cell cap cap Take 1 Cap by mouth daily. 8/11/19  Yes Padilla Jacobo MD   hydrALAZINE (APRESOLINE) 10 mg tablet Take 1 Tab by mouth three (3) times daily as needed for Other (Systolic BP >372). 8/11/19  Yes Padilla Jacobo MD   metoprolol tartrate (LOPRESSOR) 50 mg tablet TAKE ONE TABLET BY MOUTH TWICE A DAY 11/9/18  Yes Jamarcus Lion MD   chlorthalidone (HYGROTEN) 25 mg tablet Take 25 mg by mouth daily. 10/23/18  Yes Provider, Historical   aspirin 81 mg chewable tablet Take 81 mg by mouth nightly. Yes Provider, Historical   doxazosin (CARDURA) 1 mg tablet Take 1 mg by mouth daily.    Yes Provider, Historical         Review of Systems:  A comprehensive review of systems was negative except for that written in the HPI. Objective:     Patient Vitals for the past 8 hrs:   BP Temp Pulse Resp SpO2   10/27/19 0855 168/71 98.4 °F (36.9 °C) 72 18 97 %   10/27/19 0638   76     10/27/19 0338 169/63 97.7 °F (36.5 °C) 65 17 96 %     Temp (24hrs), Av.9 °F (36.6 °C), Min:97.5 °F (36.4 °C), Max:98.4 °F (36.9 °C)        Intake and Output:   10/25 1901 - 10/27 0700  In: 1130 [P.O.:1130]  Out: 2750 [Urine:2750]    Physical Exam:            General:    alert, cooperative, no distress, appears stated age             Abdomen[de-identified]  soft, non-tender. Bowel sounds normal. No masses,  no organomegaly             Genitalia:  defer exam          Extremities:  negative       Assessment:     Principal Problem:    Hyponatremia (8/3/2019)    Active Problems:    Essential hypertension, benign (2010)      Pure hypercholesterolemia (2010)      S/P CABG x 3 (2011)      Overview: LIMA to LAD and SVGs to OM-1 and OM-2. RCA small non-dominant diffusely       diseased vessel ungrafted. LV EF at cath was 65%. Coronary atherosclerosis of native coronary artery ()      Overview: Cath  with multivessel cad      S/P CABG (coronary artery bypass graft) ()      Overview: 11 CABG - OFF PUMP - CABGx 3, lima, eric, epi aortic ultrasound - off       pump, rightt endoscopic vein harvest; [id number] [place] [address] Dr to LAD,       Svg Ao to OM1 and OM2      Macular degeneration (10/30/2013)      Carotid arterial disease (HCC) ()      Overview: mild 0-49%      Dehydration (8/3/2019)      Hypokalemia (8/3/2019)      Leukocytosis (10/19/2019)      Acute metabolic encephalopathy ()      Nausea vomiting and diarrhea (10/19/2019)          Urinary retention    Plan:     Keep hope in for 3-5 days. Pt to f/u with [facility] as OP this week for voiding trial. OK to d/c from  standpoint.     Signed By: Jewel Delgado MD                         2019

## 2019-10-28 LAB
ALBUMIN SERPL-MCNC: 2.3 G/DL (ref 3.5–5)
ALBUMIN/GLOB SERPL: 0.7 {RATIO} (ref 1.1–2.2)
ALP SERPL-CCNC: 65 U/L (ref 45–117)
ALT SERPL-CCNC: 20 U/L (ref 12–78)
ANION GAP SERPL CALC-SCNC: 7 MMOL/L (ref 5–15)
AST SERPL-CCNC: 12 U/L (ref 15–37)
BASOPHILS # BLD: 0.1 K/UL (ref 0–0.1)
BASOPHILS NFR BLD: 1 % (ref 0–1)
BILIRUB SERPL-MCNC: 0.4 MG/DL (ref 0.2–1)
BUN SERPL-MCNC: 12 MG/DL (ref 6–20)
BUN/CREAT SERPL: 19 (ref 12–20)
CALCIUM SERPL-MCNC: 8 MG/DL (ref 8.5–10.1)
CHLORIDE SERPL-SCNC: 102 MMOL/L (ref 97–108)
CO2 SERPL-SCNC: 31 MMOL/L (ref 21–32)
CREAT SERPL-MCNC: 0.64 MG/DL (ref 0.55–1.02)
DIFFERENTIAL METHOD BLD: ABNORMAL
EOSINOPHIL # BLD: 0.4 K/UL (ref 0–0.4)
EOSINOPHIL NFR BLD: 5 % (ref 0–7)
ERYTHROCYTE [DISTWIDTH] IN BLOOD BY AUTOMATED COUNT: 14.4 % (ref 11.5–14.5)
GLOBULIN SER CALC-MCNC: 3.1 G/DL (ref 2–4)
GLUCOSE SERPL-MCNC: 100 MG/DL (ref 65–100)
HCT VFR BLD AUTO: 24.3 % (ref 35–47)
HGB BLD-MCNC: 8 G/DL (ref 11.5–16)
IMM GRANULOCYTES # BLD AUTO: 0 K/UL (ref 0–0.04)
IMM GRANULOCYTES NFR BLD AUTO: 1 % (ref 0–0.5)
LYMPHOCYTES # BLD: 3.5 K/UL (ref 0.8–3.5)
LYMPHOCYTES NFR BLD: 42 % (ref 12–49)
MCH RBC QN AUTO: 29.9 PG (ref 26–34)
MCHC RBC AUTO-ENTMCNC: 32.9 G/DL (ref 30–36.5)
MCV RBC AUTO: 90.7 FL (ref 80–99)
MONOCYTES # BLD: 0.8 K/UL (ref 0–1)
MONOCYTES NFR BLD: 10 % (ref 5–13)
NEUTS SEG # BLD: 3.5 K/UL (ref 1.8–8)
NEUTS SEG NFR BLD: 41 % (ref 32–75)
NRBC # BLD: 0 K/UL (ref 0–0.01)
NRBC BLD-RTO: 0 PER 100 WBC
PLATELET # BLD AUTO: 309 K/UL (ref 150–400)
PMV BLD AUTO: 8.9 FL (ref 8.9–12.9)
POTASSIUM SERPL-SCNC: 2.7 MMOL/L (ref 3.5–5.1)
PROT SERPL-MCNC: 5.4 G/DL (ref 6.4–8.2)
RBC # BLD AUTO: 2.68 M/UL (ref 3.8–5.2)
SODIUM SERPL-SCNC: 140 MMOL/L (ref 136–145)
WBC # BLD AUTO: 8.3 K/UL (ref 3.6–11)

## 2019-10-28 PROCEDURE — 65660000000 HC RM CCU STEPDOWN

## 2019-10-28 PROCEDURE — 80053 COMPREHEN METABOLIC PANEL: CPT

## 2019-10-28 PROCEDURE — 74011250637 HC RX REV CODE- 250/637: Performed by: FAMILY MEDICINE

## 2019-10-28 PROCEDURE — 74011250637 HC RX REV CODE- 250/637: Performed by: INTERNAL MEDICINE

## 2019-10-28 PROCEDURE — 85025 COMPLETE CBC W/AUTO DIFF WBC: CPT

## 2019-10-28 PROCEDURE — 74011250636 HC RX REV CODE- 250/636: Performed by: INTERNAL MEDICINE

## 2019-10-28 PROCEDURE — 97535 SELF CARE MNGMENT TRAINING: CPT | Performed by: OCCUPATIONAL THERAPIST

## 2019-10-28 PROCEDURE — 97530 THERAPEUTIC ACTIVITIES: CPT

## 2019-10-28 PROCEDURE — 97116 GAIT TRAINING THERAPY: CPT

## 2019-10-28 PROCEDURE — 36415 COLL VENOUS BLD VENIPUNCTURE: CPT

## 2019-10-28 RX ORDER — POTASSIUM CHLORIDE 750 MG/1
40 TABLET, FILM COATED, EXTENDED RELEASE ORAL 2 TIMES DAILY
Status: DISCONTINUED | OUTPATIENT
Start: 2019-10-28 | End: 2019-10-28

## 2019-10-28 RX ORDER — LANOLIN ALCOHOL/MO/W.PET/CERES
400 CREAM (GRAM) TOPICAL 2 TIMES DAILY
Status: DISCONTINUED | OUTPATIENT
Start: 2019-10-28 | End: 2019-10-29 | Stop reason: HOSPADM

## 2019-10-28 RX ADMIN — COLESTIPOL HYDROCHLORIDE 2.5 G: 5 GRANULE, FOR SUSPENSION ORAL at 20:56

## 2019-10-28 RX ADMIN — ENOXAPARIN SODIUM 40 MG: 40 INJECTION SUBCUTANEOUS at 17:12

## 2019-10-28 RX ADMIN — DIBASIC SODIUM PHOSPHATE, MONOBASIC POTASSIUM PHOSPHATE AND MONOBASIC SODIUM PHOSPHATE 2 TABLET: 852; 155; 130 TABLET ORAL at 17:13

## 2019-10-28 RX ADMIN — Medication 10 ML: at 13:33

## 2019-10-28 RX ADMIN — DIBASIC SODIUM PHOSPHATE, MONOBASIC POTASSIUM PHOSPHATE AND MONOBASIC SODIUM PHOSPHATE 2 TABLET: 852; 155; 130 TABLET ORAL at 20:50

## 2019-10-28 RX ADMIN — RIFAXIMIN 550 MG: 550 TABLET ORAL at 17:12

## 2019-10-28 RX ADMIN — METOPROLOL TARTRATE 50 MG: 50 TABLET, FILM COATED ORAL at 09:23

## 2019-10-28 RX ADMIN — ASPIRIN 81 MG 81 MG: 81 TABLET ORAL at 20:50

## 2019-10-28 RX ADMIN — Medication 100 MG: at 09:24

## 2019-10-28 RX ADMIN — DOXAZOSIN 1 MG: 2 TABLET ORAL at 09:24

## 2019-10-28 RX ADMIN — MAGNESIUM GLUCONATE 500 MG ORAL TABLET 400 MG: 500 TABLET ORAL at 17:12

## 2019-10-28 RX ADMIN — Medication 1 CAPSULE: at 09:23

## 2019-10-28 RX ADMIN — RANITIDINE 150 MG: 15 SYRUP ORAL at 09:34

## 2019-10-28 RX ADMIN — POTASSIUM CHLORIDE 40 MEQ: 750 TABLET, FILM COATED, EXTENDED RELEASE ORAL at 09:22

## 2019-10-28 RX ADMIN — AMLODIPINE BESYLATE 10 MG: 5 TABLET ORAL at 20:50

## 2019-10-28 RX ADMIN — NYSTATIN 500000 UNITS: 100000 SUSPENSION ORAL at 09:19

## 2019-10-28 RX ADMIN — MAGNESIUM GLUCONATE 500 MG ORAL TABLET 400 MG: 500 TABLET ORAL at 09:23

## 2019-10-28 RX ADMIN — NYSTATIN 500000 UNITS: 100000 SUSPENSION ORAL at 17:12

## 2019-10-28 RX ADMIN — DIBASIC SODIUM PHOSPHATE, MONOBASIC POTASSIUM PHOSPHATE AND MONOBASIC SODIUM PHOSPHATE 2 TABLET: 852; 155; 130 TABLET ORAL at 09:24

## 2019-10-28 RX ADMIN — NYSTATIN 500000 UNITS: 100000 SUSPENSION ORAL at 20:50

## 2019-10-28 RX ADMIN — LOSARTAN POTASSIUM 100 MG: 50 TABLET, FILM COATED ORAL at 09:23

## 2019-10-28 RX ADMIN — METOPROLOL TARTRATE 50 MG: 50 TABLET, FILM COATED ORAL at 17:12

## 2019-10-28 RX ADMIN — NYSTATIN 500000 UNITS: 100000 SUSPENSION ORAL at 13:32

## 2019-10-28 RX ADMIN — Medication 1 TABLET: at 09:23

## 2019-10-28 RX ADMIN — BUDESONIDE 9 MG: 3 CAPSULE, GELATIN COATED ORAL at 05:16

## 2019-10-28 RX ADMIN — COLESTIPOL HYDROCHLORIDE 2.5 G: 5 GRANULE, FOR SUSPENSION ORAL at 09:26

## 2019-10-28 RX ADMIN — RIFAXIMIN 550 MG: 550 TABLET ORAL at 11:39

## 2019-10-28 RX ADMIN — FOLIC ACID 1 MG: 1 TABLET ORAL at 09:23

## 2019-10-28 RX ADMIN — POTASSIUM CHLORIDE 40 MEQ: 750 TABLET, FILM COATED, EXTENDED RELEASE ORAL at 17:12

## 2019-10-28 NOTE — PROGRESS NOTES
Problem: Self Care Deficits Care Plan (Adult)  Goal: *Acute Goals and Plan of Care (Insert Text)  Description    FUNCTIONAL STATUS PRIOR TO ADMISSION: Patient was modified independent using a Rollator occasionally for functional mobility. HOME SUPPORT: The patient lived with  but did not require assist.    Occupational Therapy Goals  Initiated 10/22/2019  1. Patient will perform upper body dressing and bathing with independence within 7 day(s). 2.  Patient will perform lower body dressing and bathing with supervision/set-up within 7 day(s). 3.  Patient will perform toilet transfers with modified independence within 7 day(s). 4.  Patient will perform all aspects of toileting with modified independence within 7 day(s). 5.  Patient will participate in upper extremity therapeutic exercise/activities with modified independence for 10 minutes within 7 day(s). 6.  Patient will utilize energy conservation techniques during functional activities with verbal cues within 7 day(s). Outcome: Progressing Towards Goal     OCCUPATIONAL THERAPY TREATMENT  Patient: Dc Yoo (20 y.o. female)  Date: 10/28/2019  Diagnosis: Hyponatremia [E87.1] Hyponatremia       Precautions:  fall  Chart, occupational therapy assessment, plan of care, and goals were reviewed. ASSESSMENT  Patient continues with skilled OT services and is progressing towards goals. Pt with mild memory impairments, poor vision due to macular degeneration, decreased endurance, balance and safety. Educated pt and her daughter on energy conservation techniques and pacing strategies. Pt verbalized fair understanding. Continue to recommend [facility] therapy at discharge.      Current Level of Function Impacting Discharge (ADLs): CGA and set-up for LE ADLs, toileting and functional mobility using RW to amb    Other factors to consider for discharge: pt's  is unable to physically assist pt and daughter cannot provide 24/7 assist PLAN :  Patient continues to benefit from skilled intervention to address the above impairments. Continue treatment per established plan of care. to address goals. Recommend with staff: Recommend with nursing patient to complete as able in order to maintain strength, endurance and independence: ADLs with CGA/setup, OOB to chair 3x/day and mobilizing to the bathroom for toileting with CG assist. Thank you for your assistance. Recommend next OT session: UE exer in standing, grooming/bathing standing at sink    Recommendation for discharge: (in order for the patient to meet his/her long term goals)  Occupational therapy at least 2 days/week in the home AND ensure assist and/or supervision for safety      This discharge recommendation:  Has been made in collaboration with the attending provider and/or case management    IF patient discharges home will need the following DME: to be determined (TBD)       SUBJECTIVE:   Patient stated I feel better.     OBJECTIVE DATA SUMMARY:   Cognitive/Behavioral Status:  Neurologic State: Alert  Orientation Level: Oriented X4(but with mild memory impairments noted)  Cognition: Decreased attention/concentration; Follows commands  Perception: Appears intact  Perseveration: Perseverates during ADLS;Perseverates during conversation;Perseverates during mobility  Safety/Judgement: Awareness of environment;Decreased insight into deficits; Fall prevention    Functional Mobility and Transfers for ADLs:  Bed Mobility:  Supine to Sit: Supervision  Sit to Supine: Supervision    Transfers:  Sit to Stand: Contact guard assistance(RW)  Functional Transfers  Bathroom Mobility: Contact guard assistance(RW)  Cues: Tactile cues provided;Verbal cues provided;Visual cues provided  Adaptive Equipment: Walker (comment)(rolling)       Balance:  Sitting: Intact  Standing: Intact; With support(RW)    ADL Intervention:  Grooming  Grooming Assistance: Contact guard assistance(standing at sink- pt gathered items with CGA using RW)  Washing Face: Contact guard assistance  Washing Hands: Contact guard assistance  Brushing Teeth: Contact guard assistance  Cues: Verbal cues provided; Tactile cues provided;Visual cues provided  Adaptive Equipment: (RW)    Lower Body Dressing Assistance  Socks: Supervision;Set-up  Leg Crossed Method Used: Yes  Position Performed: Seated edge of bed  Cues: Don;Doff;Verbal cues provided    Patient instructed and indicated understanding energy conservation techniques to increase independence and safety during all ADLs for end goal of returning back home. Provided instruction body is like a jar of marbles, marbles represent energy, at end of day need as many marbles as possible to obtain a good night sleep, REM sleep is when the body repairs itself. This ensures a full jar of marbles, full of energy when wake up to start a new day. Use energy conservation techniques during ADLs so can increase participation in life activities patient prefers, to ensure more frequent good days. If having a bad day, evaluate tasks completed day before and re-plan how to save energy to complete same tasks, for example if going grocery shopping do not complete full bathing/dressing/grooming. Patient indicated understanding by stating tasks already completing to save energy ie sitting to don all clothing. Cognitive Retraining  Safety/Judgement: Awareness of environment;Decreased insight into deficits; Fall prevention    Pain:  No c/o pain    Activity Tolerance:   Fair and requires rest breaks  Please refer to the flowsheet for vital signs taken during this treatment.     After treatment patient left in no apparent distress:   Sitting in chair, Call bell within reach and Caregiver / family present    COMMUNICATION/COLLABORATION:   The patients plan of care was discussed with: Registered Nurse    Destini Lee OT  Time Calculation: 23 mins

## 2019-10-28 NOTE — PROGRESS NOTES
[facility]     Renal Daily Progress Note:     Admission Date: 10/19/2019     Subjective: Martinez remains in place. Urology following.  K low in am     Review of Systems  -Diarrhea is slightly better   Objective:     Visit Vitals  /85 (BP 1 Location: Left arm, BP Patient Position: At rest)   Pulse 61   Temp 98.2 °F (36.8 °C)   Resp 17   Ht 5' 5\" (1.651 m)   Wt 62.3 kg (137 lb 5.6 oz)   SpO2 96%   BMI 22.86 kg/m²     Temp (24hrs), Av.2 °F (36.8 °C), Min:97.4 °F (36.3 °C), Max:99 °F (37.2 °C)        Intake/Output Summary (Last 24 hours) at 10/28/2019 1037  Last data filed at 10/27/2019 2349  Gross per 24 hour   Intake    Output 1325 ml   Net -1325 ml     Current Facility-Administered Medications   Medication Dose Route Frequency    potassium chloride SR (KLOR-CON 10) tablet 40 mEq  40 mEq Oral BID    magnesium oxide (MAG-OX) tablet 400 mg  400 mg Oral BID    hydrALAZINE (APRESOLINE) tablet 10 mg  10 mg Oral TID PRN    phosphorus (K PHOS NEUTRAL) 250 mg tablet 2 Tab  2 Tab Oral TID    thiamine mononitrate (B-1) tablet 100 mg  100 mg Oral DAILY    folic acid (FOLVITE) tablet 1 mg  1 mg Oral DAILY    multivitamin with iron tablet 1 Tab  1 Tab Oral DAILY    raNITIdine (ZANTAC) 15 mg/mL syrup 150 mg  150 mg Oral DAILY    rifAXIMin (XIFAXAN) tablet 550 mg  550 mg Oral TIDAC    diphenoxylate-atropine (LOMOTIL) tablet 1 Tab  1 Tab Oral TID PRN    nystatin (MYCOSTATIN) 100,000 unit/mL oral suspension 500,000 Units  500,000 Units Oral QID    amLODIPine (NORVASC) tablet 10 mg  10 mg Oral QHS    budesonide (ENTOCORT EC) capsule 9 mg  9 mg Oral 7am    prochlorperazine (COMPAZINE) injection 5 mg  5 mg IntraVENous Q6H PRN    colestipol (COLESTID) packet 2.5 g  2.5 g Oral DAILY    colestipol (COLESTID) packet 2.5 g  2.5 g Oral QHS    doxazosin (CARDURA) tablet 1 mg  1 mg Oral DAILY    hydrALAZINE (APRESOLINE) 20 mg/mL injection 10 mg  10 mg IntraVENous Q6H PRN    sodium chloride (NS) flush 5-10 mL 5-10 mL IntraVENous PRN    lactobac ac& pc-s.therm-b.anim (GERALDINE Q/RISAQUAD)  1 Cap Oral DAILY    aspirin chewable tablet 81 mg  81 mg Oral QHS    naloxone (NARCAN) injection 0.4 mg  0.4 mg IntraVENous PRN    acetaminophen (TYLENOL) tablet 650 mg  650 mg Oral Q4H PRN    diphenhydrAMINE (BENADRYL) capsule 25 mg  25 mg Oral Q4H PRN    ondansetron (ZOFRAN) injection 4 mg  4 mg IntraVENous Q4H PRN    enoxaparin (LOVENOX) injection 40 mg  40 mg SubCUTAneous Q24H    metoprolol tartrate (LOPRESSOR) tablet 50 mg  50 mg Oral BID    losartan (COZAAR) tablet 100 mg  100 mg Oral DAILY       Physical Exam:General appearance: alert, cooperative, no distress, appears stated age. Lying comfortably in bed. Daughter at bedside   Neck: supple, symmetrical,   Lungs: No crackles, no wheezes   Heart: regular rate and rhythm, no S3 or S4  : Hope in place. Clear urine   Extremities: no edema  Neurologic: Grossly normal    Data Review:     LABS:  Recent Labs     10/28/19  0512 10/27/19  1311 10/27/19  0347 10/26/19  0406    139 139 139   K 2.7* 3.1* 3.2* 3.3*    102 103 107   CO2 31 31 31 27   BUN 12 12 11 10   CREA 0.64 0.81 0.63 0.69   CA 8.0* 8.1* 8.0* 7.9*   ALB 2.3*  --  2.3* 2.2*   MG  --  1.9  --   --      Recent Labs     10/28/19  0512 10/27/19  0347 10/26/19  0406   WBC 8.3 9.8 10.1   HGB 8.0* 8.1* 8.2*   HCT 24.3* 24.1* 24.6*    305 296     No results for input(s): AJIT, KU, CLU, CREAU in the last 72 hours.     No lab exists for component: PROU    Assessment:   Renal Specific Problems  Hyponatremia--resolved    Hypokalemia: 2/2 GI losses   HypoMag-resolved   HypoPO4--resolved   HTN         Plan:     Obtain/ Order: labs/cultures/radiology/procedures:      Therapeutic:      Replace K     Plan to remove hope at midnight     Encourage po intake     Manage diarrhea       Signed By: Evelia Reynolds MD     October 28, 2019

## 2019-10-28 NOTE — PROGRESS NOTES
Daily Progress Note: 10/28/2019  Nagi Corona MD    Assessment/Plan:   Hyponatremia / Dehydration / Hypokalemia - POA, due to GI loss, poor PO intake, and Rx of Bactrim. - ICU initally. - Renal consulted. Improving     Acute metabolic encephalopathy - POA, presumed due to hyponatremia, but persists, so will check urine today.     Nausea vomiting and diarrhea/microcytic colitis - POA and persistent for weeks, improving.  - GI has signed off  - Xifaxan and Budesonide, florastor      Coronary atherosclerosis of native coronary artery S/P CABG x 3 / Essential hypertension: some white coat HTN here  - Appears stable.    - Continue metoprolol and ASA   -cont home meds  -hydralazine PO for severe highs here    Pure hypercholesterolemia -   - Hold statin     Macular degeneration -   - Supportive care.     Leukocytosis - improving  -stool studies normal  - No Abx until Dx     UTI unlikely - 3rd UA is neg for bacteria    Urinary retention: new  --uro rec hope x3-5d and f/u outpt for voiding trial    Hypokalemia/hypomag:  -repleting as needed        Problem List:  Problem List as of 10/28/2019 Date Reviewed: 10/19/2019          Codes Class Noted - Resolved    Leukocytosis ICD-10-CM: D72.829  ICD-9-CM: 288.60  10/19/2019 - Present        Acute metabolic encephalopathy CEE-27-CT: G93.41  ICD-9-CM: 348.31  10/19/2019 - Present        Nausea vomiting and diarrhea ICD-10-CM: R11.2, R19.7  ICD-9-CM: 787.91, 787.01  10/19/2019 - Present        Dehydration ICD-10-CM: E86.0  ICD-9-CM: 276.51  8/3/2019 - Present        * (Principal) Hyponatremia ICD-10-CM: E87.1  ICD-9-CM: 276.1  8/3/2019 - Present        Hypokalemia ICD-10-CM: E87.6  ICD-9-CM: 276.8  8/3/2019 - Present        Macular degeneration ICD-10-CM: H35.30  ICD-9-CM: 362.50  10/30/2013 - Present        Carotid arterial disease (HCC) ICD-10-CM: I73.9  ICD-9-CM: 447.9  Unknown - Present    Overview Signed 10/30/2013 10:11 AM by Anjana Mckee, Carmelita Guzman MD     mild 0-49%             Coronary atherosclerosis of native coronary artery ICD-10-CM: I25.10  ICD-9-CM: 414.01  Unknown - Present    Overview Signed 2/22/2012 10:44 AM by Catalina Snyder MD     Cath 2011 with multivessel cad             S/P CABG (coronary artery bypass graft) ICD-10-CM: Z95.1  ICD-9-CM: V45.81  Unknown - Present    Overview Signed 2/22/2012 10:44 AM by Catalina Snyder MD     0-74-82 CABG - OFF PUMP - CABGx 3, lima, eric, epi aortic ultrasound - off pump, rightt endoscopic vein harvest; [id number] to LAD, Svg Ao to OM1 and OM2             S/P CABG x 3 ICD-10-CM: Z95.1  ICD-9-CM: V45.81  6/17/2011 - Present    Overview Signed 7/26/2011 10:51 AM by Ludy TEAGUE to LAD and SVGs to OM-1 and OM-2. RCA small non-dominant diffusely diseased vessel ungrafted. LV EF at cath was 65%.              Essential hypertension, benign ICD-10-CM: I10  ICD-9-CM: 401.1  12/2/2010 - Present        Pure hypercholesterolemia ICD-10-CM: E78.00  ICD-9-CM: 272.0  12/2/2010 - Present        RESOLVED: UTI (urinary tract infection) ICD-10-CM: N39.0  ICD-9-CM: 599.0  8/3/2019 - 10/19/2019        RESOLVED: Sepsis ([address] ICD-10-CM: A41.9  ICD-9-CM: 038.9, 995.91  8/3/2019 - 10/19/2019        RESOLVED: Fever ICD-10-CM: R50.9  ICD-9-CM: 780.60  8/3/2019 - 10/19/2019        RESOLVED: Left shift ICD-10-CM: D72.89  ICD-9-CM: 288.9  8/3/2019 - 10/19/2019        RESOLVED: Syncope ICD-10-CM: R55  ICD-9-CM: 780.2  8/3/2019 - 10/19/2019        RESOLVED: Anemia ICD-10-CM: D64.9  ICD-9-CM: 285.9  8/3/2019 - 10/19/2019        RESOLVED: Cough due to ACE inhibitor ICD-10-CM: R05, T46.4X5A  ICD-9-CM: 786.2, E942.6  Unknown - 8/3/2019        RESOLVED: Edema ICD-10-CM: R60.9  ICD-9-CM: 782.3  6/3/2012 - 8/3/2019        RESOLVED: Medication adverse effect ICD-10-CM: T50.905A  ICD-9-CM: E947.9  6/3/2012 - 8/3/2019        RESOLVED: Diverticulitis ICD-10-CM: H36.56  ICD-9-CM: 562.11  Unknown - 8/3/2019 RESOLVED: Pre-operative cardiovascular examination, unstable angina ([facility] [address] ICD-10-CM: Z01.810, I20.0  ICD-9-CM: 411.1, V72.81  6/19/2011 - 6/19/2011        RESOLVED: Diverticulosis of colon with hemorrhage ICD-10-CM: K57.31  ICD-9-CM: 562.12  12/2/2010 - 8/3/2019            Subjective:    80 y.o.  female who presented to the Emergency Department complaining of confusion. Worsening over days. Associated with persistent vomiting and diarrhea, present for weeks. Was in our ER 2 days ago, with weakness and hyponatremia, and sent home on Bactrim. She returns today worse, weak, confused and severe hyponatremia. We will admit her for management. (Dr Dianne Perez)    10/20:  Still having loose stools and nausea. Reports she feels generally \"bad. \"  Na+ up to 117. K+ up to 3.4. Confusion at usual levels per daughter. WBC 17K. Phos low at 1.0 - getting KPhos. 10/21:  Nausea improved. Feeling a little better. Daughter wants her to have a regular diet. C/O mild GERD sx. Pt and daughter want her to have Zantac daily. No loose stools overnight. Phos still low - replacing. AM labs otherwise pending. 10/22:  Still with frequent loose stools. Continues to grad improve. Phos grad improving. Na+ stable at 129 - will decrease frequency of blood draws at daughters request.     10/23: Still with frequent stools but had a 6 hour period of time during the night without a stool. Na 133 this am. Tolerating liquids well so will decrease IVF a bit. 10/24: Stools are less frequent but still very watery. Mag is down so will replete IV. K+ normal. Will ask to see if we can get a mid line placed as she is difficult to stick and her arm is swelling at the IV site. Na stable. 10/25: Stools are mushy now and less often. Tolerating diet better. Will stop IVF. Mag and K+ normal. Hb 7.7 this am.     10/26: GI is ok if she goes home. Had urinary retention of >2L yesterday, hope placed.   Pt and da want uro consult. Really don't want to go home with hope unless absolutely necessary. I explained that she will likely need the hope for 1 wk if doesn't pass voiding trial tonight, but will defer to urology's input. José is also concerned that mother is more confused this admission. Has had 2 checks for UTI that were ruled out, but another was ordered today by renal.  Will treat accordingly. Tolerating PO. BMs are getting firmer, pt is happy with this. Da wants her to walk more today. I am fine with this. 10/27: urology rec keep hope for 3-5d and follow up in uro office for voiding trial.  K still low, despite TID dosing. Need to replete mag. BPs running higher, but not needing PRN hydralazine at home or here. Tolerating PO. Only one BM per day and much more solid than previously. 10/28:  Feeling much better overall but K+ down again. K+ down to 2.7 - replacing. Daughter wants hope out today - will see if Urology can see again but otherwise will remove and have nursing watch bladder scan frequently. Review of Systems:   A comprehensive review of systems was negative except for that written in the HPI. Objective:   Physical Exam:   Visit Vitals  /65   Pulse 74   Temp 97.4 °F (36.3 °C)   Resp 18   Ht 5' 5\" (1.651 m)   Wt 62.3 kg (137 lb 5.6 oz)   SpO2 94%   BMI 22.86 kg/m²      O2 Device: Room air  Temp (24hrs), Av.2 °F (36.8 °C), Min:97.4 °F (36.3 °C), Max:99 °F (37.2 °C)    10/27 1901 - 10/28 07  In: -   Out: 600 [Urine:600]   10/26 0701 - 10/27 190  In: 480 [P.O.:480]  Out: 5821 [Urine:1775]  General:  Alert, cooperative at times, frail appearing, no distress, appears stated age. Head:  Normocephalic, without obvious abnormality, atraumatic. Eyes:  Conjunctivae/corneas clear. PERRL, EOMs intact. Throat: Lips, mucosa, and tongue moist..   Neck: Supple, symmetrical, trachea midline, no adenopathy, thyroid: no enlargement/tenderness/nodules, no carotid bruit and no JVD. Lungs:   Clear to auscultation bilaterally. Chest wall:  No tenderness or deformity. Heart:  Regular rate and rhythm, S1, S2 normal, no murmur, click, rub or gallop. Abdomen:   Soft, with mild generalized tenderness. Bowel sounds active. No masses,  No organomegaly. : hope in place with straw colored urine. Extremities: no cyanosis or edema. No calf tenderness or cords. Skin: Skin color, texture, turgor normal. No rashes or lesions   Neurologic:   Alert and oriented.  equal.  Gait not tested at this time. Sensation grossly normal to touch. Gross motor of extremities normal.       Data Review:       Recent Days:  Recent Labs     10/28/19  0512 10/27/19  0347 10/26/19  0406   WBC 8.3 9.8 10.1   HGB 8.0* 8.1* 8.2*   HCT 24.3* 24.1* 24.6*    305 296     Recent Labs     10/28/19  0512 10/27/19  1311 10/27/19  0347 10/26/19  0406    139 139 139   K 2.7* 3.1* 3.2* 3.3*    102 103 107   CO2 31 31 31 27    155* 97 92   BUN 12 12 11 10   CREA 0.64 0.81 0.63 0.69   CA 8.0* 8.1* 8.0* 7.9*   MG  --  1.9  --   --    ALB 2.3*  --  2.3* 2.2*   TBILI 0.4  --  0.3 0.3   SGOT 12*  --  12* 17   ALT 20  --  20 22     No results for input(s): PH, PCO2, PO2, HCO3, FIO2 in the last 72 hours.     24 Hour Results:  Recent Results (from the past 24 hour(s))   METABOLIC PANEL, BASIC    Collection Time: 10/27/19  1:11 PM   Result Value Ref Range    Sodium 139 136 - 145 mmol/L    Potassium 3.1 (L) 3.5 - 5.1 mmol/L    Chloride 102 97 - 108 mmol/L    CO2 31 21 - 32 mmol/L    Anion gap 6 5 - 15 mmol/L    Glucose 155 (H) 65 - 100 mg/dL    BUN 12 6 - 20 MG/DL    Creatinine 0.81 0.55 - 1.02 MG/DL    BUN/Creatinine ratio 15 12 - 20      GFR est AA >60 >60 ml/min/1.73m2    GFR est non-AA >60 >60 ml/min/1.73m2    Calcium 8.1 (L) 8.5 - 10.1 MG/DL   MAGNESIUM    Collection Time: 10/27/19  1:11 PM   Result Value Ref Range    Magnesium 1.9 1.6 - 2.4 mg/dL   CBC WITH AUTOMATED DIFF    Collection Time: 10/28/19  5:12 AM   Result Value Ref Range    WBC 8.3 3.6 - 11.0 K/uL    RBC 2.68 (L) 3.80 - 5.20 M/uL    HGB 8.0 (L) 11.5 - 16.0 g/dL    HCT 24.3 (L) 35.0 - 47.0 %    MCV 90.7 80.0 - 99.0 FL    MCH 29.9 26.0 - 34.0 PG    MCHC 32.9 30.0 - 36.5 g/dL    RDW 14.4 11.5 - 14.5 %    PLATELET 853 392 - 912 K/uL    MPV 8.9 8.9 - 12.9 FL    NRBC 0.0 0  WBC    ABSOLUTE NRBC 0.00 0.00 - 0.01 K/uL    NEUTROPHILS 41 32 - 75 %    LYMPHOCYTES 42 12 - 49 %    MONOCYTES 10 5 - 13 %    EOSINOPHILS 5 0 - 7 %    BASOPHILS 1 0 - 1 %    IMMATURE GRANULOCYTES 1 (H) 0.0 - 0.5 %    ABS. NEUTROPHILS 3.5 1.8 - 8.0 K/UL    ABS. LYMPHOCYTES 3.5 0.8 - 3.5 K/UL    ABS. MONOCYTES 0.8 0.0 - 1.0 K/UL    ABS. EOSINOPHILS 0.4 0.0 - 0.4 K/UL    ABS. BASOPHILS 0.1 0.0 - 0.1 K/UL    ABS. IMM. GRANS. 0.0 0.00 - 0.04 K/UL    DF AUTOMATED     METABOLIC PANEL, COMPREHENSIVE    Collection Time: 10/28/19  5:12 AM   Result Value Ref Range    Sodium 140 136 - 145 mmol/L    Potassium 2.7 (LL) 3.5 - 5.1 mmol/L    Chloride 102 97 - 108 mmol/L    CO2 31 21 - 32 mmol/L    Anion gap 7 5 - 15 mmol/L    Glucose 100 65 - 100 mg/dL    BUN 12 6 - 20 MG/DL    Creatinine 0.64 0.55 - 1.02 MG/DL    BUN/Creatinine ratio 19 12 - 20      GFR est AA >60 >60 ml/min/1.73m2    GFR est non-AA >60 >60 ml/min/1.73m2    Calcium 8.0 (L) 8.5 - 10.1 MG/DL    Bilirubin, total 0.4 0.2 - 1.0 MG/DL    ALT (SGPT) 20 12 - 78 U/L    AST (SGOT) 12 (L) 15 - 37 U/L    Alk.  phosphatase 65 45 - 117 U/L    Protein, total 5.4 (L) 6.4 - 8.2 g/dL    Albumin 2.3 (L) 3.5 - 5.0 g/dL    Globulin 3.1 2.0 - 4.0 g/dL    A-G Ratio 0.7 (L) 1.1 - 2.2         Medications reviewed  Current Facility-Administered Medications   Medication Dose Route Frequency    hydrALAZINE (APRESOLINE) tablet 10 mg  10 mg Oral TID PRN    phosphorus (K PHOS NEUTRAL) 250 mg tablet 2 Tab  2 Tab Oral TID    thiamine mononitrate (B-1) tablet 100 mg  100 mg Oral DAILY    folic acid (FOLVITE) tablet 1 mg  1 mg Oral DAILY    multivitamin with iron tablet 1 Tab  1 Tab Oral DAILY    raNITIdine (ZANTAC) 15 mg/mL syrup 150 mg  150 mg Oral DAILY    rifAXIMin (XIFAXAN) tablet 550 mg  550 mg Oral TIDAC    diphenoxylate-atropine (LOMOTIL) tablet 1 Tab  1 Tab Oral TID PRN    nystatin (MYCOSTATIN) 100,000 unit/mL oral suspension 500,000 Units  500,000 Units Oral QID    amLODIPine (NORVASC) tablet 10 mg  10 mg Oral QHS    budesonide (ENTOCORT EC) capsule 9 mg  9 mg Oral 7am    prochlorperazine (COMPAZINE) injection 5 mg  5 mg IntraVENous Q6H PRN    colestipol (COLESTID) packet 2.5 g  2.5 g Oral DAILY    colestipol (COLESTID) packet 2.5 g  2.5 g Oral QHS    doxazosin (CARDURA) tablet 1 mg  1 mg Oral DAILY    hydrALAZINE (APRESOLINE) 20 mg/mL injection 10 mg  10 mg IntraVENous Q6H PRN    sodium chloride (NS) flush 5-10 mL  5-10 mL IntraVENous PRN    lactobac ac& pc-s.therm-b.anim (GERALDINE Q/RISAQUAD)  1 Cap Oral DAILY    aspirin chewable tablet 81 mg  81 mg Oral QHS    naloxone (NARCAN) injection 0.4 mg  0.4 mg IntraVENous PRN    acetaminophen (TYLENOL) tablet 650 mg  650 mg Oral Q4H PRN    diphenhydrAMINE (BENADRYL) capsule 25 mg  25 mg Oral Q4H PRN    ondansetron (ZOFRAN) injection 4 mg  4 mg IntraVENous Q4H PRN    enoxaparin (LOVENOX) injection 40 mg  40 mg SubCUTAneous Q24H    metoprolol tartrate (LOPRESSOR) tablet 50 mg  50 mg Oral BID    losartan (COZAAR) tablet 100 mg  100 mg Oral DAILY       Care Plan discussed with: Patient/Family and Nurse  Total time spent with patient: 30 minutes.   Clarnce Collet, MD

## 2019-10-28 NOTE — PROGRESS NOTES
2343 - gave PRN dose of hydralazine as ordered. Pt stating \"stomach feels off\" but couldn't describe more, Pt also hot and wants blanket off which she and daughter state is abnormal for her as she is usually cold natured. Pt asking for ice chips, stating they help with stomach. 0019 - Pt stating \"I think I had a mini panic attack, I feel totally fine now. \" Stomach symptoms have resolved completely per pt. Pt now back to baseline. BP better. 0354 - Pt BP up a little more than previous, pt stating stomach \"feels funny\" again and a little bloated. Had pt stand to empty bladder and 475mL emptied, pt also with a large BM. Pt states she feels completely better and that she thought she couldn't stand or go to the bathroom to have a BM because of the hope. Pt educated on how hope works and that she is encouraged to use the restroom as needed. Recheck BP after pt settles back into bed.    0440 - Pt back to bed. BP back WNL for pt.

## 2019-10-28 NOTE — PROGRESS NOTES
10- CASE MANAGEMENT NOTE:  I met with the patient and her daughter, Gutierrez Meek ([phone]), to continue discharge planning. The patient said she is feeling better today and the plan remains for her to return home with her ,[facility] for RN,PT and OT. CM will continue to follow.  Gisela Marie, BSW, CM

## 2019-10-28 NOTE — PROGRESS NOTES
Problem: Mobility Impaired (Adult and Pediatric)  Goal: *Acute Goals and Plan of Care (Insert Text)  Description  FUNCTIONAL STATUS PRIOR TO ADMISSION: Patient was independent and active without use of DME.    HOME SUPPORT PRIOR TO ADMISSION: The patient lived with spouse but did not require assist.    Physical Therapy Goals  Initiated 10/22/2019  1. Patient will move from supine to sit and sit to supine , scoot up and down and roll side to side in bed with independence within 7 day(s). 2.  Patient will transfer from bed to chair and chair to bed with modified independence using the least restrictive device within 7 day(s). 3.  Patient will perform sit to stand with modified independence within 7 day(s). 4.  Patient will ambulate with modified independence for 200 feet with the least restrictive device within 7 day(s). 5.  Patient will ascend/descend 4 stairs with  handrail(s) with modified independence within 7 day(s). Note:   PHYSICAL THERAPY TREATMENT  Patient: aKtlin Dougherty (74 y.o. female)  Date: 10/28/2019  Diagnosis: Hyponatremia [E87.1] Hyponatremia       Precautions:    Chart, physical therapy assessment, plan of care and goals were reviewed. ASSESSMENT  Patient continues with skilled PT services. Pt comes to stand with CGA. Pt ambulated 170ft with no device CGA. Pt has decreased eye site and uses hallway rail for reference. Pt left sitting with family present. Continue goals. Current Level of Function Impacting Discharge (mobility/balance): Pt CGA for basic mobility. PLAN :  Patient continues to benefit from skilled intervention to address the above impairments. Continue treatment per established plan of care. to address goals.     Recommendation for discharge: (in order for the patient to meet his/her long term goals)  Physical therapy at least 2 days/week in the home     This discharge recommendation:  Has been made in collaboration with the attending provider and/or case management    IF patient discharges home will need the following DME:         SUBJECTIVE:       OBJECTIVE DATA SUMMARY:   Critical Behavior:  Neurologic State: Alert  Orientation Level: Oriented X4(but with mild memory impairments noted)  Cognition: Decreased attention/concentration, Follows commands  Safety/Judgement: Awareness of environment, Decreased insight into deficits, Fall prevention  Functional Mobility Training:  Bed Mobility:     Supine to Sit: Supervision  Sit to Supine: Supervision           Transfers:  Sit to Stand: Contact guard assistance  Stand to Sit: Contact guard assistance                             Balance:  Sitting: Intact  Standing: Intact; With support(RW)  Ambulation/Gait Training:  Distance (ft): 170 Feet (ft)  Assistive Device: Gait belt  Ambulation - Level of Assistance: Contact guard assistance                 Base of Support: Narrowed     Speed/Jana: Pace decreased (<100 feet/min)                      Activity Tolerance:   Good  Please refer to the flowsheet for vital signs taken during this treatment.     After treatment patient left in no apparent distress:   Sitting in chair    COMMUNICATION/COLLABORATION:   The patients plan of care was discussed with: Physical Therapist    Valentine Nava PTA   Time Calculation: 23 mins

## 2019-10-28 NOTE — PROGRESS NOTES
Pt with low volume retention. Hope in 3 dayd    Pt may go home in am    Will pull hope at midnight tonight.     Check pvr in am.

## 2019-10-29 VITALS
HEIGHT: 65 IN | RESPIRATION RATE: 17 BRPM | WEIGHT: 137.35 LBS | BODY MASS INDEX: 22.88 KG/M2 | SYSTOLIC BLOOD PRESSURE: 172 MMHG | TEMPERATURE: 98.2 F | DIASTOLIC BLOOD PRESSURE: 83 MMHG | HEART RATE: 58 BPM | OXYGEN SATURATION: 96 %

## 2019-10-29 LAB
ALBUMIN SERPL-MCNC: 2.4 G/DL (ref 3.5–5)
ALBUMIN/GLOB SERPL: 0.8 {RATIO} (ref 1.1–2.2)
ALP SERPL-CCNC: 62 U/L (ref 45–117)
ALT SERPL-CCNC: 20 U/L (ref 12–78)
ANION GAP SERPL CALC-SCNC: 4 MMOL/L (ref 5–15)
AST SERPL-CCNC: 13 U/L (ref 15–37)
BASOPHILS # BLD: 0.1 K/UL (ref 0–0.1)
BASOPHILS NFR BLD: 1 % (ref 0–1)
BILIRUB SERPL-MCNC: 0.4 MG/DL (ref 0.2–1)
BUN SERPL-MCNC: 13 MG/DL (ref 6–20)
BUN/CREAT SERPL: 23 (ref 12–20)
CALCIUM SERPL-MCNC: 8.1 MG/DL (ref 8.5–10.1)
CHLORIDE SERPL-SCNC: 103 MMOL/L (ref 97–108)
CO2 SERPL-SCNC: 32 MMOL/L (ref 21–32)
CREAT SERPL-MCNC: 0.57 MG/DL (ref 0.55–1.02)
DIFFERENTIAL METHOD BLD: ABNORMAL
EOSINOPHIL # BLD: 0.4 K/UL (ref 0–0.4)
EOSINOPHIL NFR BLD: 5 % (ref 0–7)
ERYTHROCYTE [DISTWIDTH] IN BLOOD BY AUTOMATED COUNT: 14.6 % (ref 11.5–14.5)
GLOBULIN SER CALC-MCNC: 3.2 G/DL (ref 2–4)
GLUCOSE SERPL-MCNC: 95 MG/DL (ref 65–100)
HCT VFR BLD AUTO: 23.8 % (ref 35–47)
HGB BLD-MCNC: 7.9 G/DL (ref 11.5–16)
IMM GRANULOCYTES # BLD AUTO: 0 K/UL (ref 0–0.04)
IMM GRANULOCYTES NFR BLD AUTO: 1 % (ref 0–0.5)
LYMPHOCYTES # BLD: 3.2 K/UL (ref 0.8–3.5)
LYMPHOCYTES NFR BLD: 40 % (ref 12–49)
MAGNESIUM SERPL-MCNC: 1.6 MG/DL (ref 1.6–2.4)
MCH RBC QN AUTO: 30.2 PG (ref 26–34)
MCHC RBC AUTO-ENTMCNC: 33.2 G/DL (ref 30–36.5)
MCV RBC AUTO: 90.8 FL (ref 80–99)
MONOCYTES # BLD: 0.8 K/UL (ref 0–1)
MONOCYTES NFR BLD: 9 % (ref 5–13)
NEUTS SEG # BLD: 3.6 K/UL (ref 1.8–8)
NEUTS SEG NFR BLD: 44 % (ref 32–75)
NRBC # BLD: 0 K/UL (ref 0–0.01)
NRBC BLD-RTO: 0 PER 100 WBC
PLATELET # BLD AUTO: 318 K/UL (ref 150–400)
PMV BLD AUTO: 9.4 FL (ref 8.9–12.9)
POTASSIUM SERPL-SCNC: 3.6 MMOL/L (ref 3.5–5.1)
PROT SERPL-MCNC: 5.6 G/DL (ref 6.4–8.2)
RBC # BLD AUTO: 2.62 M/UL (ref 3.8–5.2)
SODIUM SERPL-SCNC: 139 MMOL/L (ref 136–145)
WBC # BLD AUTO: 8.1 K/UL (ref 3.6–11)

## 2019-10-29 PROCEDURE — 83735 ASSAY OF MAGNESIUM: CPT

## 2019-10-29 PROCEDURE — 74011250637 HC RX REV CODE- 250/637: Performed by: FAMILY MEDICINE

## 2019-10-29 PROCEDURE — 85025 COMPLETE CBC W/AUTO DIFF WBC: CPT

## 2019-10-29 PROCEDURE — 80053 COMPREHEN METABOLIC PANEL: CPT

## 2019-10-29 PROCEDURE — 74011250637 HC RX REV CODE- 250/637: Performed by: INTERNAL MEDICINE

## 2019-10-29 PROCEDURE — 77030010547 HC BG URIN W/UMETER -A

## 2019-10-29 PROCEDURE — 77030012865 HC BG URIN LEG MDII -A

## 2019-10-29 PROCEDURE — 51798 US URINE CAPACITY MEASURE: CPT

## 2019-10-29 PROCEDURE — 36415 COLL VENOUS BLD VENIPUNCTURE: CPT

## 2019-10-29 PROCEDURE — 77030005513 HC CATH URETH FOL11 MDII -B

## 2019-10-29 RX ORDER — AMLODIPINE BESYLATE 10 MG/1
10 TABLET ORAL
Qty: 30 TAB | Refills: 0 | Status: SHIPPED | OUTPATIENT
Start: 2019-10-29 | End: 2021-07-23

## 2019-10-29 RX ORDER — MULTIVITAMIN WITH IRON
1 TABLET ORAL DAILY
Qty: 30 TAB | Refills: 0 | Status: SHIPPED | OUTPATIENT
Start: 2019-10-30

## 2019-10-29 RX ORDER — LANOLIN ALCOHOL/MO/W.PET/CERES
400 CREAM (GRAM) TOPICAL 2 TIMES DAILY
Qty: 30 TAB | Refills: 0 | Status: SHIPPED | OUTPATIENT
Start: 2019-10-29 | End: 2021-01-20

## 2019-10-29 RX ADMIN — MAGNESIUM GLUCONATE 500 MG ORAL TABLET 400 MG: 500 TABLET ORAL at 09:12

## 2019-10-29 RX ADMIN — Medication 100 MG: at 09:12

## 2019-10-29 RX ADMIN — FOLIC ACID 1 MG: 1 TABLET ORAL at 09:12

## 2019-10-29 RX ADMIN — BUDESONIDE 9 MG: 3 CAPSULE, GELATIN COATED ORAL at 06:17

## 2019-10-29 RX ADMIN — NYSTATIN 500000 UNITS: 100000 SUSPENSION ORAL at 09:11

## 2019-10-29 RX ADMIN — POTASSIUM BICARBONATE 40 MEQ: 782 TABLET, EFFERVESCENT ORAL at 09:09

## 2019-10-29 RX ADMIN — Medication 1 CAPSULE: at 09:12

## 2019-10-29 RX ADMIN — COLESTIPOL HYDROCHLORIDE 2.5 G: 5 GRANULE, FOR SUSPENSION ORAL at 09:10

## 2019-10-29 RX ADMIN — RIFAXIMIN 550 MG: 550 TABLET ORAL at 11:54

## 2019-10-29 RX ADMIN — LOSARTAN POTASSIUM 100 MG: 50 TABLET, FILM COATED ORAL at 09:12

## 2019-10-29 RX ADMIN — RIFAXIMIN 550 MG: 550 TABLET ORAL at 06:17

## 2019-10-29 RX ADMIN — DIBASIC SODIUM PHOSPHATE, MONOBASIC POTASSIUM PHOSPHATE AND MONOBASIC SODIUM PHOSPHATE 2 TABLET: 852; 155; 130 TABLET ORAL at 09:11

## 2019-10-29 RX ADMIN — METOPROLOL TARTRATE 50 MG: 50 TABLET, FILM COATED ORAL at 09:12

## 2019-10-29 RX ADMIN — DOXAZOSIN 1 MG: 2 TABLET ORAL at 09:11

## 2019-10-29 RX ADMIN — RANITIDINE 150 MG: 15 SYRUP ORAL at 09:10

## 2019-10-29 RX ADMIN — Medication 1 TABLET: at 09:12

## 2019-10-29 NOTE — PROGRESS NOTES
0000-Pt's hope removed per order. Pt DTV 1706-1914. Will continue to monitor. 0615-Pt bladder scanned for 753 mL. Pt voided moderate amount of urine in bathroom. PVR is 650 mL. Dr. King Schools notified. No new orders at this time. Will continue to monitor. Bedside shift change report given to [employer], RN (oncoming nurse) by Sharyle Spates, RN (offgoing nurse). Report included the following information SBAR and Kardex.

## 2019-10-29 NOTE — PROGRESS NOTES
10- CASE MANAGEMENT NOTE:  The patient is being discharged home today and her daughter will transport her. I sent the discharge instructions and discharge summary to [facility] with notification of the discharged-they acknowledged receipt. The daughter signed the second Medicare letter, was given a copy and the original was placed on her chart. Care Management Interventions  PCP Verified by CM: Yes  Mode of Transport at Discharge:  Other (see comment)  Transition of Care Consult (CM Consult): [address]: No  Reason [place]: (Pt choice- followed by another company)  Discharge Durable Medical Equipment: No(Has a rollator)  Physical Therapy Consult: Yes  Occupational Therapy Consult: Yes  Current Support Network: Lives with Spouse, Own Home  Confirm Follow Up Transport: Family  Plan discussed with Pt/Family/Caregiver: Yes  Freedom of Choice Offered: (S) Yes  Discharge Location  Discharge Placement: (Home with  and [facility])    [facility], CM

## 2019-10-29 NOTE — PROGRESS NOTES
Physical Therapy    Chart reviewed and patient cleared for therapy by RN. Upon arrival, patient stating that she has \"been doing fine getting around\" with her daughter supervising, went outside yesterday and up and down halls this morning. Patient notes she is to be discharged shortly and wishes to rest up for the increased activity to follow later today.   Will defer at this time but f/u should patient remain in the hospital.    Johanna Madrigal, MS, PT

## 2019-10-29 NOTE — PROGRESS NOTES
4182- Patient voided, bladder scan performed immediately after urination. 9033- Md made aware of bladder scan results, order received to re insert hope catheter.

## 2019-10-29 NOTE — DISCHARGE SUMMARY
Physician Discharge Summary     Patient ID:    Emmy Whiteside  [id number]  80 y.o.  5/7/1931  Gladys Josue MD    Admit date: 10/19/2019  Discharge date and time: 10/29/2019  Admission Diagnoses: Hyponatremia [E87.1]    Chronic Diagnoses:    Problem List as of 10/29/2019 Date Reviewed: 10/19/2019          Codes Class Noted - Resolved    Leukocytosis ICD-10-CM: D72.829  ICD-9-CM: 288.60  10/19/2019 - Present        Acute metabolic encephalopathy UXG-39-FP: G93.41  ICD-9-CM: 348.31  10/19/2019 - Present        Nausea vomiting and diarrhea ICD-10-CM: R11.2, R19.7  ICD-9-CM: 787.91, 787.01  10/19/2019 - Present        Dehydration ICD-10-CM: E86.0  ICD-9-CM: 276.51  8/3/2019 - Present        * (Principal) Hyponatremia ICD-10-CM: E87.1  ICD-9-CM: 276.1  8/3/2019 - Present        Hypokalemia ICD-10-CM: E87.6  ICD-9-CM: 276.8  8/3/2019 - Present        Macular degeneration ICD-10-CM: H35.30  ICD-9-CM: 362.50  10/30/2013 - Present        Carotid arterial disease (HCC) ICD-10-CM: I73.9  ICD-9-CM: 447.9  Unknown - Present    Overview Signed 10/30/2013 10:11 AM by Salena iHggins MD     mild 0-49%             Coronary atherosclerosis of native coronary artery ICD-10-CM: I25.10  ICD-9-CM: 414.01  Unknown - Present    Overview Signed 2/22/2012 10:44 AM by Salena Higgins MD     Cath 2011 with multivessel cad             S/P CABG (coronary artery bypass graft) ICD-10-CM: Z95.1  ICD-9-CM: V45.81  Unknown - Present    Overview Signed 2/22/2012 10:44 AM by Salena Higgins MD     8-07-32 CABG - OFF PUMP - CABGx 3, lima, eric, epi aortic ultrasound - off pump, rightt endoscopic vein harvest; [id number] [place] [address] [address] to LAD, Svg Ao to OM1 and OM2             S/P CABG x 3 ICD-10-CM: Z95.1  ICD-9-CM: V45.81  6/17/2011 - Present    Overview Signed 7/26/2011 10:51 AM by Wiliam TEAGUE to LAD and SVGs to OM-1 and OM-2. RCA small non-dominant diffusely diseased vessel ungrafted. LV EF at cath was 65%. Essential hypertension, benign ICD-10-CM: I10  ICD-9-CM: 401.1  12/2/2010 - Present        Pure hypercholesterolemia ICD-10-CM: E78.00  ICD-9-CM: 272.0  12/2/2010 - Present        RESOLVED: UTI (urinary tract infection) ICD-10-CM: N39.0  ICD-9-CM: 599.0  8/3/2019 - 10/19/2019        RESOLVED: Sepsis ([facility] ICD-10-CM: A41.9  ICD-9-CM: 038.9, 995.91  8/3/2019 - 10/19/2019        RESOLVED: Fever ICD-10-CM: R50.9  ICD-9-CM: 780.60  8/3/2019 - 10/19/2019        RESOLVED: Left shift ICD-10-CM: D72.89  ICD-9-CM: 288.9  8/3/2019 - 10/19/2019        RESOLVED: Syncope ICD-10-CM: R55  ICD-9-CM: 780.2  8/3/2019 - 10/19/2019        RESOLVED: Anemia ICD-10-CM: D64.9  ICD-9-CM: 285.9  8/3/2019 - 10/19/2019        RESOLVED: Cough due to ACE inhibitor ICD-10-CM: R05, T46.4X5A  ICD-9-CM: 786.2, E942.6  Unknown - 8/3/2019        RESOLVED: Edema ICD-10-CM: R60.9  ICD-9-CM: 782.3  6/3/2012 - 8/3/2019        RESOLVED: Medication adverse effect ICD-10-CM: T50.905A  ICD-9-CM: E947.9  6/3/2012 - 8/3/2019        RESOLVED: Diverticulitis ICD-10-CM: K57.92  ICD-9-CM: 562.11  Unknown - 8/3/2019        RESOLVED: Pre-operative cardiovascular examination, unstable angina ([facility] ICD-10-CM: Z01.810, I20.0  ICD-9-CM: 411.1, V72.81  6/19/2011 - 6/19/2011        RESOLVED: Diverticulosis of colon with hemorrhage ICD-10-CM: K57.31  ICD-9-CM: 562.12  12/2/2010 - 8/3/2019            Discharge Medications:   Current Discharge Medication List      START taking these medications    Details   magnesium oxide (MAG-OX) 400 mg tablet Take 1 Tab by mouth two (2) times a day. Qty: 30 Tab, Refills: 0      multivitamin with iron tablet Take 1 Tab by mouth daily. Qty: 30 Tab, Refills: 0      rifAXIMin (XIFAXAN) 550 mg tablet Take 1 Tab by mouth Before breakfast, lunch, and dinner. Qty: 90 Tab, Refills: 0         CONTINUE these medications which have CHANGED    Details   amLODIPine (NORVASC) 10 mg tablet Take 1 Tab by mouth nightly.   Qty: 30 Tab, Refills: 0 CONTINUE these medications which have NOT CHANGED    Details   camphor-menthol (SARNA ORIGINAL) 0.5-0.5 % lotion Apply  to affected area two (2) times daily as needed for Itching. colestipol (COLESTID) 1 gram tablet Take 2 g by mouth daily. colestipol (COLESTID) 1 gram tablet Take 3 g by mouth nightly. ondansetron (ZOFRAN ODT) 4 mg disintegrating tablet Take 4 mg by mouth every eight (8) hours as needed for Nausea. raNITIdine (ZANTAC) 150 mg tablet Take 150 mg by mouth nightly. atorvastatin (LIPITOR) 20 mg tablet Take 1 Tab by mouth nightly. Qty: 90 Tab, Refills: 3    Associated Diagnoses: Pure hypercholesterolemia      losartan (COZAAR) 100 mg tablet Take 1 Tab by mouth daily. Qty: 30 Tab, Refills: 0      budesonide (ENTOCORT EC) 3 mg capsule Take 3 Caps by mouth daily. Qty: 30 Cap, Refills: 0      L. acidoph & paracasei- S therm- Bifido (GERALDINE-Q/RISAQUAD) 8 billion cell cap cap Take 1 Cap by mouth daily. Qty: 30 Cap, Refills: 0      hydrALAZINE (APRESOLINE) 10 mg tablet Take 1 Tab by mouth three (3) times daily as needed for Other (Systolic BP >602). Qty: 12 Tab, Refills: 0      metoprolol tartrate (LOPRESSOR) 50 mg tablet TAKE ONE TABLET BY MOUTH TWICE A DAY  Qty: 180 Tab, Refills: 3      aspirin 81 mg chewable tablet Take 81 mg by mouth nightly. Associated Diagnoses: Atherosclerosis of native coronary artery without angina pectoris; S/P CABG x 3      doxazosin (CARDURA) 1 mg tablet Take 1 mg by mouth daily. STOP taking these medications       nitrofurantoin, macrocrystal-monohydrate, (MACROBID) 100 mg capsule Comments:   Reason for Stopping:         psyllium husk-aspartame (METAMUCIL FIBER) 3.4 gram pwpk packet Comments:   Reason for Stopping:         chlorthalidone (HYGROTEN) 25 mg tablet Comments:   Reason for Stopping: Follow up Care:    1. Palak Oconnell MD with in 1 weeks  2.  specialists as directed.   Diet:  Regular Diet  Disposition:  Home. Advanced Directive:  Discharge Exam:  [See today's progress note.]  CONSULTATIONS: GI and Urology    Significant Diagnostic Studies:   Recent Labs     10/29/19  0235 10/28/19  0512   WBC 8.1 8.3   HGB 7.9* 8.0*   HCT 23.8* 24.3*    309     Recent Labs     10/29/19  0235 10/28/19  0512 10/27/19  1311    140 139   K 3.6 2.7* 3.1*    102 102   CO2 32 31 31   BUN 13 12 12   CREA 0.57 0.64 0.81   GLU 95 100 155*   CA 8.1* 8.0* 8.1*   MG 1.6  --  1.9     Recent Labs     10/29/19  0235 10/28/19  0512 10/27/19  0347   SGOT 13* 12* 12*   ALT 20 20 20   AP 62 65 66   TBILI 0.4 0.4 0.3   TP 5.6* 5.4* 5.4*   ALB 2.4* 2.3* 2.3*   GLOB 3.2 3.1 3.1     Lab Results   Component Value Date/Time    TSH 0.33 (L) 08/03/2019 07:10 AM     HOSPITAL COURSE & TREATMENT RENDERED:   1. See today's progress note:    Daily Progress Note and Discharge Note: 10/29/2019  Jacob Syed MD         Assessment/Plan:   Hyponatremia / Dehydration / Hypokalemia - POA, due to GI loss, poor PO intake, and Rx of Bactrim.    - ICU initally.     - Renal consulted. - Resolved.       Acute metabolic encephalopathy - POA  - improved     Nausea vomiting and diarrhea/microcytic colitis - POA and persistent for weeks, improving.  - GI has signed off - follow up with them outpt  - Xifaxan and Budesonide, florastor inpt     Coronary atherosclerosis of native coronary artery S/P CABG x 3 / Essential hypertension: some white coat HTN here  - Appears stable.    - Continue metoprolol and ASA   -cont home meds  -received hydralazine PO for severe highs inpt     Pure hypercholesterolemia -   - resume statin     Macular degeneration -   - Supportive care.     Leukocytosis - improving  -stool studies normal  - No Abx until Dx     UTI unlikely - 3rd UA is neg for bacteria     Urinary retention:  --uro rec and f/u outpt for voiding trial     Hypokalemia/hypomag:  -repleting as needed         Subjective: 80 y.o.   female who presented to the Emergency Department complaining of confusion.  Worsening over days.  Associated with persistent vomiting and diarrhea, present for weeks. Was in our ER 2 days ago, with weakness and hyponatremia, and sent home on Bactrim.  She returns today worse, weak, confused and severe hyponatremia.  We will admit her for management. (Dr Reshma Morales)     10/20:  Still having loose stools and nausea. Reports she feels generally \"bad. \"  Na+ up to 117. K+ up to 3.4. Confusion at usual levels per daughter. WBC 17K. Phos low at 1.0 - getting KPhos.       10/21:  Nausea improved. Feeling a little better. Daughter wants her to have a regular diet. C/O mild GERD sx. Pt and daughter want her to have Zantac daily. No loose stools overnight. Phos still low - replacing. AM labs otherwise pending.       10/22:  Still with frequent loose stools. Continues to grad improve. Phos grad improving. Na+ stable at 129 - will decrease frequency of blood draws at daughters request.      10/23: Still with frequent stools but had a 6 hour period of time during the night without a stool. Na 133 this am. Tolerating liquids well so will decrease IVF a bit.      10/24: Stools are less frequent but still very watery. Mag is down so will replete IV. K+ normal. Will ask to see if we can get a mid line placed as she is difficult to stick and her arm is swelling at the IV site. Na stable.      10/25: Stools are mushy now and less often. Tolerating diet better. Will stop IVF. Mag and K+ normal. Hb 7.7 this am.      10/26: GI is ok if she goes home. Had urinary retention of >2L yesterday, hope placed. Pt and da want uro consult. Really don't want to go home with hope unless absolutely necessary. I explained that she will likely need the hope for 1 wk if doesn't pass voiding trial tonight, but will defer to urology's input. Da is also concerned that mother is more confused this admission.   Has had 2 checks for UTI that were ruled out, but another was ordered today by renal.  Will treat accordingly. Tolerating PO. BMs are getting firmer, pt is happy with this. Da wants her to walk more today. I am fine with this.     10/27: urology rec keep hope for 3-5d and follow up in uro office for voiding trial.  K still low, despite TID dosing. Need to replete mag. BPs running higher, but not needing PRN hydralazine at home or here. Tolerating PO. Only one BM per day and much more solid than previously.     10/28:  Feeling much better overall but K+ down again. K+ down to 2.7 - replacing. Daughter wants hope out today - will see if Urology can see again but otherwise will remove and have nursing watch bladder scan frequently.        10/29:   Feeling better and wants to go home. Stools less frequent. Failed voiding trial and hope had to be replaced - leave in place until follow up with Urology outpt. Follow up with GI for microcytic colitis, and length of tx of Xifaxan. Potassium up to 3.6. Follow up with Dr Brendan Espino later this wk or early next wk for hospital f/u, recheck K+ and general medical follow up.       Review of Systems:   A comprehensive review of systems was negative except for that written in the HPI.     Objective:   Physical Exam:   Visit Vitals  /66 (BP 1 Location: Left arm, BP Patient Position: At rest)   Pulse 65   Temp 97.3 °F (36.3 °C)   Resp 17   Ht 5' 5\" (1.651 m)   Wt 62.3 kg (137 lb 5.6 oz)   SpO2 96%   BMI 22.86 kg/m²      O2 Device: Room air  Temp (24hrs), Av.9 °F (36.6 °C), Min:97.3 °F (36.3 °C), Max:98.2 °F (36.8 °C)    10/28 1901 - 10/29 0700  In: 480 [P.O.:480]  Out: 450 [Urine:450]   10/27 0701 - 10/28 1900  In: 180 [P.O.:180]  Out: 190 [Urine:1775]  General:  Alert, cooperative at times, frail appearing, no distress, appears stated age. Head:  Normocephalic, without obvious abnormality, atraumatic. Eyes:  Conjunctivae/corneas clear. PERRL, EOMs intact. Throat: Lips, mucosa, and tongue moist..   Neck: Supple, symmetrical, trachea midline, no adenopathy, thyroid: no enlargement/tenderness/nodules, no carotid bruit and no JVD. Lungs:   Clear to auscultation bilaterally. Chest wall:  No tenderness or deformity. Heart:  Regular rate and rhythm, S1, S2 normal, no murmur, click, rub or gallop. Abdomen:   Soft, with mild generalized tenderness. Bowel sounds active. No masses,  No organomegaly. : hope in place with straw colored urine. Extremities: no cyanosis or edema. No calf tenderness or cords. Skin: Skin color, texture, turgor normal. No rashes or lesions   Neurologic:   Alert and oriented.  equal.  Gait not tested at this time. Sensation grossly normal to touch.   Gross motor of extremities normal.       Signed:  Divya Fabian MD  10/29/2019  11:57 AM

## 2019-10-29 NOTE — PROGRESS NOTES
Bedside shift change report given to Domenic Lowe (oncoming nurse) by Martha Garza (offgoing nurse). Report included the following information SBAR, Kardex, MAR and Recent Results.

## 2019-10-29 NOTE — PROGRESS NOTES
Discharge info reviewed with patient and daughter, verbalizes understanding. Peripheral IV removed. Made aware of meds available at pharmacy. Teaching provided regarding hope cath care and management. Daughter has already scheduled f/u appt with PCP and urology for this week.

## 2019-10-29 NOTE — PROGRESS NOTES
Daily Progress Note and Discharge Note: 10/29/2019  Alexy Goff MD    Assessment/Plan:   Hyponatremia / Dehydration / Hypokalemia - POA, due to GI loss, poor PO intake, and Rx of Bactrim. - ICU initally. - Renal consulted. - Resolved.       Acute metabolic encephalopathy - POA  - improved     Nausea vomiting and diarrhea/microcytic colitis - POA and persistent for weeks, improving.  - GI has signed off - follow up with them outpt  - Xifaxan and Budesonide, florastor inpt     Coronary atherosclerosis of native coronary artery S/P CABG x 3 / Essential hypertension: some white coat HTN here  - Appears stable.    - Continue metoprolol and ASA   -cont home meds  -received hydralazine PO for severe highs inpt    Pure hypercholesterolemia -   - resume statin     Macular degeneration -   - Supportive care.     Leukocytosis - improving  -stool studies normal  - No Abx until Dx     UTI unlikely - 3rd UA is neg for bacteria    Urinary retention:  --uro rec and f/u outpt for voiding trial    Hypokalemia/hypomag:  -repleting as needed        Problem List:  Problem List as of 10/29/2019 Date Reviewed: 10/19/2019          Codes Class Noted - Resolved    Leukocytosis ICD-10-CM: D72.829  ICD-9-CM: 288.60  10/19/2019 - Present        Acute metabolic encephalopathy FFS-83-XG: G93.41  ICD-9-CM: 348.31  10/19/2019 - Present        Nausea vomiting and diarrhea ICD-10-CM: R11.2, R19.7  ICD-9-CM: 787.91, 787.01  10/19/2019 - Present        Dehydration ICD-10-CM: E86.0  ICD-9-CM: 276.51  8/3/2019 - Present        * (Principal) Hyponatremia ICD-10-CM: E87.1  ICD-9-CM: 276.1  8/3/2019 - Present        Hypokalemia ICD-10-CM: E87.6  ICD-9-CM: 276.8  8/3/2019 - Present        Macular degeneration ICD-10-CM: H35.30  ICD-9-CM: 362.50  10/30/2013 - Present        Carotid arterial disease ([facility] [address] ICD-10-CM: I73.9  ICD-9-CM: 447.9  Unknown - Present    Overview Signed 10/30/2013 10:11 AM by Alex Simmons, MD     mild 0-49%             Coronary atherosclerosis of native coronary artery ICD-10-CM: I25.10  ICD-9-CM: 414.01  Unknown - Present    Overview Signed 2/22/2012 10:44 AM by Akua Whipple MD     Cath 2011 with multivessel cad             S/P CABG (coronary artery bypass graft) ICD-10-CM: Z95.1  ICD-9-CM: V45.81  Unknown - Present    Overview Signed 2/22/2012 10:44 AM by Akua Whipple MD     8-05-41 CABG - OFF PUMP - CABGx 3, lima, eric, epi aortic ultrasound - off pump, rightt endoscopic vein harvest; [id number] to LAD, Svg Ao to OM1 and OM2             S/P CABG x 3 ICD-10-CM: Z95.1  ICD-9-CM: V45.81  6/17/2011 - Present    Overview Signed 7/26/2011 10:51 AM by Kelvin TEAGUE to LAD and SVGs to OM-1 and OM-2. RCA small non-dominant diffusely diseased vessel ungrafted. LV EF at cath was 65%.              Essential hypertension, benign ICD-10-CM: I10  ICD-9-CM: 401.1  12/2/2010 - Present        Pure hypercholesterolemia ICD-10-CM: E78.00  ICD-9-CM: 272.0  12/2/2010 - Present        RESOLVED: UTI (urinary tract infection) ICD-10-CM: N39.0  ICD-9-CM: 599.0  8/3/2019 - 10/19/2019        RESOLVED: Sepsis ([address] ICD-10-CM: A41.9  ICD-9-CM: 038.9, 995.91  8/3/2019 - 10/19/2019        RESOLVED: Fever ICD-10-CM: R50.9  ICD-9-CM: 780.60  8/3/2019 - 10/19/2019        RESOLVED: Left shift ICD-10-CM: D72.89  ICD-9-CM: 288.9  8/3/2019 - 10/19/2019        RESOLVED: Syncope ICD-10-CM: R55  ICD-9-CM: 780.2  8/3/2019 - 10/19/2019        RESOLVED: Anemia ICD-10-CM: D64.9  ICD-9-CM: 285.9  8/3/2019 - 10/19/2019        RESOLVED: Cough due to ACE inhibitor ICD-10-CM: R05, T46.4X5A  ICD-9-CM: 786.2, E942.6  Unknown - 8/3/2019        RESOLVED: Edema ICD-10-CM: R60.9  ICD-9-CM: 782.3  6/3/2012 - 8/3/2019        RESOLVED: Medication adverse effect ICD-10-CM: T50.905A  ICD-9-CM: E947.9  6/3/2012 - 8/3/2019        RESOLVED: Diverticulitis ICD-10-CM: I08.35  ICD-9-CM: 562.11  Unknown - 8/3/2019 RESOLVED: Pre-operative cardiovascular examination, unstable angina ([facility] [address] ICD-10-CM: Z01.810, I20.0  ICD-9-CM: 411.1, V72.81  6/19/2011 - 6/19/2011        RESOLVED: Diverticulosis of colon with hemorrhage ICD-10-CM: K57.31  ICD-9-CM: 562.12  12/2/2010 - 8/3/2019            Subjective:    80 y.o.  female who presented to the Emergency Department complaining of confusion. Worsening over days. Associated with persistent vomiting and diarrhea, present for weeks. Was in our ER 2 days ago, with weakness and hyponatremia, and sent home on Bactrim. She returns today worse, weak, confused and severe hyponatremia. We will admit her for management. (Dr Portia Luo)    10/20:  Still having loose stools and nausea. Reports she feels generally \"bad. \"  Na+ up to 117. K+ up to 3.4. Confusion at usual levels per daughter. WBC 17K. Phos low at 1.0 - getting KPhos. 10/21:  Nausea improved. Feeling a little better. Daughter wants her to have a regular diet. C/O mild GERD sx. Pt and daughter want her to have Zantac daily. No loose stools overnight. Phos still low - replacing. AM labs otherwise pending. 10/22:  Still with frequent loose stools. Continues to grad improve. Phos grad improving. Na+ stable at 129 - will decrease frequency of blood draws at daughters request.     10/23: Still with frequent stools but had a 6 hour period of time during the night without a stool. Na 133 this am. Tolerating liquids well so will decrease IVF a bit. 10/24: Stools are less frequent but still very watery. Mag is down so will replete IV. K+ normal. Will ask to see if we can get a mid line placed as she is difficult to stick and her arm is swelling at the IV site. Na stable. 10/25: Stools are mushy now and less often. Tolerating diet better. Will stop IVF. Mag and K+ normal. Hb 7.7 this am.     10/26: GI is ok if she goes home. Had urinary retention of >2L yesterday, hope placed. Pt and da want uro consult. Really don't want to go home with hope unless absolutely necessary. I explained that she will likely need the hope for 1 wk if doesn't pass voiding trial tonight, but will defer to urology's input. José is also concerned that mother is more confused this admission. Has had 2 checks for UTI that were ruled out, but another was ordered today by renal.  Will treat accordingly. Tolerating PO. BMs are getting firmer, pt is happy with this. Da wants her to walk more today. I am fine with this. 10/27: urology rec keep hope for 3-5d and follow up in uro office for voiding trial.  K still low, despite TID dosing. Need to replete mag. BPs running higher, but not needing PRN hydralazine at home or here. Tolerating PO. Only one BM per day and much more solid than previously. 10/28:  Feeling much better overall but K+ down again. K+ down to 2.7 - replacing. Daughter wants hope out today - will see if Urology can see again but otherwise will remove and have nursing watch bladder scan frequently. 10/29:   Feeling better and wants to go home. Stools less frequent. Failed voiding trial and hope had to be replaced - leave in place until follow up with Urology outpt. Follow up with GI for microcytic colitis, and length of tx of Xifaxan. Potassium up to 3.6. Follow up with Dr Katie Jaramillo later this wk or early next wk for hospital f/u, recheck K+ and general medical follow up. Review of Systems:   A comprehensive review of systems was negative except for that written in the HPI.     Objective:   Physical Exam:   Visit Vitals  /66 (BP 1 Location: Left arm, BP Patient Position: At rest)   Pulse 65   Temp 97.3 °F (36.3 °C)   Resp 17   Ht 5' 5\" (1.651 m)   Wt 62.3 kg (137 lb 5.6 oz)   SpO2 96%   BMI 22.86 kg/m²      O2 Device: Room air  Temp (24hrs), Av.9 °F (36.6 °C), Min:97.3 °F (36.3 °C), Max:98.2 °F (36.8 °C)    10/28 1901 - 10/29 0700  In: 480 [P.O.:480]  Out: 450 [Urine:450]   10/27 0701 - 10/28 1900  In: 180 [P.O.:180]  Out: 1775 [Urine:1775]  General:  Alert, cooperative at times, frail appearing, no distress, appears stated age. Head:  Normocephalic, without obvious abnormality, atraumatic. Eyes:  Conjunctivae/corneas clear. PERRL, EOMs intact. Throat: Lips, mucosa, and tongue moist..   Neck: Supple, symmetrical, trachea midline, no adenopathy, thyroid: no enlargement/tenderness/nodules, no carotid bruit and no JVD. Lungs:   Clear to auscultation bilaterally. Chest wall:  No tenderness or deformity. Heart:  Regular rate and rhythm, S1, S2 normal, no murmur, click, rub or gallop. Abdomen:   Soft, with mild generalized tenderness. Bowel sounds active. No masses,  No organomegaly. : hope in place with straw colored urine. Extremities: no cyanosis or edema. No calf tenderness or cords. Skin: Skin color, texture, turgor normal. No rashes or lesions   Neurologic:   Alert and oriented.  equal.  Gait not tested at this time. Sensation grossly normal to touch. Gross motor of extremities normal.       Data Review:       Recent Days:  Recent Labs     10/29/19  0235 10/28/19  0512 10/27/19  0347   WBC 8.1 8.3 9.8   HGB 7.9* 8.0* 8.1*   HCT 23.8* 24.3* 24.1*    309 305     Recent Labs     10/29/19  0235 10/28/19  0512 10/27/19  1311 10/27/19  0347    140 139 139   K 3.6 2.7* 3.1* 3.2*    102 102 103   CO2 32 31 31 31   GLU 95 100 155* 97   BUN 13 12 12 11   CREA 0.57 0.64 0.81 0.63   CA 8.1* 8.0* 8.1* 8.0*   MG 1.6  --  1.9  --    ALB 2.4* 2.3*  --  2.3*   TBILI 0.4 0.4  --  0.3   SGOT 13* 12*  --  12*   ALT 20 20  --  20     No results for input(s): PH, PCO2, PO2, HCO3, FIO2 in the last 72 hours.     24 Hour Results:  Recent Results (from the past 24 hour(s))   CBC WITH AUTOMATED DIFF    Collection Time: 10/29/19  2:35 AM   Result Value Ref Range    WBC 8.1 3.6 - 11.0 K/uL    RBC 2.62 (L) 3.80 - 5.20 M/uL    HGB 7.9 (L) 11.5 - 16.0 g/dL    HCT 23.8 (L) 35.0 - 47.0 %    MCV 90.8 80.0 - 99.0 FL    MCH 30.2 26.0 - 34.0 PG    MCHC 33.2 30.0 - 36.5 g/dL    RDW 14.6 (H) 11.5 - 14.5 %    PLATELET 489 403 - 420 K/uL    MPV 9.4 8.9 - 12.9 FL    NRBC 0.0 0  WBC    ABSOLUTE NRBC 0.00 0.00 - 0.01 K/uL    NEUTROPHILS 44 32 - 75 %    LYMPHOCYTES 40 12 - 49 %    MONOCYTES 9 5 - 13 %    EOSINOPHILS 5 0 - 7 %    BASOPHILS 1 0 - 1 %    IMMATURE GRANULOCYTES 1 (H) 0.0 - 0.5 %    ABS. NEUTROPHILS 3.6 1.8 - 8.0 K/UL    ABS. LYMPHOCYTES 3.2 0.8 - 3.5 K/UL    ABS. MONOCYTES 0.8 0.0 - 1.0 K/UL    ABS. EOSINOPHILS 0.4 0.0 - 0.4 K/UL    ABS. BASOPHILS 0.1 0.0 - 0.1 K/UL    ABS. IMM. GRANS. 0.0 0.00 - 0.04 K/UL    DF AUTOMATED     METABOLIC PANEL, COMPREHENSIVE    Collection Time: 10/29/19  2:35 AM   Result Value Ref Range    Sodium 139 136 - 145 mmol/L    Potassium 3.6 3.5 - 5.1 mmol/L    Chloride 103 97 - 108 mmol/L    CO2 32 21 - 32 mmol/L    Anion gap 4 (L) 5 - 15 mmol/L    Glucose 95 65 - 100 mg/dL    BUN 13 6 - 20 MG/DL    Creatinine 0.57 0.55 - 1.02 MG/DL    BUN/Creatinine ratio 23 (H) 12 - 20      GFR est AA >60 >60 ml/min/1.73m2    GFR est non-AA >60 >60 ml/min/1.73m2    Calcium 8.1 (L) 8.5 - 10.1 MG/DL    Bilirubin, total 0.4 0.2 - 1.0 MG/DL    ALT (SGPT) 20 12 - 78 U/L    AST (SGOT) 13 (L) 15 - 37 U/L    Alk.  phosphatase 62 45 - 117 U/L    Protein, total 5.6 (L) 6.4 - 8.2 g/dL    Albumin 2.4 (L) 3.5 - 5.0 g/dL    Globulin 3.2 2.0 - 4.0 g/dL    A-G Ratio 0.8 (L) 1.1 - 2.2     MAGNESIUM    Collection Time: 10/29/19  2:35 AM   Result Value Ref Range    Magnesium 1.6 1.6 - 2.4 mg/dL       Medications reviewed  Current Facility-Administered Medications   Medication Dose Route Frequency    magnesium oxide (MAG-OX) tablet 400 mg  400 mg Oral BID    potassium bicarb-citric acid (EFFER-K) tablet 40 mEq  40 mEq Oral BID    hydrALAZINE (APRESOLINE) tablet 10 mg  10 mg Oral TID PRN    phosphorus (K PHOS NEUTRAL) 250 mg tablet 2 Tab  2 Tab Oral TID    thiamine mononitrate (B-1) tablet 100 mg  100 mg Oral DAILY    folic acid (FOLVITE) tablet 1 mg  1 mg Oral DAILY    multivitamin with iron tablet 1 Tab  1 Tab Oral DAILY    raNITIdine (ZANTAC) 15 mg/mL syrup 150 mg  150 mg Oral DAILY    rifAXIMin (XIFAXAN) tablet 550 mg  550 mg Oral TIDAC    nystatin (MYCOSTATIN) 100,000 unit/mL oral suspension 500,000 Units  500,000 Units Oral QID    amLODIPine (NORVASC) tablet 10 mg  10 mg Oral QHS    budesonide (ENTOCORT EC) capsule 9 mg  9 mg Oral 7am    prochlorperazine (COMPAZINE) injection 5 mg  5 mg IntraVENous Q6H PRN    colestipol (COLESTID) packet 2.5 g  2.5 g Oral DAILY    colestipol (COLESTID) packet 2.5 g  2.5 g Oral QHS    doxazosin (CARDURA) tablet 1 mg  1 mg Oral DAILY    hydrALAZINE (APRESOLINE) 20 mg/mL injection 10 mg  10 mg IntraVENous Q6H PRN    sodium chloride (NS) flush 5-10 mL  5-10 mL IntraVENous PRN    lactobac ac& pc-s.therm-b.anim (GERALDINE Q/RISAQUAD)  1 Cap Oral DAILY    aspirin chewable tablet 81 mg  81 mg Oral QHS    naloxone (NARCAN) injection 0.4 mg  0.4 mg IntraVENous PRN    acetaminophen (TYLENOL) tablet 650 mg  650 mg Oral Q4H PRN    diphenhydrAMINE (BENADRYL) capsule 25 mg  25 mg Oral Q4H PRN    ondansetron (ZOFRAN) injection 4 mg  4 mg IntraVENous Q4H PRN    enoxaparin (LOVENOX) injection 40 mg  40 mg SubCUTAneous Q24H    metoprolol tartrate (LOPRESSOR) tablet 50 mg  50 mg Oral BID    losartan (COZAAR) tablet 100 mg  100 mg Oral DAILY       Care Plan discussed with: Patient/Family/GI/ and Nurse  Total time spent with patient: 30 minutes.   Merline Yan MD

## 2019-10-29 NOTE — DISCHARGE INSTRUCTIONS
Patient Discharge Instructions    Collin Barragan / [id number] : 1931    Admitted 10/19/2019 Discharged: 10/29/2019 11:54 AM     ACUTE DIAGNOSES:  Hyponatremia [E87.1]    CHRONIC MEDICAL DIAGNOSES:  Problem List as of 10/29/2019 Date Reviewed: 10/19/2019          Codes Class Noted - Resolved    Leukocytosis ICD-10-CM: D72.829  ICD-9-CM: 288.60  10/19/2019 - Present        Acute metabolic encephalopathy T-68-: G93.41  ICD-9-CM: 348.31  10/19/2019 - Present        Nausea vomiting and diarrhea ICD-10-CM: R11.2, R19.7  ICD-9-CM: 787.91, 787.01  10/19/2019 - Present        Dehydration ICD-10-CM: E86.0  ICD-9-CM: 276.51  8/3/2019 - Present        * (Principal) Hyponatremia ICD-10-CM: E87.1  ICD-9-CM: 276.1  8/3/2019 - Present        Hypokalemia ICD-10-CM: E87.6  ICD-9-CM: 276.8  8/3/2019 - Present        Macular degeneration ICD-10-CM: H35.30  ICD-9-CM: 362.50  10/30/2013 - Present        Carotid arterial disease (HCC) ICD-10-CM: I73.9  ICD-9-CM: 447.9  Unknown - Present    Overview Signed 10/30/2013 10:11 AM by Akua Whipple MD     mild 0-49%             Coronary atherosclerosis of native coronary artery ICD-10-CM: I25.10  ICD-9-CM: 414.01  Unknown - Present    Overview Signed 2012 10:44 AM by Akua Whipple MD     Cath  with multivessel cad             S/P CABG (coronary artery bypass graft) ICD-10-CM: Z95.1  ICD-9-CM: V45.81  Unknown - Present    Overview Signed 2012 10:44 AM by Akua Whipple MD     3-63-07 CABG - OFF PUMP - CABGx 3, lima, eric, epi aortic ultrasound - off pump, rightt endoscopic vein harvest; [id number] [facility] [address] to LAD, Svg Ao to OM1 and OM2             S/P CABG x 3 ICD-10-CM: Z95.1  ICD-9-CM: V45.81  2011 - Present    Overview Signed 2011 10:51 AM by Kelvin TEAGUE to LAD and SVGs to OM-1 and OM-2. RCA small non-dominant diffusely diseased vessel ungrafted. LV EF at cath was 65%.              Essential hypertension, benign ICD-10-CM: I10  ICD-9-CM: 401.1  12/2/2010 - Present        Pure hypercholesterolemia ICD-10-CM: E78.00  ICD-9-CM: 272.0  12/2/2010 - Present        RESOLVED: UTI (urinary tract infection) ICD-10-CM: N39.0  ICD-9-CM: 599.0  8/3/2019 - 10/19/2019        RESOLVED: Sepsis ([facility] ICD-10-CM: A41.9  ICD-9-CM: 038.9, 995.91  8/3/2019 - 10/19/2019        RESOLVED: Fever ICD-10-CM: R50.9  ICD-9-CM: 780.60  8/3/2019 - 10/19/2019        RESOLVED: Left shift ICD-10-CM: D72.89  ICD-9-CM: 288.9  8/3/2019 - 10/19/2019        RESOLVED: Syncope ICD-10-CM: R55  ICD-9-CM: 780.2  8/3/2019 - 10/19/2019        RESOLVED: Anemia ICD-10-CM: D64.9  ICD-9-CM: 285.9  8/3/2019 - 10/19/2019        RESOLVED: Cough due to ACE inhibitor ICD-10-CM: R05, T46.4X5A  ICD-9-CM: 786.2, E942.6  Unknown - 8/3/2019        RESOLVED: Edema ICD-10-CM: R60.9  ICD-9-CM: 782.3  6/3/2012 - 8/3/2019        RESOLVED: Medication adverse effect ICD-10-CM: T50.905A  ICD-9-CM: E947.9  6/3/2012 - 8/3/2019        RESOLVED: Diverticulitis ICD-10-CM: K57.92  ICD-9-CM: 562.11  Unknown - 8/3/2019        RESOLVED: Pre-operative cardiovascular examination, unstable angina ([facility] ICD-10-CM: Z01.810, I20.0  ICD-9-CM: 411.1, V72.81  6/19/2011 - 6/19/2011        RESOLVED: Diverticulosis of colon with hemorrhage ICD-10-CM: K57.31  ICD-9-CM: 562.12  12/2/2010 - 8/3/2019              DISCHARGE MEDICATIONS:         · It is important that you take the medication exactly as they are prescribed. · Keep your medication in the bottles provided by the pharmacist and keep a list of the medication names, dosages, and times to be taken in your wallet. · Do not take other medications without consulting your doctor.        DIET: Regular    ACTIVITY: Activity as tolerated    ADDITIONAL INFORMATION: If you experience any of the following symptoms then please call your primary care physician or return to the emergency room if you cannot get hold of your doctor: Fever, chills, nausea, vomiting, diarrhea, change in mentation, falling, bleeding, shortness of breath. FOLLOW UP CARE:  Dr. Sara Carcamo MD  you are to call and set up an appointment to see them with in 1 week. Follow-up with Gastroenterology and Urology w/i 1-2 wks or as directed by them      Information obtained by :  I understand that if any problems occur once I am at home I am to contact my physician. I understand and acknowledge receipt of the instructions indicated above.                                                                                                                                            Physician's or R.N.'s Signature                                                                  Date/Time                                                                                                                                              Patient or Representative Signature                                                          Date/Time

## 2019-12-12 RX ORDER — METOPROLOL TARTRATE 50 MG/1
50 TABLET ORAL 2 TIMES DAILY
Qty: 180 TAB | Refills: 0 | Status: SHIPPED | OUTPATIENT
Start: 2019-12-12 | End: 2020-09-09

## 2019-12-12 NOTE — TELEPHONE ENCOUNTER
Request for metoprolol 50mg BID. Last office visit 10-31-18, next office visit needs to be scheduled, left message with pharmacy.  Refills per verbal order from Dr. Jamar Ross.

## 2019-12-31 RX ORDER — LOSARTAN POTASSIUM 100 MG/1
100 TABLET ORAL DAILY
Qty: 90 TAB | Refills: 0 | Status: SHIPPED | OUTPATIENT
Start: 2019-12-31 | End: 2020-03-05

## 2019-12-31 NOTE — TELEPHONE ENCOUNTER
Request for Losartan 100mg daily. Last office visit 10-31-18, next office visit needs to be scheduled, left message with pharmacy.  Refills per verbal order from Dr. Lion Muñoz.

## 2020-02-19 ENCOUNTER — OFFICE VISIT (OUTPATIENT)
Dept: CARDIOLOGY CLINIC | Age: 85
End: 2020-02-19

## 2020-02-19 ENCOUNTER — TELEPHONE (OUTPATIENT)
Dept: CARDIOLOGY CLINIC | Age: 85
End: 2020-02-19

## 2020-02-19 VITALS
DIASTOLIC BLOOD PRESSURE: 62 MMHG | RESPIRATION RATE: 16 BRPM | OXYGEN SATURATION: 97 % | BODY MASS INDEX: 22.82 KG/M2 | HEIGHT: 65 IN | HEART RATE: 58 BPM | WEIGHT: 137 LBS | SYSTOLIC BLOOD PRESSURE: 130 MMHG

## 2020-02-19 DIAGNOSIS — I10 ESSENTIAL HYPERTENSION, BENIGN: ICD-10-CM

## 2020-02-19 DIAGNOSIS — I25.10 ATHEROSCLEROSIS OF NATIVE CORONARY ARTERY OF NATIVE HEART WITHOUT ANGINA PECTORIS: Primary | ICD-10-CM

## 2020-02-19 DIAGNOSIS — D50.9 IRON DEFICIENCY ANEMIA, UNSPECIFIED IRON DEFICIENCY ANEMIA TYPE: ICD-10-CM

## 2020-02-19 DIAGNOSIS — Z95.1 S/P CABG X 3: ICD-10-CM

## 2020-02-19 DIAGNOSIS — E78.00 PURE HYPERCHOLESTEROLEMIA: ICD-10-CM

## 2020-02-19 DIAGNOSIS — R05.8 COUGH DUE TO ACE INHIBITOR: ICD-10-CM

## 2020-02-19 DIAGNOSIS — T46.4X5A COUGH DUE TO ACE INHIBITOR: ICD-10-CM

## 2020-02-19 RX ORDER — CHLORTHALIDONE 25 MG/1
TABLET ORAL DAILY
COMMUNITY

## 2020-02-19 RX ORDER — SAME BUTANEDISULFONATE/BETAINE 400-600 MG
250 POWDER IN PACKET (EA) ORAL 2 TIMES DAILY
COMMUNITY
End: 2021-01-12 | Stop reason: SDUPTHER

## 2020-02-19 RX ORDER — FAMOTIDINE 10 MG/1
10 TABLET ORAL 2 TIMES DAILY
COMMUNITY

## 2020-02-19 NOTE — PROGRESS NOTES
Thania Garcia     5/7/1931       Jason Cummings MD, [facility]  Date of Visit-2/19/2020   PCP is More Spicer MD   [facility] [place]  [facility]  HPI:  Thania Garcia is a 80 y.o. female   Last seen in October of 2018 with CAD, CABG, HTN and dyslipidemia. Unable to tolerate statin or ACE. We increased Norvasc, losartan, and Cozaar in February. And by March 2018 BP seems to be at goal.     Since last visit has been hospitalized in October with hyponatremia and placed on Xifaxin this was thought due to GI loss and poor PO intake. She had presented in with confusion. Previously admitted August of 2019 with presyncope and diarrhea and found to also have a UTI. Pt is present with her  and daughter. Overall the pt states she is doing well. Pt reports that she bruises easily. Pt denies any cardiac issues when she was admitted into the hospital. Pt notes that she has become \"forgetful\". Pt states she is singing and listening to books on tape to help with her memory. Pt is seeing Dr. Martha Romano for trouble swallowing. Denies chest pain, edema, syncope or shortness of breath at rest, has no tachycardia, palpitations or sense of arrhythmia. EKG: SB at 58, nonspecific ST depression, nonspecific T-abnormality    Assessment/Plan:    Patient Instructions   We will see you back for an annual follow up. 1. Atherosclerosis of native coronary artery of native heart without angina pectoris  Has no angina. She has been pain free since CABG and has tolerated BB and ASA. She has some mild bruising. Is only on a single blood thinner. On high potency statin. - AMB POC EKG ROUTINE W/ 12 LEADS, INTER & REP    2. Essential hypertension, benign  Multiple meds. Could consider a change in Amlodipine if she develops low BP.    At goal , meds and possible side effects reviewed and patient denies  Key CAD CHF Meds             chlorthalidone (HYGROTEN) 25 mg tablet (Taking) Take  by mouth daily. losartan (COZAAR) 100 mg tablet (Taking) Take 1 Tab by mouth daily. metoprolol tartrate (LOPRESSOR) 50 mg tablet (Taking) Take 1 Tab by mouth two (2) times a day. amLODIPine (NORVASC) 10 mg tablet (Taking) Take 1 Tab by mouth nightly. atorvastatin (LIPITOR) 20 mg tablet (Taking) Take 1 Tab by mouth nightly. aspirin 81 mg chewable tablet (Taking) Take 81 mg by mouth nightly. doxazosin (CARDURA) 1 mg tablet (Taking) Take 1 mg by mouth daily. colestipol (COLESTID) 1 gram tablet Take 2 g by mouth daily. colestipol (COLESTID) 1 gram tablet Take 3 g by mouth nightly. hydrALAZINE (APRESOLINE) 10 mg tablet Take 1 Tab by mouth three (3) times daily as needed for Other (Systolic BP >247). BP Readings from Last 6 Encounters:   02/19/20 130/62   10/29/19 172/83   10/17/19 148/64   08/11/19 159/64   10/31/18 110/60   02/07/18 130/56        3. Pure hypercholesterolemia  Lipids on high potency statin as appropriate for secondary prevention. Key Antihyperlipidemia Meds             atorvastatin (LIPITOR) 20 mg tablet (Taking) Take 1 Tab by mouth nightly. colestipol (COLESTID) 1 gram tablet Take 2 g by mouth daily. colestipol (COLESTID) 1 gram tablet Take 3 g by mouth nightly. At goal , denies excess muscle aches or new liver issues  Lab Results   Component Value Date/Time    LDL, calculated 82 01/26/2018 09:15 AM       4. Iron deficiency anemia, unspecified iron deficiency anemia type  With anemia. She is taking iron. I will recheck her CBC and BMP and forward to her PCP. If she remains persistently anemic would she benefit from iron infusion. 5. S/P CABG x 3  6. Cough due to ACE inhibitor  7. Dementia as discussed in HPI  Future Appointments   Date Time Provider Dalia Khan   4/1/2020  1:40 PM Denis Zavaleta  [address]         F/u in 1 year     Impression:   1.  Atherosclerosis of native coronary artery of native heart without angina pectoris    2. Essential hypertension, benign    3. Pure hypercholesterolemia    4. Iron deficiency anemia, unspecified iron deficiency anemia type    5. S/P CABG x 3    6. Cough due to ACE inhibitor       Cardiac History:   Cath 6-:-- Global left ventricular function was normal. EF calculated by contrast  ventriculography was 65 %. -- CORONARY CIRCULATION:  -- Proximal LAD: There was a 50 % stenosis. -- Mid LAD: There was a long diffusely diseased segment tapering down to  a 95 % stenosis. With a 70% stenosis more distally. -- Proximal  circumflex: There was a very proximal 30-35% followed by a 90 % stenosis. -- 1st obtuse marginal: There was a 90 % stenosis. -- RCA: The vessel was small sized. Non-dominant vessel. -- Ostial RCA: There was a 90 % stenosis. -- Proximal RCA: There was a 50- 70 % long stenosis. -- Mid RCA: There was a 80 % stenosis in the proximal portion of this  Segment. CABG- CABG - OFF PUMP - CABGx 3, lima, eric, epi aortic ultrasound - off pump, rightt endoscopic vein harvest; [address] [place] [address] Calvin Penag Ao to OM1 and OM2     ROS-except as noted above. . A complete cardiac and respiratory are reviewed and negative except as above ; Resp-denies wheezing  or productive cough,.  Const- No unusual weight loss or fever; Neuro-no recent seizure or CVA ; GI- No BRBPR, abdom pain, bloating ; - no  hematuria   see supplement sheet, initialed and to be scanned by staff  Past Medical History:   Diagnosis Date    Carotid arterial disease ([address]     mild 0-49%    Coronary atherosclerosis of native coronary artery     Cath 2011 with multivessel cad    Cough due to ACE inhibitor     Diverticulitis     Dyslipidemia     Hypertension     Macular degeneration     S/P CABG (coronary artery bypass graft)     6-19-11 CABG - OFF PUMP - CABGx 3, lima, eric, epi aortic ultrasound - off pump, rightt endoscopic vein harvest; [address] [place] [address] Juan Pena Ao to OM1 and OM2      Social Hx= reports that she has never smoked. She has never used smokeless tobacco. She reports current alcohol use of about 1.0 standard drinks of alcohol per week. She reports that she does not use drugs. Exam and Labs:  /62 (BP 1 Location: Left arm, BP Patient Position: Sitting)   Pulse (!) 58   Resp 16   Ht 5' 5\" (1.651 m)   Wt 137 lb (62.1 kg)   SpO2 97%   BMI 22.80 kg/m² Constitutional:  NAD, comfortable  Head: NC,AT. Eyes: No scleral icterus. Neck:  Neck supple. No JVD present. Throat: moist mucous membranes. Chest: Effort normal & normal respiratory excursion . Neurological: alert, conversant and oriented . Skin: Skin is not cold. No obvious systemic rash noted. Not diaphoretic. No erythema. Psychiatric:  Grossly normal mood and affect. Behavior appears normal. Extremities:  no clubbing or cyanosis. Abdomen: non distended    Lungs:breath sounds normal. No stridor. distress, wheezes or  Rales. Heart: normal rate, regular rhythm, normal S1, S2, no murmurs, rubs, clicks or gallops , PMI non displaced. Edema: Edema is none.   Lab Results   Component Value Date/Time    Cholesterol, total 177 01/26/2018 09:15 AM    HDL Cholesterol 79 01/26/2018 09:15 AM    LDL, calculated 82 01/26/2018 09:15 AM    Triglyceride 81 01/26/2018 09:15 AM     Lab Results   Component Value Date/Time    Sodium 139 10/29/2019 02:35 AM    Potassium 3.6 10/29/2019 02:35 AM    Chloride 103 10/29/2019 02:35 AM    CO2 32 10/29/2019 02:35 AM    Anion gap 4 (L) 10/29/2019 02:35 AM    Glucose 95 10/29/2019 02:35 AM    BUN 13 10/29/2019 02:35 AM    Creatinine 0.57 10/29/2019 02:35 AM    BUN/Creatinine ratio 23 (H) 10/29/2019 02:35 AM    GFR est AA >60 10/29/2019 02:35 AM    GFR est non-AA >60 10/29/2019 02:35 AM    Calcium 8.1 (L) 10/29/2019 02:35 AM      Wt Readings from Last 3 Encounters:   02/19/20 137 lb (62.1 kg)   10/27/19 137 lb 5.6 oz (62.3 kg)   10/17/19 133 lb (60.3 kg)      BP Readings from Last 3 Encounters:   02/19/20 130/62   10/29/19 172/83   10/17/19 148/64      Current Outpatient Medications   Medication Sig    famotidine (PEPCID) 10 mg tablet Take 10 mg by mouth two (2) times a day.  chlorthalidone (HYGROTEN) 25 mg tablet Take  by mouth daily.  Saccharomyces boulardii (FLORASTOR) 250 mg capsule Take 250 mg by mouth two (2) times a day.  losartan (COZAAR) 100 mg tablet Take 1 Tab by mouth daily.  metoprolol tartrate (LOPRESSOR) 50 mg tablet Take 1 Tab by mouth two (2) times a day.  amLODIPine (NORVASC) 10 mg tablet Take 1 Tab by mouth nightly. (Patient taking differently: Take 5 mg by mouth nightly.)    multivitamin with iron tablet Take 1 Tab by mouth daily.  atorvastatin (LIPITOR) 20 mg tablet Take 1 Tab by mouth nightly.  aspirin 81 mg chewable tablet Take 81 mg by mouth nightly.  doxazosin (CARDURA) 1 mg tablet Take 1 mg by mouth daily.  magnesium oxide (MAG-OX) 400 mg tablet Take 1 Tab by mouth two (2) times a day.  rifAXIMin (XIFAXAN) 550 mg tablet Take 1 Tab by mouth Before breakfast, lunch, and dinner.  camphor-menthol (SARNA ORIGINAL) 0.5-0.5 % lotion Apply  to affected area two (2) times daily as needed for Itching.  colestipol (COLESTID) 1 gram tablet Take 2 g by mouth daily.  colestipol (COLESTID) 1 gram tablet Take 3 g by mouth nightly.  ondansetron (ZOFRAN ODT) 4 mg disintegrating tablet Take 4 mg by mouth every eight (8) hours as needed for Nausea.  raNITIdine (ZANTAC) 150 mg tablet Take 150 mg by mouth nightly.  budesonide (ENTOCORT EC) 3 mg capsule Take 3 Caps by mouth daily.  L. acidoph & paracasei- S therm- Bifido (GERALDINE-Q/RISAQUAD) 8 billion cell cap cap Take 1 Cap by mouth daily.  hydrALAZINE (APRESOLINE) 10 mg tablet Take 1 Tab by mouth three (3) times daily as needed for Other (Systolic BP >112). No current facility-administered medications for this visit. Impression see above.       Written by Ama Perkins, [address], as dictated by Stanislav Ferguson MD.

## 2020-02-19 NOTE — TELEPHONE ENCOUNTER
Verified patient with two types of identifiers. Let patient know I will fax CBC and BMP order over to her PCP as she wishes to get the labs completed there. Patient verbalized understanding and will call with any other questions. Faxed lab slip. Received confirmation.

## 2020-02-19 NOTE — TELEPHONE ENCOUNTER
Patient states that she was seen this morning and was supposed to receive an order to have blood work done but she did not receive that. Please advise.     Phone: [phone]

## 2020-02-19 NOTE — PROGRESS NOTES
Visit Vitals  /62 (BP 1 Location: Left arm, BP Patient Position: Sitting)   Pulse (!) 58   Resp 16   Ht 5' 5\" (1.651 m)   Wt 137 lb (62.1 kg)   SpO2 97%   BMI 22.80 kg/m²

## 2020-02-19 NOTE — Clinical Note
2/19/20 Patient: Nghia Bhatia YOB: 1931 Date of Visit: 2/19/2020 Seven Campa, [address] VIA Facsimile: [phone] Dear Seven Campa MD, Thank you for referring Ms. Ángel Wren to [address]  for evaluation. My notes for this consultation are attached. If you have questions, please do not hesitate to call me. I look forward to following your patient along with you.  
 
 
Sincerely, 
 
Molina Epps MD

## 2020-03-02 DIAGNOSIS — I10 ESSENTIAL HYPERTENSION, BENIGN: Primary | ICD-10-CM

## 2020-03-05 RX ORDER — LOSARTAN POTASSIUM 100 MG/1
100 TABLET ORAL DAILY
Qty: 90 TAB | Refills: 3 | Status: SHIPPED | OUTPATIENT
Start: 2020-03-05 | End: 2021-03-16

## 2020-03-05 NOTE — TELEPHONE ENCOUNTER
Request for Losartan 100mg daily. Last office visit 2-19-20, next office visit 2-17-21.  Refills per verbal order from Dr. Marlin Ibarra.

## 2020-03-28 NOTE — PROGRESS NOTES
3/27/2020  Labs 2/26/2020 hemoglobin 11.3  Sodium 134 glucose 1 5 creatinine 0.89  Labs all stable  Notify patient by nurse

## 2020-03-30 ENCOUNTER — TELEPHONE (OUTPATIENT)
Dept: CARDIOLOGY CLINIC | Age: 85
End: 2020-03-30

## 2020-03-30 NOTE — TELEPHONE ENCOUNTER
Two patient identifiers verified. Per MD patient called and given results of labs. Confirmed follow up. Patient verbalized understanding and denies any further questions or concerns at this time.

## 2020-09-08 DIAGNOSIS — I25.10 ATHEROSCLEROSIS OF NATIVE CORONARY ARTERY OF NATIVE HEART WITHOUT ANGINA PECTORIS: Primary | ICD-10-CM

## 2020-09-09 RX ORDER — METOPROLOL TARTRATE 50 MG/1
50 TABLET ORAL 2 TIMES DAILY
Qty: 180 TAB | Refills: 3 | Status: SHIPPED | OUTPATIENT
Start: 2020-09-09 | End: 2021-09-22

## 2020-09-09 NOTE — TELEPHONE ENCOUNTER
Request for metoprolol 50mg BID. Last office visit 2-19-20, next office visit 2-17-21.  Refills per verbal order from Dr. Allan Padilla.

## 2020-10-20 DIAGNOSIS — E78.00 PURE HYPERCHOLESTEROLEMIA: ICD-10-CM

## 2020-10-21 RX ORDER — ATORVASTATIN CALCIUM 20 MG/1
20 TABLET, FILM COATED ORAL
Qty: 90 TAB | Refills: 3 | Status: SHIPPED | OUTPATIENT
Start: 2020-10-21 | End: 2021-07-23

## 2020-10-21 NOTE — TELEPHONE ENCOUNTER
Request for Lipitor 20mg QHS. Last office visit 2-19-20 next office visit 2-17-21. Refills per verbal order from Dr. Bernabe Hi as Dr. Porter Miranda is out of the office.

## 2021-01-11 ENCOUNTER — APPOINTMENT (OUTPATIENT)
Dept: GENERAL RADIOLOGY | Age: 86
End: 2021-01-11
Attending: EMERGENCY MEDICINE
Payer: MEDICARE

## 2021-01-11 ENCOUNTER — HOSPITAL ENCOUNTER (EMERGENCY)
Age: 86
Discharge: HOME | End: 2021-01-11
Attending: EMERGENCY MEDICINE
Payer: MEDICARE

## 2021-01-11 ENCOUNTER — APPOINTMENT (OUTPATIENT)
Dept: CT IMAGING | Age: 86
End: 2021-01-11
Attending: EMERGENCY MEDICINE
Payer: MEDICARE

## 2021-01-11 VITALS
HEART RATE: 57 BPM | DIASTOLIC BLOOD PRESSURE: 47 MMHG | TEMPERATURE: 97.7 F | RESPIRATION RATE: 18 BRPM | SYSTOLIC BLOOD PRESSURE: 178 MMHG | OXYGEN SATURATION: 100 % | HEIGHT: 65 IN | BODY MASS INDEX: 22.49 KG/M2 | WEIGHT: 135 LBS

## 2021-01-11 DIAGNOSIS — I48.0 PAROXYSMAL A-FIB (HCC): ICD-10-CM

## 2021-01-11 DIAGNOSIS — R07.9 CHEST PAIN, UNSPECIFIED TYPE: Primary | ICD-10-CM

## 2021-01-11 LAB
ALBUMIN SERPL-MCNC: 3.5 G/DL (ref 3.5–5)
ALBUMIN/GLOB SERPL: 0.9 {RATIO} (ref 1.1–2.2)
ALP SERPL-CCNC: 95 U/L (ref 45–117)
ALT SERPL-CCNC: 23 U/L (ref 12–78)
ANION GAP SERPL CALC-SCNC: 6 MMOL/L (ref 5–15)
AST SERPL-CCNC: 16 U/L (ref 15–37)
ATRIAL RATE: 107 BPM
ATRIAL RATE: 53 BPM
BASOPHILS # BLD: 0.1 K/UL (ref 0–0.1)
BASOPHILS NFR BLD: 1 % (ref 0–1)
BILIRUB SERPL-MCNC: 0.9 MG/DL (ref 0.2–1)
BNP SERPL-MCNC: 736 PG/ML
BUN SERPL-MCNC: 25 MG/DL (ref 6–20)
BUN/CREAT SERPL: 25 (ref 12–20)
CALCIUM SERPL-MCNC: 9.5 MG/DL (ref 8.5–10.1)
CALCULATED P AXIS, ECG09: 83 DEGREES
CALCULATED R AXIS, ECG10: 70 DEGREES
CALCULATED R AXIS, ECG10: 92 DEGREES
CALCULATED T AXIS, ECG11: -32 DEGREES
CALCULATED T AXIS, ECG11: 41 DEGREES
CHLORIDE SERPL-SCNC: 103 MMOL/L (ref 97–108)
CO2 SERPL-SCNC: 28 MMOL/L (ref 21–32)
COMMENT, HOLDF: NORMAL
CREAT SERPL-MCNC: 0.99 MG/DL (ref 0.55–1.02)
D DIMER PPP FEU-MCNC: 1.2 MG/L FEU (ref 0–0.65)
DIAGNOSIS, 93000: NORMAL
DIAGNOSIS, 93000: NORMAL
DIFFERENTIAL METHOD BLD: ABNORMAL
EOSINOPHIL # BLD: 0.4 K/UL (ref 0–0.4)
EOSINOPHIL NFR BLD: 5 % (ref 0–7)
ERYTHROCYTE [DISTWIDTH] IN BLOOD BY AUTOMATED COUNT: 12.9 % (ref 11.5–14.5)
GLOBULIN SER CALC-MCNC: 3.7 G/DL (ref 2–4)
GLUCOSE SERPL-MCNC: 133 MG/DL (ref 65–100)
HCT VFR BLD AUTO: 32.7 % (ref 35–47)
HGB BLD-MCNC: 11 G/DL (ref 11.5–16)
IMM GRANULOCYTES # BLD AUTO: 0 K/UL (ref 0–0.04)
IMM GRANULOCYTES NFR BLD AUTO: 0 % (ref 0–0.5)
LYMPHOCYTES # BLD: 2.6 K/UL (ref 0.8–3.5)
LYMPHOCYTES NFR BLD: 32 % (ref 12–49)
MCH RBC QN AUTO: 30.4 PG (ref 26–34)
MCHC RBC AUTO-ENTMCNC: 33.6 G/DL (ref 30–36.5)
MCV RBC AUTO: 90.3 FL (ref 80–99)
MONOCYTES # BLD: 0.6 K/UL (ref 0–1)
MONOCYTES NFR BLD: 7 % (ref 5–13)
NEUTS SEG # BLD: 4.4 K/UL (ref 1.8–8)
NEUTS SEG NFR BLD: 55 % (ref 32–75)
NRBC # BLD: 0 K/UL (ref 0–0.01)
NRBC BLD-RTO: 0 PER 100 WBC
P-R INTERVAL, ECG05: 190 MS
PLATELET # BLD AUTO: 241 K/UL (ref 150–400)
PMV BLD AUTO: 9.9 FL (ref 8.9–12.9)
POTASSIUM SERPL-SCNC: 3.4 MMOL/L (ref 3.5–5.1)
PROT SERPL-MCNC: 7.2 G/DL (ref 6.4–8.2)
Q-T INTERVAL, ECG07: 396 MS
Q-T INTERVAL, ECG07: 456 MS
QRS DURATION, ECG06: 132 MS
QRS DURATION, ECG06: 136 MS
QTC CALCULATION (BEZET), ECG08: 427 MS
QTC CALCULATION (BEZET), ECG08: 510 MS
RBC # BLD AUTO: 3.62 M/UL (ref 3.8–5.2)
SAMPLES BEING HELD,HOLD: NORMAL
SODIUM SERPL-SCNC: 137 MMOL/L (ref 136–145)
TROPONIN I SERPL-MCNC: <0.05 NG/ML
VENTRICULAR RATE, ECG03: 100 BPM
VENTRICULAR RATE, ECG03: 53 BPM
WBC # BLD AUTO: 8.1 K/UL (ref 3.6–11)

## 2021-01-11 PROCEDURE — 93005 ELECTROCARDIOGRAM TRACING: CPT

## 2021-01-11 PROCEDURE — 74011250637 HC RX REV CODE- 250/637: Performed by: EMERGENCY MEDICINE

## 2021-01-11 PROCEDURE — 85025 COMPLETE CBC W/AUTO DIFF WBC: CPT

## 2021-01-11 PROCEDURE — 99285 EMERGENCY DEPT VISIT HI MDM: CPT

## 2021-01-11 PROCEDURE — 71046 X-RAY EXAM CHEST 2 VIEWS: CPT

## 2021-01-11 PROCEDURE — 85379 FIBRIN DEGRADATION QUANT: CPT

## 2021-01-11 PROCEDURE — 83880 ASSAY OF NATRIURETIC PEPTIDE: CPT

## 2021-01-11 PROCEDURE — 84484 ASSAY OF TROPONIN QUANT: CPT

## 2021-01-11 PROCEDURE — 74011000636 HC RX REV CODE- 636

## 2021-01-11 PROCEDURE — 74011000636 HC RX REV CODE- 636: Performed by: RADIOLOGY

## 2021-01-11 PROCEDURE — 80053 COMPREHEN METABOLIC PANEL: CPT

## 2021-01-11 PROCEDURE — 71275 CT ANGIOGRAPHY CHEST: CPT

## 2021-01-11 RX ORDER — DILTIAZEM HYDROCHLORIDE 30 MG/1
30 TABLET, FILM COATED ORAL 3 TIMES DAILY
Qty: 30 TAB | Refills: 0 | Status: SHIPPED | OUTPATIENT
Start: 2021-01-11 | End: 2021-01-20

## 2021-01-11 RX ORDER — POTASSIUM CHLORIDE 750 MG/1
10 TABLET, FILM COATED, EXTENDED RELEASE ORAL
Status: COMPLETED | OUTPATIENT
Start: 2021-01-11 | End: 2021-01-11

## 2021-01-11 RX ORDER — METOPROLOL TARTRATE 5 MG/5ML
5 INJECTION INTRAVENOUS ONCE
Status: DISCONTINUED | OUTPATIENT
Start: 2021-01-11 | End: 2021-01-11

## 2021-01-11 RX ADMIN — POTASSIUM CHLORIDE 10 MEQ: 750 TABLET, FILM COATED, EXTENDED RELEASE ORAL at 12:20

## 2021-01-11 RX ADMIN — IOPAMIDOL 50 ML: 755 INJECTION, SOLUTION INTRAVENOUS at 13:20

## 2021-01-11 RX ADMIN — IOPAMIDOL 17 ML: 755 INJECTION, SOLUTION INTRAVENOUS at 13:20

## 2021-01-11 NOTE — ED PROVIDER NOTES
HPI      Pt is a 80 y.o. F with PMH of CAD and hx of bypass, HTN, HLD here with c/o chest pain this AM at 8 AM.  The pt denies pain currently. She did receive 325 of ASA en route to ED. She did have associated N/V. No dypsnea. No calf or leg pain. She is compliant with all meds. Cards: Dr. Terry Hidalgo.  No other complaints at this time. Past Medical History:   Diagnosis Date    Carotid arterial disease ([facility] [address]     mild 0-49%    Coronary atherosclerosis of native coronary artery     Cath 2011 with multivessel cad    Cough due to ACE inhibitor     Diverticulitis     Dyslipidemia     Hypertension     Macular degeneration     S/P CABG (coronary artery bypass graft)     6-19-11 CABG - OFF PUMP - CABGx 3, lima, eric, epi aortic ultrasound - off pump, rightt endoscopic vein harvest; [id number] [facility] [address] [facility] [address] to LAD, Svg Ao to OM1 and OM2       No past surgical history on file. No family history on file. Social History     Socioeconomic History    Marital status:      Spouse name: Not on file    Number of children: Not on file    Years of education: Not on file    Highest education level: Not on file   Occupational History    Not on file   Social Needs    Financial resource strain: Not on file    Food insecurity     Worry: Not on file     Inability: Not on file    Transportation needs     Medical: Not on file     Non-medical: Not on file   Tobacco Use    Smoking status: Never Smoker    Smokeless tobacco: Never Used   Substance and Sexual Activity    Alcohol use:  Yes     Alcohol/week: 1.0 standard drinks     Types: 1 Standard drinks or equivalent per week     Comment: Occasional    Drug use: No    Sexual activity: Not Currently   Lifestyle    Physical activity     Days per week: Not on file     Minutes per session: Not on file    Stress: Not on file   Relationships    Social connections     Talks on phone: Not on file     Gets together: Not on file     Attends Cheondo[employer] service: Not on file     Active member of club or organization: Not on file     Attends meetings of clubs or organizations: Not on file     Relationship status: Not on file    Intimate partner violence     Fear of current or ex partner: Not on file     Emotionally abused: Not on file     Physically abused: Not on file     Forced sexual activity: Not on file   Other Topics Concern    Not on file   Social History Narrative    Not on file         ALLERGIES: Ciprofloxacin, Codeine, Lisinopril, and Lovastatin    Review of Systems   Constitutional: Negative for chills, diaphoresis and fever. HENT: Negative for congestion and trouble swallowing. Eyes: Negative for photophobia and visual disturbance. Respiratory: Negative for cough, chest tightness and shortness of breath. Cardiovascular: Positive for chest pain. Negative for palpitations and leg swelling. Gastrointestinal: Negative for abdominal pain, diarrhea, nausea and vomiting. Genitourinary: Negative for difficulty urinating, dysuria, flank pain and frequency. Musculoskeletal: Negative for back pain and myalgias. Skin: Negative for rash and wound. Neurological: Negative for dizziness, weakness, light-headedness and headaches. Hematological: Negative for adenopathy. Does not bruise/bleed easily. Psychiatric/Behavioral: Negative for agitation and confusion. All other systems reviewed and are negative. Vitals:    01/11/21 0924   BP: (!) 148/74   Pulse: (!) 104   Resp: 18   Temp: 97.6 °F (36.4 °C)   SpO2: 96%   Weight: 61.2 kg (135 lb)   Height: 5' 5\" (1.651 m)            Physical Exam  Vitals signs and nursing note reviewed. Constitutional:       General: She is not in acute distress. Appearance: She is well-developed. She is not diaphoretic. HENT:      Head: Normocephalic. Eyes:      Conjunctiva/sclera: Conjunctivae normal.      Pupils: Pupils are equal, round, and reactive to light.    Neck:      Musculoskeletal: Normal range of motion and neck supple. Vascular: No JVD. Cardiovascular:      Rate and Rhythm: Tachycardia present. Rhythm irregular. Heart sounds: Normal heart sounds. Pulmonary:      Effort: Pulmonary effort is normal.      Breath sounds: Normal breath sounds. Abdominal:      General: Bowel sounds are normal. There is no distension. Palpations: Abdomen is soft. Tenderness: There is no abdominal tenderness. Musculoskeletal: Normal range of motion. General: No tenderness or deformity. Lymphadenopathy:      Cervical: No cervical adenopathy. Skin:     General: Skin is warm and dry. Capillary Refill: Capillary refill takes less than 2 seconds. Findings: No erythema or rash. Neurological:      Mental Status: She is alert and oriented to person, place, and time. Cranial Nerves: No cranial nerve deficit. Sensory: No sensory deficit. MDM       Procedures    ED EKG interpretation:  Rhythm: atrial fib; and irregular. Rate (approx.): 100; Axis: normal; P wave: no P wave; QRS interval: prolonged; RBBB; T wave abnormal inferior leads. EKG documented by Carmen Carreon MD, as interpreted by Viola Campa MD, ED MD.    11:36 AM  Pt noted to have elevated D-dimer will get CTA chest.  Will give potassium and consult cardiology. Previous rate was 90s - low 100s. However, HR starting to increase will give IV metoprolol as pt already on metoprolol. CONSULT NOTE:  12:11 PM Viola Campa MD spoke with Dr. Martín Wray for Cardiology. Discussed available diagnostic tests and clinical findings. Dr. Samia Farooq recommends 30 mg cardizem TID and continue metoprolol. Get EKG to make sure back in Sinus rhythm. Also rx eliquis. ED EKG interpretation:  12:36  Sinus bradycardia, regular rate 53, RBBB, QRS duration prolonged,  Prolonged QT. no STEMI or ST depression. 2:02 PM  Patient's results have been reviewed with them.   Patient and/or family have verbally conveyed their understanding and agreement of the patient's signs, symptoms, diagnosis, treatment and prognosis and additionally agree to follow up as recommended or return to the Emergency Room should their condition change prior to follow-up. Discharge instructions have also been provided to the patient with some educational information regarding their diagnosis as well a list of reasons why they would want to return to the ER prior to their follow-up appointment should their condition change.     Jolene Delgado MD

## 2021-01-11 NOTE — ED NOTES
Pts daughter at bedside. Advised pt and daughter about visitor policy at this time. Daughter states she will not leave and that her mother is blind and hard of hearing. and pt stating \"If she is unable to stay with me I will leave. \". Spoke we ED Manager regarding situation and per Manager, daughter is able to stay at bedside while in ED.

## 2021-01-11 NOTE — ED TRIAGE NOTES
Patient arrives from home via EMS with complaint of midsternal chest pain that radiates to bilateral arms, with nausea. Reports 1 episode of vomiting. Patient reports chest pain has resolved. Took 325 mg Aspirin PTA. Hx of bypass 10 years ago. Per EMS: BP: 90/55. Patient is A&O x 4.

## 2021-01-12 ENCOUNTER — ANCILLARY PROCEDURE (OUTPATIENT)
Dept: CARDIOLOGY CLINIC | Age: 86
End: 2021-01-12
Payer: MEDICARE

## 2021-01-12 ENCOUNTER — OFFICE VISIT (OUTPATIENT)
Dept: CARDIOLOGY CLINIC | Age: 86
End: 2021-01-12
Payer: MEDICARE

## 2021-01-12 ENCOUNTER — TELEPHONE (OUTPATIENT)
Dept: CARDIOLOGY CLINIC | Age: 86
End: 2021-01-12

## 2021-01-12 VITALS
RESPIRATION RATE: 16 BRPM | BODY MASS INDEX: 22.82 KG/M2 | SYSTOLIC BLOOD PRESSURE: 100 MMHG | HEART RATE: 65 BPM | WEIGHT: 137 LBS | HEIGHT: 65 IN | DIASTOLIC BLOOD PRESSURE: 60 MMHG | OXYGEN SATURATION: 98 %

## 2021-01-12 VITALS
WEIGHT: 137 LBS | SYSTOLIC BLOOD PRESSURE: 100 MMHG | BODY MASS INDEX: 22.82 KG/M2 | DIASTOLIC BLOOD PRESSURE: 60 MMHG | HEIGHT: 65 IN

## 2021-01-12 DIAGNOSIS — I48.91 NEW ONSET A-FIB (HCC): Primary | ICD-10-CM

## 2021-01-12 DIAGNOSIS — I10 ESSENTIAL HYPERTENSION, BENIGN: ICD-10-CM

## 2021-01-12 DIAGNOSIS — I25.10 ATHEROSCLEROSIS OF NATIVE CORONARY ARTERY OF NATIVE HEART WITHOUT ANGINA PECTORIS: ICD-10-CM

## 2021-01-12 DIAGNOSIS — Z95.1 S/P CABG X 3: ICD-10-CM

## 2021-01-12 DIAGNOSIS — E78.00 PURE HYPERCHOLESTEROLEMIA: ICD-10-CM

## 2021-01-12 DIAGNOSIS — R05.8 COUGH DUE TO ACE INHIBITOR: ICD-10-CM

## 2021-01-12 DIAGNOSIS — I48.91 NEW ONSET A-FIB (HCC): ICD-10-CM

## 2021-01-12 DIAGNOSIS — I25.10 ATHEROSCLEROSIS OF NATIVE CORONARY ARTERY OF NATIVE HEART WITHOUT ANGINA PECTORIS: Primary | ICD-10-CM

## 2021-01-12 DIAGNOSIS — D50.9 IRON DEFICIENCY ANEMIA, UNSPECIFIED IRON DEFICIENCY ANEMIA TYPE: ICD-10-CM

## 2021-01-12 DIAGNOSIS — T46.4X5A COUGH DUE TO ACE INHIBITOR: ICD-10-CM

## 2021-01-12 PROCEDURE — 99214 OFFICE O/P EST MOD 30 MIN: CPT | Performed by: SPECIALIST

## 2021-01-12 PROCEDURE — 1090F PRES/ABSN URINE INCON ASSESS: CPT | Performed by: SPECIALIST

## 2021-01-12 PROCEDURE — G8536 NO DOC ELDER MAL SCRN: HCPCS | Performed by: SPECIALIST

## 2021-01-12 PROCEDURE — G0463 HOSPITAL OUTPT CLINIC VISIT: HCPCS | Performed by: SPECIALIST

## 2021-01-12 PROCEDURE — 93306 TTE W/DOPPLER COMPLETE: CPT | Performed by: SPECIALIST

## 2021-01-12 PROCEDURE — 1101F PT FALLS ASSESS-DOCD LE1/YR: CPT | Performed by: SPECIALIST

## 2021-01-12 PROCEDURE — G8420 CALC BMI NORM PARAMETERS: HCPCS | Performed by: SPECIALIST

## 2021-01-12 PROCEDURE — G8427 DOCREV CUR MEDS BY ELIG CLIN: HCPCS | Performed by: SPECIALIST

## 2021-01-12 PROCEDURE — G8432 DEP SCR NOT DOC, RNG: HCPCS | Performed by: SPECIALIST

## 2021-01-12 RX ORDER — AMIODARONE HYDROCHLORIDE 200 MG/1
400 TABLET ORAL 2 TIMES DAILY
Qty: 56 TAB | Refills: 0 | Status: SHIPPED | OUTPATIENT
Start: 2021-01-12 | End: 2021-01-20 | Stop reason: SDUPTHER

## 2021-01-12 NOTE — TELEPHONE ENCOUNTER
Patient's daughter calling in regards to patient's ER visit yesterday. States she was told it was a-fib and given medication, yet woke up this morning with similar symptoms, elevated bp. States patient was not having chest pains at time of call, would like to know if she is medicated correctly.     Phone: [phone]

## 2021-01-12 NOTE — Clinical Note
1/12/2021 Patient: Jomar Snow YOB: 1931 Date of Visit: 1/12/2021 Linh Coleman, [address] [facility] Via Fax: [phone] Dear Linh Coleman MD, Thank you for referring Ms. Ba Galeas to [address]  [place] [address] for evaluation. My notes for this consultation are attached. If you have questions, please do not hesitate to call me. I look forward to following your patient along with you.  
 
 
Sincerely, 
 
Elisa Regalado MD

## 2021-01-12 NOTE — PROGRESS NOTES
Amanda Bain     5/7/1931       Jason Greenwood MD, [facility]  Date of Visit-1/12/2021   PCP is Maddie Piña MD   [facility] [place]  [facility]  HPI:  Amanda Bain is a 80 y.o. female   Pt last seen in February of 2020 in f/u of CAD, CABG, HTN, and dyslipidemia. Unable to tolerate statin or ACE. Previous hx of hospital stay October of 2019 with hyponatremia. Hospitalized August of 2019 with presyncope and urosepsis. She had anemia. Lab work in October showed a HGB of 7.9. Lab work yesterday showed a HGB of 11, elevated BUN of 25, potassium 3.4, creatinine 0.99. Pt went to the ER yesterday complaining of mid sternal chest pain to both arms with nausea and vomiting. Her HR was 104, . EKG demonstrated AF. ER spoke to cardiology, Dr. Milton Hernandez, who recommended Cardizem 30 mg TID and Eliquis. Pt apparently converted to SB. Pt had been given IV Metoprolol. Troponin was negative. Pro-BNP was 736. CT showed some mild emphysema and no PE. Pt does not have a hx of prior AF. EKG's from yesterday are both personally visualized. Echo shows normal LV function, mild MS and AS, she is in sinus rhythm with no effusion. Pt is accompanied by her daughter. Pt states that yesterday she had pain in her chest, arms, and back along with sweating and vomiting. She notes that this morning at 5:00AM she started to feel similar sx's, but then they went away. Her daughter reports that they sometimes get low BP's at home. She uses a wrist cuff. Pt asks if she has starts to feel these sx's in the middle of the night should she try to walk around or sit up in bed. She also asks if any of the medications can cause bloating. Denies edema, syncope or shortness of breath at rest, has no tachycardia, palpitations or sense of arrhythmia.     A note chest pain abdominal pain and dizziness have seen Dr. Sheri Zhao and Dr. Delfina Gowers the no chest pain, abdominal pain, dizziness. Have seen Dr. Lacie Wood and Dr. Sj alba hospitalized twice in 2019 for diarrhea now on Eliquis and a small dose of diltiazem 3 times daily  01/12/21   ECHO ADULT COMPLETE 01/14/2021 1/14/2021    Narrative · LV: Estimated LVEF is 55 - 60%. Visually measured ejection fraction. Normal cavity size and systolic function (ejection fraction normal). Mildly increased wall thickness. Wall motion: normal. Left ventricular   diastolic dysfunction. · AV: Aortic valve thickening with mild stenosis. Aortic valve mean   gradient is 9.3 mmHg. Aortic valve area is 1.1 cm2.  · MV: Mitral valve thickening. Mitral valve leaflet calcification with   reduced excursion and mitral annular calcification. Severe calcification   at base of posterior leaflet. Mitral valve mean gradient is 2.6 mmHg. Very   mild mitral valve stenosis is present. Very mild mitral valve   regurgitation is present. · LA: Moderately dilated left atrium. Left Atrium volume index is 47   mL/m2. Signed by: Leandra Shankar MD      Assessment/Plan:   Echo arranged and done today since she was in the ER yesterday A. fib  Patient Instructions   Please start Amiodarone 400mg twice a day for 14 days. We will see you back in about a week. Future Appointments   Date Time Provider Dalia Erin   1/20/2021  9:20 AM Leandra Shankar MD [facility] AMB   2/17/2021 11:40 AM Jason De Souza MD [facility] AMB         1. New onset a-fib (HCC)  AF last night with RVR. Symptomatic with possible angina. Continue Eliquis, started last night. Load with Amiodarone 400 mg BID for two weeks. Will continue Diltiazem at least temporarily. EF is normal. She is asymptomatic when in sinus. 2. Atherosclerosis of native coronary artery of native heart without angina pectoris  She had some chest pain which is probably residual small vessel disease from her previous bypass.      3. Essential hypertension, benign  BP Readings from Last 6 Encounters:   01/12/21 100/60   01/12/21 100/60   01/11/21 (!) 178/47   02/19/20 130/62   10/29/19 172/83   10/17/19 148/64       Hernandez CAD CHF Meds             amiodarone (CORDARONE) 200 mg tablet (Taking) Take 2 Tabs by mouth two (2) times a day. apixaban (Eliquis) 5 mg tablet (Taking) Take 1 Tab by mouth two (2) times a day. dilTIAZem IR (Cardizem) 30 mg tablet (Taking) Take 1 Tab by mouth three (3) times daily. atorvastatin (LIPITOR) 20 mg tablet (Taking) Take 1 Tab by mouth nightly. metoprolol tartrate (LOPRESSOR) 50 mg tablet (Taking) Take 1 Tab by mouth two (2) times a day. losartan (COZAAR) 100 mg tablet (Taking) Take 1 Tab by mouth daily. chlorthalidone (HYGROTEN) 25 mg tablet (Taking) Take  by mouth daily. amLODIPine (NORVASC) 10 mg tablet (Taking) Take 1 Tab by mouth nightly. aspirin 81 mg chewable tablet (Taking) Take 81 mg by mouth nightly. doxazosin (CARDURA) 1 mg tablet (Taking) Take 1 mg by mouth daily. colestipol (COLESTID) 1 gram tablet Take 2 g by mouth daily. colestipol (COLESTID) 1 gram tablet Take 3 g by mouth nightly. hydrALAZINE (APRESOLINE) 10 mg tablet Take 1 Tab by mouth three (3) times daily as needed for Other (Systolic BP >767). Multiple meds. Could consider a change in Amlodipine if she develops low BP. At goal , meds and possible side effects reviewed and patient denies    4. Pure hypercholesterolemia  At goal , denies excess muscle aches or new liver issues  Key Antihyperlipidemia Meds             atorvastatin (LIPITOR) 20 mg tablet (Taking) Take 1 Tab by mouth nightly. colestipol (COLESTID) 1 gram tablet Take 2 g by mouth daily. colestipol (COLESTID) 1 gram tablet Take 3 g by mouth nightly. Lab Results   Component Value Date/Time    LDL, calculated 82 01/26/2018 09:15 AM       Lipids on high potency statin as appropriate for secondary prevention. At goal , denies excess muscle aches or new liver issues    5.  Iron deficiency anemia, unspecified iron deficiency anemia type    With anemia. Lab Results   Component Value Date/Time    HGB 11.0 (L) 01/11/2021 09:48 AM        6. S/P CABG x 3  7. Cough due to ACE inhibitor  8. Dementia  Mild seems cogent today  F/u in 1 week     Impression:   1. Atherosclerosis of native coronary artery of native heart without angina pectoris    2. New onset a-fib ([facility] [address]    3. Essential hypertension, benign    4. Pure hypercholesterolemia    5. Iron deficiency anemia, unspecified iron deficiency anemia type    6. S/P CABG x 3    7. Cough due to ACE inhibitor       Cardiac History:   Cath 6-:-- Global left ventricular function was normal. EF calculated by contrast  ventriculography was 65 %. -- CORONARY CIRCULATION:  -- Proximal LAD: There was a 50 % stenosis. -- Mid LAD: There was a long diffusely diseased segment tapering down to  a 95 % stenosis. With a 70% stenosis more distally. -- Proximal  circumflex: There was a very proximal 30-35% followed by a 90 % stenosis. -- 1st obtuse marginal: There was a 90 % stenosis. -- RCA: The vessel was small sized. Non-dominant vessel. -- Ostial RCA: There was a 90 % stenosis. -- Proximal RCA: There was a 50- 70 % long stenosis. -- Mid RCA: There was a 80 % stenosis in the proximal portion of this  Segment. CABG- CABG - OFF PUMP - CABGx 3, lima, eric, epi aortic ultrasound - off pump, rightt endoscopic vein harvest; [id number] to LAD, Svg Ao to OM1 and OM2     ROS-except as noted above. . A complete cardiac and respiratory are reviewed and negative except as above ; Resp-denies wheezing  or productive cough,.  Const- No unusual weight loss or fever; Neuro-no recent seizure or CVA ; GI- No BRBPR, abdom pain, bloating ; - no  hematuria   see supplement sheet, initialed and to be scanned by staff  Past Medical History:   Diagnosis Date    Carotid arterial disease ([facility] [address]     mild 0-49%    Coronary atherosclerosis of native coronary artery     Cath 2011 with multivessel cad    Cough due to ACE inhibitor     Diverticulitis     Dyslipidemia     Hypertension     Macular degeneration     S/P CABG (coronary artery bypass graft)     6-19-11 CABG - OFF PUMP - CABGx 3, lima, eric, epi aortic ultrasound - off pump, rightt endoscopic vein harvest; [address] [facility] [address] to LAD, Svg Ao to OM1 and OM2      Social Hx= reports that she has never smoked. She has never used smokeless tobacco. She reports current alcohol use of about 1.0 standard drinks of alcohol per week. She reports that she does not use drugs. Exam and Labs:  /60 (BP 1 Location: Left arm, BP Patient Position: Sitting)   Pulse 65   Resp 16   Ht 5' 5\" (1.651 m)   Wt 137 lb (62.1 kg)   SpO2 98%   BMI 22.80 kg/m² Constitutional:  NAD, comfortable  Head: NC,AT. Eyes: No scleral icterus. Neck:  Neck supple. No JVD present. Throat: moist mucous membranes. Chest: Effort normal & normal respiratory excursion . Neurological: alert, conversant and oriented . Skin: Skin is not cold. No obvious systemic rash noted. Not diaphoretic. No erythema. Psychiatric:  Grossly normal mood and affect. Behavior appears normal. Extremities:  no clubbing or cyanosis. Abdomen: non distended    Lungs:breath sounds normal. No stridor. distress, wheezes or  Rales. Heart:2/6 BRONWYN louder at the apex than the base normal rate, regular rhythm, normal S1, S2, no rubs, clicks or gallops , PMI non displaced. Edema: Edema is none.   Lab Results   Component Value Date/Time    Cholesterol, total 177 01/26/2018 09:15 AM    HDL Cholesterol 79 01/26/2018 09:15 AM    LDL, calculated 82 01/26/2018 09:15 AM    Triglyceride 81 01/26/2018 09:15 AM     Lab Results   Component Value Date/Time    Sodium 137 01/11/2021 09:48 AM    Potassium 3.4 (L) 01/11/2021 09:48 AM    Chloride 103 01/11/2021 09:48 AM    CO2 28 01/11/2021 09:48 AM    Anion gap 6 01/11/2021 09:48 AM    Glucose 133 (H) 01/11/2021 09:48 AM    BUN 25 (H) 01/11/2021 09:48 AM Creatinine 0.99 01/11/2021 09:48 AM    BUN/Creatinine ratio 25 (H) 01/11/2021 09:48 AM    GFR est AA >60 01/11/2021 09:48 AM    GFR est non-AA 53 (L) 01/11/2021 09:48 AM    Calcium 9.5 01/11/2021 09:48 AM      Wt Readings from Last 3 Encounters:   01/12/21 137 lb (62.1 kg)   01/12/21 137 lb (62.1 kg)   01/11/21 135 lb (61.2 kg)      BP Readings from Last 3 Encounters:   01/12/21 100/60   01/12/21 100/60   01/11/21 (!) 178/47      Current Outpatient Medications   Medication Sig    apixaban (Eliquis) 5 mg tablet Take 1 Tab by mouth two (2) times a day.  dilTIAZem IR (Cardizem) 30 mg tablet Take 1 Tab by mouth three (3) times daily.  atorvastatin (LIPITOR) 20 mg tablet Take 1 Tab by mouth nightly.  metoprolol tartrate (LOPRESSOR) 50 mg tablet Take 1 Tab by mouth two (2) times a day.  losartan (COZAAR) 100 mg tablet Take 1 Tab by mouth daily.  famotidine (PEPCID) 10 mg tablet Take 10 mg by mouth two (2) times a day.  chlorthalidone (HYGROTEN) 25 mg tablet Take  by mouth daily.  amLODIPine (NORVASC) 10 mg tablet Take 1 Tab by mouth nightly. (Patient taking differently: Take 5 mg by mouth nightly.)    multivitamin with iron tablet Take 1 Tab by mouth daily.  L. acidoph & paracasei- S therm- Bifido (GERALDINE-Q/RISAQUAD) 8 billion cell cap cap Take 1 Cap by mouth daily.  aspirin 81 mg chewable tablet Take 81 mg by mouth nightly.  doxazosin (CARDURA) 1 mg tablet Take 1 mg by mouth daily.  magnesium oxide (MAG-OX) 400 mg tablet Take 1 Tab by mouth two (2) times a day.  rifAXIMin (XIFAXAN) 550 mg tablet Take 1 Tab by mouth Before breakfast, lunch, and dinner.  camphor-menthol (SARNA ORIGINAL) 0.5-0.5 % lotion Apply  to affected area two (2) times daily as needed for Itching.  colestipol (COLESTID) 1 gram tablet Take 2 g by mouth daily.  colestipol (COLESTID) 1 gram tablet Take 3 g by mouth nightly.     ondansetron (ZOFRAN ODT) 4 mg disintegrating tablet Take 4 mg by mouth every eight (8) hours as needed for Nausea.  raNITIdine (ZANTAC) 150 mg tablet Take 150 mg by mouth nightly.  budesonide (ENTOCORT EC) 3 mg capsule Take 3 Caps by mouth daily.  hydrALAZINE (APRESOLINE) 10 mg tablet Take 1 Tab by mouth three (3) times daily as needed for Other (Systolic BP >301). No current facility-administered medications for this visit. Impression see above.       Written by Huber See, as dictated by Melanie Haskins MD.

## 2021-01-12 NOTE — TELEPHONE ENCOUNTER
Scheduled for echo & OV at [facility] per MD. Patient's daughter verbalized understanding and will call with any other questions.

## 2021-01-12 NOTE — PROGRESS NOTES
Visit Vitals  /60 (BP 1 Location: Left arm, BP Patient Position: Sitting)   Pulse 65   Resp 16   Ht 5' 5\" (1.651 m)   Wt 137 lb (62.1 kg)   SpO2 98%   BMI 22.80 kg/m²

## 2021-01-14 LAB
ECHO AO ASC DIAM: 2.94 CM
ECHO AO ROOT DIAM: 2.78 CM
ECHO AV AREA PEAK VELOCITY: 1.05 CM2
ECHO AV AREA VTI: 1.1 CM2
ECHO AV AREA/BSA PEAK VELOCITY: 0.6 CM2/M2
ECHO AV AREA/BSA VTI: 0.7 CM2/M2
ECHO AV MEAN GRADIENT: 9.28 MMHG
ECHO AV PEAK GRADIENT: 16.45 MMHG
ECHO AV PEAK VELOCITY: 202.78 CM/S
ECHO AV VTI: 52.98 CM
ECHO LA AREA 4C: 23.59 CM2
ECHO LA MAJOR AXIS: 3.65 CM
ECHO LA MINOR AXIS: 2.17 CM
ECHO LA VOL 2C: 61.98 ML (ref 22–52)
ECHO LA VOL 4C: 79.03 ML (ref 22–52)
ECHO LA VOL BP: 77.76 ML (ref 22–52)
ECHO LA VOL/BSA BIPLANE: 46.17 ML/M2 (ref 16–28)
ECHO LA VOLUME INDEX A2C: 36.8 ML/M2 (ref 16–28)
ECHO LA VOLUME INDEX A4C: 46.92 ML/M2 (ref 16–28)
ECHO LV E' LATERAL VELOCITY: 5.9 CM/S
ECHO LV E' SEPTAL VELOCITY: 3.7 CM/S
ECHO LV EDV A2C: 68.57 ML
ECHO LV EDV A4C: 84.24 ML
ECHO LV EDV BP: 76.81 ML (ref 56–104)
ECHO LV EDV INDEX A4C: 50 ML/M2
ECHO LV EDV INDEX BP: 45.6 ML/M2
ECHO LV EDV NDEX A2C: 40.7 ML/M2
ECHO LV EJECTION FRACTION A2C: 52 PERCENT
ECHO LV EJECTION FRACTION A4C: 52 PERCENT
ECHO LV EJECTION FRACTION BIPLANE: 52.3 PERCENT (ref 55–100)
ECHO LV ESV A2C: 33.06 ML
ECHO LV ESV A4C: 40.18 ML
ECHO LV ESV BP: 36.67 ML (ref 19–49)
ECHO LV ESV INDEX A2C: 19.6 ML/M2
ECHO LV ESV INDEX A4C: 23.9 ML/M2
ECHO LV ESV INDEX BP: 21.8 ML/M2
ECHO LV INTERNAL DIMENSION DIASTOLIC: 4.48 CM (ref 3.9–5.3)
ECHO LV INTERNAL DIMENSION SYSTOLIC: 2.72 CM
ECHO LV IVSD: 1.29 CM (ref 0.6–0.9)
ECHO LV MASS 2D: 208.7 G (ref 67–162)
ECHO LV MASS INDEX 2D: 123.9 G/M2 (ref 43–95)
ECHO LV POSTERIOR WALL DIASTOLIC: 1.21 CM (ref 0.6–0.9)
ECHO LVOT DIAM: 1.81 CM
ECHO LVOT PEAK GRADIENT: 2.76 MMHG
ECHO LVOT PEAK VELOCITY: 83.11 CM/S
ECHO LVOT SV: 58 ML
ECHO LVOT VTI: 22.68 CM
ECHO MV A VELOCITY: 103.97 CM/S
ECHO MV AREA PHT: 2.62 CM2
ECHO MV AREA VTI: 0.97 CM2
ECHO MV E DECELERATION TIME (DT): 289.71 MS
ECHO MV E VELOCITY: 111.58 CM/S
ECHO MV E/A RATIO: 1.07
ECHO MV E/E' LATERAL: 18.91
ECHO MV E/E' RATIO (AVERAGED): 24.53
ECHO MV E/E' SEPTAL: 30.16
ECHO MV MAX VELOCITY: 134.98 CM/S
ECHO MV MEAN GRADIENT: 2.56 MMHG
ECHO MV PEAK GRADIENT: 7.29 MMHG
ECHO MV PRESSURE HALF TIME (PHT): 84.02 MS
ECHO MV VTI: 59.93 CM
ECHO RA AREA 4C: 14.98 CM2
ECHO RV INTERNAL DIMENSION: 3.66 CM
ECHO RV TAPSE: 1.5 CM (ref 1.5–2)
LA VOL DISK BP: 72.34 ML (ref 22–52)

## 2021-01-20 ENCOUNTER — OFFICE VISIT (OUTPATIENT)
Dept: CARDIOLOGY CLINIC | Age: 86
End: 2021-01-20
Payer: MEDICARE

## 2021-01-20 VITALS
DIASTOLIC BLOOD PRESSURE: 50 MMHG | HEIGHT: 65 IN | SYSTOLIC BLOOD PRESSURE: 126 MMHG | OXYGEN SATURATION: 98 % | HEART RATE: 56 BPM | BODY MASS INDEX: 22.99 KG/M2 | WEIGHT: 138 LBS

## 2021-01-20 DIAGNOSIS — T46.4X5A COUGH DUE TO ACE INHIBITOR: ICD-10-CM

## 2021-01-20 DIAGNOSIS — D50.9 IRON DEFICIENCY ANEMIA, UNSPECIFIED IRON DEFICIENCY ANEMIA TYPE: ICD-10-CM

## 2021-01-20 DIAGNOSIS — E78.00 PURE HYPERCHOLESTEROLEMIA: ICD-10-CM

## 2021-01-20 DIAGNOSIS — I48.91 NEW ONSET A-FIB (HCC): Primary | ICD-10-CM

## 2021-01-20 DIAGNOSIS — R05.8 COUGH DUE TO ACE INHIBITOR: ICD-10-CM

## 2021-01-20 DIAGNOSIS — I10 ESSENTIAL HYPERTENSION, BENIGN: ICD-10-CM

## 2021-01-20 DIAGNOSIS — Z95.1 S/P CABG X 3: ICD-10-CM

## 2021-01-20 DIAGNOSIS — I25.10 ATHEROSCLEROSIS OF NATIVE CORONARY ARTERY OF NATIVE HEART WITHOUT ANGINA PECTORIS: ICD-10-CM

## 2021-01-20 PROCEDURE — G8536 NO DOC ELDER MAL SCRN: HCPCS | Performed by: SPECIALIST

## 2021-01-20 PROCEDURE — G0463 HOSPITAL OUTPT CLINIC VISIT: HCPCS | Performed by: SPECIALIST

## 2021-01-20 PROCEDURE — G8427 DOCREV CUR MEDS BY ELIG CLIN: HCPCS | Performed by: SPECIALIST

## 2021-01-20 PROCEDURE — G8420 CALC BMI NORM PARAMETERS: HCPCS | Performed by: SPECIALIST

## 2021-01-20 PROCEDURE — 1090F PRES/ABSN URINE INCON ASSESS: CPT | Performed by: SPECIALIST

## 2021-01-20 PROCEDURE — 99214 OFFICE O/P EST MOD 30 MIN: CPT | Performed by: SPECIALIST

## 2021-01-20 PROCEDURE — 93010 ELECTROCARDIOGRAM REPORT: CPT | Performed by: SPECIALIST

## 2021-01-20 PROCEDURE — 1101F PT FALLS ASSESS-DOCD LE1/YR: CPT | Performed by: SPECIALIST

## 2021-01-20 PROCEDURE — G8510 SCR DEP NEG, NO PLAN REQD: HCPCS | Performed by: SPECIALIST

## 2021-01-20 PROCEDURE — 93005 ELECTROCARDIOGRAM TRACING: CPT | Performed by: SPECIALIST

## 2021-01-20 RX ORDER — AMIODARONE HYDROCHLORIDE 200 MG/1
200 TABLET ORAL DAILY
Qty: 90 TAB | Refills: 1 | Status: SHIPPED | OUTPATIENT
Start: 2021-01-20 | End: 2021-03-19

## 2021-01-20 NOTE — PATIENT INSTRUCTIONS
Please stop your diltiazem. In 6 days go reduce your Amiodarone 200mg to daily. We will see you back in 2 months.

## 2021-01-20 NOTE — PROGRESS NOTES
Filemon Hartmann     5/7/1931       Jason Clancy MD, [facility]  Date of Visit-1/20/2021   PCP is Dolly Goodpasture, MD   [facility] [place]  [facility]  HPI:  Filemon Hartmann is a 80 y.o. female   1 week f/u. Pt returns after starting Amiodarone 400 mg BID for new onset AF. Additionally, she had started Eliquis. She presented to the ER on 1/11/21 and was seen in the office on 1/12/21. Pt went to the ER on 1/11/21 complaining of mid sternal chest pain to both arms with nausea and vomiting. Her HR was 104, . EKG demonstrated AF. ER spoke to cardiology, Dr. Morgan Madison, who recommended Cardizem 30 mg TID and Eliquis. Lab work  showed a HGB of 11, elevated BUN of 25, potassium 3.4, creatinine 0.99. Pt apparently converted to SB. Pt had been given IV Metoprolol. Troponin was negative. Pro-BNP was 736. CT showed some mild emphysema and no PE. Pt does not have a hx of prior AF. EKG's from 1/11 are both personally visualized. Echo shows normal LV function, mild MS and AS, she is in sinus rhythm with no effusion  Pt is accompanied by her daughter. Since the last visit pt reports that she has had one episode of what she believes was AF. She has not had any more episodes. She notes SOB when walking into the office today. Her daughter states that the walk into the office was more exercise than she has had in the past year. She comes in with home BP's that average in the 110's. Pt is taking Prevagen. She is also taking Metamucil secondary to diarrhea. Denies chest pain, edema, syncope or shortness of breath at rest    Hx of CAD, CABG, HTN, and dyslipidemia. Unable to tolerate statin or ACE. Previous hx of hospital stay October of 2019 with hyponatremia. Hospitalized August of 2019 with presyncope and urosepsis. She had anemia. Lab work in October showed a HGB of 7.9.     .    01/12/21   ECHO ADULT COMPLETE 01/14/2021 1/14/2021    Narrative · LV: Estimated LVEF is 55 - 60%. Visually measured ejection fraction. Normal cavity size and systolic function (ejection fraction normal). Mildly increased wall thickness. Wall motion: normal. Left ventricular   diastolic dysfunction. · AV: Aortic valve thickening with mild stenosis. Aortic valve mean   gradient is 9.3 mmHg. Aortic valve area is 1.1 cm2.  · MV: Mitral valve thickening. Mitral valve leaflet calcification with   reduced excursion and mitral annular calcification. Severe calcification   at base of posterior leaflet. Mitral valve mean gradient is 2.6 mmHg. Very   mild mitral valve stenosis is present. Very mild mitral valve   regurgitation is present. · LA: Moderately dilated left atrium. Left Atrium volume index is 47   mL/m2. Signed by: Stacey Dickey MD        EKG: SB, First degree A-V block Yoel = 236, Right bundle branch block. Assessment/Plan:    Patient Instructions   Please stop your diltiazem. In 6 days go reduce your Amiodarone 200mg to daily. We will see you back in 2 months. Future Appointments   Date Time Provider Dalia Khan   3/24/2021 10:20 AM Jason De Souza MD [facility] BS AMB         1. New onset a-fib ([address]  Now in sinus. Because of the bradycardia as the Amiodarone gets absorbed we will stop Diltiazem and in 6 days she will reduce Amiodarone from 4 tablets daily to 1.   - AMB POC EKG ROUTINE W/ 12 LEADS, INTER & REP    2. Atherosclerosis of native coronary artery of native heart without angina pectoris  Small vessel disease and previous CABG. Has no current angina. 3. Essential hypertension, benign  Stable. At goal , meds and possible side effects reviewed and patient denies  Key CAD CHF Meds             amiodarone (CORDARONE) 200 mg tablet (Taking) Take 1 Tab by mouth daily. apixaban (Eliquis) 5 mg tablet (Taking/Discontinued) Take 1 Tab by mouth two (2) times a day. atorvastatin (LIPITOR) 20 mg tablet (Taking) Take 1 Tab by mouth nightly.     metoprolol tartrate (LOPRESSOR) 50 mg tablet (Taking) Take 1 Tab by mouth two (2) times a day. losartan (COZAAR) 100 mg tablet (Taking) Take 1 Tab by mouth daily. chlorthalidone (HYGROTEN) 25 mg tablet (Taking) Take  by mouth daily. amLODIPine (NORVASC) 10 mg tablet (Taking) Take 1 Tab by mouth nightly. aspirin 81 mg chewable tablet (Taking) Take 81 mg by mouth nightly. doxazosin (CARDURA) 1 mg tablet (Taking) Take 1 mg by mouth daily. BP Readings from Last 6 Encounters:   01/20/21 (!) 126/50   01/12/21 100/60   01/12/21 100/60   01/11/21 (!) 178/47   02/19/20 130/62   10/29/19 172/83          4. Pure hypercholesterolemia  At goal.   At goal , denies excess muscle aches or new liver issues  Key Antihyperlipidemia Meds             atorvastatin (LIPITOR) 20 mg tablet (Taking) Take 1 Tab by mouth nightly. Lab Results   Component Value Date/Time    LDL, calculated 82 01/26/2018 09:15 AM       5. Iron deficiency anemia, unspecified iron deficiency anemia type  Anemia improved. 6. S/P CABG x 3    7. Cough due to ACE inhibitor     F/u in 2  months     Impression:   1. New onset a-fib ([address]    2. Atherosclerosis of native coronary artery of native heart without angina pectoris    3. Essential hypertension, benign    4. Pure hypercholesterolemia    5. Iron deficiency anemia, unspecified iron deficiency anemia type    6. S/P CABG x 3    7. Cough due to ACE inhibitor       Cardiac History:   Cath 6-:-- Global left ventricular function was normal. EF calculated by contrast  ventriculography was 65 %. -- CORONARY CIRCULATION:  -- Proximal LAD: There was a 50 % stenosis. -- Mid LAD: There was a long diffusely diseased segment tapering down to  a 95 % stenosis. With a 70% stenosis more distally. -- Proximal  circumflex: There was a very proximal 30-35% followed by a 90 % stenosis. -- 1st obtuse marginal: There was a 90 % stenosis. -- RCA: The vessel was small sized. Non-dominant vessel.   -- Ostial RCA: There was a 90 % stenosis. -- Proximal RCA: There was a 50- 70 % long stenosis. -- Mid RCA: There was a 80 % stenosis in the proximal portion of this  Segment. CABG- CABG - OFF PUMP - CABGx 3, lima, eric, epi aortic ultrasound - off pump, rightt endoscopic vein harvest; [address] [place] [address]  to LAD Svg Ao to OM1 and OM2     ROS-except as noted above. . A complete cardiac and respiratory are reviewed and negative except as above ; Resp-denies wheezing  or productive cough,. Const- No unusual weight loss or fever; Neuro-no recent seizure or CVA ; GI- No BRBPR, abdom pain, bloating ; - no  hematuria   see supplement sheet, initialed and to be scanned by staff  Past Medical History:   Diagnosis Date    Carotid arterial disease ([facility] [address]     mild 0-49%    Coronary atherosclerosis of native coronary artery     Cath 2011 with multivessel cad    Cough due to ACE inhibitor     Diverticulitis     Dyslipidemia     Hypertension     Macular degeneration     S/P CABG (coronary artery bypass graft)     6-19-11 CABG - OFF PUMP - CABGx 3, lima, eric, epi aortic ultrasound - off pump, rightt endoscopic vein harvest; [address] [place] [address]  to Juan TORRES Ao to OM1 and OM2      Social Hx= reports that she has never smoked. She has never used smokeless tobacco. She reports previous alcohol use of about 1.0 standard drinks of alcohol per week. She reports that she does not use drugs. Exam and Labs:  BP (!) 126/50 (BP 1 Location: Left arm, BP Patient Position: Sitting)   Pulse (!) 56   Ht 5' 5\" (1.651 m)   Wt 138 lb (62.6 kg)   SpO2 98%   BMI 22.96 kg/m² Constitutional:  NAD, comfortable  Head: NC,AT. Eyes: No scleral icterus. Neck:  Neck supple. No JVD present. Throat: moist mucous membranes. Chest: Effort normal & normal respiratory excursion . Neurological: alert, conversant and oriented . Skin: Skin is not cold. No obvious systemic rash noted. Not diaphoretic. No erythema.  Psychiatric:  Grossly normal mood and affect. Behavior appears normal. Extremities:  no clubbing or cyanosis. Abdomen: non distended    Lungs:breath sounds normal. No stridor. distress, wheezes or  Rales. Heart:2/6 BRONWYN  normal rate, regular rhythm, normal S1, S2, no murmurs, rubs, clicks or gallops , PMI non displaced. Edema: Edema is none. Lab Results   Component Value Date/Time    Cholesterol, total 177 01/26/2018 09:15 AM    HDL Cholesterol 79 01/26/2018 09:15 AM    LDL, calculated 82 01/26/2018 09:15 AM    Triglyceride 81 01/26/2018 09:15 AM     Lab Results   Component Value Date/Time    Sodium 137 01/11/2021 09:48 AM    Potassium 3.4 (L) 01/11/2021 09:48 AM    Chloride 103 01/11/2021 09:48 AM    CO2 28 01/11/2021 09:48 AM    Anion gap 6 01/11/2021 09:48 AM    Glucose 133 (H) 01/11/2021 09:48 AM    BUN 25 (H) 01/11/2021 09:48 AM    Creatinine 0.99 01/11/2021 09:48 AM    BUN/Creatinine ratio 25 (H) 01/11/2021 09:48 AM    GFR est AA >60 01/11/2021 09:48 AM    GFR est non-AA 53 (L) 01/11/2021 09:48 AM    Calcium 9.5 01/11/2021 09:48 AM      Wt Readings from Last 3 Encounters:   01/20/21 138 lb (62.6 kg)   01/12/21 137 lb (62.1 kg)   01/12/21 137 lb (62.1 kg)      BP Readings from Last 3 Encounters:   01/20/21 (!) 126/50   01/12/21 100/60   01/12/21 100/60      Current Outpatient Medications   Medication Sig    Saccharomyces boulardii (FLORASTOR PO) Take  by mouth daily.  amiodarone (CORDARONE) 200 mg tablet Take 1 Tab by mouth daily.  apixaban (Eliquis) 5 mg tablet Take 1 Tab by mouth two (2) times a day.  atorvastatin (LIPITOR) 20 mg tablet Take 1 Tab by mouth nightly.  metoprolol tartrate (LOPRESSOR) 50 mg tablet Take 1 Tab by mouth two (2) times a day.  losartan (COZAAR) 100 mg tablet Take 1 Tab by mouth daily.  famotidine (PEPCID) 10 mg tablet Take 10 mg by mouth two (2) times a day.  chlorthalidone (HYGROTEN) 25 mg tablet Take  by mouth daily.  amLODIPine (NORVASC) 10 mg tablet Take 1 Tab by mouth nightly. (Patient taking differently: Take 5 mg by mouth nightly.)    multivitamin with iron tablet Take 1 Tab by mouth daily.  aspirin 81 mg chewable tablet Take 81 mg by mouth nightly.  doxazosin (CARDURA) 1 mg tablet Take 1 mg by mouth daily. No current facility-administered medications for this visit. Impression see above.       Written by Jessica Landis, as dictated by Ney Daugherty MD.

## 2021-01-20 NOTE — PROGRESS NOTES
Room 5    Visit Vitals  BP (!) 126/50 (BP 1 Location: Left arm, BP Patient Position: Sitting)   Pulse (!) 56   Ht 5' 5\" (1.651 m)   Wt 138 lb (62.6 kg)   SpO2 98%   BMI 22.96 kg/m²       Assessment/Plan:   Echo arranged and done today since she was in the ER yesterday A. fib  Patient Instructions   Please start Amiodarone 400mg twice a day for 14 days. We will see you back in about a week.      Chest pain: no  Shortness of breath: no  Edema: no  Palpitations: no  Dizziness: Yes, same    New diagnosis/Surgeries: no    ER/Hospitalizations: no

## 2021-01-20 NOTE — Clinical Note
1/20/2021 Patient: Lynette Agosto YOB: 1931 Date of Visit: 1/20/2021 Mery Carnes, [address] [facility] [address] Fax: [phone] Dear Mery Carnes MD, Thank you for referring Ms. Luba Fraga to [address]  [facility] for evaluation. My notes for this consultation are attached. If you have questions, please do not hesitate to call me. I look forward to following your patient along with you.  
 
 
Sincerely, 
 
Anel Sanabria MD

## 2021-01-26 DIAGNOSIS — I48.91 NEW ONSET A-FIB (HCC): Primary | ICD-10-CM

## 2021-03-16 DIAGNOSIS — I10 ESSENTIAL HYPERTENSION, BENIGN: ICD-10-CM

## 2021-03-16 RX ORDER — LOSARTAN POTASSIUM 100 MG/1
TABLET ORAL
Qty: 90 TAB | Refills: 1 | Status: SHIPPED | OUTPATIENT
Start: 2021-03-16 | End: 2021-10-20

## 2021-03-16 NOTE — TELEPHONE ENCOUNTER
Requested Prescriptions     Signed Prescriptions Disp Refills    losartan (COZAAR) 100 mg tablet 90 Tab 1     Sig: TAKE ONE TABLET BY MOUTH DAILY     Authorizing Provider: Cristo Castle     Ordering User: Rohini De Souza     Verbal order per Dr. Nathanael Hernandez.    Future Appointments   Date Time Provider Dalia Khan   3/24/2021 10:20 AM Martha Barney MD [facility]

## 2021-03-24 ENCOUNTER — OFFICE VISIT (OUTPATIENT)
Dept: CARDIOLOGY CLINIC | Age: 86
End: 2021-03-24
Payer: MEDICARE

## 2021-03-24 VITALS
BODY MASS INDEX: 23.16 KG/M2 | SYSTOLIC BLOOD PRESSURE: 122 MMHG | RESPIRATION RATE: 16 BRPM | HEIGHT: 65 IN | DIASTOLIC BLOOD PRESSURE: 50 MMHG | WEIGHT: 139 LBS | HEART RATE: 53 BPM | OXYGEN SATURATION: 99 %

## 2021-03-24 DIAGNOSIS — I48.91 NEW ONSET A-FIB (HCC): Primary | ICD-10-CM

## 2021-03-24 DIAGNOSIS — Z95.1 S/P CABG X 3: ICD-10-CM

## 2021-03-24 DIAGNOSIS — D50.9 IRON DEFICIENCY ANEMIA, UNSPECIFIED IRON DEFICIENCY ANEMIA TYPE: ICD-10-CM

## 2021-03-24 DIAGNOSIS — R05.8 COUGH DUE TO ACE INHIBITOR: ICD-10-CM

## 2021-03-24 DIAGNOSIS — E78.00 PURE HYPERCHOLESTEROLEMIA: ICD-10-CM

## 2021-03-24 DIAGNOSIS — I25.10 ATHEROSCLEROSIS OF NATIVE CORONARY ARTERY OF NATIVE HEART WITHOUT ANGINA PECTORIS: ICD-10-CM

## 2021-03-24 DIAGNOSIS — T46.4X5A COUGH DUE TO ACE INHIBITOR: ICD-10-CM

## 2021-03-24 DIAGNOSIS — I10 ESSENTIAL HYPERTENSION, BENIGN: ICD-10-CM

## 2021-03-24 PROCEDURE — 99214 OFFICE O/P EST MOD 30 MIN: CPT | Performed by: SPECIALIST

## 2021-03-24 NOTE — Clinical Note
3/24/2021 Patient: Libra Paez YOB: 1931 Date of Visit: 3/24/2021 Vilma Frey MD 
[address] [place] [phone] Via Fax: [phone] Dear Vilma Frey MD, Thank you for referring Ms. Stephanie Hernandez to [address] for evaluation. My notes for this consultation are attached. If you have questions, please do not hesitate to call me. I look forward to following your patient along with you.  
 
 
Sincerely, 
 
Kelsey Miranda MD

## 2021-03-24 NOTE — PROGRESS NOTES
Deborah Magaña     5/7/1931       Jason Johnson MD, [facility]  Date of Visit-3/24/2021   PCP is Esau Puri MD   [facility] [place]  [facility]  HPI:  Deborah Magaña is a 80 y.o. female   2 month f/u. Pt started Amiodarone 400 mg BID for new onset AF. Additionally, she had started Eliquis. She presented to the ER on 1/11/21 and was seen in the office on 1/12/21. Pt went to the ER on 1/11/21 complaining of mid sternal chest pain to both arms with nausea and vomiting. Her HR was 104, . EKG demonstrated AF.  ER spoke to cardiology, Dr. Vasyl Vazquez, who recommended Cardizem 30 mg TID and Eliquis. Lab work  showed a HGB of 11, elevated BUN of 25, potassium 3.4, creatinine 0.99. Pt apparently converted to SB. Pt had been given IV Metoprolol. Troponin was negative. Pro-BNP was 736. CT showed some mild emphysema and no PE. Pt does not have a hx of prior AF. EKG's from 1/11 are both personally visualized. Echo showed normal LV function, mild MS and AS, she is in sinus rhythm with no effusion  01/12/21   ECHO ADULT COMPLETE 01/14/2021 1/14/2021    Narrative · LV: Estimated LVEF is 55 - 60%. Visually measured ejection fraction. Normal cavity size and systolic function (ejection fraction normal). Mildly increased wall thickness. Wall motion: normal. Left ventricular   diastolic dysfunction. · AV: Aortic valve thickening with mild stenosis. Aortic valve mean   gradient is 9.3 mmHg. Aortic valve area is 1.1 cm2.  · MV: Mitral valve thickening. Mitral valve leaflet calcification with   reduced excursion and mitral annular calcification. Severe calcification   at base of posterior leaflet. Mitral valve mean gradient is 2.6 mmHg. Very   mild mitral valve stenosis is present. Very mild mitral valve   regurgitation is present. · LA: Moderately dilated left atrium. Left Atrium volume index is 47   mL/m2.         Signed by: Eloy Bach MD      Pt's family spoke to us last week indicating she was having dizziness with Amiodarone. I instructed them to stop it. She had had new onset AF and also started Eliquis. The AF was noted when she went into the ER on January 11th. Pt is accompanied by her daughter. Overall the pt states she is doing well. She states that since stopping the Amiodarone she is no longer feeling dizzy. Pt denies any breathing issues. Pt notes that she has been having diarrhea for a long period of time, though her daughter states that this has been better in the last couple weeks. Denies chest pain, edema, syncope or shortness of breath at rest, has no tachycardia, palpitations or sense of arrhythmia. Assessment/Plan:     1. New onset a-fib [facility])  Since January had an episode, has been back in sinus. With her CAD really the only option is a BB-Lopresor. We had tried Diltiazem and Amiodarone. She did not seem to tolerate the Amiodarone due to CNS and GI side effects. A lower dose is a possibility but I would be concerned about her tolerance and if she would require a pacemaker. Another option is to treat episodes PRN with additional Metoprolol. Since her AF, her EF has been confirmed to be normal.   Patient Instructions   We will see you back in 3 months. 2. Atherosclerosis of native coronary artery of native heart without angina pectoris  Prior CABG and small vessel disease with no angina. C/o dizziness but no angina, sees 'red spots\" on her ankles, like micro-capillary changes    3. Essential hypertension, benign  Stable. She reports occasional dizziness. Feels her BP is variable which it is. We talked about staying seated if she gets dizzy. At goal , meds and possible side effects reviewed and patient denies  Key CAD CHF Meds             losartan (COZAAR) 100 mg tablet (Taking) TAKE ONE TABLET BY MOUTH DAILY    apixaban (Eliquis) 5 mg tablet (Taking) Take 1 Tab by mouth two (2) times a day.     atorvastatin (LIPITOR) 20 mg tablet (Taking) Take 1 Tab by mouth nightly. metoprolol tartrate (LOPRESSOR) 50 mg tablet (Taking) Take 1 Tab by mouth two (2) times a day. chlorthalidone (HYGROTEN) 25 mg tablet (Taking) Take  by mouth daily. amLODIPine (NORVASC) 10 mg tablet (Taking) Take 1 Tab by mouth nightly. aspirin 81 mg chewable tablet (Taking) Take 81 mg by mouth nightly. doxazosin (CARDURA) 1 mg tablet (Taking) Take 1 mg by mouth daily. BP Readings from Last 6 Encounters:   03/24/21 (!) 122/50   01/20/21 (!) 126/50   01/12/21 100/60   01/12/21 100/60   01/11/21 (!) 178/47   02/19/20 130/62        4. Pure hypercholesterolemia  At goal.   At goal , denies excess muscle aches or new liver issues  Lab Results   Component Value Date/Time    LDL, calculated 82 01/26/2018 09:15 AM        5. Iron deficiency anemia, unspecified iron deficiency anemia type  Anemia improved. 6. S/P CABG x 3    7. Cough due to ACE inhibitor     F/u in 3 months     Impression:   1. New onset a-fib ([address]    2. Atherosclerosis of native coronary artery of native heart without angina pectoris    3. Essential hypertension, benign    4. Pure hypercholesterolemia    5. Iron deficiency anemia, unspecified iron deficiency anemia type    6. S/P CABG x 3    7. Cough due to ACE inhibitor       Cardiac History:   Cath 6-:-- Global left ventricular function was normal. EF calculated by contrast  ventriculography was 65 %. -- CORONARY CIRCULATION:  -- Proximal LAD: There was a 50 % stenosis. -- Mid LAD: There was a long diffusely diseased segment tapering down to  a 95 % stenosis. With a 70% stenosis more distally. -- Proximal  circumflex: There was a very proximal 30-35% followed by a 90 % stenosis. -- 1st obtuse marginal: There was a 90 % stenosis. -- RCA: The vessel was small sized. Non-dominant vessel. -- Ostial RCA: There was a 90 % stenosis. -- Proximal RCA: There was a 50- 70 % long stenosis.   -- Mid RCA: There was a 80 % stenosis in the proximal portion of this  Segment. CABG- CABG - OFF PUMP - CABGx 3, lima, eric, epi aortic ultrasound - off pump, rightt endoscopic vein harvest; [id number] [place] [address]  to Juan TORRES to OM1 and OM2     ROS-except as noted above. . A complete cardiac and respiratory are reviewed and negative except as above ; Resp-denies wheezing  or productive cough,. Const- No unusual weight loss or fever; Neuro-no recent seizure or CVA ; GI- No BRBPR, abdom pain, bloating ; - no  hematuria   see supplement sheet, initialed and to be scanned by staff  Past Medical History:   Diagnosis Date    Carotid arterial disease ([facility]     mild 0-49%    Coronary atherosclerosis of native coronary artery     Cath 2011 with multivessel cad    Cough due to ACE inhibitor     Diverticulitis     Dyslipidemia     Hypertension     Macular degeneration     S/P CABG (coronary artery bypass graft)     6-19-11 CABG - OFF PUMP - CABGx 3, lima, eric, epi aortic ultrasound - off pump, rightt endoscopic vein harvest; [id number] [place] [address] Juan Pena to OM1 and OM2      Social Hx= reports that she has never smoked. She has never used smokeless tobacco. She reports previous alcohol use of about 1.0 standard drinks of alcohol per week. She reports that she does not use drugs. Exam and Labs:  BP (!) 122/50   Pulse (!) 53   Resp 16   Ht 5' 5\" (1.651 m)   Wt 139 lb (63 kg)   SpO2 99%   BMI 23.13 kg/m² Constitutional:  NAD, comfortable  Head: NC,AT. Eyes: No scleral icterus. Neck:  Neck supple. No JVD present. Throat: moist mucous membranes. Chest: Effort normal & normal respiratory excursion . Neurological: alert, conversant and oriented . Skin: Skin is not cold. No obvious systemic rash noted. Not diaphoretic. No erythema. Psychiatric:  Grossly normal mood and affect. Behavior appears normal. Extremities:  no clubbing or cyanosis. Abdomen: non distended    Lungs:breath sounds normal. No stridor.  distress, wheezes or Rales.  Heart:2/6 very short systolic murmur, RUSB normal rate, regular rhythm, normal S1, S2, no rubs, clicks or gallops , PMI non displaced. Edema: Edema is none. Lab Results   Component Value Date/Time    Cholesterol, total 177 01/26/2018 09:15 AM    HDL Cholesterol 79 01/26/2018 09:15 AM    LDL, calculated 82 01/26/2018 09:15 AM    Triglyceride 81 01/26/2018 09:15 AM     Lab Results   Component Value Date/Time    Sodium 137 01/11/2021 09:48 AM    Potassium 3.4 (L) 01/11/2021 09:48 AM    Chloride 103 01/11/2021 09:48 AM    CO2 28 01/11/2021 09:48 AM    Anion gap 6 01/11/2021 09:48 AM    Glucose 133 (H) 01/11/2021 09:48 AM    BUN 25 (H) 01/11/2021 09:48 AM    Creatinine 0.99 01/11/2021 09:48 AM    BUN/Creatinine ratio 25 (H) 01/11/2021 09:48 AM    GFR est AA >60 01/11/2021 09:48 AM    GFR est non-AA 53 (L) 01/11/2021 09:48 AM    Calcium 9.5 01/11/2021 09:48 AM      Wt Readings from Last 3 Encounters:   03/24/21 139 lb (63 kg)   01/20/21 138 lb (62.6 kg)   01/12/21 137 lb (62.1 kg)      BP Readings from Last 3 Encounters:   03/24/21 (!) 122/50   01/20/21 (!) 126/50   01/12/21 100/60      Current Outpatient Medications   Medication Sig    losartan (COZAAR) 100 mg tablet TAKE ONE TABLET BY MOUTH DAILY    apixaban (Eliquis) 5 mg tablet Take 1 Tab by mouth two (2) times a day.  Saccharomyces boulardii (FLORASTOR PO) Take  by mouth daily.  atorvastatin (LIPITOR) 20 mg tablet Take 1 Tab by mouth nightly.  metoprolol tartrate (LOPRESSOR) 50 mg tablet Take 1 Tab by mouth two (2) times a day.  famotidine (PEPCID) 10 mg tablet Take 10 mg by mouth two (2) times a day.  chlorthalidone (HYGROTEN) 25 mg tablet Take  by mouth daily.  amLODIPine (NORVASC) 10 mg tablet Take 1 Tab by mouth nightly. (Patient taking differently: Take 5 mg by mouth nightly.)    multivitamin with iron tablet Take 1 Tab by mouth daily.  aspirin 81 mg chewable tablet Take 81 mg by mouth nightly.     doxazosin (CARDURA) 1 mg tablet Take 1 mg by mouth daily. No current facility-administered medications for this visit. Impression see above.       Written by Silvano Harris, as dictated by Ashley Arroyo MD.

## 2021-03-24 NOTE — PROGRESS NOTES
Luigi Sanches is a 80 y.o. female     Pt did receive a text notification for this appt. Chief Complaint   Patient presents with    Follow-up     2 mo. Chest pain : NO  SOB : NO  Dizziness : NO  Edema : NO  Refills : NO    Visit Vitals  BP (!) 122/50   Pulse (!) 53   Resp 16   Ht 5' 5\" (1.651 m)   Wt 139 lb (63 kg)   SpO2 99%   BMI 23.13 kg/m²       1. Have you been to the ER, urgent care clinic since your last visit? Hospitalized since your last visit? NO    2. Have you seen or consulted any other health care providers outside of the [address] [place] [address] since your last visit? Include any pap smears or colon screening.  Yes, Dr. Namita England

## 2021-05-05 ENCOUNTER — TELEPHONE (OUTPATIENT)
Dept: CARDIOLOGY CLINIC | Age: 86
End: 2021-05-05

## 2021-05-05 NOTE — TELEPHONE ENCOUNTER
Daughter states pt unable to swallow today, ptis requesting advice as to what she should do at this point.     XJHWJ:[phone]

## 2021-05-10 NOTE — TELEPHONE ENCOUNTER
Called pt daughter Aleshia Bennett  two patient identifiers confirmed. Aleshia Bennett stated that on Wednesday 5/5/2021 the pt was not able to swallow. Aleshia Bennett talked with PCP and was told she had a TIA. Aleshia Bennett wanted to know if the pt should increase Eliquis. Notified Aleshia Loyolae pt is currently taking highest dose of Eliquis. Notified Aleshia Loyolae that if the PCP really thinks that then she needs to be evaluated in the ER. Aleshia Bennett denied any facial dropping, at the time of event. Aleshia Loyolae stated the pt has been very confused sine time of event and would like to know what Dr. Arnel Flaherty recommends.  Will ask MD

## 2021-05-14 NOTE — TELEPHONE ENCOUNTER
Verified patient with two types of identifiers. Jeffy Honour to take her mother to the ER if the symptoms return. She states it only happened once and lasted about three hours. She will follow up with her mother's PCP and take her to the ER if this or other stroke like symptoms return. Patient's daughter verbalized understanding and appreciation.

## 2021-05-14 NOTE — TELEPHONE ENCOUNTER
Patient should see PCP for evaluation especially if symptoms have persisted.   If patient declines then make sure the patient understands that if it recurs she should go to the emergency room right away

## 2021-07-07 ENCOUNTER — OFFICE VISIT (OUTPATIENT)
Dept: CARDIOLOGY CLINIC | Age: 86
End: 2021-07-07
Payer: MEDICARE

## 2021-07-07 VITALS
DIASTOLIC BLOOD PRESSURE: 60 MMHG | BODY MASS INDEX: 23.32 KG/M2 | OXYGEN SATURATION: 98 % | HEIGHT: 65 IN | HEART RATE: 53 BPM | SYSTOLIC BLOOD PRESSURE: 110 MMHG | WEIGHT: 140 LBS

## 2021-07-07 DIAGNOSIS — I48.19 PERSISTENT ATRIAL FIBRILLATION (HCC): Primary | ICD-10-CM

## 2021-07-07 DIAGNOSIS — E78.00 PURE HYPERCHOLESTEROLEMIA: ICD-10-CM

## 2021-07-07 DIAGNOSIS — Z95.1 S/P CABG X 3: ICD-10-CM

## 2021-07-07 DIAGNOSIS — D50.9 IRON DEFICIENCY ANEMIA, UNSPECIFIED IRON DEFICIENCY ANEMIA TYPE: ICD-10-CM

## 2021-07-07 DIAGNOSIS — I10 ESSENTIAL HYPERTENSION: ICD-10-CM

## 2021-07-07 DIAGNOSIS — R05.8 COUGH DUE TO ACE INHIBITOR: ICD-10-CM

## 2021-07-07 DIAGNOSIS — I25.10 ATHEROSCLEROSIS OF NATIVE CORONARY ARTERY OF NATIVE HEART WITHOUT ANGINA PECTORIS: ICD-10-CM

## 2021-07-07 DIAGNOSIS — T46.4X5A COUGH DUE TO ACE INHIBITOR: ICD-10-CM

## 2021-07-07 PROCEDURE — G8536 NO DOC ELDER MAL SCRN: HCPCS | Performed by: SPECIALIST

## 2021-07-07 PROCEDURE — G8432 DEP SCR NOT DOC, RNG: HCPCS | Performed by: SPECIALIST

## 2021-07-07 PROCEDURE — 1101F PT FALLS ASSESS-DOCD LE1/YR: CPT | Performed by: SPECIALIST

## 2021-07-07 PROCEDURE — G8427 DOCREV CUR MEDS BY ELIG CLIN: HCPCS | Performed by: SPECIALIST

## 2021-07-07 PROCEDURE — G0463 HOSPITAL OUTPT CLINIC VISIT: HCPCS | Performed by: SPECIALIST

## 2021-07-07 PROCEDURE — G8420 CALC BMI NORM PARAMETERS: HCPCS | Performed by: SPECIALIST

## 2021-07-07 PROCEDURE — 99214 OFFICE O/P EST MOD 30 MIN: CPT | Performed by: SPECIALIST

## 2021-07-07 PROCEDURE — 1090F PRES/ABSN URINE INCON ASSESS: CPT | Performed by: SPECIALIST

## 2021-07-07 RX ORDER — POLYETHYLENE GLYCOL 3350, SODIUM SULFATE ANHYDROUS, SODIUM BICARBONATE, SODIUM CHLORIDE, POTASSIUM CHLORIDE 236; 22.74; 6.74; 5.86; 2.97 G/4L; G/4L; G/4L; G/4L; G/4L
POWDER, FOR SOLUTION ORAL
COMMUNITY
End: 2022-07-05

## 2021-07-07 NOTE — PROGRESS NOTES
Hemalatha William     5/7/1931       Jason Marshall MD, [facility]  Date of Visit-7/7/2021   PCP is Estefany Epps MD   [facility] [place]  [facility]  HPI:  Hemalatha William is a 80 y.o. female   4 month f/u. · CAD with CABG x 3  · New onset atrial fibrillation 1/11/21 ; ER  midsternal chest pain heart rate 104 placed on Cardizem and Eliquis,  had converted to sinus bradycardia with IV metoprolol ;Troponin was negative. Pro-BNP was 736. CT showed some mild emphysema and no PE.   · Echo 1/12/2021 showed normal LV function EF 55-60, mild MS calcification at base of posterior leaflet and mild AS mean gradient 9  · Dizziness with amiodarone 1/12/21  and stopped 1/20/21  · Hypertension  · Dyslipidemia,  statin intolerant  · Intolerant to ACE, cough  · Deficiency anemia  · Dementia  Pt is accompanied by daughter. Overall the pt states she is doing well. She has a \"burbling tingling\" sensation that radiates from her left knee to left ankle. She denies any pain. She does not feel her heart racing. She feels weaker. Pt has some bruising with some times are worse than other. Pt denies any bleeding. She has no hx of bleeding. She is taking Metamucil daily. Daughter notes she cannot walk as far as she used to. Pt mentions she has some left hip pain that radiates to her left knee. Denies chest pain, edema, syncope or shortness of breath at rest, has no tachycardia, palpitations or sense of arrhythmia. Assessment/Plan:    Patient Instructions   Please have your blood work completed. We will call with results and see you back in 6 months. 1. Persistent atrial fibrillation  Episode in January, at rapid rate in the ER now back in sinus. She did not tolerate amiodarone. Rhythm is now controlled she is doing fine on Eliquis with minor bruising. Check labs and f/u in 6 months    2.  Atherosclerosis of native coronary artery of native heart without angina pectoris  Prior CABG and small vessel disease. Has no angina    3. Essential hypertension  stable  At goal , meds and possible side effects reviewed and patient denies  Key CAD CHF Meds             amLODIPine (NORVASC) 10 mg tablet (Taking) Take 0.5 Tablets by mouth nightly. losartan (COZAAR) 100 mg tablet (Taking) TAKE ONE TABLET BY MOUTH DAILY    apixaban (Eliquis) 5 mg tablet (Taking) Take 1 Tab by mouth two (2) times a day. atorvastatin (LIPITOR) 20 mg tablet (Taking/Discontinued) Take 1 Tab by mouth nightly. metoprolol tartrate (LOPRESSOR) 50 mg tablet (Taking) Take 1 Tab by mouth two (2) times a day. chlorthalidone (HYGROTEN) 25 mg tablet (Taking) Take  by mouth daily. aspirin 81 mg chewable tablet (Taking) Take 81 mg by mouth nightly. doxazosin (CARDURA) 1 mg tablet (Taking) Take 1 mg by mouth daily. BP Readings from Last 6 Encounters:   07/07/21 110/60   03/24/21 (!) 122/50   01/20/21 (!) 126/50   01/12/21 100/60   01/12/21 100/60   01/11/21 (!) 178/47        4. Pure hypercholesterolemia  At goal , denies excess muscle aches or new liver issues  Key Antihyperlipidemia Meds             atorvastatin (LIPITOR) 20 mg tablet (Taking/Discontinued) Take 1 Tab by mouth nightly. Lab Results   Component Value Date/Time    LDL, calculated 82 01/26/2018 09:15 AM     5. Iron deficiency anemia, unspecified iron deficiency anemia type  Lab Results   Component Value Date/Time    HGB 10.2 (L) 07/19/2021 04:07 PM    6. S/P CABG x 3  7. Cough due to ACE inhibitor     Impression:   1. Persistent atrial fibrillation ([address]    2. Atherosclerosis of native coronary artery of native heart without angina pectoris    3. Essential hypertension    4. Pure hypercholesterolemia    5. Iron deficiency anemia, unspecified iron deficiency anemia type    6. S/P CABG x 3    7.  Cough due to ACE inhibitor       Cardiac History:   Cath 6-:-- Global left ventricular function was normal. EF calculated by contrast  ventriculography was 65 %. -- CORONARY CIRCULATION:  -- Proximal LAD: There was a 50 % stenosis. -- Mid LAD: There was a long diffusely diseased segment tapering down to  a 95 % stenosis. With a 70% stenosis more distally. -- Proximal  circumflex: There was a very proximal 30-35% followed by a 90 % stenosis. -- 1st obtuse marginal: There was a 90 % stenosis. -- RCA: The vessel was small sized. Non-dominant vessel. -- Ostial RCA: There was a 90 % stenosis. -- Proximal RCA: There was a 50- 70 % long stenosis. -- Mid RCA: There was a 80 % stenosis in the proximal portion of this  Segment. CABG- CABG - OFF PUMP - CABGx 3, lima, eric, epi aortic ultrasound - off pump, rightt endoscopic vein harvest; [id number] [place] [address]  to LAD, Svg Ao to OM1 and OM2     Future Appointments   Date Time Provider Dalia Erin   1/5/2022 10:40 AM Jason De Souza MD [facility]        ROS-except as noted above. . A complete cardiac and respiratory are reviewed and negative except as above ; Resp-denies wheezing  or productive cough,. Const- No unusual weight loss or fever; Neuro-no recent seizure or CVA ; GI- No BRBPR, abdom pain, bloating ; - no  hematuria   see supplement sheet, initialed and to be scanned by staff  Past Medical History:   Diagnosis Date    Carotid arterial disease ([address]     mild 0-49%    Coronary atherosclerosis of native coronary artery     Cath 2011 with multivessel cad    Cough due to ACE inhibitor     Diverticulitis     Dyslipidemia     Hypertension     Macular degeneration     S/P CABG (coronary artery bypass graft)     6-19-11 CABG - OFF PUMP - CABGx 3, lima, eric, epi aortic ultrasound - off pump, rightt endoscopic vein harvest; [address] [place] [address]  to MELISSA, Svg Ao to OM1 and OM2      Social Hx= reports that she has never smoked. She has never used smokeless tobacco. She reports previous alcohol use of about 1.0 standard drinks of alcohol per week.  She reports that she does not use drugs. Exam and Labs:  /60 (BP 1 Location: Left upper arm, BP Patient Position: Sitting, BP Cuff Size: Adult)   Pulse (!) 53   Ht 5' 5\" (1.651 m)   Wt 140 lb (63.5 kg)   SpO2 98%   BMI 23.30 kg/m² Constitutional:  NAD, comfortable  Head: NC,AT. Eyes: No scleral icterus. Neck:  Neck supple. No JVD present. Throat: moist mucous membranes. Chest: Effort normal & normal respiratory excursion . Neurological: alert, conversant and oriented . Skin: Skin is not cold. No obvious systemic rash noted. Not diaphoretic. No erythema. Psychiatric:  Grossly normal mood and affect. Behavior appears normal. Extremities:  no clubbing or cyanosis. Abdomen: non distended    Lungs:breath sounds normal. No stridor. distress, wheezes or  Rales. TLWCA:7/7 short systolic murmur RUSB normal rate, regular rhythm, normal S1, S2, no murmurs, rubs, clicks or gallops , PMI non displaced. Edema: Edema is none.   Lab Results   Component Value Date/Time    Cholesterol, total 177 01/26/2018 09:15 AM    HDL Cholesterol 79 01/26/2018 09:15 AM    LDL, calculated 82 01/26/2018 09:15 AM    Triglyceride 81 01/26/2018 09:15 AM     Lab Results   Component Value Date/Time    Sodium 137 01/11/2021 09:48 AM    Potassium 3.4 (L) 01/11/2021 09:48 AM    Chloride 103 01/11/2021 09:48 AM    CO2 28 01/11/2021 09:48 AM    Anion gap 6 01/11/2021 09:48 AM    Glucose 133 (H) 01/11/2021 09:48 AM    BUN 25 (H) 01/11/2021 09:48 AM    Creatinine 0.99 01/11/2021 09:48 AM    BUN/Creatinine ratio 25 (H) 01/11/2021 09:48 AM    GFR est AA >60 01/11/2021 09:48 AM    GFR est non-AA 53 (L) 01/11/2021 09:48 AM    Calcium 9.5 01/11/2021 09:48 AM      Wt Readings from Last 3 Encounters:   07/07/21 140 lb (63.5 kg)   03/24/21 139 lb (63 kg)   01/20/21 138 lb (62.6 kg)      BP Readings from Last 3 Encounters:   07/07/21 110/60   03/24/21 (!) 122/50   01/20/21 (!) 126/50      Current Outpatient Medications   Medication Sig    amLODIPine (NORVASC) 10 mg tablet Take 0.5 Tablets by mouth nightly.  PEG 3350-Electrolytes (GO-LYTELY) 236-22.74-6.74 -5.86 gram suspension Take  by mouth now.  losartan (COZAAR) 100 mg tablet TAKE ONE TABLET BY MOUTH DAILY    apixaban (Eliquis) 5 mg tablet Take 1 Tab by mouth two (2) times a day.  Saccharomyces boulardii (FLORASTOR PO) Take  by mouth daily.  metoprolol tartrate (LOPRESSOR) 50 mg tablet Take 1 Tab by mouth two (2) times a day.  famotidine (PEPCID) 10 mg tablet Take 10 mg by mouth two (2) times a day.  chlorthalidone (HYGROTEN) 25 mg tablet Take  by mouth daily.  multivitamin with iron tablet Take 1 Tab by mouth daily.  aspirin 81 mg chewable tablet Take 81 mg by mouth nightly.  doxazosin (CARDURA) 1 mg tablet Take 1 mg by mouth daily.  atorvastatin (LIPITOR) 20 mg tablet Take 1 Tablet by mouth nightly. No current facility-administered medications for this visit. Impression see above.       Written by Damaris Rooney, as dictated by Leo Aguilar MD.

## 2021-07-07 NOTE — Clinical Note
7/7/2021    Patient: Thu Sun   YOB: 1931   Date of Visit: 7/7/2021     Mendel Kettering, MD  [address] [place] [address]  Via Fax: [phone]    Dear Mendel Kettering, MD,      Thank you for referring Ms. Isela Bradley to [address] [facility] for evaluation. My notes for this consultation are attached. If you have questions, please do not hesitate to call me. I look forward to following your patient along with you.       Sincerely,    Lachelle Moore MD

## 2021-07-07 NOTE — PROGRESS NOTES
Boby Chakraborty is a 80 y.o. female    Visit Vitals  /60 (BP 1 Location: Left upper arm, BP Patient Position: Sitting, BP Cuff Size: Adult)   Pulse (!) 53   Ht 5' 5\" (1.651 m)   Wt 140 lb (63.5 kg)   SpO2 98%   BMI 23.30 kg/m²       Chief Complaint   Patient presents with    Other     3 MONTH F/U       Chest pain NO  SOB NO  Dizziness NO  Swelling NO  Recent hospital visit NO  Refills NO

## 2021-07-07 NOTE — PATIENT INSTRUCTIONS
Please have your blood work completed. We will call with results and see you back in 6 months.      [facility] Draw Site:  [address] [place] [address], [address] [place] [address],[facility], [address] [place] [address]  Phone: [phone]  Monday-Friday 8:00 am- 5:00 pm  *closed for lunch 12:30 pm-1:30 pm*

## 2021-07-16 DIAGNOSIS — E78.00 PURE HYPERCHOLESTEROLEMIA: ICD-10-CM

## 2021-07-20 LAB
BUN SERPL-MCNC: 29 MG/DL (ref 10–36)
BUN/CREAT SERPL: 21 (ref 12–28)
CALCIUM SERPL-MCNC: 9.3 MG/DL (ref 8.7–10.3)
CHLORIDE SERPL-SCNC: 102 MMOL/L (ref 96–106)
CO2 SERPL-SCNC: 26 MMOL/L (ref 20–29)
CREAT SERPL-MCNC: 1.35 MG/DL (ref 0.57–1)
ERYTHROCYTE [DISTWIDTH] IN BLOOD BY AUTOMATED COUNT: 13.1 % (ref 11.7–15.4)
GLUCOSE SERPL-MCNC: 94 MG/DL (ref 65–99)
HCT VFR BLD AUTO: 30.6 % (ref 34–46.6)
HGB BLD-MCNC: 10.2 G/DL (ref 11.1–15.9)
INTERPRETATION: NORMAL
MCH RBC QN AUTO: 30.8 PG (ref 26.6–33)
MCHC RBC AUTO-ENTMCNC: 33.3 G/DL (ref 31.5–35.7)
MCV RBC AUTO: 92 FL (ref 79–97)
PLATELET # BLD AUTO: 260 X10E3/UL (ref 150–450)
POTASSIUM SERPL-SCNC: 4.9 MMOL/L (ref 3.5–5.2)
RBC # BLD AUTO: 3.31 X10E6/UL (ref 3.77–5.28)
SODIUM SERPL-SCNC: 137 MMOL/L (ref 134–144)
TSH SERPL DL<=0.005 MIU/L-ACNC: 0.28 UIU/ML (ref 0.45–4.5)
WBC # BLD AUTO: 9.5 X10E3/UL (ref 3.4–10.8)

## 2021-07-23 RX ORDER — AMLODIPINE BESYLATE 10 MG/1
5 TABLET ORAL
Qty: 30 TABLET | COMMUNITY
Start: 2021-07-23

## 2021-07-23 RX ORDER — ATORVASTATIN CALCIUM 20 MG/1
20 TABLET, FILM COATED ORAL
Qty: 90 TABLET | Refills: 3 | Status: SHIPPED | OUTPATIENT
Start: 2021-07-23

## 2021-07-27 ENCOUNTER — TELEPHONE (OUTPATIENT)
Dept: CARDIOLOGY CLINIC | Age: 86
End: 2021-07-27

## 2021-07-27 DIAGNOSIS — I48.19 PERSISTENT ATRIAL FIBRILLATION (HCC): ICD-10-CM

## 2021-07-27 DIAGNOSIS — I25.10 ATHEROSCLEROSIS OF NATIVE CORONARY ARTERY OF NATIVE HEART WITHOUT ANGINA PECTORIS: Primary | ICD-10-CM

## 2021-07-27 NOTE — TELEPHONE ENCOUNTER
Patient daughter called because she was looking for her mother Lab results from 7/19/21. Please give her a call back.       Phone [phone]

## 2021-07-29 NOTE — TELEPHONE ENCOUNTER
Two patient identifiers verified. Spoke to patient's daughter and went over results. She will take her mother to get her labs repeated in 2 months at the [facility] and follow up with PCP about TSH. Routed labs to PCP. Patient's daughter verbalized understanding and denies any further questions or concerns at this time. Faxed lab slip to [phone] and received confirmation.

## 2021-07-29 NOTE — TELEPHONE ENCOUNTER
Lab Results   Component Value Date/Time    Sodium 137 07/19/2021 04:07 PM    Potassium 4.9 07/19/2021 04:07 PM    Chloride 102 07/19/2021 04:07 PM    CO2 26 07/19/2021 04:07 PM    Anion gap 6 01/11/2021 09:48 AM    Glucose 94 07/19/2021 04:07 PM    BUN 29 07/19/2021 04:07 PM    Creatinine 1.35 (H) 07/19/2021 04:07 PM    BUN/Creatinine ratio 21 07/19/2021 04:07 PM    GFR est AA 40 (L) 07/19/2021 04:07 PM    GFR est non-AA 35 (L) 07/19/2021 04:07 PM    Calcium 9.3 07/19/2021 04:07 PM     Lab Results   Component Value Date/Time    TSH 0.278 (L) 07/19/2021 04:07 PM      Labs show kidney function is as expected with her on diuretic  Repeat BMP in 2 months, watch creatinine  K and Na are fine  TSH is low, is she on thyroid medication? , this indicates she has mild hyperthyroidism and should see PCP or Endocrine         Current Outpatient Medications   Medication Instructions    amLODIPine (NORVASC) 5 mg, Oral, EVERY BEDTIME    apixaban (ELIQUIS) 5 mg, Oral, 2 TIMES DAILY    aspirin 81 mg, Oral, EVERY BEDTIME    atorvastatin (LIPITOR) 20 mg, Oral, EVERY BEDTIME    chlorthalidone (HYGROTEN) 25 mg tablet Oral, DAILY    doxazosin (CARDURA) 1 mg, Oral, DAILY    famotidine (PEPCID) 10 mg, Oral, 2 TIMES DAILY    losartan (COZAAR) 100 mg tablet TAKE ONE TABLET BY MOUTH DAILY    metoprolol tartrate (LOPRESSOR) 50 mg, Oral, 2 TIMES DAILY    multivitamin with iron tablet 1 Tablet, Oral, DAILY    PEG 3350-Electrolytes (GO-LYTELY) 236-22.74-6.74 -5.86 gram suspension Oral, NOW    Saccharomyces boulardii (FLORASTOR PO) Oral, DAILY

## 2021-08-12 ENCOUNTER — TRANSCRIBE ORDER (OUTPATIENT)
Dept: SCHEDULING | Age: 86
End: 2021-08-12

## 2021-08-12 DIAGNOSIS — M51.9 INTERVERTEBRAL DISC DISORDER: Primary | ICD-10-CM

## 2021-08-23 ENCOUNTER — HOSPITAL ENCOUNTER (OUTPATIENT)
Dept: MRI IMAGING | Age: 86
Discharge: HOME | End: 2021-08-23
Attending: FAMILY MEDICINE
Payer: MEDICARE

## 2021-08-23 DIAGNOSIS — M51.9 INTERVERTEBRAL DISC DISORDER: ICD-10-CM

## 2021-08-23 PROCEDURE — 72148 MRI LUMBAR SPINE W/O DYE: CPT

## 2021-09-09 ENCOUNTER — TELEPHONE (OUTPATIENT)
Dept: CARDIOLOGY CLINIC | Age: 86
End: 2021-09-09

## 2021-09-09 NOTE — TELEPHONE ENCOUNTER
Daughter states Luli Herron have been waiting for a cardiac clearance letter in regards to stopping Eliquis for 3 day prior to a back shot for the patient. \"   Daughter is requesting a call back in regards to this.      Dr. Luis Alberto Villanueva [facility])     PHONE: [phone]

## 2021-09-09 NOTE — TELEPHONE ENCOUNTER
I have no objection to the planned  surgery. Patient seems to be at a low risk for cr-operative severe adverse cardiac events.     Ok to hold [address] for 3 days prior for epidural steroid  Start back 1-2 days later at discretion of spine doctor

## 2021-09-09 NOTE — TELEPHONE ENCOUNTER
Patient's daughter calling in reference to clearance to stop the patient's eliquis, please advise            [phone]

## 2021-09-10 NOTE — TELEPHONE ENCOUNTER
Verified patient with two types of identifiers. Updated patient's daughter that signed form was faxed this morning and confirmation received. Patient's daughter verbalized understanding and appreciation.

## 2021-09-18 DIAGNOSIS — I25.10 ATHEROSCLEROSIS OF NATIVE CORONARY ARTERY OF NATIVE HEART WITHOUT ANGINA PECTORIS: ICD-10-CM

## 2021-09-21 NOTE — TELEPHONE ENCOUNTER
Pharmacy requesting refill, patient is out of this med      [facility] [phone]

## 2021-09-22 RX ORDER — METOPROLOL TARTRATE 50 MG/1
50 TABLET ORAL 2 TIMES DAILY
Qty: 180 TABLET | Refills: 3 | Status: SHIPPED | OUTPATIENT
Start: 2021-09-22

## 2021-10-07 ENCOUNTER — TELEPHONE (OUTPATIENT)
Dept: CARDIOLOGY CLINIC | Age: 86
End: 2021-10-07

## 2021-10-07 NOTE — TELEPHONE ENCOUNTER
Daughter is requesting a call in regards to the paperwork for the patient about her Eliquis. Daughter is wanting the paperwork to be done by today. [facility], for the second injection.      PHONE: [phone]

## 2021-10-07 NOTE — TELEPHONE ENCOUNTER
Verified patient with two types of identifiers. Spoke to Pan at [facility] and gave her my fax number. She will fax the form now. Updated Fabio Hurt, patient's daughter as well. Patient's daughter verbalized understanding and will call with any other questions.

## 2021-10-08 ENCOUNTER — TELEPHONE (OUTPATIENT)
Dept: CARDIOLOGY CLINIC | Age: 86
End: 2021-10-08

## 2021-10-08 NOTE — TELEPHONE ENCOUNTER
Patient's daughter Rapheal Lesch called to follow up on the medicine management for surgery on 10/12/2021. Form was faxed but never sent to Shauna Foster  Please fax to the number on the form.           QTFKM:[phone]

## 2021-10-13 DIAGNOSIS — I10 ESSENTIAL HYPERTENSION, BENIGN: ICD-10-CM

## 2021-10-20 RX ORDER — LOSARTAN POTASSIUM 100 MG/1
100 TABLET ORAL DAILY
Qty: 90 TABLET | Refills: 3 | Status: SHIPPED | OUTPATIENT
Start: 2021-10-20

## 2021-10-20 NOTE — TELEPHONE ENCOUNTER
Per VO by MD.     Future Appointments   Date Time Provider [facility] Erin   1/5/2022 10:40 AM Pierre Major MD [facility] AMB

## 2021-11-27 ENCOUNTER — HOSPITAL ENCOUNTER (EMERGENCY)
Age: 86
Discharge: HOME | End: 2021-11-27
Attending: STUDENT IN AN ORGANIZED HEALTH CARE EDUCATION/TRAINING PROGRAM
Payer: MEDICARE

## 2021-11-27 VITALS
OXYGEN SATURATION: 98 % | TEMPERATURE: 98.4 F | HEART RATE: 63 BPM | SYSTOLIC BLOOD PRESSURE: 167 MMHG | HEIGHT: 65 IN | DIASTOLIC BLOOD PRESSURE: 51 MMHG | WEIGHT: 140 LBS | BODY MASS INDEX: 23.32 KG/M2 | RESPIRATION RATE: 17 BRPM

## 2021-11-27 DIAGNOSIS — R41.0 CONFUSION: Primary | ICD-10-CM

## 2021-11-27 DIAGNOSIS — R19.7 DIARRHEA, UNSPECIFIED TYPE: ICD-10-CM

## 2021-11-27 LAB
ALBUMIN SERPL-MCNC: 3.2 G/DL (ref 3.5–5)
ALBUMIN/GLOB SERPL: 0.8 {RATIO} (ref 1.1–2.2)
ALP SERPL-CCNC: 85 U/L (ref 45–117)
ALT SERPL-CCNC: 20 U/L (ref 12–78)
ANION GAP SERPL CALC-SCNC: 6 MMOL/L (ref 5–15)
APPEARANCE UR: CLEAR
AST SERPL-CCNC: 21 U/L (ref 15–37)
BACTERIA URNS QL MICRO: NEGATIVE /HPF
BASOPHILS # BLD: 0.1 K/UL (ref 0–0.1)
BASOPHILS NFR BLD: 1 % (ref 0–1)
BILIRUB SERPL-MCNC: 0.7 MG/DL (ref 0.2–1)
BILIRUB UR QL: NEGATIVE
BUN SERPL-MCNC: 20 MG/DL (ref 6–20)
BUN/CREAT SERPL: 19 (ref 12–20)
CALCIUM SERPL-MCNC: 9.8 MG/DL (ref 8.5–10.1)
CHLORIDE SERPL-SCNC: 101 MMOL/L (ref 97–108)
CO2 SERPL-SCNC: 29 MMOL/L (ref 21–32)
COLOR UR: NORMAL
COMMENT, HOLDF: NORMAL
CREAT SERPL-MCNC: 1.07 MG/DL (ref 0.55–1.02)
DIFFERENTIAL METHOD BLD: ABNORMAL
EOSINOPHIL # BLD: 0.4 K/UL (ref 0–0.4)
EOSINOPHIL NFR BLD: 4 % (ref 0–7)
EPITH CASTS URNS QL MICRO: NORMAL /LPF
ERYTHROCYTE [DISTWIDTH] IN BLOOD BY AUTOMATED COUNT: 13 % (ref 11.5–14.5)
GLOBULIN SER CALC-MCNC: 3.9 G/DL (ref 2–4)
GLUCOSE SERPL-MCNC: 113 MG/DL (ref 65–100)
GLUCOSE UR STRIP.AUTO-MCNC: NEGATIVE MG/DL
HCT VFR BLD AUTO: 31.7 % (ref 35–47)
HGB BLD-MCNC: 10.6 G/DL (ref 11.5–16)
HGB UR QL STRIP: NEGATIVE
HYALINE CASTS URNS QL MICRO: NORMAL /LPF (ref 0–5)
IMM GRANULOCYTES # BLD AUTO: 0 K/UL (ref 0–0.04)
IMM GRANULOCYTES NFR BLD AUTO: 0 % (ref 0–0.5)
KETONES UR QL STRIP.AUTO: NEGATIVE MG/DL
LACTATE SERPL-SCNC: 0.7 MMOL/L (ref 0.4–2)
LEUKOCYTE ESTERASE UR QL STRIP.AUTO: NEGATIVE
LYMPHOCYTES # BLD: 2.2 K/UL (ref 0.8–3.5)
LYMPHOCYTES NFR BLD: 26 % (ref 12–49)
MCH RBC QN AUTO: 30.1 PG (ref 26–34)
MCHC RBC AUTO-ENTMCNC: 33.4 G/DL (ref 30–36.5)
MCV RBC AUTO: 90.1 FL (ref 80–99)
MONOCYTES # BLD: 0.8 K/UL (ref 0–1)
MONOCYTES NFR BLD: 9 % (ref 5–13)
NEUTS SEG # BLD: 5.1 K/UL (ref 1.8–8)
NEUTS SEG NFR BLD: 60 % (ref 32–75)
NITRITE UR QL STRIP.AUTO: NEGATIVE
NRBC # BLD: 0 K/UL (ref 0–0.01)
NRBC BLD-RTO: 0 PER 100 WBC
PH UR STRIP: 7.5 [PH] (ref 5–8)
PLATELET # BLD AUTO: 278 K/UL (ref 150–400)
PMV BLD AUTO: 9.8 FL (ref 8.9–12.9)
POTASSIUM SERPL-SCNC: 3.4 MMOL/L (ref 3.5–5.1)
PROT SERPL-MCNC: 7.1 G/DL (ref 6.4–8.2)
PROT UR STRIP-MCNC: NEGATIVE MG/DL
RBC # BLD AUTO: 3.52 M/UL (ref 3.8–5.2)
RBC #/AREA URNS HPF: NORMAL /HPF (ref 0–5)
SAMPLES BEING HELD,HOLD: NORMAL
SODIUM SERPL-SCNC: 136 MMOL/L (ref 136–145)
SP GR UR REFRACTOMETRY: <1.005 (ref 1–1.03)
UROBILINOGEN UR QL STRIP.AUTO: 0.2 EU/DL (ref 0.2–1)
WBC # BLD AUTO: 8.4 K/UL (ref 3.6–11)
WBC URNS QL MICRO: NORMAL /HPF (ref 0–4)

## 2021-11-27 PROCEDURE — 85025 COMPLETE CBC W/AUTO DIFF WBC: CPT

## 2021-11-27 PROCEDURE — 74011250636 HC RX REV CODE- 250/636: Performed by: STUDENT IN AN ORGANIZED HEALTH CARE EDUCATION/TRAINING PROGRAM

## 2021-11-27 PROCEDURE — 80053 COMPREHEN METABOLIC PANEL: CPT

## 2021-11-27 PROCEDURE — 99284 EMERGENCY DEPT VISIT MOD MDM: CPT

## 2021-11-27 PROCEDURE — 81001 URINALYSIS AUTO W/SCOPE: CPT

## 2021-11-27 PROCEDURE — 36415 COLL VENOUS BLD VENIPUNCTURE: CPT

## 2021-11-27 PROCEDURE — 87040 BLOOD CULTURE FOR BACTERIA: CPT

## 2021-11-27 PROCEDURE — 96360 HYDRATION IV INFUSION INIT: CPT

## 2021-11-27 PROCEDURE — 83605 ASSAY OF LACTIC ACID: CPT

## 2021-11-27 PROCEDURE — 96361 HYDRATE IV INFUSION ADD-ON: CPT

## 2021-11-27 PROCEDURE — 87086 URINE CULTURE/COLONY COUNT: CPT

## 2021-11-27 RX ORDER — SODIUM CHLORIDE 9 MG/ML
1000 INJECTION, SOLUTION INTRAVENOUS ONCE
Status: COMPLETED | OUTPATIENT
Start: 2021-11-27 | End: 2021-11-27

## 2021-11-27 RX ADMIN — SODIUM CHLORIDE 1000 ML: 9 INJECTION, SOLUTION INTRAVENOUS at 13:25

## 2021-11-27 NOTE — ED TRIAGE NOTES
Patient arrives with complaint of altered mental status, diarrhea, and fever. Patient is alert to self, place, and disoriented to time and situation. Patient arrives with daughter to triage who states symptoms have been present for past \"few\" days. Sta    Hx of urosepsis.

## 2021-11-27 NOTE — ED PROVIDER NOTES
Patient is a 41-year-old female presenting to the emergency department today secondary to diarrhea, fatigue and confusion. Daughter is at bedside who helps with history. Patient states that for the past several days she has had increased \"loopiness\", fatigue and a few episodes of diarrhea, one yesterday and 3 today. No blood in her stools. No pain anywhere including the chest, abdomen, head, neck or back. No focal weakness or numbness. She had a subjective fever earlier today she has gotten so in the past with UTIs so family wanted her to be evaluated for this. Of note she also started taking a new medication, duloxetine?,  For back pain. She started this several weeks ago and has recently had it uptitrated.             Past Medical History:   Diagnosis Date    Carotid arterial disease ([facility] [address]     mild 0-49%    Coronary atherosclerosis of native coronary artery     Cath 2011 with multivessel cad    Cough due to ACE inhibitor     Diverticulitis     Dyslipidemia     Hypertension     Macular degeneration     S/P CABG (coronary artery bypass graft)     6-19-11 CABG - OFF PUMP - CABGx 3, lima, eric, epi aortic ultrasound - off pump, rightt endoscopic vein harvest; [id number] [place] [address] Dr to LAD, Svg Ao to OM1 and OM2         Social History     Socioeconomic History    Marital status:      Spouse name: Not on file    Number of children: Not on file    Years of education: Not on file    Highest education level: Not on file   Occupational History    Not on file   Tobacco Use    Smoking status: Never Smoker    Smokeless tobacco: Never Used   Substance and Sexual Activity    Alcohol use: Not Currently     Alcohol/week: 1.0 standard drink     Types: 1 Standard drinks or equivalent per week     Comment: Occasional    Drug use: No    Sexual activity: Not Currently   Other Topics Concern    Not on file   Social History Narrative    Not on file     Social Determinants of Health     Financial Resource Strain:     Difficulty of Paying Living Expenses: Not on file   Food Insecurity:     Worried About Running Out of Food in the Last Year: Not on file    Elie of Food in the Last Year: Not on file   Transportation Needs:     Lack of Transportation (Medical): Not on file    Lack of Transportation (Non-Medical): Not on file   Physical Activity:     Days of Exercise per Week: Not on file    Minutes of Exercise per Session: Not on file   Stress:     Feeling of Stress : Not on file   Social Connections:     Frequency of Communication with Friends and Family: Not on file    Frequency of Social Gatherings with Friends and Family: Not on file    Attends Protestant Services: Not on file    Active Member of [address] [place] [address] or Organizations: Not on file    Attends Club or Organization Meetings: Not on file    Marital Status: Not on file   Intimate Partner Violence:     Fear of Current or Ex-Partner: Not on file    Emotionally Abused: Not on file    Physically Abused: Not on file    Sexually Abused: Not on file   Housing Stability:     Unable to Pay for Housing in the Last Year: Not on file    Number of [place] in the Last Year: Not on file    Unstable Housing in the Last Year: Not on file         ALLERGIES: Ciprofloxacin, Codeine, Lisinopril, and Lovastatin    Review of Systems   Constitutional: Positive for fatigue. Negative for chills. HENT: Negative for congestion and rhinorrhea. Eyes: Negative for redness and visual disturbance. Respiratory: Negative for cough and shortness of breath. Cardiovascular: Negative for chest pain and leg swelling. Gastrointestinal: Positive for diarrhea. Negative for abdominal pain, nausea and vomiting. Genitourinary: Negative for dysuria, flank pain, frequency, hematuria and urgency. Musculoskeletal: Negative for arthralgias, back pain, myalgias and neck pain. Skin: Negative for rash and wound.    Allergic/Immunologic: Negative for immunocompromised state.   Neurological: Negative for dizziness and headaches. Psychiatric/Behavioral: Positive for confusion. Vitals:    11/27/21 1134 11/27/21 1326   BP: (!) 163/64    Pulse: 61    Resp: 18    Temp: 98.4 °F (36.9 °C)    SpO2: 97% 98%   Weight: 63.5 kg (140 lb)    Height: 5' 5\" (1.651 m)             Physical Exam  Vitals and nursing note reviewed. Constitutional:       General: She is not in acute distress. Appearance: She is well-developed. She is not diaphoretic. HENT:      Head: Normocephalic. Mouth/Throat:      Pharynx: No oropharyngeal exudate. Eyes:      General:         Right eye: No discharge. Left eye: No discharge. Pupils: Pupils are equal, round, and reactive to light. Cardiovascular:      Rate and Rhythm: Normal rate and regular rhythm. Heart sounds: Normal heart sounds. No murmur heard. No friction rub. No gallop. Pulmonary:      Effort: Pulmonary effort is normal. No respiratory distress. Breath sounds: Normal breath sounds. No stridor. No wheezing or rales. Abdominal:      General: Bowel sounds are normal. There is no distension. Palpations: Abdomen is soft. Tenderness: There is no abdominal tenderness. There is no guarding or rebound. Musculoskeletal:         General: No deformity. Normal range of motion. Cervical back: Normal range of motion and neck supple. Skin:     General: Skin is warm and dry. Capillary Refill: Capillary refill takes less than 2 seconds. Findings: No rash. Neurological:      General: No focal deficit present. Mental Status: She is alert and oriented to person, place, and time. Cranial Nerves: No cranial nerve deficit. Motor: No weakness. Comments:  Answers questions appropriately   Psychiatric:         Behavior: Behavior normal.          MDM       Procedures            The patient is a quite well-appearing 71-year-old female here today with some \"loopiness\", increased fatigue/sleepiness and diarrhea with concerns for UTI. Lab work unremarkable with normal renal function, mild anemia, no leukocytosis and normal lactic acid. Urinalysis pending at time of signout. She is in no acute distress with stable vital signs and a nonfocal neurologic exam.  She says she has been feeling loopy and a bit confused however she seems quite sharp to me and answers questions appropriately. I wonder if some of her symptoms may be related to her recently uptitrated medication for her back, family thinks it is duloxetine. Advised her to continue this but to discuss with the prescribing provider about possibly backing off on the dose. I had a lengthy discussion with patient and daughter regarding hospitalization versus discharge home and I both agree that discharge home would be the most appropriate disposition at this time. Patient was signed out to Dr. Jhoan Feldman at 3:15 PM pending urinalysis.       Alhaji Lind, DO

## 2021-11-27 NOTE — ED NOTES
11:29 AM  I have evaluated the patient as the Provider in Triage. I have reviewed Her vital signs and the triage nurse assessment. I have talked with the patient and any available family and advised that I am the provider in triage and have ordered the appropriate study to initiate their work up based on the clinical presentation during my assessment. I have advised that the patient will be accommodated in the Main ED as soon as possible. I have also requested to contact the triage nurse or myself immediately if the patient experiences any changes in their condition during this brief waiting period. Brought in by daughter for concerns of sepsis. States she has worsening confusion, diarrhea and a fever this morning. History of sepsis in the past. States increased fatigue.    Stefany Hastings, ANA

## 2021-11-28 LAB
BACTERIA SPEC CULT: NORMAL
SERVICE CMNT-IMP: NORMAL

## 2021-12-02 LAB
BACTERIA SPEC CULT: NORMAL
SERVICE CMNT-IMP: NORMAL

## 2022-03-19 PROBLEM — E87.1 HYPONATREMIA: Status: ACTIVE | Noted: 2019-08-03

## 2022-03-19 PROBLEM — G93.41 ACUTE METABOLIC ENCEPHALOPATHY: Status: ACTIVE | Noted: 2019-10-19

## 2022-03-19 PROBLEM — E87.6 HYPOKALEMIA: Status: ACTIVE | Noted: 2019-08-03

## 2022-03-19 PROBLEM — R11.2 NAUSEA VOMITING AND DIARRHEA: Status: ACTIVE | Noted: 2019-10-19

## 2022-03-19 PROBLEM — E86.0 DEHYDRATION: Status: ACTIVE | Noted: 2019-08-03

## 2022-03-19 PROBLEM — R19.7 NAUSEA VOMITING AND DIARRHEA: Status: ACTIVE | Noted: 2019-10-19

## 2022-03-19 PROBLEM — D72.829 LEUKOCYTOSIS: Status: ACTIVE | Noted: 2019-10-19

## 2022-07-05 ENCOUNTER — HOSPITAL ENCOUNTER (EMERGENCY)
Age: 87
Discharge: HOME | End: 2022-07-05
Attending: STUDENT IN AN ORGANIZED HEALTH CARE EDUCATION/TRAINING PROGRAM
Payer: MEDICARE

## 2022-07-05 VITALS
WEIGHT: 128 LBS | HEIGHT: 63 IN | TEMPERATURE: 98.7 F | BODY MASS INDEX: 22.68 KG/M2 | SYSTOLIC BLOOD PRESSURE: 148 MMHG | HEART RATE: 63 BPM | OXYGEN SATURATION: 98 % | DIASTOLIC BLOOD PRESSURE: 53 MMHG | RESPIRATION RATE: 16 BRPM

## 2022-07-05 DIAGNOSIS — E87.6 HYPOKALEMIA: Primary | ICD-10-CM

## 2022-07-05 DIAGNOSIS — E87.1 HYPONATREMIA: ICD-10-CM

## 2022-07-05 LAB
ALBUMIN SERPL-MCNC: 2.5 G/DL (ref 3.5–5)
ALBUMIN/GLOB SERPL: 0.8 {RATIO} (ref 1.1–2.2)
ALP SERPL-CCNC: 51 U/L (ref 45–117)
ALT SERPL-CCNC: 20 U/L (ref 12–78)
ANION GAP SERPL CALC-SCNC: 10 MMOL/L (ref 5–15)
AST SERPL-CCNC: 10 U/L (ref 15–37)
BASOPHILS # BLD: 0 K/UL (ref 0–0.1)
BASOPHILS NFR BLD: 0 % (ref 0–1)
BILIRUB SERPL-MCNC: 0.7 MG/DL (ref 0.2–1)
BUN SERPL-MCNC: 36 MG/DL (ref 6–20)
BUN/CREAT SERPL: 28 (ref 12–20)
CALCIUM SERPL-MCNC: 8.2 MG/DL (ref 8.5–10.1)
CHLORIDE SERPL-SCNC: 96 MMOL/L (ref 97–108)
CO2 SERPL-SCNC: 25 MMOL/L (ref 21–32)
COVID-19 RAPID TEST, COVR: DETECTED
CREAT SERPL-MCNC: 1.29 MG/DL (ref 0.55–1.02)
DIFFERENTIAL METHOD BLD: ABNORMAL
EOSINOPHIL # BLD: 0 K/UL (ref 0–0.4)
EOSINOPHIL NFR BLD: 0 % (ref 0–7)
ERYTHROCYTE [DISTWIDTH] IN BLOOD BY AUTOMATED COUNT: 13.5 % (ref 11.5–14.5)
GLOBULIN SER CALC-MCNC: 3.3 G/DL (ref 2–4)
GLUCOSE SERPL-MCNC: 214 MG/DL (ref 65–100)
HCT VFR BLD AUTO: 31.7 % (ref 35–47)
HGB BLD-MCNC: 11 G/DL (ref 11.5–16)
IMM GRANULOCYTES # BLD AUTO: 0.2 K/UL (ref 0–0.04)
IMM GRANULOCYTES NFR BLD AUTO: 1 % (ref 0–0.5)
INR PPP: 1.1 (ref 0.9–1.1)
LYMPHOCYTES # BLD: 1.4 K/UL (ref 0.8–3.5)
LYMPHOCYTES NFR BLD: 10 % (ref 12–49)
MAGNESIUM SERPL-MCNC: 2.2 MG/DL (ref 1.6–2.4)
MCH RBC QN AUTO: 30.1 PG (ref 26–34)
MCHC RBC AUTO-ENTMCNC: 34.7 G/DL (ref 30–36.5)
MCV RBC AUTO: 86.8 FL (ref 80–99)
MONOCYTES # BLD: 0.8 K/UL (ref 0–1)
MONOCYTES NFR BLD: 6 % (ref 5–13)
NEUTS SEG # BLD: 11.4 K/UL (ref 1.8–8)
NEUTS SEG NFR BLD: 82 % (ref 32–75)
NRBC # BLD: 0 K/UL (ref 0–0.01)
NRBC BLD-RTO: 0 PER 100 WBC
PLATELET # BLD AUTO: 310 K/UL (ref 150–400)
PMV BLD AUTO: 9.4 FL (ref 8.9–12.9)
POTASSIUM SERPL-SCNC: 2.6 MMOL/L (ref 3.5–5.1)
PROT SERPL-MCNC: 5.8 G/DL (ref 6.4–8.2)
PROTHROMBIN TIME: 11 SEC (ref 9–11.1)
RBC # BLD AUTO: 3.65 M/UL (ref 3.8–5.2)
SODIUM SERPL-SCNC: 131 MMOL/L (ref 136–145)
SOURCE, COVRS: ABNORMAL
WBC # BLD AUTO: 13.8 K/UL (ref 3.6–11)

## 2022-07-05 PROCEDURE — 93005 ELECTROCARDIOGRAM TRACING: CPT

## 2022-07-05 PROCEDURE — 74011250636 HC RX REV CODE- 250/636: Performed by: STUDENT IN AN ORGANIZED HEALTH CARE EDUCATION/TRAINING PROGRAM

## 2022-07-05 PROCEDURE — 96360 HYDRATION IV INFUSION INIT: CPT

## 2022-07-05 PROCEDURE — 85025 COMPLETE CBC W/AUTO DIFF WBC: CPT

## 2022-07-05 PROCEDURE — 36415 COLL VENOUS BLD VENIPUNCTURE: CPT

## 2022-07-05 PROCEDURE — 83735 ASSAY OF MAGNESIUM: CPT

## 2022-07-05 PROCEDURE — 99284 EMERGENCY DEPT VISIT MOD MDM: CPT

## 2022-07-05 PROCEDURE — 87635 SARS-COV-2 COVID-19 AMP PRB: CPT

## 2022-07-05 PROCEDURE — 80053 COMPREHEN METABOLIC PANEL: CPT

## 2022-07-05 PROCEDURE — 74011250637 HC RX REV CODE- 250/637: Performed by: STUDENT IN AN ORGANIZED HEALTH CARE EDUCATION/TRAINING PROGRAM

## 2022-07-05 PROCEDURE — 85610 PROTHROMBIN TIME: CPT

## 2022-07-05 RX ORDER — POTASSIUM CHLORIDE 750 MG/1
40 TABLET, FILM COATED, EXTENDED RELEASE ORAL
Status: DISCONTINUED | OUTPATIENT
Start: 2022-07-05 | End: 2022-07-05

## 2022-07-05 RX ORDER — BUDESONIDE 3 MG/1
6 CAPSULE, COATED PELLETS ORAL
COMMUNITY

## 2022-07-05 RX ADMIN — SODIUM CHLORIDE 1000 ML: 9 INJECTION, SOLUTION INTRAVENOUS at 16:09

## 2022-07-05 RX ADMIN — POTASSIUM BICARBONATE 40 MEQ: 391 TABLET, EFFERVESCENT ORAL at 17:38

## 2022-07-05 NOTE — ED TRIAGE NOTES
Arrived ambulatory to treatment area via wheelchair with daughter. Daughter states mother was diagnosed with Covid on 6/25/22 and has been in quarantine since. Per daughter, patient's PCP sent her here for dehydration and labs.

## 2022-07-05 NOTE — ED PROVIDER NOTES
Patient is a 26-year-old female who is in a skilled nursing facility with her . They both been quarantining for [employer].  was diagnosed with COVID about 10 days ago. Family finally saw the patient today she is not acting quite herself. They went to PCPs office and they wanted lab work and IV hydration as patient appears dehydrated. Family offered EKG, CT head but they declined. The investigation for head bleed was discussed however family state understanding and stated patient was DNR and they would not want any heroic measures or invasive interventions. Patient is on Eliquis and has bruising of the right side of her face. Family concern, patient is usually active and self-sufficient but now she is exhausted, fatigued. Patient is finished Paxlovid on 7/2/2022. Family unsure if she was ever tested or diagnosed with COVID. The history is provided by the patient and medical records. Dehydration  This is a new problem. The current episode started more than 2 days ago. The problem occurs constantly. The problem has not changed since onset. Pertinent negatives include no chest pain, no abdominal pain, no headaches and no shortness of breath. Nothing aggravates the symptoms. Nothing relieves the symptoms. She has tried nothing for the symptoms. The treatment provided no relief. Past Medical History:   Diagnosis Date    Carotid arterial disease ([address]     mild 0-49%    Coronary atherosclerosis of native coronary artery     Cath 2011 with multivessel cad    Cough due to ACE inhibitor     Diverticulitis     Dyslipidemia     Hypertension     Macular degeneration     S/P CABG (coronary artery bypass graft)     6-19-11 CABG - OFF PUMP - CABGx 3, lima, eric, epi aortic ultrasound - off pump, rightt endoscopic vein harvest; [address] to LAD, Svg Ao to OM1 and OM2       History reviewed. No pertinent surgical history. History reviewed.  No pertinent family history. Social History     Socioeconomic History    Marital status:      Spouse name: Not on file    Number of children: Not on file    Years of education: Not on file    Highest education level: Not on file   Occupational History    Not on file   Tobacco Use    Smoking status: Never Smoker    Smokeless tobacco: Never Used   Substance and Sexual Activity    Alcohol use: Not Currently     Alcohol/week: 1.0 standard drink     Types: 1 Standard drinks or equivalent per week     Comment: Occasional    Drug use: No    Sexual activity: Not Currently   Other Topics Concern    Not on file   Social History Narrative    Not on file     Social Determinants of Health     Financial Resource Strain:     Difficulty of Paying Living Expenses: Not on file   Food Insecurity:     Worried About Running Out of Food in the Last Year: Not on file    Elie of Food in the Last Year: Not on file   Transportation Needs:     Lack of Transportation (Medical): Not on file    Lack of Transportation (Non-Medical):  Not on file   Physical Activity:     Days of Exercise per Week: Not on file    Minutes of Exercise per Session: Not on file   Stress:     Feeling of Stress : Not on file   Social Connections:     Frequency of Communication with Friends and Family: Not on file    Frequency of Social Gatherings with Friends and Family: Not on file    Attends Restoration Services: Not on file    Active Member of [address] [place] [address] or Organizations: Not on file    Attends Club or Organization Meetings: Not on file    Marital Status: Not on file   Intimate Partner Violence:     Fear of Current or Ex-Partner: Not on file    Emotionally Abused: Not on file    Physically Abused: Not on file    Sexually Abused: Not on file   Housing Stability:     Unable to Pay for Housing in the Last Year: Not on file    Number of [place] in the Last Year: Not on file    Unstable Housing in the Last Year: Not on file         ALLERGIES: Ciprofloxacin, Codeine, Lisinopril, and Lovastatin    Review of Systems   Constitutional: Positive for activity change, appetite change and fatigue. HENT: Negative. Eyes: Negative. Respiratory: Negative. Negative for shortness of breath. Cardiovascular: Negative. Negative for chest pain. Gastrointestinal: Negative. Negative for abdominal pain. Endocrine: Negative. Genitourinary: Negative. Musculoskeletal: Negative. Skin: Positive for color change. Allergic/Immunologic: Negative. Neurological: Negative. Negative for headaches. Hematological: Negative. Psychiatric/Behavioral: Negative. Vitals:    07/05/22 1729 07/05/22 1733 07/05/22 1735 07/05/22 1753   BP:       Pulse:       Resp:       Temp:       SpO2: 98% 98% 98% 98%   Weight:       Height:                Physical Exam  Vitals and nursing note reviewed. Constitutional:       General: She is not in acute distress. Appearance: Normal appearance. HENT:      Head: Normocephalic and atraumatic. Right Ear: External ear normal.      Left Ear: External ear normal.      Nose: Nose normal.   Eyes:      Extraocular Movements: Extraocular movements intact. Conjunctiva/sclera: Conjunctivae normal.   Cardiovascular:      Rate and Rhythm: Normal rate. Pulses: Normal pulses. Radial pulses are 2+ on the right side and 2+ on the left side. Heart sounds: Normal heart sounds. Pulmonary:      Effort: Pulmonary effort is normal.      Breath sounds: Normal breath sounds. Chest:      Chest wall: No deformity or tenderness. Abdominal:      General: Abdomen is flat. There is no distension. Tenderness: There is no abdominal tenderness. Musculoskeletal:         General: No deformity or signs of injury. Normal range of motion. Cervical back: Normal range of motion and neck supple. No tenderness. Skin:     General: Skin is warm and dry.       Capillary Refill: Capillary refill takes less than 2 seconds. Findings: Bruising present. Neurological:      General: No focal deficit present. Mental Status: She is alert and oriented to person, place, and time. Psychiatric:         Attention and Perception: Attention normal.         Mood and Affect: Mood normal.         Behavior: Behavior normal.          [facility]  ED Course as of 07/05/22 2141 Tue Jul 05, 2022   1725 EKG interpretation:   Rhythm: atrial fib; and irregular. Rate (approx.): 69; Axis: normal; ST/T wave: non-specific changes; EKG documented and interpreted by Renetta Balderrama. Dom Britt MD, Emergency Medicine.   [AL]      ED Course User Index  [AL] Nadir Sofia MD     LABORATORY RESULTS:  Labs Reviewed   COVID-19 RAPID TEST - Abnormal; Notable for the following components:       Result Value    COVID-19 rapid test Detected (*)     All other components within normal limits   CBC WITH AUTOMATED DIFF - Abnormal; Notable for the following components:    WBC 13.8 (*)     RBC 3.65 (*)     HGB 11.0 (*)     HCT 31.7 (*)     NEUTROPHILS 82 (*)     LYMPHOCYTES 10 (*)     IMMATURE GRANULOCYTES 1 (*)     ABS. NEUTROPHILS 11.4 (*)     ABS. IMM.  GRANS. 0.2 (*)     All other components within normal limits   METABOLIC PANEL, COMPREHENSIVE - Abnormal; Notable for the following components:    Sodium 131 (*)     Potassium 2.6 (*)     Chloride 96 (*)     Glucose 214 (*)     BUN 36 (*)     Creatinine 1.29 (*)     BUN/Creatinine ratio 28 (*)     GFR est AA 47 (*)     GFR est non-AA 39 (*)     Calcium 8.2 (*)     AST (SGOT) 10 (*)     Protein, total 5.8 (*)     Albumin 2.5 (*)     A-G Ratio 0.8 (*)     All other components within normal limits   MAGNESIUM   PROTHROMBIN TIME + INR       IMAGING RESULTS:  No orders to display       MEDICATIONS GIVEN:  Medications   sodium chloride 0.9 % bolus infusion 1,000 mL (0 mL IntraVENous IV Completed 7/5/22 1714)   potassium bicarb-citric acid (EFFER-K) tablet 40 mEq (40 mEq Oral Given 7/5/22 1738)       Differential diagnosis: Dehydration, electrolyte abnormality, fatigue, sequela of COVID-19, bleeding risk on anticoagulation, facial bruising, head trauma    ED physician interpretation of imaging: Patient and family declined CT head as they stated it would not  as she does not want any heroic measures or interventions  ED physician interpretation of EKG: No STEMI. See my interpretation EKG and ED course above. ED physician interpretation of laboratory results: Lab work with hypokalemia without hypomagnesemia. No EKG findings concerning for QTC prolongation from hypokalemia. MDM: Patient is a 30-year-old female with recent COVID exposure and treatment with antivirals presenting the ED with primary care doctor concern for dehydration with patient found to have positive COVID test, hypokalemia without hypomagnesemia, hyponatremia. Multiple options for treatment were discussed with patient and family. They do not want hospital admission at this time or any heroic measures. Patient can tolerate p.o. and her electrolyte values are likely due to recent quarantine for COVID. Patient given p.o. potassium likely improving her potassium to 3.0. Prescriptions written for additional potassium. Patient also given normal saline the likely improve her sodium. Discussed strict return precautions. Further recommendations as below. Patient is comfortable with discharge. She is DNR and rather spend time at home in the hospital.    Key discharge instructions and summary of care: You presented to ED with fatigue and weakness with PCP concern for dehydration. Your COVID test was positive consistent with your recent COVID exposure and you have been treated with Paxlovid. This may be contributing to her generalized fatigue. However you are also found to have significant hypokalemia and mild hyponatremia. 40 mEq orally were given here which should take you to 3.0 on your potassium.   Recommend taking the prescribed potassium twice a day for the next 5 days as well as increasing your daily potassium intake. Please review information about foods high in potassium and follow-up with your PCP      The patient has been re-evaluated and feeling better. Patient is stable for discharge. All available radiology and laboratory results have been reviewed with patient and/or available family. Patient and/or family verbally conveyed their understanding and agreement of the patient's signs, symptoms, diagnosis, treatment and prognosis and additionally agree to follow-up as recommended in the discharge instructions or to return to the Emergency Department should their condition change or worsen prior to their follow-up appointment. All questions have been answered and patient and/or available family express understanding. IMPRESSION:  1. Hypokalemia    2. Hyponatremia        DISPOSITION: Discharged     Triston Landeros MD      Procedures

## 2022-07-05 NOTE — DISCHARGE INSTRUCTIONS
You presented to ED with fatigue and weakness with PCP concern for dehydration. Your COVID test was positive consistent with your recent COVID exposure and you have been treated with Paxlovid. This may be contributing to her generalized fatigue. However you are also found to have significant hypokalemia and mild hyponatremia. 40 mEq orally were given here which should take you to 3.0 on your potassium. Recommend taking the prescribed potassium twice a day for the next 5 days as well as increasing your daily potassium intake.   Please review information about foods high in potassium and follow-up with your PCP

## 2022-07-06 LAB
CALCULATED R AXIS, ECG10: 37 DEGREES
CALCULATED T AXIS, ECG11: -43 DEGREES
DIAGNOSIS, 93000: NORMAL
Q-T INTERVAL, ECG07: 430 MS
QRS DURATION, ECG06: 140 MS
QTC CALCULATION (BEZET), ECG08: 460 MS
VENTRICULAR RATE, ECG03: 69 BPM

## 2023-03-15 NOTE — TELEPHONE ENCOUNTER
Dear Nehal Gold  On behalf of my care team, I would like to thank you for entrusting us with your care today.  Your Rheumatology Team    [facility] Rheumatology  Office Hours: Monday-Friday 8:00 am-4:45 pm    I am out of the office Friday afternoons.   Reach us by phone at [phone] during office hours.    Dr. Mahesh MD, and Staff      If you feel that there is an urgent situation please go to the emergency.    Laboratory Testing:   Most of the important results that help me make treatment decisions take a few days to come back. Most of the abnormal results for arthritis and autoimmune disease rarely require urgent action.  For x-ray results I also wait for the radiologist to review the films before giving my final conclusion.     If you have portal access to [employer] you will get the results within 5-7 days.   Once you have had your blood drawn, please allow 10-15 days to receive your results by mail. (There are specialty labs that have to be sent outside of our facility and can take longer for our office to receive results.)  If your results are normal you may receive a letter in the mail.  If your results are abnormal that require a change you will receive a call from the office.  If you were referred to a Specialist for further treatment:   Please allow 1-5 days to be contacted by that Specialty office.    If you are requesting prescription refills:   Please contact your pharmacy for all refills.   We cannot refill pain medication after hours. You will be asked to see pain management if narcotic prescriptions that are to be refilled every month are needed.      Plan:         Refaxed and received confirmation.

## 2024-04-25 NOTE — H&P
Outpatient Physical Therapy  DAILY TREATMENT     [facility] [place]  [address], [address]  [facility] [address]  Phone:  [phone]  Fax:  [phone]    Date: 04/25/2024    Patient: Marian Matias  YOB: 1953  MRN: [id number]     Time Calculation    Start time: 0815  Stop time: 0900 Time Calculation (min): 45 minutes         Chief Complaint: Shoulder Problem    Visit #: 2    SUBJECTIVE:  Patient reports she is feeling better. Notes increased IR.    OBJECTIVE:  Current objective measures:           Therapeutic Exercises (CPT 62040):     1. Walkouts ER/IR, x fatigue or 15    2. Edu on use of ice, x15min 2x day    3. Pulley, x5min    4. Scaption, xfatigue    5. Scap ret/depression, x20      Therapeutic Exercise Summary: Access Code: [email or web address]  URL: [email or web address]  Date: 04/25/2024  Prepared by: Swapnil Aguirre    Exercises  - Shoulder Internal Rotation Reactive Isometrics  - 1 x daily - 7 x weekly - 2 sets - 10 reps  - Shoulder External Rotation Reactive Isometrics  - 1 x daily - 7 x weekly - 2 sets - 10 reps  - Seated Single Arm Shoulder Scaption AROM with Resistance  - 1 x daily - 7 x weekly - 2 sets - 10 reps  - Seated Scapular Retraction  - 1 x daily - 7 x weekly - 2 sets - 10 reps  - Seated Shoulder Flexion AAROM with Pulley Behind  - 1 x daily - 7 x weekly - 2 sets - 10 reps  - Standing Shoulder Internal Rotation AAROM with Pulley  - 1 x daily - 7 x weekly - 2 sets - 10 reps    Therapeutic Treatments and Modalities:     1. E Stim Unattended (CPT 17984), IFC L shoulder 80-150hz x16min    Time-based treatments/modalities:    Physical Therapy Timed Treatment Charges  Therapeutic exercise minutes (CPT 71687): 30 minutes      Pain rating (1-10) before treatment:  1  Pain rating (1-10) after treatment:  0    ASSESSMENT:   Response to treatment: Patient responded well to therapy with an overall decrease in symptoms.     PLAN/RECOMMENDATIONS:   Plan for treatment:  [facility]    Hospitalist Admission Note      NAME:  Phill Valenzuela   :   1931   MRN:  [id number]     PCP:  Arnaud Lafleur MD     Date/Time:  8/3/2019 10:08 AM          Subjective:     CHIEF COMPLAINT: syncope     HISTORY OF PRESENT ILLNESS:     Ms. Shala Childs is a 80 y.o.  female who presented to the Emergency Department complaining of pre syncope. Occurred this AM. Occurred after BM. Associated with period of weakness, poor alertness and confusion. Patient with acute and hx chronic diarrhea, for 3 weeks lately. Failed improve on imodium and cholestyramine. In our ER she has a fever, meets sepsis criteria and UA suggests UTI. We will admit her for management. Past Medical History:   Diagnosis Date    Carotid arterial disease ([facility] [address]     mild 0-49%    Coronary atherosclerosis of native coronary artery     Cath  with multivessel cad    Cough due to ACE inhibitor     Diverticulitis     Dyslipidemia     Hypertension     Macular degeneration     S/P CABG (coronary artery bypass graft)     11 CABG - OFF PUMP - CABGx 3, lima, eric, epi aortic ultrasound - off pump, rightt endoscopic vein harvest; [id number] [place] [address] Dr to LAD, Svg Ao to OM1 and OM2        No past surgical history on file. Social History     Tobacco Use    Smoking status: Never Smoker    Smokeless tobacco: Never Used   Substance Use Topics    Alcohol use: Yes     Alcohol/week: 1.0 standard drinks     Types: 1 Standard drinks or equivalent per week     Comment: Occasional        No family history on file. Family hx cannot be fully assessed, due to inial confusion    Allergies   Allergen Reactions    Ciprofloxacin Itching and Other (comments)     Cipro, \"turn beet red like sunburn\"    Codeine Itching    Lisinopril Cough    Lovastatin Diarrhea        Prior to Admission medications    Medication Sig Start Date End Date Taking?  Authorizing Provider   atorvastatin (LIPITOR) 20 mg tablet TAKE ONE TABLET BY MOUTH ONCE NIGHTLY 3/18/19   Jason De Souza MD   metoprolol tartrate (LOPRESSOR) 50 mg tablet TAKE ONE TABLET BY MOUTH TWICE A DAY 11/9/18   Fawn Rice MD   losartan (COZAAR) 100 mg tablet TAKE ONE TABLET BY MOUTH DAILY 11/9/18   Fawn Rice MD   chlorthalidone (HYGROTEN) 25 mg tablet  10/23/18   Provider, Historical   hydroCHLOROthiazide (HYDRODIURIL) 25 mg tablet TAKE ONE TABLET BY MOUTH DAILY 8/7/18   Fawn Rice MD   amLODIPine (NORVASC) 10 mg tablet Take 1 Tab by mouth daily. Patient taking differently: Take 5 mg by mouth daily. 2/23/18   Jason De Souza MD   aspirin 81 mg chewable tablet Take 81 mg by mouth daily. Provider, Historical   doxazosin (CARDURA) 1 mg tablet Take  by mouth daily. Provider, Historical   naproxen (NAPROSYN) 250 mg tablet Take  by mouth two (2) times daily (with meals).     Provider, Historical       Review of Systems:  (bold if positive, if negative)    Gen:  feverfatigueEyes:  ENT:  CVS:  syncopePulm:  GI:  :  MS:  Skin:  Psych:  Endo:  Hem:  Renal:  Neuro:        Objective:      VITALS:    Vital signs reviewed; most recent are:    Visit Vitals  BP (!) 131/92   Pulse 78   Temp 100.1 °F (37.8 °C)   Resp 19   Ht 5' 4\" (1.626 m)   Wt 63.5 kg (140 lb)   SpO2 97%   BMI 24.03 kg/m²     SpO2 Readings from Last 6 Encounters:   08/03/19 97%   10/31/18 97%   02/07/18 98%   06/13/17 98%   10/30/13 98%   07/30/13 98%            Intake/Output Summary (Last 24 hours) at 8/3/2019 1008  Last data filed at 8/3/2019 0810  Gross per 24 hour   Intake 1000 ml   Output    Net 1000 ml        Exam:     Physical Exam:    Gen:  Well-developed, well-nourished, in no acute distress  HEENT:  Pink conjunctivae, PERRL, hearing intact to voice, dry mucous membranes  Neck:  Supple, without masses, thyroid non-tender  Resp:  No accessory muscle use, clear breath sounds without wheezes rales or rhonchi  Card:  No murmurs, normal S1, S2 without thrills, bruits therapy treatment to continue next visit.  Planned interventions for next visit: continue with current treatment.          or peripheral edema  Abd:  Soft, non-tender, non-distended, normoactive bowel sounds are present, no mass  Lymph:  No cervical or inguinal adenopathy  Musc:  No cyanosis or clubbing  Skin:  No rashes or ulcers, skin turgor is reduced  Neuro:  Cranial nerves are grossly intact, general motor weakness, follows commands   Psych: Moderate insight, oriented to person, place and time, alert     Labs:    Recent Labs     08/03/19 0710   WBC 9.7   HGB 9.9*   HCT 29.2*        Recent Labs     08/03/19 0710   *   K 3.0*      CO2 21   *   BUN 21*   CREA 1.20*   CA 8.1*   ALB 2.6*   TBILI 0.6   SGOT 10*   ALT 13     Lab Results   Component Value Date/Time    Glucose (POC) 112 (H) 06/21/2011 06:35 AM    Glucose (POC) 111 (H) 06/20/2011 09:01 PM     No results for input(s): PH, PCO2, PO2, HCO3, FIO2 in the last 72 hours. No results for input(s): INR in the last 72 hours. No lab exists for component: INREXT  All Micro Results     Procedure Component Value Units Date/Time    CULTURE, BLOOD [[id number]] Collected:  08/03/19 0710    Order Status:  Completed Specimen:  Blood Updated:  08/03/19 0934     Special Requests: NO SPECIAL REQUESTS        Culture result: NO GROWTH AFTER 2 HOURS       CULTURE, BLOOD [[id number]] Collected:  08/03/19 0801    Order Status:  Completed Specimen:  Blood Updated:  08/03/19 0934     Special Requests: NO SPECIAL REQUESTS        Culture result: NO GROWTH AFTER 1 HOUR       CULTURE, URINE [[id number]]     Order Status:  Sent Specimen:  Urine from Clean catch            Assessment and Plan:      UTI (urinary tract infection) - POA, based on UA. Awaiting cx. For now empiric ceftriaxone. Sepsis / Fever / Left shift  - POA, presumed due to UTI. NOT severe. Lactate normal.  Abx as above. Supportive care. Follow cx. Syncope / Dehydration / Hyponatremia / Hypokalemia - POA due to GI loss, poor PO intake and UTI. Hydrate and follow. Replete lytes.   Check ECHO, TSH and orthostatics. Anemia - POA, mild, not active bleeding, but may worsen with hydration. Check serologies. Coronary atherosclerosis of native coronary artery S/P CABG x 3 / Essential hypertension, benign - POA, likely stable. Normal troponin, but trend in AM.  BP fine now dspite syncope at hoem. Check ECHO. Continue ASA, metoprolol, losartan, amlodipine and doxazosin, but today hold chlorthalidone and HCTZ. Pure hypercholesterolemia - continue atorvastatin    Macular degeneration - Supportive care. Telemetry reviewed:   normal sinus rhythm    Risk of deterioration: high  Signed out to Dr Princess Julian at [facility]    Total time spent with patient: 79 Minutes I reviewed chart, notes, data and current medications in the medical record. I have examined and treated the patient at bedside during this period.                  Care Plan discussed with: Patient, Nursing Staff and >50% of time spent in counseling and coordination of care    Discussed:  Care Plan       ___________________________________________________    Attending Physician: Teresa Holborok MD